# Patient Record
Sex: FEMALE | Race: WHITE | NOT HISPANIC OR LATINO | Employment: OTHER | URBAN - METROPOLITAN AREA
[De-identification: names, ages, dates, MRNs, and addresses within clinical notes are randomized per-mention and may not be internally consistent; named-entity substitution may affect disease eponyms.]

---

## 2009-03-18 LAB — HM COLONOSCOPY: NEGATIVE

## 2017-01-05 ENCOUNTER — PATIENT OUTREACH (OUTPATIENT)
Dept: ADMINISTRATIVE | Facility: HOSPITAL | Age: 72
End: 2017-01-05

## 2017-01-05 NOTE — PROGRESS NOTES
A letter was mailed to the patient on  today in regards to information below.    The patient is due for a urine microalbumin. The patient is due for immunizations(pneumonia), mammogram, and a diabetic eye exam.

## 2017-01-12 ENCOUNTER — TELEPHONE (OUTPATIENT)
Dept: FAMILY MEDICINE | Facility: CLINIC | Age: 72
End: 2017-01-12

## 2017-01-12 ENCOUNTER — PATIENT MESSAGE (OUTPATIENT)
Dept: FAMILY MEDICINE | Facility: CLINIC | Age: 72
End: 2017-01-12

## 2017-01-20 DIAGNOSIS — E11.9 TYPE 2 DIABETES MELLITUS WITHOUT COMPLICATION: ICD-10-CM

## 2017-01-20 DIAGNOSIS — Z12.31 OTHER SCREENING MAMMOGRAM: ICD-10-CM

## 2017-03-07 ENCOUNTER — LAB VISIT (OUTPATIENT)
Dept: LAB | Facility: HOSPITAL | Age: 72
End: 2017-03-07
Attending: INTERNAL MEDICINE
Payer: MEDICARE

## 2017-03-07 DIAGNOSIS — E78.5 HYPERLIPIDEMIA LDL GOAL <100: ICD-10-CM

## 2017-03-07 DIAGNOSIS — I10 BENIGN HYPERTENSION: ICD-10-CM

## 2017-03-07 LAB
ALBUMIN SERPL BCP-MCNC: 4.2 G/DL
ALP SERPL-CCNC: 74 U/L
ALT SERPL W/O P-5'-P-CCNC: 25 U/L
ANION GAP SERPL CALC-SCNC: 11 MMOL/L
AST SERPL-CCNC: 22 U/L
BASOPHILS # BLD AUTO: 0.07 K/UL
BASOPHILS NFR BLD: 0.8 %
BILIRUB SERPL-MCNC: 0.5 MG/DL
BUN SERPL-MCNC: 15 MG/DL
CALCIUM SERPL-MCNC: 10.1 MG/DL
CHLORIDE SERPL-SCNC: 103 MMOL/L
CHOLEST/HDLC SERPL: 3 {RATIO}
CO2 SERPL-SCNC: 26 MMOL/L
CREAT SERPL-MCNC: 0.8 MG/DL
DIFFERENTIAL METHOD: NORMAL
EOSINOPHIL # BLD AUTO: 0.4 K/UL
EOSINOPHIL NFR BLD: 4 %
ERYTHROCYTE [DISTWIDTH] IN BLOOD BY AUTOMATED COUNT: 13.6 %
EST. GFR  (AFRICAN AMERICAN): >60 ML/MIN/1.73 M^2
EST. GFR  (NON AFRICAN AMERICAN): >60 ML/MIN/1.73 M^2
GLUCOSE SERPL-MCNC: 195 MG/DL
HCT VFR BLD AUTO: 42.6 %
HDL/CHOLESTEROL RATIO: 33.1 %
HDLC SERPL-MCNC: 118 MG/DL
HDLC SERPL-MCNC: 39 MG/DL
HGB BLD-MCNC: 14.2 G/DL
LDLC SERPL CALC-MCNC: 59.4 MG/DL
LYMPHOCYTES # BLD AUTO: 3.2 K/UL
LYMPHOCYTES NFR BLD: 35 %
MCH RBC QN AUTO: 27.8 PG
MCHC RBC AUTO-ENTMCNC: 33.3 %
MCV RBC AUTO: 84 FL
MONOCYTES # BLD AUTO: 0.6 K/UL
MONOCYTES NFR BLD: 6.3 %
NEUTROPHILS # BLD AUTO: 4.9 K/UL
NEUTROPHILS NFR BLD: 53.9 %
NONHDLC SERPL-MCNC: 79 MG/DL
PLATELET # BLD AUTO: 342 K/UL
PMV BLD AUTO: 12.2 FL
POTASSIUM SERPL-SCNC: 5.1 MMOL/L
PROT SERPL-MCNC: 7.6 G/DL
RBC # BLD AUTO: 5.1 M/UL
SODIUM SERPL-SCNC: 140 MMOL/L
TRIGL SERPL-MCNC: 98 MG/DL
WBC # BLD AUTO: 9.25 K/UL

## 2017-03-07 PROCEDURE — 36415 COLL VENOUS BLD VENIPUNCTURE: CPT | Mod: PO

## 2017-03-07 PROCEDURE — 80061 LIPID PANEL: CPT

## 2017-03-07 PROCEDURE — 80053 COMPREHEN METABOLIC PANEL: CPT

## 2017-03-07 PROCEDURE — 83036 HEMOGLOBIN GLYCOSYLATED A1C: CPT

## 2017-03-07 PROCEDURE — 85025 COMPLETE CBC W/AUTO DIFF WBC: CPT

## 2017-03-08 LAB
ESTIMATED AVG GLUCOSE: 177 MG/DL
HBA1C MFR BLD HPLC: 7.8 %

## 2017-03-10 ENCOUNTER — PATIENT MESSAGE (OUTPATIENT)
Dept: FAMILY MEDICINE | Facility: CLINIC | Age: 72
End: 2017-03-10

## 2017-03-10 ENCOUNTER — OFFICE VISIT (OUTPATIENT)
Dept: FAMILY MEDICINE | Facility: CLINIC | Age: 72
End: 2017-03-10
Payer: MEDICARE

## 2017-03-10 VITALS
HEIGHT: 60 IN | DIASTOLIC BLOOD PRESSURE: 80 MMHG | OXYGEN SATURATION: 97 % | WEIGHT: 162.38 LBS | TEMPERATURE: 99 F | BODY MASS INDEX: 31.88 KG/M2 | SYSTOLIC BLOOD PRESSURE: 126 MMHG | HEART RATE: 110 BPM

## 2017-03-10 DIAGNOSIS — E55.9 MILD VITAMIN D DEFICIENCY: ICD-10-CM

## 2017-03-10 DIAGNOSIS — E78.5 HYPERLIPIDEMIA LDL GOAL <100: ICD-10-CM

## 2017-03-10 DIAGNOSIS — I10 BENIGN HYPERTENSION: ICD-10-CM

## 2017-03-10 DIAGNOSIS — Z12.31 ENCOUNTER FOR SCREENING MAMMOGRAM FOR BREAST CANCER: ICD-10-CM

## 2017-03-10 DIAGNOSIS — Z00.00 ROUTINE MEDICAL EXAM: Primary | ICD-10-CM

## 2017-03-10 DIAGNOSIS — G47.00 INSOMNIA, UNSPECIFIED TYPE: ICD-10-CM

## 2017-03-10 DIAGNOSIS — Z23 NEED FOR STREPTOCOCCUS PNEUMONIAE VACCINATION: ICD-10-CM

## 2017-03-10 PROCEDURE — 90732 PPSV23 VACC 2 YRS+ SUBQ/IM: CPT | Mod: S$GLB,,, | Performed by: INTERNAL MEDICINE

## 2017-03-10 PROCEDURE — 3079F DIAST BP 80-89 MM HG: CPT | Mod: S$GLB,,, | Performed by: INTERNAL MEDICINE

## 2017-03-10 PROCEDURE — 99499 UNLISTED E&M SERVICE: CPT | Mod: S$GLB,,, | Performed by: INTERNAL MEDICINE

## 2017-03-10 PROCEDURE — 99999 PR PBB SHADOW E&M-EST. PATIENT-LVL IV: CPT | Mod: PBBFAC,,, | Performed by: INTERNAL MEDICINE

## 2017-03-10 PROCEDURE — 3074F SYST BP LT 130 MM HG: CPT | Mod: S$GLB,,, | Performed by: INTERNAL MEDICINE

## 2017-03-10 PROCEDURE — 99397 PER PM REEVAL EST PAT 65+ YR: CPT | Mod: S$GLB,,, | Performed by: INTERNAL MEDICINE

## 2017-03-10 PROCEDURE — G0009 ADMIN PNEUMOCOCCAL VACCINE: HCPCS | Mod: S$GLB,,, | Performed by: INTERNAL MEDICINE

## 2017-03-10 RX ORDER — GLIMEPIRIDE 4 MG/1
TABLET ORAL
Qty: 180 TABLET | Refills: 1 | Status: SHIPPED | OUTPATIENT
Start: 2017-03-10 | End: 2017-10-04 | Stop reason: SDUPTHER

## 2017-03-10 RX ORDER — LOSARTAN POTASSIUM 100 MG/1
100 TABLET ORAL DAILY
Qty: 90 TABLET | Refills: 1 | Status: CANCELLED | OUTPATIENT
Start: 2017-03-10 | End: 2018-03-10

## 2017-03-10 RX ORDER — ROSUVASTATIN CALCIUM 5 MG/1
5 TABLET, COATED ORAL NIGHTLY
Qty: 90 TABLET | Refills: 1 | Status: SHIPPED | OUTPATIENT
Start: 2017-03-10 | End: 2017-03-10 | Stop reason: SDUPTHER

## 2017-03-10 RX ORDER — LOSARTAN POTASSIUM AND HYDROCHLOROTHIAZIDE 25; 100 MG/1; MG/1
1 TABLET ORAL DAILY
Qty: 90 TABLET | Refills: 1 | Status: SHIPPED | OUTPATIENT
Start: 2017-03-10 | End: 2017-10-04 | Stop reason: SDUPTHER

## 2017-03-10 RX ORDER — AMLODIPINE BESYLATE 5 MG/1
5 TABLET ORAL DAILY
Qty: 90 TABLET | Refills: 1 | Status: SHIPPED | OUTPATIENT
Start: 2017-03-10 | End: 2017-03-10 | Stop reason: SDUPTHER

## 2017-03-10 RX ORDER — ACETAMINOPHEN 500 MG
1 TABLET ORAL DAILY
Qty: 90 CAPSULE | Refills: 1 | Status: SHIPPED | OUTPATIENT
Start: 2017-03-10 | End: 2017-03-10 | Stop reason: SDUPTHER

## 2017-03-10 RX ORDER — ZOLPIDEM TARTRATE 5 MG/1
5 TABLET ORAL NIGHTLY PRN
Qty: 90 TABLET | Refills: 0 | Status: CANCELLED | OUTPATIENT
Start: 2017-03-10 | End: 2017-09-08

## 2017-03-10 RX ORDER — AMLODIPINE BESYLATE 5 MG/1
5 TABLET ORAL DAILY
Qty: 90 TABLET | Refills: 1 | Status: SHIPPED | OUTPATIENT
Start: 2017-03-10 | End: 2017-10-04

## 2017-03-10 RX ORDER — LANCETS 28 GAUGE
1 EACH MISCELLANEOUS DAILY
Qty: 100 EACH | Refills: 1 | Status: CANCELLED | OUTPATIENT
Start: 2017-03-10

## 2017-03-10 RX ORDER — HYDROCHLOROTHIAZIDE 25 MG/1
25 TABLET ORAL DAILY
Qty: 90 TABLET | Refills: 1 | Status: CANCELLED | OUTPATIENT
Start: 2017-03-10 | End: 2018-03-10

## 2017-03-10 RX ORDER — METFORMIN HYDROCHLORIDE 1000 MG/1
1000 TABLET ORAL 2 TIMES DAILY
Qty: 180 TABLET | Refills: 1 | Status: SHIPPED | OUTPATIENT
Start: 2017-03-10 | End: 2017-10-04 | Stop reason: SDUPTHER

## 2017-03-10 RX ORDER — LANCETS 28 GAUGE
1 EACH MISCELLANEOUS DAILY
Qty: 100 EACH | Refills: 1 | Status: SHIPPED | OUTPATIENT
Start: 2017-03-10 | End: 2018-10-17 | Stop reason: SDUPTHER

## 2017-03-10 RX ORDER — GLIMEPIRIDE 4 MG/1
TABLET ORAL
Qty: 180 TABLET | Refills: 1 | Status: SHIPPED | OUTPATIENT
Start: 2017-03-10 | End: 2017-03-10 | Stop reason: SDUPTHER

## 2017-03-10 RX ORDER — LANCETS 28 GAUGE
1 EACH MISCELLANEOUS DAILY
Qty: 100 EACH | Refills: 1 | Status: SHIPPED | OUTPATIENT
Start: 2017-03-10 | End: 2018-04-09 | Stop reason: SDUPTHER

## 2017-03-10 RX ORDER — ACETAMINOPHEN 500 MG
1 TABLET ORAL DAILY
Qty: 90 CAPSULE | Refills: 1 | Status: SHIPPED | OUTPATIENT
Start: 2017-03-10 | End: 2019-05-20

## 2017-03-10 RX ORDER — ROSUVASTATIN CALCIUM 5 MG/1
5 TABLET, COATED ORAL NIGHTLY
Qty: 90 TABLET | Refills: 1 | Status: SHIPPED | OUTPATIENT
Start: 2017-03-10 | End: 2017-10-04 | Stop reason: SDUPTHER

## 2017-03-10 RX ORDER — LOSARTAN POTASSIUM AND HYDROCHLOROTHIAZIDE 25; 100 MG/1; MG/1
1 TABLET ORAL DAILY
Qty: 90 TABLET | Refills: 1 | Status: SHIPPED | OUTPATIENT
Start: 2017-03-10 | End: 2017-03-10 | Stop reason: SDUPTHER

## 2017-03-10 NOTE — PROGRESS NOTES
HISTORY OF PRESENT ILLNESS:  Peace Farmer is a 72 y.o. female who presents to the clinic today for a routine medical physical exam. Her last physical exam was approximately 1 years(s) ago.    Contraception: n/a      PAST MEDICAL HISTORY:  Past Medical History:   Diagnosis Date    Abnormal mammogram 10/13    s/p biopsy    Diabetes mellitus type II     Fibromyalgia     Hyperlipidemia     Hypertension     Obesity (BMI 30-39.9)     Osteopenia     Psoriasis     Skin cancer of face 2011    forehead       PAST SURGICAL HISTORY:  Past Surgical History:   Procedure Laterality Date    PARTIAL HYSTERECTOMY      age 34    skin cancer face  2011       SOCIAL HISTORY:  Social History     Social History    Marital status:      Spouse name: N/A    Number of children: 3    Years of education: some colle     Occupational History    homemaker      Social History Main Topics    Smoking status: Never Smoker    Smokeless tobacco: Never Used    Alcohol use Yes      Comment: rare    Drug use: No    Sexual activity: Not Currently     Partners: Male     Birth control/ protection: Post-menopausal     Other Topics Concern    Not on file     Social History Narrative    Adult Screenings Reviewed and updated  5/20/14    Mammogram( for females) October 2013 abnromal left     Pap ( for females) partial hysterectomy     Colonoscopy  2009  Reported normal.    Flu shot 2013     Td updated today  As Tdap  5/20/14    Pneumovax  done once    Zostavax done    Eye exam 2013 due for  2014    Bone density 7/2012 due again in  2016       FAMILY HISTORY:  Family History   Problem Relation Age of Onset    Heart disease Father     Skin cancer Father     Hypertension Father     Lung cancer Father     Hypertension Mother     Skin cancer Mother     Stroke Maternal Grandmother     Diabetes Mellitus Neg Hx     Breast cancer Neg Hx     Colon cancer Neg Hx        ALLERGIES AND MEDICATIONS: updated and reviewed.  Review of  patient's allergies indicates:   Allergen Reactions    Pcn [penicillins] Hives and Shortness Of Breath     Medication List with Changes/Refills   New Medications    LINAGLIPTIN (TRADJENTA) 5 MG TAB TABLET    Take 1 tablet (5 mg total) by mouth once daily.    LOSARTAN-HYDROCHLOROTHIAZIDE 100-25 MG (HYZAAR) 100-25 MG PER TABLET    Take 1 tablet by mouth once daily.   Current Medications    BLOOD-GLUCOSE METER KIT    Use as instructed    CALCIUM-VITAMIN D 500-125 MG-UNIT TABLET    Take 1 tablet by mouth 2 (two) times daily.      LORATADINE (CLARITIN) 10 MG TABLET    Take 10 mg by mouth once daily.    ZOLPIDEM (AMBIEN) 5 MG TAB    Take 1 tablet (5 mg total) by mouth nightly as needed.   Changed and/or Refilled Medications    Modified Medication Previous Medication    AMLODIPINE (NORVASC) 5 MG TABLET amlodipine (NORVASC) 5 MG tablet       Take 1 tablet (5 mg total) by mouth once daily.    Take 1 tablet (5 mg total) by mouth once daily.    BLOOD SUGAR DIAGNOSTIC (FREESTYLE LITE STRIPS) STRP blood sugar diagnostic (FREESTYLE LITE STRIPS) Strp       Apply 1 strip topically once daily.    Apply 1 strip topically once daily.    CHOLECALCIFEROL, VITAMIN D3, 2,000 UNIT CAP cholecalciferol, vitamin D3, 2,000 unit Cap       Take 1 capsule (2,000 Units total) by mouth once daily.    Take 1 capsule (2,000 Units total) by mouth once daily.    GLIMEPIRIDE (AMARYL) 4 MG TABLET glimepiride (AMARYL) 4 MG tablet       TAKE 1 TABLET BY MOUTH TWO TIMES DAILY BEFORE MEALS....(HOLD FOR BLOOD SUGAR LESS THAN 100)    TAKE 1 TABLET BY MOUTH TWO TIMES DAILY BEFORE MEALS....(HOLD FOR BLOOD SUGAR LESS THAN 100)    LANCETS (FREESTYLE LANCETS) 28 GAUGE MISC lancets (FREESTYLE LANCETS) 28 gauge Misc       Apply 1 lancet topically once daily.    Apply 1 lancet topically once daily.    LANCETS (FREESTYLE LANCETS) 28 GAUGE MISC lancets (FREESTYLE LANCETS) 28 gauge Misc       Apply 1 lancet topically once daily.    Apply 1 lancet topically once daily.     METFORMIN (GLUCOPHAGE) 1000 MG TABLET metformin (GLUCOPHAGE) 1000 MG tablet       Take 1 tablet (1,000 mg total) by mouth 2 (two) times daily.    Take 1 tablet (1,000 mg total) by mouth 2 (two) times daily.    ROSUVASTATIN (CRESTOR) 5 MG TABLET rosuvastatin (CRESTOR) 5 MG tablet       Take 1 tablet (5 mg total) by mouth every evening.    Take 1 tablet (5 mg total) by mouth every evening.   Discontinued Medications    HYDROCHLOROTHIAZIDE (HYDRODIURIL) 25 MG TABLET    Take 1 tablet (25 mg total) by mouth once daily.    HYDROCHLOROTHIAZIDE (HYDRODIURIL) 25 MG TABLET    Take 1 tablet (25 mg total) by mouth once daily.    LOSARTAN (COZAAR) 100 MG TABLET    Take 1 tablet (100 mg total) by mouth once daily.    LOSARTAN (COZAAR) 100 MG TABLET    Take 1 tablet (100 mg total) by mouth once daily.    LOSARTAN (COZAAR) 100 MG TABLET    Take 1 tablet (100 mg total) by mouth once daily.             SCREENING HISTORY:  Health Maintenance       Date Due Completion Date    Mammogram 10/7/2016 10/7/2014    Pneumococcal (65+) (2 of 2 - PPSV23) 12/15/2016 12/15/2015    Urine Microalbumin 12/15/2016 12/15/2015    Eye Exam 12/29/2016 12/29/2015    Hemoglobin A1c 9/7/2017 3/7/2017    Foot Exam 9/14/2017 9/14/2016    Lipid Panel 3/7/2018 3/7/2017    DEXA SCAN 1/7/2019 1/7/2016    Colonoscopy 3/18/2019 3/18/2009    TETANUS VACCINE 5/20/2024 5/20/2014            REVIEW OF SYSTEMS:   The patient reports fair dietary habits.  The patient does not exercise regularly.  General ROS: negative for - chills, fever or malaise  Psychological ROS: negative for - anxiety, depression, sleep disturbances or suicidal ideation  Ophthalmic ROS: negative for - blurry vision or eye pain  ENT ROS: negative for - epistaxis, headaches, nasal congestion, oral lesions or sore throat  Allergy and Immunology ROS: negative for - hives  Hematological and Lymphatic ROS: negative for - swollen lymph nodes  Endocrine ROS: negative for - polydipsia/polyuria or  temperature intolerance  Respiratory ROS: no cough, shortness of breath, or wheezing  Cardiovascular ROS: no chest pain or dyspnea on exertion  Gastrointestinal ROS: no abdominal pain, change in bowel habits, or black or bloody stools  Genito-Urinary ROS: no dysuria, trouble voiding, or hematuria  Breast ROS: negative for breast lumps (she does report having dense fibrous breast tissue)  Musculoskeletal ROS: negative for - gait disturbance, joint swelling or muscle pain  Neurological ROS: no TIA or stroke symptoms  Dermatological ROS: negative for mole changes and rash    Physical Examination:   Vitals:    03/10/17 1423   BP: 126/80   Pulse: 110   Temp: 98.7 °F (37.1 °C)     Weight: 73.6 kg (162 lb 5.9 oz)   Height: 5' (152.4 cm)   Body mass index is 31.71 kg/(m^2).    General appearance - alert, well appearing, and in no distress and obese  Mental status - alert, oriented to person, place, and time, normal mood, behavior, speech, dress, motor activity, and thought processes  Eyes - pupils equal and reactive, extraocular eye movements intact, sclera anicteric  Mouth - mucous membranes moist, pharynx normal without lesions  Neck - supple, no significant adenopathy, carotids upstroke normal bilaterally, no bruits, thyroid exam: thyroid is normal in size without nodules or tenderness  Lymphatics - no palpable lymphadenopathy  Chest - clear to auscultation, no wheezes, rales or rhonchi, symmetric air entry  Heart - normal rate and regular rhythm, no gallops noted  Abdomen - soft, nontender, nondistended, no masses or organomegaly  Breasts - not examined  Back exam - full range of motion, no tenderness, palpable spasm or pain on motion  Neurological - alert, oriented, normal speech, no focal findings or movement disorder noted, cranial nerves II through XII intact  Musculoskeletal - no joint tenderness, deformity or swelling, no muscular tenderness noted  Extremities - peripheral pulses normal, no pedal edema, no  clubbing or cyanosis  Skin - normal coloration and turgor, no rashes, no suspicious skin lesions noted      Results for orders placed or performed in visit on 03/07/17   CBC auto differential   Result Value Ref Range    WBC 9.25 3.90 - 12.70 K/uL    RBC 5.10 4.00 - 5.40 M/uL    Hemoglobin 14.2 12.0 - 16.0 g/dL    Hematocrit 42.6 37.0 - 48.5 %    MCV 84 82 - 98 fL    MCH 27.8 27.0 - 31.0 pg    MCHC 33.3 32.0 - 36.0 %    RDW 13.6 11.5 - 14.5 %    Platelets 342 150 - 350 K/uL    MPV 12.2 9.2 - 12.9 fL    Gran # 4.9 1.8 - 7.7 K/uL    Lymph # 3.2 1.0 - 4.8 K/uL    Mono # 0.6 0.3 - 1.0 K/uL    Eos # 0.4 0.0 - 0.5 K/uL    Baso # 0.07 0.00 - 0.20 K/uL    Gran% 53.9 38.0 - 73.0 %    Lymph% 35.0 18.0 - 48.0 %    Mono% 6.3 4.0 - 15.0 %    Eosinophil% 4.0 0.0 - 8.0 %    Basophil% 0.8 0.0 - 1.9 %    Differential Method Automated    Comprehensive metabolic panel   Result Value Ref Range    Sodium 140 136 - 145 mmol/L    Potassium 5.1 3.5 - 5.1 mmol/L    Chloride 103 95 - 110 mmol/L    CO2 26 23 - 29 mmol/L    Glucose 195 (H) 70 - 110 mg/dL    BUN, Bld 15 8 - 23 mg/dL    Creatinine 0.8 0.5 - 1.4 mg/dL    Calcium 10.1 8.7 - 10.5 mg/dL    Total Protein 7.6 6.0 - 8.4 g/dL    Albumin 4.2 3.5 - 5.2 g/dL    Total Bilirubin 0.5 0.1 - 1.0 mg/dL    Alkaline Phosphatase 74 55 - 135 U/L    AST 22 10 - 40 U/L    ALT 25 10 - 44 U/L    Anion Gap 11 8 - 16 mmol/L    eGFR if African American >60.0 >60 mL/min/1.73 m^2    eGFR if non African American >60.0 >60 mL/min/1.73 m^2   Lipid panel   Result Value Ref Range    Cholesterol 118 (L) 120 - 199 mg/dL    Triglycerides 98 30 - 150 mg/dL    HDL 39 (L) 40 - 75 mg/dL    LDL Cholesterol 59.4 (L) 63.0 - 159.0 mg/dL    HDL/Chol Ratio 33.1 20.0 - 50.0 %    Total Cholesterol/HDL Ratio 3.0 2.0 - 5.0    Non-HDL Cholesterol 79 mg/dL   Hemoglobin A1c   Result Value Ref Range    Hemoglobin A1C 7.8 (H) 4.5 - 6.2 %    Estimated Avg Glucose 177 (H) 68 - 131 mg/dL           ASSESSMENT AND PLAN:  1. Routine medical  exam  Counseled on age appropriate medical preventative services including age appropriate cancer screenings, age appropriate eye and dental exams, over all nutritional health, need for a consistent exercise regimen, and an over all push towards maintaining a vigorous and active lifestyle.  Counseled on age appropriate vaccines and discussed upcoming health care needs based on age/gender. Discussed good sleep hygiene and stress management.     2. Type 2 diabetes, uncontrolled, with neuropathy  Diabetes control has worsened.  We discussed diabetic diet and regular exercise.  She is on maximum dose metformin and glimepiride.  I will add Tradjenta.  Recheck blood work in 3-6 months.  Neuropathy pain control.  - Microalbumin/creatinine urine ratio; Future  - metformin (GLUCOPHAGE) 1000 MG tablet; Take 1 tablet (1,000 mg total) by mouth 2 (two) times daily.  Dispense: 180 tablet; Refill: 1  - glimepiride (AMARYL) 4 MG tablet; TAKE 1 TABLET BY MOUTH TWO TIMES DAILY BEFORE MEALS....(HOLD FOR BLOOD SUGAR LESS THAN 100)  Dispense: 180 tablet; Refill: 1  - linagliptin (TRADJENTA) 5 mg Tab tablet; Take 1 tablet (5 mg total) by mouth once daily.  Dispense: 90 tablet; Refill: 1  - Ambulatory referral to Ophthalmology  - lancets (FREESTYLE LANCETS) 28 gauge Misc; Apply 1 lancet topically once daily.  Dispense: 100 each; Refill: 1  - blood sugar diagnostic (FREESTYLE LITE STRIPS) Strp; Apply 1 strip topically once daily.  Dispense: 100 strip; Refill: 1  - lancets (FREESTYLE LANCETS) 28 gauge Misc; Apply 1 lancet topically once daily.  Dispense: 100 each; Refill: 1    3. Benign hypertension  Discussed sodium restriction, maintaining ideal body weight and regular exercise program as physiologic means to achieve blood pressure control. The patient will strive towards this. The current medical regimen is effective;  continue present plan and medications. Recommended patient to check home readings to monitor and see me for followup as  scheduled or sooner as needed. Patient was educated that both decongestant and anti-inflammatory medication may raise blood pressure.   - amlodipine (NORVASC) 5 MG tablet; Take 1 tablet (5 mg total) by mouth once daily.  Dispense: 90 tablet; Refill: 1  - losartan-hydrochlorothiazide 100-25 mg (HYZAAR) 100-25 mg per tablet; Take 1 tablet by mouth once daily.  Dispense: 90 tablet; Refill: 1    4. Hyperlipidemia LDL goal <100  We discussed low fat diet and regular exercise.The current medical regimen is effective;  continue present plan and medications.    - rosuvastatin (CRESTOR) 5 MG tablet; Take 1 tablet (5 mg total) by mouth every evening.  Dispense: 90 tablet; Refill: 1    6. Insomnia, unspecified type  We discussed sleep hygiene including going to bed at the same time every night, having a bedtime routine, avoiding bright lights in the 2-3 hours before bedtime, making sure the bedroom is completely dark when going to bed and not watching TV or reading in bed (preferably no tv in the bedroom), taking melatonin up to 10 mg 30 minutes prior to going to sleep, and regular daily exercise. We discussed only sleeping for 7-8 hours every night and no daytime napping. Avoid caffeine after the noon hour.    Consider consultation with a sleep specialist if symptoms worsen or persist.     7. Mild vitamin D deficiency  The current medical regimen is effective;  continue present plan and medications.   - cholecalciferol, vitamin D3, 2,000 unit Cap; Take 1 capsule (2,000 Units total) by mouth once daily.  Dispense: 90 capsule; Refill: 1    8. Encounter for screening mammogram for breast cancer    - Mammo Digital Screening Bilat with CAD; Future  - Mammo Digital Screening Bilat with CAD    9. Need for Streptococcus pneumoniae vaccination    - Pneumococcal Polysaccharide Vaccine (23 Valent) (SQ/IM)          Return in about 6 months (around 9/10/2017), or if symptoms worsen or fail to improve, for follow up chronic medical  conditions.. or sooner as needed.

## 2017-03-15 ENCOUNTER — HOSPITAL ENCOUNTER (OUTPATIENT)
Dept: RADIOLOGY | Facility: HOSPITAL | Age: 72
Discharge: HOME | End: 2017-03-15
Attending: INTERNAL MEDICINE
Payer: MEDICARE

## 2017-03-15 DIAGNOSIS — Z12.31 ENCOUNTER FOR SCREENING MAMMOGRAM FOR BREAST CANCER: ICD-10-CM

## 2017-03-15 PROCEDURE — 77067 SCR MAMMO BI INCL CAD: CPT | Mod: 26,,, | Performed by: RADIOLOGY

## 2017-03-15 PROCEDURE — 77067 SCR MAMMO BI INCL CAD: CPT | Mod: TC

## 2017-03-15 PROCEDURE — 77063 BREAST TOMOSYNTHESIS BI: CPT | Mod: 26,,, | Performed by: RADIOLOGY

## 2017-03-20 ENCOUNTER — OFFICE VISIT (OUTPATIENT)
Dept: OPTOMETRY | Facility: CLINIC | Age: 72
End: 2017-03-20
Payer: MEDICARE

## 2017-03-20 DIAGNOSIS — H25.13 NUCLEAR SCLEROSIS, BILATERAL: ICD-10-CM

## 2017-03-20 DIAGNOSIS — H43.819 POSTERIOR VITREOUS DETACHMENT, UNSPECIFIED LATERALITY: ICD-10-CM

## 2017-03-20 DIAGNOSIS — H52.7 REFRACTIVE ERROR: ICD-10-CM

## 2017-03-20 DIAGNOSIS — H26.9 CORTICAL CATARACT OF BOTH EYES: ICD-10-CM

## 2017-03-20 DIAGNOSIS — E11.9 TYPE 2 DIABETES MELLITUS WITHOUT OPHTHALMIC MANIFESTATIONS: Primary | ICD-10-CM

## 2017-03-20 PROCEDURE — 99499 UNLISTED E&M SERVICE: CPT | Mod: S$GLB,,, | Performed by: OPTOMETRIST

## 2017-03-20 PROCEDURE — 92015 DETERMINE REFRACTIVE STATE: CPT | Mod: S$GLB,,, | Performed by: OPTOMETRIST

## 2017-03-20 PROCEDURE — 99999 PR PBB SHADOW E&M-EST. PATIENT-LVL I: CPT | Mod: PBBFAC,,, | Performed by: OPTOMETRIST

## 2017-03-20 PROCEDURE — 92004 COMPRE OPH EXAM NEW PT 1/>: CPT | Mod: S$GLB,,, | Performed by: OPTOMETRIST

## 2017-03-20 NOTE — LETTER
March 20, 2017      Rosa Garber MD  [address]  [place] [address]           [facility]  [address]  [place] [address]  Phone: [phone]  Fax: [phone]          Patient: Peace Farmer   MR Number: [id number]   YOB: 1945   Date of Visit: 3/20/2017       Dear Dr. Rosa Garber:    Thank you for referring Peace Farmer to me for evaluation. Attached you will find relevant portions of my assessment and plan of care.    If you have questions, please do not hesitate to call me. I look forward to following Peace Farmer along with you.    Sincerely,    Samira Freeman, OD    Enclosure  CC:  No Recipients    If you would like to receive this communication electronically, please contact [email or web address] or [phone] to request more information on [employer] access.    For providers and/or their staff who would like to refer a patient to [facility], please contact us through our one-stop-shop provider referral line, [facility], at [phone].    If you feel you have received this communication in error or would no longer like to receive these types of communications, please e-mail [email or web address]

## 2017-03-20 NOTE — PROGRESS NOTES
"HPI     Dls: 1 yr - Dr. Álvarez    Pt here for diabetic and hypertensive eye exam.   Pt states no changes in vision. Pt wears pal's. Pt c/o dry eyes ou itching   ou tearing ou no burning no pain no ha's floaters ou for yrs.     Eye meds:   None    Hemoglobin A1C       Date                     Value               Ref Range             Status                03/07/2017               7.8 (H)             4.5 - 6.2 %           Final                  09/09/2016               7.3 (H)             4.5 - 6.2 %           Final                  06/14/2016               6.9 (H)             4.5 - 6.2 %           Final            ----------    H/o carotid doppler x 5 years ago per request of previous OD - pt was not   sure why, do not have old records, reports results were normal. No history   of TIA or bleeds in the eye per patient        Last edited by Samira Freeman, OD on 3/20/2017  9:51 AM.     Assessment /Plan     For exam results, see Encounter Report.    Type 2 diabetes mellitus without ophthalmic manifestations  - No diabetic retinopathy. Educated patient on importance of good blood sugar control, compliance with meds, and follow up care with PCP. Return in 1 year for dilated eye exam, sooner PRN.    Cortical cataract of both eyes  Nuclear sclerosis, bilateral  - Educated patient on the presence of cataracts and effects on vision, including clouded, blurred or dim vision, increasing difficulty with vision at night, sensitivity to light and glare, need for brighter light for reading and other activities, and seeing "halos" around lights. No surgery at this time. Recheck in one year.    Posterior vitreous detachment, unspecified laterality  - Discussed causes of flashes and floaters with the patient and described the warnings of a possible retinal detachment. Advised patient to call if there is an increase in flashes or floaters    Refractive error  - Educated patient on refractive error and discussed lens options. Pt preferred " +3.00 add for peter work. Discussed holding reading objects closer due to shorter WD for peter. Gave pt Rx for specs. RTC in 1 year.    Eyeglass Final Rx     Eyeglass Final Rx      Sphere Cylinder Axis Add   Right -1.25 +0.75 035 +3.00   Left -1.25 +1.25 020 +3.00       Type:  PAL    Expiration Date:  3/21/2018                  RTC 1 year(s) or sooner PRN

## 2017-04-13 DIAGNOSIS — E11.9 DIABETES MELLITUS WITHOUT COMPLICATION: ICD-10-CM

## 2017-06-12 ENCOUNTER — OFFICE VISIT (OUTPATIENT)
Dept: FAMILY MEDICINE | Facility: CLINIC | Age: 72
End: 2017-06-12
Payer: MEDICARE

## 2017-06-12 VITALS
WEIGHT: 161.94 LBS | RESPIRATION RATE: 18 BRPM | TEMPERATURE: 99 F | DIASTOLIC BLOOD PRESSURE: 70 MMHG | HEIGHT: 60 IN | SYSTOLIC BLOOD PRESSURE: 121 MMHG | BODY MASS INDEX: 31.79 KG/M2 | HEART RATE: 101 BPM | OXYGEN SATURATION: 97 %

## 2017-06-12 DIAGNOSIS — J06.9 VIRAL URI WITH COUGH: ICD-10-CM

## 2017-06-12 DIAGNOSIS — I10 BENIGN HYPERTENSION: ICD-10-CM

## 2017-06-12 DIAGNOSIS — K12.1 MOUTH ULCER: Primary | ICD-10-CM

## 2017-06-12 PROCEDURE — 1159F MED LIST DOCD IN RCRD: CPT | Mod: S$GLB,,, | Performed by: NURSE PRACTITIONER

## 2017-06-12 PROCEDURE — 4010F ACE/ARB THERAPY RXD/TAKEN: CPT | Mod: S$GLB,,, | Performed by: NURSE PRACTITIONER

## 2017-06-12 PROCEDURE — 99499 UNLISTED E&M SERVICE: CPT | Mod: S$GLB,,, | Performed by: NURSE PRACTITIONER

## 2017-06-12 PROCEDURE — 99214 OFFICE O/P EST MOD 30 MIN: CPT | Mod: S$GLB,,, | Performed by: NURSE PRACTITIONER

## 2017-06-12 PROCEDURE — 99999 PR PBB SHADOW E&M-EST. PATIENT-LVL V: CPT | Mod: PBBFAC,,, | Performed by: NURSE PRACTITIONER

## 2017-06-12 PROCEDURE — 3045F PR MOST RECENT HEMOGLOBIN A1C LEVEL 7.0-9.0%: CPT | Mod: S$GLB,,, | Performed by: NURSE PRACTITIONER

## 2017-06-12 PROCEDURE — 1125F AMNT PAIN NOTED PAIN PRSNT: CPT | Mod: S$GLB,,, | Performed by: NURSE PRACTITIONER

## 2017-06-12 NOTE — PROGRESS NOTES
Subjective:       Patient ID: Peace Farmer is a 72 y.o. female.    Chief Complaint: Chest Congestion and Oral Pain (pain for 3 weeks )    72-year-old female presents to the clinic today with a sore area to her right upper gums where she had 2 previous teeth removed in the past.  She said the soreness has been going on for about 3 weeks.  She says the pain comes and goes.She takes Extra Strength ASA.  She also complains of a slight cough with some chest congestion.  She denies any fever, chills, sore throat, sinus congestion, ear pain, wheezing, shortness breath, abdominal pain, nausea, vomiting, or diarrhea.  She has a slight headache at times.  She denies any dizziness or blurred vision.  She denies any cardiac chest pain, heart palpitations, shortness breath, or swelling to lower extremities.  She's been applying one half water and one half hydrogen peroxide to right ear which is now much improved.  She does not check her blood sugars.  She has diabetes and hypertension.      Past Medical History:   Diagnosis Date    Abnormal mammogram 10/13    s/p biopsy    Diabetes mellitus type II     Fibromyalgia     Hyperlipidemia     Hypertension     Obesity (BMI 30-39.9)     Osteopenia     Psoriasis     Skin cancer of face 2011    forehead     Past Surgical History:   Procedure Laterality Date    PARTIAL HYSTERECTOMY      age 34    skin cancer face  2011      reports that she has never smoked. She has never used smokeless tobacco. She reports that she drinks alcohol. She reports that she does not use drugs.  Review of Systems   Constitutional: Negative for chills and fever.   HENT: Negative for congestion, ear discharge, mouth sores, postnasal drip, rhinorrhea, sinus pressure, sneezing and sore throat.         Oral pain    Eyes: Negative for pain, discharge and itching.   Respiratory: Positive for cough. Negative for shortness of breath and wheezing.    Cardiovascular: Negative for chest pain, palpitations  and leg swelling.   Gastrointestinal: Negative for abdominal pain, diarrhea, nausea and vomiting.   Musculoskeletal: Negative for gait problem.   Neurological: Negative for dizziness, light-headedness and headaches.       Objective:      Physical Exam   Constitutional: She is oriented to person, place, and time. She appears well-developed and well-nourished. No distress.   HENT:   Head: Normocephalic and atraumatic.   Right Ear: External ear normal.   Left Ear: External ear normal.   Nose: Nose normal.   Mouth/Throat: Oropharynx is clear and moist. No oropharyngeal exudate.   Small ulcer noted right upper gums previous site where 2 teeth were removed silver nitrate applied tolerated well   Eyes: Conjunctivae and EOM are normal. Pupils are equal, round, and reactive to light. Right eye exhibits no discharge. Left eye exhibits no discharge. No scleral icterus.   Neck: Normal range of motion. Neck supple. No JVD present.   Cardiovascular: Normal rate, regular rhythm and normal heart sounds.  Exam reveals no gallop and no friction rub.    No murmur heard.  Pulmonary/Chest: Effort normal and breath sounds normal. No respiratory distress. She has no wheezes. She has no rales.   Congested cough   Abdominal: Soft. Bowel sounds are normal. There is no tenderness.   Musculoskeletal: Normal range of motion. She exhibits no edema.   Lymphadenopathy:     She has no cervical adenopathy.   Neurological: She is alert and oriented to person, place, and time.   Skin: Skin is warm and dry. She is not diaphoretic.   Psychiatric: She has a normal mood and affect.       Assessment:       1. Mouth ulcer    2. Viral URI with cough    3. Benign hypertension    4. Type 2 diabetes, uncontrolled, with neuropathy        Plan:         Mouth ulcer  - sliver nitrate applied tolerated well    Viral URI with cough  - Mucinex DM as directed    Benign hypertension  - The current medical regimen is effective;  continue present plan and  medications.    Type 2 diabetes, uncontrolled, with neuropathy  - The current medical regimen is effective;  continue present plan and medications.

## 2017-09-29 ENCOUNTER — TELEPHONE (OUTPATIENT)
Dept: FAMILY MEDICINE | Facility: CLINIC | Age: 72
End: 2017-09-29

## 2017-09-29 DIAGNOSIS — E53.8 VITAMIN B12 DEFICIENCY: ICD-10-CM

## 2017-09-29 DIAGNOSIS — E55.9 MILD VITAMIN D DEFICIENCY: ICD-10-CM

## 2017-09-29 NOTE — TELEPHONE ENCOUNTER
----- Message from Jamaal Payton sent at 9/29/2017  4:21 PM CDT -----  Contact: Self/[phone]  Patient is requesting lab orders.  She would like to schedule for Monday, October 2nd. Thank you.

## 2017-10-02 ENCOUNTER — LAB VISIT (OUTPATIENT)
Dept: LAB | Facility: HOSPITAL | Age: 72
End: 2017-10-02
Attending: INTERNAL MEDICINE
Payer: MEDICARE

## 2017-10-02 DIAGNOSIS — E55.9 MILD VITAMIN D DEFICIENCY: ICD-10-CM

## 2017-10-02 DIAGNOSIS — E53.8 VITAMIN B12 DEFICIENCY: ICD-10-CM

## 2017-10-02 LAB
25(OH)D3+25(OH)D2 SERPL-MCNC: 43 NG/ML
ALBUMIN SERPL BCP-MCNC: 4.1 G/DL
ALP SERPL-CCNC: 76 U/L
ALT SERPL W/O P-5'-P-CCNC: 28 U/L
ANION GAP SERPL CALC-SCNC: 10 MMOL/L
AST SERPL-CCNC: 20 U/L
BILIRUB SERPL-MCNC: 0.4 MG/DL
BUN SERPL-MCNC: 16 MG/DL
CALCIUM SERPL-MCNC: 10.2 MG/DL
CHLORIDE SERPL-SCNC: 100 MMOL/L
CO2 SERPL-SCNC: 29 MMOL/L
CREAT SERPL-MCNC: 0.8 MG/DL
EST. GFR  (AFRICAN AMERICAN): >60 ML/MIN/1.73 M^2
EST. GFR  (NON AFRICAN AMERICAN): >60 ML/MIN/1.73 M^2
ESTIMATED AVG GLUCOSE: 177 MG/DL
GLUCOSE SERPL-MCNC: 203 MG/DL
HBA1C MFR BLD HPLC: 7.8 %
POTASSIUM SERPL-SCNC: 3.7 MMOL/L
PROT SERPL-MCNC: 7.7 G/DL
SODIUM SERPL-SCNC: 139 MMOL/L
VIT B12 SERPL-MCNC: 451 PG/ML

## 2017-10-02 PROCEDURE — 83036 HEMOGLOBIN GLYCOSYLATED A1C: CPT

## 2017-10-02 PROCEDURE — 82607 VITAMIN B-12: CPT

## 2017-10-02 PROCEDURE — 80053 COMPREHEN METABOLIC PANEL: CPT

## 2017-10-02 PROCEDURE — 82306 VITAMIN D 25 HYDROXY: CPT

## 2017-10-02 PROCEDURE — 36415 COLL VENOUS BLD VENIPUNCTURE: CPT | Mod: PO

## 2017-10-03 NOTE — PROGRESS NOTES
This note was created by combination of typed  and Dragon dictation.  Transcription errors may be present.  If there are any questions, please contact me.    Assessment & Plan  Type 2 diabetes, uncontrolled, with neuropathy-monofilament intact today.  A1c not to goal.  She thinks that 3 month visits will keep her more accountable.  Stay on metformin and stay on glimepiride.  Stop the Tradjenta and restart Januvia at 50 mg.  She would like to avoid Jardiance given how painful UTIs have been in the past and would like to avoid injections i.e. would like to avoid Trulicity.  -     SITagliptin (JANUVIA) 50 MG Tab; Take 2 tablets (100 mg total) by mouth once daily.  Dispense: 90 tablet; Refill: 1  -     metformin (GLUCOPHAGE) 1000 MG tablet; Take 1 tablet (1,000 mg total) by mouth 2 (two) times daily.  Dispense: 180 tablet; Refill: 1  -     glimepiride (AMARYL) 4 MG tablet; TAKE 1 TABLET BY MOUTH TWO TIMES DAILY BEFORE MEALS....(HOLD FOR BLOOD SUGAR LESS THAN 100)  Dispense: 180 tablet; Refill: 1    Hyperlipidemia LDL goal <100 - stable on crestor refilled.  -     rosuvastatin (CRESTOR) 5 MG tablet; Take 1 tablet (5 mg total) by mouth every evening.  Dispense: 90 tablet; Refill: 1    Benign hypertension-borderline control, hx of amlodipine with possible SE of edema, for now no change.  -     losartan-hydrochlorothiazide 100-25 mg (HYZAAR) 100-25 mg per tablet; Take 1 tablet by mouth once daily.  Dispense: 90 tablet; Refill: 1    Vitamin B12 deficiency - levels good not on B12 supplement, does not need injection/B12 vitamin.    Mild vitamin D deficiency  Osteopenia, unspecified location - stable on OTC vit D.    Insomnia, unspecified type - notes rare use, cautioned re: long term SE profile, refilled today #90 expect this should last her a year.  -     zolpidem (AMBIEN) 5 MG Tab; Take 1 tablet (5 mg total) by mouth nightly as needed.  Dispense: 90 tablet; Refill: 0        Medications Discontinued During This  Encounter   Medication Reason    linagliptin (TRADJENTA) 5 mg Tab tablet     amlodipine (NORVASC) 5 MG tablet     cyanocobalamin injection 1,000 mcg     metformin (GLUCOPHAGE) 1000 MG tablet Reorder    rosuvastatin (CRESTOR) 5 MG tablet Reorder    losartan-hydrochlorothiazide 100-25 mg (HYZAAR) 100-25 mg per tablet Reorder    zolpidem (AMBIEN) 5 MG Tab     glimepiride (AMARYL) 4 MG tablet Reorder    zolpidem (AMBIEN) 5 MG Tab Reorder       Follow-up: No Follow-up on file. OV 3 months with her PCP      =================================================================      Chief Complaint   Patient presents with    Medication Refill    Diabetes    Hyperlipidemia    Hypertension       KAYKAY Hand is a 72 y.o. female, last appointment with this clinic was 6/12/2017.    No LMP recorded. Patient has had a hysterectomy.    Pt of Dr. Garber.  DM2 complicated with neuropathy; metformin; tradjenta; glimepiride  HTN; amlodipine, losartan HCTZ  Hyperlipidemia, rosuvastatin.  Vit D deficient  B12 deficiency    Previsit labs B12 normal.  Vitamin D normal.  A1c remains high at 7.8.    Not taking any B12 supplement.   Diet - less restricted.  Finds that q3 month visits keep her more accountable.  Amlodipine - not taking b/c of possible SE of pedal edema. Confounder is that she was walking a lot. So she stopped it. Took it for months?  BP borderline control today.  Requesting RF on Ambien, takes sparingly, notes 90 tablets last her a year.  We talked about alternative medications such as Jardiance.  She is wary of possible side effect of UTI.  We also talked about possible alternatives such as Trulicity but she is wary of injections.  She has some leftover Januvia from previous, she thinks it gave her stomach but there was a confounding factor at that time and would like to rechallenge on that one.    Patient Care Team:  Rosa Garber MD as PCP - General (Internal Medicine)    Patient Active Problem List     Diagnosis Date Noted    Psoriasis 09/08/2014     - mild      Obesity (BMI 30-39.9)     Vitamin B12 deficiency 12/18/2013    Insomnia 12/18/2013    Mild vitamin D deficiency 12/18/2013    Osteopenia     History of skin cancer      - face      Type 2 diabetes, uncontrolled, with neuropathy     Benign hypertension     Hyperlipidemia LDL goal <100        PAST MEDICAL HISTORY:  Past Medical History:   Diagnosis Date    Abnormal mammogram 10/13    s/p biopsy    Diabetes mellitus type II     Fibromyalgia     Hyperlipidemia     Hypertension     Obesity (BMI 30-39.9)     Osteopenia     Psoriasis     Skin cancer of face 2011    forehead       PAST SURGICAL HISTORY:  Past Surgical History:   Procedure Laterality Date    PARTIAL HYSTERECTOMY      age 34    skin cancer face  2011       SOCIAL HISTORY:  Social History     Social History    Marital status:      Spouse name: N/A    Number of children: 3    Years of education: some colle     Occupational History    homemaker      Social History Main Topics    Smoking status: Never Smoker    Smokeless tobacco: Never Used    Alcohol use Yes      Comment: rare    Drug use: No    Sexual activity: Not Currently     Partners: Male     Birth control/ protection: Post-menopausal     Other Topics Concern    Not on file     Social History Narrative    Adult Screenings Reviewed and updated  5/20/14    Mammogram( for females) October 2013 abnromal left     Pap ( for females) partial hysterectomy     Colonoscopy  2009  Reported normal.    Flu shot 2013     Td updated today  As Tdap  5/20/14    Pneumovax  done once    Zostavax done    Eye exam 2013 due for  2014    Bone density 7/2012 due again in  2016       ALLERGIES AND MEDICATIONS: updated and reviewed.  Review of patient's allergies indicates:   Allergen Reactions    Pcn [penicillins] Hives and Shortness Of Breath     Current Outpatient Prescriptions   Medication Sig Dispense Refill    amlodipine  (NORVASC) 5 MG tablet Take 1 tablet (5 mg total) by mouth once daily. 90 tablet 1    blood sugar diagnostic (FREESTYLE LITE STRIPS) Strp Apply 1 strip topically once daily. 100 strip 1    blood-glucose meter kit Use as instructed 1 each 0    calcium-vitamin D 500-125 mg-unit tablet Take 1 tablet by mouth 2 (two) times daily.        cholecalciferol, vitamin D3, 2,000 unit Cap Take 1 capsule (2,000 Units total) by mouth once daily. 90 capsule 1    glimepiride (AMARYL) 4 MG tablet TAKE 1 TABLET BY MOUTH TWO TIMES DAILY BEFORE MEALS....(HOLD FOR BLOOD SUGAR LESS THAN 100) 180 tablet 1    lancets (FREESTYLE LANCETS) 28 gauge Misc Apply 1 lancet topically once daily. 100 each 1    lancets (FREESTYLE LANCETS) 28 gauge Misc Apply 1 lancet topically once daily. 100 each 1    linagliptin (TRADJENTA) 5 mg Tab tablet Take 1 tablet (5 mg total) by mouth once daily. 90 tablet 1    loratadine (CLARITIN) 10 mg tablet Take 10 mg by mouth once daily.      losartan-hydrochlorothiazide 100-25 mg (HYZAAR) 100-25 mg per tablet Take 1 tablet by mouth once daily. 90 tablet 1    metformin (GLUCOPHAGE) 1000 MG tablet Take 1 tablet (1,000 mg total) by mouth 2 (two) times daily. 180 tablet 1    rosuvastatin (CRESTOR) 5 MG tablet Take 1 tablet (5 mg total) by mouth every evening. 90 tablet 1     Current Facility-Administered Medications   Medication Dose Route Frequency Provider Last Rate Last Dose    cyanocobalamin injection 1,000 mcg  1,000 mcg Intramuscular Q30 Days Kaylene Landin MD   1,000 mcg at 12/18/13 1125       Review of Systems   Constitutional: Negative for chills and fever.   Respiratory: Negative for cough.    Cardiovascular: Negative for chest pain and palpitations.       Physical Exam   Vitals:    10/04/17 1340 10/04/17 1358   BP: (!) 140/84 138/82   BP Location:  Left arm   Patient Position:  Sitting   BP Method:  Large (Manual)   Pulse: 92    Temp: 98.3 °F (36.8 °C)    SpO2: 98%    Weight: 74.2 kg  (163 lb 11.1 oz)    Height: 5' (1.524 m)     Body mass index is 31.97 kg/m².  Weight: 74.2 kg (163 lb 11.1 oz)   Height: 5' (152.4 cm)     Physical Exam   Constitutional: She is oriented to person, place, and time. She appears well-developed and well-nourished. No distress.   Eyes: EOM are normal.   Cardiovascular: Normal rate, regular rhythm and normal heart sounds.    No murmur heard.  Pulses:       Dorsalis pedis pulses are 1+ on the right side, and 1+ on the left side.        Posterior tibial pulses are 0 on the right side, and 0 on the left side.   Pulmonary/Chest: Effort normal and breath sounds normal.   Musculoskeletal: Normal range of motion.        Right foot: There is no deformity.        Left foot: There is no deformity.   Feet:   Right Foot:   Protective Sensation: 5 sites tested. 5 sites sensed.   Skin Integrity: Negative for ulcer, blister, skin breakdown, erythema or warmth.   Left Foot:   Protective Sensation: 5 sites tested. 5 sites sensed.   Skin Integrity: Negative for ulcer, blister, skin breakdown, erythema or warmth.   Neurological: She is alert and oriented to person, place, and time. Coordination normal.   Skin: Skin is warm and dry.   Psychiatric: She has a normal mood and affect. Her behavior is normal. Thought content normal.        Component      Latest Ref Rng & Units 10/2/2017   Sodium      136 - 145 mmol/L 139   Potassium      3.5 - 5.1 mmol/L 3.7   Chloride      95 - 110 mmol/L 100   CO2      23 - 29 mmol/L 29   Glucose      70 - 110 mg/dL 203 (H)   BUN, Bld      8 - 23 mg/dL 16   Creatinine      0.5 - 1.4 mg/dL 0.8   Calcium      8.7 - 10.5 mg/dL 10.2   Total Protein      6.0 - 8.4 g/dL 7.7   Albumin      3.5 - 5.2 g/dL 4.1   Total Bilirubin      0.1 - 1.0 mg/dL 0.4   Alkaline Phosphatase      55 - 135 U/L 76   AST      10 - 40 U/L 20   ALT      10 - 44 U/L 28   Anion Gap      8 - 16 mmol/L 10   eGFR if African American      >60 mL/min/1.73 m:2 >60.0   eGFR if non African American       >60 mL/min/1.73 m:2 >60.0   Hemoglobin A1C      4.0 - 5.6 % 7.8 (H)   Estimated Avg Glucose      68 - 131 mg/dL 177 (H)   Vit D, 25-Hydroxy      30 - 96 ng/mL 43   Vitamin B-12      210 - 950 pg/mL 451

## 2017-10-04 ENCOUNTER — OFFICE VISIT (OUTPATIENT)
Dept: FAMILY MEDICINE | Facility: CLINIC | Age: 72
End: 2017-10-04
Payer: MEDICARE

## 2017-10-04 VITALS
OXYGEN SATURATION: 98 % | BODY MASS INDEX: 32.14 KG/M2 | DIASTOLIC BLOOD PRESSURE: 82 MMHG | HEART RATE: 92 BPM | TEMPERATURE: 98 F | HEIGHT: 60 IN | WEIGHT: 163.69 LBS | SYSTOLIC BLOOD PRESSURE: 138 MMHG

## 2017-10-04 DIAGNOSIS — E53.8 VITAMIN B12 DEFICIENCY: ICD-10-CM

## 2017-10-04 DIAGNOSIS — I10 BENIGN HYPERTENSION: ICD-10-CM

## 2017-10-04 DIAGNOSIS — G47.00 INSOMNIA, UNSPECIFIED TYPE: ICD-10-CM

## 2017-10-04 DIAGNOSIS — M85.80 OSTEOPENIA, UNSPECIFIED LOCATION: ICD-10-CM

## 2017-10-04 DIAGNOSIS — E55.9 MILD VITAMIN D DEFICIENCY: ICD-10-CM

## 2017-10-04 DIAGNOSIS — E78.5 HYPERLIPIDEMIA LDL GOAL <100: ICD-10-CM

## 2017-10-04 PROCEDURE — 99214 OFFICE O/P EST MOD 30 MIN: CPT | Mod: S$GLB,,, | Performed by: INTERNAL MEDICINE

## 2017-10-04 PROCEDURE — 99499 UNLISTED E&M SERVICE: CPT | Mod: S$GLB,,, | Performed by: INTERNAL MEDICINE

## 2017-10-04 PROCEDURE — 99999 PR PBB SHADOW E&M-EST. PATIENT-LVL III: CPT | Mod: PBBFAC,,, | Performed by: INTERNAL MEDICINE

## 2017-10-04 RX ORDER — ZOLPIDEM TARTRATE 5 MG/1
5 TABLET ORAL NIGHTLY PRN
Qty: 90 TABLET | Refills: 0 | Status: SHIPPED | OUTPATIENT
Start: 2017-10-04 | End: 2018-07-16 | Stop reason: ALTCHOICE

## 2017-10-04 RX ORDER — GLIMEPIRIDE 4 MG/1
TABLET ORAL
Qty: 180 TABLET | Refills: 1 | Status: SHIPPED | OUTPATIENT
Start: 2017-10-04 | End: 2018-01-10 | Stop reason: SDUPTHER

## 2017-10-04 RX ORDER — METFORMIN HYDROCHLORIDE 1000 MG/1
1000 TABLET ORAL 2 TIMES DAILY
Qty: 180 TABLET | Refills: 1 | Status: SHIPPED | OUTPATIENT
Start: 2017-10-04 | End: 2017-11-22 | Stop reason: SDUPTHER

## 2017-10-04 RX ORDER — LOSARTAN POTASSIUM AND HYDROCHLOROTHIAZIDE 25; 100 MG/1; MG/1
1 TABLET ORAL DAILY
Qty: 90 TABLET | Refills: 1 | Status: SHIPPED | OUTPATIENT
Start: 2017-10-04 | End: 2018-04-02 | Stop reason: SDUPTHER

## 2017-10-04 RX ORDER — ROSUVASTATIN CALCIUM 5 MG/1
5 TABLET, COATED ORAL NIGHTLY
Qty: 90 TABLET | Refills: 1 | Status: SHIPPED | OUTPATIENT
Start: 2017-10-04 | End: 2018-04-02 | Stop reason: SDUPTHER

## 2017-10-30 NOTE — TELEPHONE ENCOUNTER
----- Message from Maryellen Still sent at 1/12/2017 12:30 PM CST -----  Contact: self  Pt calling to state that her appt was scheduled on the wrong day and would like it to be changed. Please call [phone]  
OV and lab date rescheduled.  
- - -

## 2017-11-21 ENCOUNTER — PATIENT MESSAGE (OUTPATIENT)
Dept: FAMILY MEDICINE | Facility: CLINIC | Age: 72
End: 2017-11-21

## 2017-11-22 RX ORDER — METFORMIN HYDROCHLORIDE 1000 MG/1
1000 TABLET ORAL 2 TIMES DAILY
Qty: 14 TABLET | Refills: 0 | Status: SHIPPED | OUTPATIENT
Start: 2017-11-22 | End: 2017-12-12 | Stop reason: SDUPTHER

## 2017-11-22 RX ORDER — METFORMIN HYDROCHLORIDE 1000 MG/1
1000 TABLET ORAL 2 TIMES DAILY
Qty: 14 TABLET | Refills: 0 | Status: SHIPPED | OUTPATIENT
Start: 2017-11-22 | End: 2017-11-22 | Stop reason: SDUPTHER

## 2017-11-22 NOTE — TELEPHONE ENCOUNTER
----- Message from Deneen Lopes sent at 11/22/2017  8:53 AM CST -----  metformin (GLUCOPHAGE) 1000 MG tablet    Patient states that she is out of town and has forgotten her medication. Please call her when it is sent in at your [facility] convience at [phone]. Thank you!    [facility]  [address]  [place] [address]

## 2017-12-12 RX ORDER — METFORMIN HYDROCHLORIDE 1000 MG/1
1000 TABLET ORAL 2 TIMES DAILY
Qty: 180 TABLET | Refills: 0 | Status: SHIPPED | OUTPATIENT
Start: 2017-12-12 | End: 2018-04-02 | Stop reason: SDUPTHER

## 2018-01-04 ENCOUNTER — PATIENT MESSAGE (OUTPATIENT)
Dept: FAMILY MEDICINE | Facility: CLINIC | Age: 73
End: 2018-01-04

## 2018-01-04 DIAGNOSIS — I10 BENIGN HYPERTENSION: Primary | ICD-10-CM

## 2018-01-04 DIAGNOSIS — E78.5 HYPERLIPIDEMIA LDL GOAL <100: ICD-10-CM

## 2018-01-08 ENCOUNTER — LAB VISIT (OUTPATIENT)
Dept: LAB | Facility: HOSPITAL | Age: 73
End: 2018-01-08
Attending: INTERNAL MEDICINE
Payer: MEDICARE

## 2018-01-08 DIAGNOSIS — I10 BENIGN HYPERTENSION: ICD-10-CM

## 2018-01-08 DIAGNOSIS — E78.5 HYPERLIPIDEMIA LDL GOAL <100: ICD-10-CM

## 2018-01-08 LAB
ALBUMIN SERPL BCP-MCNC: 3.9 G/DL
ALP SERPL-CCNC: 68 U/L
ALT SERPL W/O P-5'-P-CCNC: 22 U/L
ANION GAP SERPL CALC-SCNC: 9 MMOL/L
AST SERPL-CCNC: 17 U/L
BASOPHILS # BLD AUTO: 0.07 K/UL
BASOPHILS NFR BLD: 0.9 %
BILIRUB SERPL-MCNC: 0.5 MG/DL
BUN SERPL-MCNC: 13 MG/DL
CALCIUM SERPL-MCNC: 9.7 MG/DL
CHLORIDE SERPL-SCNC: 103 MMOL/L
CHOLEST SERPL-MCNC: 127 MG/DL
CHOLEST/HDLC SERPL: 3.6 {RATIO}
CO2 SERPL-SCNC: 26 MMOL/L
CREAT SERPL-MCNC: 0.7 MG/DL
DIFFERENTIAL METHOD: ABNORMAL
EOSINOPHIL # BLD AUTO: 0.3 K/UL
EOSINOPHIL NFR BLD: 3.6 %
ERYTHROCYTE [DISTWIDTH] IN BLOOD BY AUTOMATED COUNT: 13.4 %
EST. GFR  (AFRICAN AMERICAN): >60 ML/MIN/1.73 M^2
EST. GFR  (NON AFRICAN AMERICAN): >60 ML/MIN/1.73 M^2
ESTIMATED AVG GLUCOSE: 160 MG/DL
GLUCOSE SERPL-MCNC: 266 MG/DL
HBA1C MFR BLD HPLC: 7.2 %
HCT VFR BLD AUTO: 41.1 %
HDLC SERPL-MCNC: 35 MG/DL
HDLC SERPL: 27.6 %
HGB BLD-MCNC: 13.4 G/DL
IMM GRANULOCYTES # BLD AUTO: 0.06 K/UL
IMM GRANULOCYTES NFR BLD AUTO: 0.8 %
LDLC SERPL CALC-MCNC: 67 MG/DL
LYMPHOCYTES # BLD AUTO: 2.6 K/UL
LYMPHOCYTES NFR BLD: 34.2 %
MCH RBC QN AUTO: 26.8 PG
MCHC RBC AUTO-ENTMCNC: 32.6 G/DL
MCV RBC AUTO: 82 FL
MONOCYTES # BLD AUTO: 0.5 K/UL
MONOCYTES NFR BLD: 6.3 %
NEUTROPHILS # BLD AUTO: 4 K/UL
NEUTROPHILS NFR BLD: 54.2 %
NONHDLC SERPL-MCNC: 92 MG/DL
NRBC BLD-RTO: 0 /100 WBC
PLATELET # BLD AUTO: 295 K/UL
PMV BLD AUTO: 12.1 FL
POTASSIUM SERPL-SCNC: 4.1 MMOL/L
PROT SERPL-MCNC: 7.2 G/DL
RBC # BLD AUTO: 5 M/UL
SODIUM SERPL-SCNC: 138 MMOL/L
TRIGL SERPL-MCNC: 125 MG/DL
WBC # BLD AUTO: 7.45 K/UL

## 2018-01-08 PROCEDURE — 80061 LIPID PANEL: CPT

## 2018-01-08 PROCEDURE — 80053 COMPREHEN METABOLIC PANEL: CPT

## 2018-01-08 PROCEDURE — 36415 COLL VENOUS BLD VENIPUNCTURE: CPT | Mod: PO

## 2018-01-08 PROCEDURE — 85025 COMPLETE CBC W/AUTO DIFF WBC: CPT

## 2018-01-08 PROCEDURE — 83036 HEMOGLOBIN GLYCOSYLATED A1C: CPT

## 2018-01-10 ENCOUNTER — OFFICE VISIT (OUTPATIENT)
Dept: FAMILY MEDICINE | Facility: CLINIC | Age: 73
End: 2018-01-10
Payer: MEDICARE

## 2018-01-10 VITALS
TEMPERATURE: 99 F | OXYGEN SATURATION: 96 % | BODY MASS INDEX: 32.7 KG/M2 | HEART RATE: 98 BPM | SYSTOLIC BLOOD PRESSURE: 130 MMHG | WEIGHT: 166.56 LBS | DIASTOLIC BLOOD PRESSURE: 85 MMHG | HEIGHT: 60 IN

## 2018-01-10 DIAGNOSIS — E78.5 HYPERLIPIDEMIA LDL GOAL <100: ICD-10-CM

## 2018-01-10 DIAGNOSIS — I10 BENIGN HYPERTENSION: ICD-10-CM

## 2018-01-10 DIAGNOSIS — E55.9 MILD VITAMIN D DEFICIENCY: ICD-10-CM

## 2018-01-10 DIAGNOSIS — B07.9 VIRAL WARTS, UNSPECIFIED TYPE: ICD-10-CM

## 2018-01-10 DIAGNOSIS — M89.9 DISORDER OF BONE AND CARTILAGE: ICD-10-CM

## 2018-01-10 DIAGNOSIS — M94.9 DISORDER OF BONE AND CARTILAGE: ICD-10-CM

## 2018-01-10 DIAGNOSIS — M85.80 OSTEOPENIA, UNSPECIFIED LOCATION: ICD-10-CM

## 2018-01-10 DIAGNOSIS — E66.9 OBESITY (BMI 30-39.9): ICD-10-CM

## 2018-01-10 PROCEDURE — 99214 OFFICE O/P EST MOD 30 MIN: CPT | Mod: S$GLB,,, | Performed by: INTERNAL MEDICINE

## 2018-01-10 PROCEDURE — 99999 PR PBB SHADOW E&M-EST. PATIENT-LVL IV: CPT | Mod: PBBFAC,,, | Performed by: INTERNAL MEDICINE

## 2018-01-10 PROCEDURE — 99499 UNLISTED E&M SERVICE: CPT | Mod: S$GLB,,, | Performed by: INTERNAL MEDICINE

## 2018-01-10 RX ORDER — AMLODIPINE BESYLATE 2.5 MG/1
2.5 TABLET ORAL DAILY
Qty: 90 TABLET | Refills: 1 | Status: SHIPPED | OUTPATIENT
Start: 2018-01-10 | End: 2018-04-02 | Stop reason: SDUPTHER

## 2018-01-10 RX ORDER — GLIMEPIRIDE 4 MG/1
TABLET ORAL
Qty: 180 TABLET | Refills: 1 | Status: SHIPPED | OUTPATIENT
Start: 2018-01-10 | End: 2018-09-13 | Stop reason: SDUPTHER

## 2018-01-10 NOTE — PROGRESS NOTES
HISTORY OF PRESENT ILLNESS:  Peace Farmer is a 73 y.o. female who presents to the clinic today for Diabetes (3 Month Follow Up); Hypertension; and Hyperlipidemia  .  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  She states blood pressures at home are generally better controlled than they are in the office.  We had stopped her amlodipine and her last office visit his blood pressures were actually running kind of low.  She has been doing better with diabetic diet.  She does not exercise regularly. The patient reports compliance with current medication- patient denies missing any  medication doses.  She recently had blood work done and is here today to discuss the results.  She did see a dermatologist for removal of a wart on her right foot.  She states she developed another one and was advised to see podiatry for further evaluation/treatment as she does have neuropathy.  She is requesting a referral.      PAST MEDICAL HISTORY:  Past Medical History:   Diagnosis Date    Abnormal mammogram 10/13    s/p biopsy    Diabetes mellitus type II     Fibromyalgia     Hyperlipidemia     Hypertension     Obesity (BMI 30-39.9)     Osteopenia     Psoriasis     Skin cancer of face 2011    forehead       PAST SURGICAL HISTORY:  Past Surgical History:   Procedure Laterality Date    PARTIAL HYSTERECTOMY      age 34    skin cancer face  2011       SOCIAL HISTORY:  Social History     Social History    Marital status:      Spouse name: N/A    Number of children: 3    Years of education: some colle     Occupational History    homemaker      Social History Main Topics    Smoking status: Never Smoker    Smokeless tobacco: Never Used    Alcohol use Yes      Comment: rare    Drug use: No    Sexual activity: Not Currently     Partners: Male     Birth control/ protection: Post-menopausal     Other Topics Concern     Not on file     Social History Narrative    Adult Screenings Reviewed and updated  5/20/14    Mammogram( for females) October 2013 abnromal left     Pap ( for females) partial hysterectomy     Colonoscopy  2009  Reported normal.    Flu shot 2013     Td updated today  As Tdap  5/20/14    Pneumovax  done once    Zostavax done    Eye exam 2013 due for  2014    Bone density 7/2012 due again in  2016       FAMILY HISTORY:  Family History   Problem Relation Age of Onset    Heart disease Father     Skin cancer Father     Hypertension Father     Lung cancer Father     Cataracts Father     Hypertension Mother     Skin cancer Mother     Cataracts Mother     Macular degeneration Mother     No Known Problems Brother     Stroke Maternal Grandmother     No Known Problems Maternal Grandfather     No Known Problems Paternal Grandmother     No Known Problems Paternal Grandfather     No Known Problems Maternal Aunt     No Known Problems Maternal Uncle     No Known Problems Paternal Aunt     No Known Problems Paternal Uncle     Diabetes Mellitus Neg Hx     Breast cancer Neg Hx     Colon cancer Neg Hx     Amblyopia Neg Hx     Blindness Neg Hx     Cancer Neg Hx     Diabetes Neg Hx     Glaucoma Neg Hx     Retinal detachment Neg Hx     Strabismus Neg Hx     Thyroid disease Neg Hx        ALLERGIES AND MEDICATIONS: updated and reviewed.  Review of patient's allergies indicates:   Allergen Reactions    Pcn [penicillins] Hives and Shortness Of Breath     Medication List with Changes/Refills   New Medications    AMLODIPINE (NORVASC) 2.5 MG TABLET    Take 1 tablet (2.5 mg total) by mouth once daily.   Current Medications    BLOOD SUGAR DIAGNOSTIC (FREESTYLE LITE STRIPS) STRP    Apply 1 strip topically once daily.    BLOOD-GLUCOSE METER KIT    Use as instructed    CALCIUM-VITAMIN D 500-125 MG-UNIT TABLET    Take 1 tablet by mouth 2 (two) times daily.      CHOLECALCIFEROL, VITAMIN D3, 2,000 UNIT CAP    Take 1  capsule (2,000 Units total) by mouth once daily.    LANCETS (FREESTYLE LANCETS) 28 GAUGE MISC    Apply 1 lancet topically once daily.    LANCETS (FREESTYLE LANCETS) 28 GAUGE MISC    Apply 1 lancet topically once daily.    LORATADINE (CLARITIN) 10 MG TABLET    Take 10 mg by mouth once daily.    LOSARTAN-HYDROCHLOROTHIAZIDE 100-25 MG (HYZAAR) 100-25 MG PER TABLET    Take 1 tablet by mouth once daily.    METFORMIN (GLUCOPHAGE) 1000 MG TABLET    Take 1 tablet (1,000 mg total) by mouth 2 (two) times daily.    ROSUVASTATIN (CRESTOR) 5 MG TABLET    Take 1 tablet (5 mg total) by mouth every evening.    ZOLPIDEM (AMBIEN) 5 MG TAB    Take 1 tablet (5 mg total) by mouth nightly as needed.   Changed and/or Refilled Medications    Modified Medication Previous Medication    GLIMEPIRIDE (AMARYL) 4 MG TABLET glimepiride (AMARYL) 4 MG tablet       TAKE 1 TABLET BY MOUTH TWO TIMES DAILY BEFORE MEALS....(HOLD FOR BLOOD SUGAR LESS THAN 100)    TAKE 1 TABLET BY MOUTH TWO TIMES DAILY BEFORE MEALS....(HOLD FOR BLOOD SUGAR LESS THAN 100)    SITAGLIPTIN (JANUVIA) 50 MG TAB SITagliptin (JANUVIA) 50 MG Tab       Take 2 tablets (100 mg total) by mouth once daily.    Take 2 tablets (100 mg total) by mouth once daily.   Discontinued Medications    FLUAD 7123-6305, 65 YR UP,,PF, 45 MCG (15 MCG X 3)/0.5 ML SYRG              CARE TEAM:  Patient Care Team:  Rosa Garber MD as PCP - General (Internal Medicine)         REVIEW OF SYSTEMS:  Review of Systems   Constitutional: Negative for chills, fever, malaise/fatigue and weight loss.   HENT: Negative for congestion, ear pain, nosebleeds and sore throat (or Oral Lesions).    Eyes: Negative for blurred vision, pain and discharge.   Respiratory: Negative for cough, shortness of breath and wheezing.    Cardiovascular: Negative for chest pain, palpitations and leg swelling.   Gastrointestinal: Negative for abdominal pain, blood in stool, constipation, diarrhea, heartburn, nausea and vomiting.    Genitourinary: Negative for dysuria, frequency, hematuria and urgency.   Musculoskeletal: Negative for falls (or Gait Disturbance), joint pain and myalgias.   Skin: Negative for itching (or Hives) and rash.        - see HPI   Neurological: Negative for dizziness, focal weakness, seizures, loss of consciousness and headaches.   Endo/Heme/Allergies: Negative for environmental allergies and polydipsia (or Temperature Intolerance). Does not bruise/bleed easily (or Lymphadenopathy).   Psychiatric/Behavioral: Negative for depression, memory loss and substance abuse. The patient is not nervous/anxious and does not have insomnia.          PHYSICAL EXAM:   Vitals:    01/10/18 1438   BP: 130/85   Pulse:    Temp:      Weight: 75.5 kg (166 lb 8.9 oz)   Height: 5' (152.4 cm)   Body mass index is 32.53 kg/m².     General appearance - alert, well appearing, and in no distress and obese  Mental status - alert, oriented to person, place, and time, normal mood, behavior, speech, dress, motor activity, and thought processes  Eyes - pupils equal and reactive, extraocular eye movements intact, sclera anicteric  Mouth - mucous membranes moist, pharynx normal without lesions  Neck - supple, no significant adenopathy, carotids upstroke normal bilaterally, no bruits, thyroid exam: thyroid is normal in size without nodules or tenderness  Lymphatics - no palpable lymphadenopathy  Chest - clear to auscultation, no wheezes, rales or rhonchi, symmetric air entry  Heart - normal rate and regular rhythm, no gallops noted  Neurological - alert, oriented, normal speech, no focal findings or movement disorder noted, cranial nerves II through XII intact  Musculoskeletal - no joint tenderness, deformity or swelling, no muscular tenderness noted  Extremities - peripheral pulses normal, no pedal edema, no clubbing or cyanosis  Skin - normal coloration and turgor, no rashes, no suspicious skin lesions noted (foot not examined)      Results for orders  placed or performed in visit on 01/08/18   CBC auto differential   Result Value Ref Range    WBC 7.45 3.90 - 12.70 K/uL    RBC 5.00 4.00 - 5.40 M/uL    Hemoglobin 13.4 12.0 - 16.0 g/dL    Hematocrit 41.1 37.0 - 48.5 %    MCV 82 82 - 98 fL    MCH 26.8 (L) 27.0 - 31.0 pg    MCHC 32.6 32.0 - 36.0 g/dL    RDW 13.4 11.5 - 14.5 %    Platelets 295 150 - 350 K/uL    MPV 12.1 9.2 - 12.9 fL    Immature Granulocytes 0.8 (H) 0.0 - 0.5 %    Gran # 4.0 1.8 - 7.7 K/uL    Immature Grans (Abs) 0.06 (H) 0.00 - 0.04 K/uL    Lymph # 2.6 1.0 - 4.8 K/uL    Mono # 0.5 0.3 - 1.0 K/uL    Eos # 0.3 0.0 - 0.5 K/uL    Baso # 0.07 0.00 - 0.20 K/uL    nRBC 0 0 /100 WBC    Gran% 54.2 38.0 - 73.0 %    Lymph% 34.2 18.0 - 48.0 %    Mono% 6.3 4.0 - 15.0 %    Eosinophil% 3.6 0.0 - 8.0 %    Basophil% 0.9 0.0 - 1.9 %    Differential Method Automated    Comprehensive metabolic panel   Result Value Ref Range    Sodium 138 136 - 145 mmol/L    Potassium 4.1 3.5 - 5.1 mmol/L    Chloride 103 95 - 110 mmol/L    CO2 26 23 - 29 mmol/L    Glucose 266 (H) 70 - 110 mg/dL    BUN, Bld 13 8 - 23 mg/dL    Creatinine 0.7 0.5 - 1.4 mg/dL    Calcium 9.7 8.7 - 10.5 mg/dL    Total Protein 7.2 6.0 - 8.4 g/dL    Albumin 3.9 3.5 - 5.2 g/dL    Total Bilirubin 0.5 0.1 - 1.0 mg/dL    Alkaline Phosphatase 68 55 - 135 U/L    AST 17 10 - 40 U/L    ALT 22 10 - 44 U/L    Anion Gap 9 8 - 16 mmol/L    eGFR if African American >60.0 >60 mL/min/1.73 m^2    eGFR if non African American >60.0 >60 mL/min/1.73 m^2   Lipid panel   Result Value Ref Range    Cholesterol 127 120 - 199 mg/dL    Triglycerides 125 30 - 150 mg/dL    HDL 35 (L) 40 - 75 mg/dL    LDL Cholesterol 67.0 63.0 - 159.0 mg/dL    HDL/Chol Ratio 27.6 20.0 - 50.0 %    Total Cholesterol/HDL Ratio 3.6 2.0 - 5.0    Non-HDL Cholesterol 92 mg/dL   Hemoglobin A1c   Result Value Ref Range    Hemoglobin A1C 7.2 (H) 4.0 - 5.6 %    Estimated Avg Glucose 160 (H) 68 - 131 mg/dL          ASSESSMENT AND PLAN:  1. Type 2 diabetes, uncontrolled,  with neuropathy  Diabetes currently is controlled. We discussed diabetic diet and regular exercise. We discussed home blood sugar monitoring, if appropriate. The current medical regimen is effective;  continue present plan and medications. Neuropathy pain controlled.   - glimepiride (AMARYL) 4 MG tablet; TAKE 1 TABLET BY MOUTH TWO TIMES DAILY BEFORE MEALS....(HOLD FOR BLOOD SUGAR LESS THAN 100)  Dispense: 180 tablet; Refill: 1  - SITagliptin (JANUVIA) 50 MG Tab; Take 2 tablets (100 mg total) by mouth once daily.  Dispense: 90 tablet; Refill: 1    2. Benign hypertension  I will start her back on amlodipine at a lower dose 2.5 mg. She will continue to monitor her BP at home and recheck in office in 3 months.  - amLODIPine (NORVASC) 2.5 MG tablet; Take 1 tablet (2.5 mg total) by mouth once daily.  Dispense: 90 tablet; Refill: 1    3. Hyperlipidemia LDL goal <100  We discussed low fat diet and regular exercise.The current medical regimen is effective;  continue present plan and medications.      4. Osteopenia, unspecified location/5. Mild vitamin D deficiency  We discussed adequate calcium and vitamin D supplementation. We discussed fall precautions. She is scheduled for her BMD. No need for Rx tx at this time.     6. Obesity (BMI 30-39.9)  The patient is asked to make an attempt to improve diet and exercise patterns to aid in medical management of this problem.     7. Disorder of bone and cartilage    - DXA Bone Density Spine And Hip; Future    8. Viral warts, unspecified type  Refer to podiatry per patient request.  - Ambulatory referral to Podiatry           Return in about 3 months (around 4/10/2018), or if symptoms worsen or fail to improve, for annual exam. or sooner as needed.

## 2018-01-15 ENCOUNTER — HOSPITAL ENCOUNTER (OUTPATIENT)
Dept: RADIOLOGY | Facility: CLINIC | Age: 73
Discharge: HOME | End: 2018-01-15
Attending: INTERNAL MEDICINE
Payer: MEDICARE

## 2018-01-15 DIAGNOSIS — M89.9 DISORDER OF BONE AND CARTILAGE: ICD-10-CM

## 2018-01-15 DIAGNOSIS — M94.9 DISORDER OF BONE AND CARTILAGE: ICD-10-CM

## 2018-01-15 PROCEDURE — 77080 DXA BONE DENSITY AXIAL: CPT | Mod: TC,PO

## 2018-01-15 PROCEDURE — 77080 DXA BONE DENSITY AXIAL: CPT | Mod: 26,,, | Performed by: INTERNAL MEDICINE

## 2018-01-17 NOTE — TELEPHONE ENCOUNTER
Received fax from [facility], requesting clarification.  States Rx was written different than what  prescribed. Spoke w/patient, states she takes Januvia 50mg once daily. States the MD she saw when  was out, mistakenly prescribed Januvia 50mg take 2 (100mg) once daily. Please advise.

## 2018-01-29 ENCOUNTER — OFFICE VISIT (OUTPATIENT)
Dept: PODIATRY | Facility: CLINIC | Age: 73
End: 2018-01-29
Payer: MEDICARE

## 2018-01-29 VITALS
SYSTOLIC BLOOD PRESSURE: 148 MMHG | HEIGHT: 60 IN | WEIGHT: 166.44 LBS | DIASTOLIC BLOOD PRESSURE: 78 MMHG | BODY MASS INDEX: 32.68 KG/M2

## 2018-01-29 DIAGNOSIS — R20.0 NUMBNESS OF FOOT: ICD-10-CM

## 2018-01-29 DIAGNOSIS — L84 CORN OR CALLUS: ICD-10-CM

## 2018-01-29 DIAGNOSIS — E11.49 TYPE II DIABETES MELLITUS WITH NEUROLOGICAL MANIFESTATIONS: Primary | ICD-10-CM

## 2018-01-29 DIAGNOSIS — M79.9 SOFT TISSUE LESION: ICD-10-CM

## 2018-01-29 DIAGNOSIS — M20.42 HAMMER TOES OF BOTH FEET: ICD-10-CM

## 2018-01-29 DIAGNOSIS — L84 HELOMA MOLLE: ICD-10-CM

## 2018-01-29 DIAGNOSIS — M20.41 HAMMER TOES OF BOTH FEET: ICD-10-CM

## 2018-01-29 PROCEDURE — 99203 OFFICE O/P NEW LOW 30 MIN: CPT | Mod: 25,S$GLB,, | Performed by: PODIATRIST

## 2018-01-29 PROCEDURE — 88305 TISSUE EXAM BY PATHOLOGIST: CPT | Performed by: PATHOLOGY

## 2018-01-29 PROCEDURE — 88305 TISSUE EXAM BY PATHOLOGIST: CPT | Mod: 26,,, | Performed by: PATHOLOGY

## 2018-01-29 PROCEDURE — 11100 PR BIOPSY OF SKIN LESION: CPT | Mod: S$GLB,,, | Performed by: PODIATRIST

## 2018-01-29 PROCEDURE — 99999 PR PBB SHADOW E&M-EST. PATIENT-LVL III: CPT | Mod: PBBFAC,,, | Performed by: PODIATRIST

## 2018-01-29 PROCEDURE — 99499 UNLISTED E&M SERVICE: CPT | Mod: S$GLB,,, | Performed by: PODIATRIST

## 2018-01-29 PROCEDURE — 88312 SPECIAL STAINS GROUP 1: CPT | Mod: 26,,, | Performed by: PATHOLOGY

## 2018-01-29 NOTE — LETTER
January 29, 2018      Rosa Garber MD  [address] [place] [address]           [facility]  [address] [facility]  [place] [address]  Phone: [phone]          Patient: Peace Farmer   MR Number: [id number]   YOB: 1945   Date of Visit: 1/29/2018       Dear Dr. Rosa Garber:    Thank you for referring Peace Farmer to me for evaluation. Attached you will find relevant portions of my assessment and plan of care.    If you have questions, please do not hesitate to call me. I look forward to following Peace Farmer along with you.    Sincerely,    Sveta Nicholson DPM    Enclosure  CC:  No Recipients    If you would like to receive this communication electronically, please contact [email or web address] or [phone] to request more information on [employer] access.    For providers and/or their staff who would like to refer a patient to [facility], please contact us through our one-stop-shop provider referral line, [facility], [place], at [phone].    If you feel you have received this communication in error or would no longer like to receive these types of communications, please e-mail [email or web address]

## 2018-01-29 NOTE — PROGRESS NOTES
Subjective:      Patient ID: Peace Farmer is a 73 y.o. female.    Chief Complaint: Plantar Warts (left foot between second and smallest toe Pcp Dr. Garber 01/10/2018); Diabetes Mellitus; Diabetic Foot Exam; and Nail Care    Peace is a 73 y.o. female who presents to the clinic for evaluation and treatment of high risk feet. Peace has a past medical history of Abnormal mammogram (10/13); Diabetes mellitus type II; Fibromyalgia; Hyperlipidemia; Hypertension; Obesity (BMI 30-39.9); Osteopenia; Psoriasis; and Skin cancer of face (2011). The patient's chief complaint is suspcicious soft tissue lesions between multiple toes of left foot. Has had them removed 2+ years ago as her dermatologist thought they were warts. They did not bother her again until now. She is also diabetic and was advised to have DM foot exam as well. Has some numbness from time to time on bottoms of both feet which comes and goes. Rubs moisturizer on bottom of feet from time to time which helps. This patient has documented high risk feet requiring routine maintenance secondary to diabetes mellitis and those secondary complications of diabetes, as mentioned..    PCP: Rosa Garber MD    Date Last Seen by PCP:   Chief Complaint   Patient presents with    Plantar Warts     left foot between second and smallest toe Pcp Dr. Garber 01/10/2018    Diabetes Mellitus    Diabetic Foot Exam    Nail Care       Current shoe gear: Casual shoes         Hemoglobin A1C   Date Value Ref Range Status   01/08/2018 7.2 (H) 4.0 - 5.6 % Final     Comment:     According to ADA guidelines, hemoglobin A1c <7.0% represents  optimal control in non-pregnant diabetic patients. Different  metrics may apply to specific patient populations.   Standards of Medical Care in Diabetes-2016.  For the purpose of screening for the presence of diabetes:  <5.7%     Consistent with the absence of diabetes  5.7-6.4%  Consistent with increasing risk for diabetes    (prediabetes)  >or=6.5%  Consistent with diabetes  Currently, no consensus exists for use of hemoglobin A1c  for diagnosis of diabetes for children.  This Hemoglobin A1c assay has significant interference with fetal   hemoglobin   (HbF). The results are invalid for patients with abnormal amounts of   HbF,   including those with known Hereditary Persistence   of Fetal Hemoglobin. Heterozygous hemoglobin variants (HbAS, HbAC,   HbAD, HbAE, HbA2) do not significantly interfere with this assay;   however, presence of multiple variants in a sample may impact the %   interference.     10/02/2017 7.8 (H) 4.0 - 5.6 % Final     Comment:     According to ADA guidelines, hemoglobin A1c <7.0% represents  optimal control in non-pregnant diabetic patients. Different  metrics may apply to specific patient populations.   Standards of Medical Care in Diabetes-2016.  For the purpose of screening for the presence of diabetes:  <5.7%     Consistent with the absence of diabetes  5.7-6.4%  Consistent with increasing risk for diabetes   (prediabetes)  >or=6.5%  Consistent with diabetes  Currently, no consensus exists for use of hemoglobin A1c  for diagnosis of diabetes for children.  This Hemoglobin A1c assay has significant interference with fetal   hemoglobin   (HbF). The results are invalid for patients with abnormal amounts of   HbF,   including those with known Hereditary Persistence   of Fetal Hemoglobin. Heterozygous hemoglobin variants (HbAS, HbAC,   HbAD, HbAE, HbA2) do not significantly interfere with this assay;   however, presence of multiple variants in a sample may impact the %   interference.     03/07/2017 7.8 (H) 4.5 - 6.2 % Final     Comment:     According to ADA guidelines, hemoglobin A1C <7.0% represents  optimal control in non-pregnant diabetic patients.  Different  metrics may apply to specific populations.   Standards of Medical Care in Diabetes - 2016.  For the purpose of screening for the presence of  diabetes:  <5.7%     Consistent with the absence of diabetes  5.7-6.4%  Consistent with increasing risk for diabetes   (prediabetes)  >or=6.5%  Consistent with diabetes  Currently no consensus exists for use of hemoglobin A1C  for diagnosis of diabetes for children.         Review of Systems   Constitution: Negative for chills and fever.   Cardiovascular: Negative for chest pain, claudication and leg swelling.   Respiratory: Negative for cough and shortness of breath.    Skin: Positive for dry skin and suspicious lesions.   Musculoskeletal: Negative.    Gastrointestinal: Negative for nausea and vomiting.   Neurological: Positive for numbness and sensory change. Negative for paresthesias.   Psychiatric/Behavioral: Negative for altered mental status.           Objective:      Physical Exam   Constitutional: She is oriented to person, place, and time. She appears well-developed and well-nourished.   HENT:   Head: Normocephalic.   Cardiovascular: Intact distal pulses.    Pulses:       Dorsalis pedis pulses are 2+ on the right side, and 2+ on the left side.        Posterior tibial pulses are 2+ on the right side, and 2+ on the left side.   CRT < 3 sec to tips of toes. No edema noted to b/l LE. No vericosities noted to b/l LEs.      Pulmonary/Chest: No respiratory distress.   Musculoskeletal:   Gastrocnemius equinus noted to b/l ankles with decreased DF noted on exam. MMT 5/5 in DF/PF/Inv/Ev resistance with no reproduction of pain in any direction. Passive range of motion of ankle and pedal joints is painless. Adequate pedal joint ROM.     Semi-reducible hammertoe contractures noted to toes 2-4 b/l-asymptomatic.    Neurological: She is alert and oriented to person, place, and time. She has normal strength. A sensory deficit is present.   Light touch, proprioception, and sharp/dull sensation are all intact bilaterally. Protective threshold with the Assaria-Wienstein monofilament is intact bilaterally. Vibratory sensation  diminished b/l distal foot. Subjective, intermittent numbness to plantar forefoot with no clearly identifiable source or trigger.      Skin: Skin is warm, dry and intact. Lesion noted. No bruising and no ecchymosis noted. No erythema.   No open lesions, lacerations or wounds noted. Nails are normotrophic and well trimmed to R 1-5 and L 1-5. Interdigital spaces clean, dry and intact b/l. No erythema noted to b/l foot. Skin texture normal. Pedal hair normal. Skin temperature normal b/l foot.     Hyperkeratotic lesion noted to left 4th interdigital space, medial L 3rd toe PIPJ, and plantar R 4th toe. Skin lines present to lesion/s. No signs of deep tissue injury.     Questionable appearance of spongy base to left 4th interdigital lesion. No pinpoint bleeding noted.      Psychiatric: She has a normal mood and affect. Her behavior is normal. Judgment and thought content normal.   Vitals reviewed.            Assessment:       Encounter Diagnoses   Name Primary?    Type II diabetes mellitus with neurological manifestations Yes    Numbness of foot     Heloma molle     Corn or callus     Soft tissue lesion     Hammer toes of both feet          Plan:       Peace was seen today for plantar warts, diabetes mellitus, diabetic foot exam and nail care.    Diagnoses and all orders for this visit:    Type II diabetes mellitus with neurological manifestations  -     DIABETIC SHOES FOR HOME USE    Numbness of foot  -     DIABETIC SHOES FOR HOME USE    Heloma molle  -     DIABETIC SHOES FOR HOME USE    Corn or callus  -     DIABETIC SHOES FOR HOME USE    Soft tissue lesion  -     Tissue Specimen To Pathology, Podiatry  -     DIABETIC SHOES FOR HOME USE    Hammer toes of both feet  -     DIABETIC SHOES FOR HOME USE      I counseled the patient on her conditions, their implications and medical management.        - Shoe inspection. Diabetic Foot Education. Patient reminded of the importance of good nutrition and blood sugar control  to help prevent podiatric complications of diabetes. Patient instructed on proper foot hygeine. We discussed wearing proper shoe gear, daily foot inspections, never walking without protective shoe gear, never putting sharp instruments to feet, routine podiatric nail visits every 2-3 months.      - The affected area was cleansed with an alcohol prep pad. Next, utilizing a No.15 scalpel, the hyperkeratotic tissues were trimmed from left 4th interdigital space and medial 3rd toe PIPJ and plantar R 4th toe, down to appropriate level of skin. This was done as a courtesy today. Care was taken to remove any nucleated core from the center of the lesion. No pinpoint bleeding was encountered. The patient tolerated relief following this procedure.    - Discussed regular and routine moisturizer to skin of both feet to help improve dry skin. Advised to apply twice daily until resolution of symptoms. Avoid between toes.     - Shoe inspection. Diabetic Foot Education. Patient reminded of the importance of good nutrition and blood sugar control to help prevent podiatric complications of diabetes. Patient instructed on proper foot hygeine. We discussed wearing proper shoe gear, daily foot inspections, never walking without protective shoe gear, caution putting sharp instruments to feet     - Discussed DM foot care:  Wear comfortable, proper fitting shoes. Wash feet daily. Dry well. After drying, apply moisturizer to feet (no lotion to webspaces). Inspect feet daily for skin breaks, blisters, swelling, or redness. Wear cotton socks (preferably white)  Change socks every day. Do NOT walk barefoot. Do NOT use heating pads or warm/hot water soaks     L 4th interdigital lesion shave biopsy taken and placed in specimen cup for pathology testing to confirm or rule out wart. Will await results.     RTC 4 months for DM foot exam and callus trimming if desired otherwise ok to return in 1 year for DM exam.

## 2018-03-08 ENCOUNTER — PES CALL (OUTPATIENT)
Dept: ADMINISTRATIVE | Facility: CLINIC | Age: 73
End: 2018-03-08

## 2018-04-02 ENCOUNTER — PATIENT MESSAGE (OUTPATIENT)
Dept: FAMILY MEDICINE | Facility: CLINIC | Age: 73
End: 2018-04-02

## 2018-04-02 DIAGNOSIS — I10 BENIGN HYPERTENSION: ICD-10-CM

## 2018-04-02 DIAGNOSIS — G47.00 INSOMNIA, UNSPECIFIED TYPE: ICD-10-CM

## 2018-04-02 DIAGNOSIS — E78.5 HYPERLIPIDEMIA LDL GOAL <100: ICD-10-CM

## 2018-04-02 RX ORDER — AMLODIPINE BESYLATE 2.5 MG/1
2.5 TABLET ORAL DAILY
Qty: 90 TABLET | Refills: 0 | Status: SHIPPED | OUTPATIENT
Start: 2018-04-02 | End: 2018-07-16 | Stop reason: SDUPTHER

## 2018-04-02 RX ORDER — ZOLPIDEM TARTRATE 5 MG/1
5 TABLET ORAL NIGHTLY PRN
Qty: 90 TABLET | Refills: 0 | Status: CANCELLED | OUTPATIENT
Start: 2018-04-02 | End: 2018-10-01

## 2018-04-02 RX ORDER — METFORMIN HYDROCHLORIDE 1000 MG/1
1000 TABLET ORAL 2 TIMES DAILY
Qty: 180 TABLET | Refills: 0 | Status: SHIPPED | OUTPATIENT
Start: 2018-04-02 | End: 2018-10-11 | Stop reason: SDUPTHER

## 2018-04-02 RX ORDER — LOSARTAN POTASSIUM AND HYDROCHLOROTHIAZIDE 25; 100 MG/1; MG/1
TABLET ORAL
Qty: 90 TABLET | Refills: 1 | Status: CANCELLED | OUTPATIENT
Start: 2018-04-02

## 2018-04-02 RX ORDER — ROSUVASTATIN CALCIUM 5 MG/1
TABLET, COATED ORAL
Qty: 90 TABLET | Refills: 1 | Status: CANCELLED | OUTPATIENT
Start: 2018-04-02

## 2018-04-02 RX ORDER — ROSUVASTATIN CALCIUM 5 MG/1
5 TABLET, COATED ORAL NIGHTLY
Qty: 90 TABLET | Refills: 0 | Status: SHIPPED | OUTPATIENT
Start: 2018-04-02 | End: 2018-06-06 | Stop reason: SDUPTHER

## 2018-04-02 RX ORDER — LOSARTAN POTASSIUM AND HYDROCHLOROTHIAZIDE 25; 100 MG/1; MG/1
1 TABLET ORAL DAILY
Qty: 90 TABLET | Refills: 0 | Status: SHIPPED | OUTPATIENT
Start: 2018-04-02 | End: 2018-04-09 | Stop reason: SDUPTHER

## 2018-04-09 ENCOUNTER — PATIENT MESSAGE (OUTPATIENT)
Dept: FAMILY MEDICINE | Facility: CLINIC | Age: 73
End: 2018-04-09

## 2018-04-09 ENCOUNTER — OFFICE VISIT (OUTPATIENT)
Dept: FAMILY MEDICINE | Facility: CLINIC | Age: 73
End: 2018-04-09
Payer: MEDICARE

## 2018-04-09 ENCOUNTER — LAB VISIT (OUTPATIENT)
Dept: LAB | Facility: HOSPITAL | Age: 73
End: 2018-04-09
Attending: INTERNAL MEDICINE
Payer: MEDICARE

## 2018-04-09 VITALS
TEMPERATURE: 98 F | DIASTOLIC BLOOD PRESSURE: 76 MMHG | HEART RATE: 100 BPM | WEIGHT: 164.56 LBS | SYSTOLIC BLOOD PRESSURE: 152 MMHG | OXYGEN SATURATION: 95 % | HEIGHT: 60 IN | BODY MASS INDEX: 32.31 KG/M2

## 2018-04-09 DIAGNOSIS — Z71.89 COUNSELING REGARDING END OF LIFE DECISION MAKING: ICD-10-CM

## 2018-04-09 DIAGNOSIS — H00.012 HORDEOLUM EXTERNUM OF RIGHT LOWER EYELID: Primary | ICD-10-CM

## 2018-04-09 DIAGNOSIS — Z00.00 ROUTINE MEDICAL EXAM: Primary | ICD-10-CM

## 2018-04-09 DIAGNOSIS — M81.0 OSTEOPOROSIS, UNSPECIFIED OSTEOPOROSIS TYPE, UNSPECIFIED PATHOLOGICAL FRACTURE PRESENCE: ICD-10-CM

## 2018-04-09 DIAGNOSIS — E78.5 HYPERLIPIDEMIA LDL GOAL <100: ICD-10-CM

## 2018-04-09 DIAGNOSIS — E55.9 MILD VITAMIN D DEFICIENCY: ICD-10-CM

## 2018-04-09 DIAGNOSIS — I10 BENIGN HYPERTENSION: ICD-10-CM

## 2018-04-09 DIAGNOSIS — H00.012 HORDEOLUM EXTERNUM OF RIGHT LOWER EYELID: ICD-10-CM

## 2018-04-09 DIAGNOSIS — E66.9 OBESITY (BMI 30-39.9): ICD-10-CM

## 2018-04-09 LAB
ESTIMATED AVG GLUCOSE: 157 MG/DL
HBA1C MFR BLD HPLC: 7.1 %

## 2018-04-09 PROCEDURE — 36415 COLL VENOUS BLD VENIPUNCTURE: CPT | Mod: PO

## 2018-04-09 PROCEDURE — 99497 ADVNCD CARE PLAN 30 MIN: CPT | Mod: S$GLB,,, | Performed by: INTERNAL MEDICINE

## 2018-04-09 PROCEDURE — 99499 UNLISTED E&M SERVICE: CPT | Mod: S$PBB,,, | Performed by: INTERNAL MEDICINE

## 2018-04-09 PROCEDURE — 99999 PR PBB SHADOW E&M-EST. PATIENT-LVL III: CPT | Mod: PBBFAC,,, | Performed by: INTERNAL MEDICINE

## 2018-04-09 PROCEDURE — 83036 HEMOGLOBIN GLYCOSYLATED A1C: CPT

## 2018-04-09 PROCEDURE — 99397 PER PM REEVAL EST PAT 65+ YR: CPT | Mod: 25,S$GLB,, | Performed by: INTERNAL MEDICINE

## 2018-04-09 RX ORDER — BACITRACIN ZINC AND POLYMYXIN B SULFATE 500; 10000 [USP'U]/G; [USP'U]/G
OINTMENT OPHTHALMIC 3 TIMES DAILY
Qty: 3.5 G | Refills: 0 | Status: SHIPPED | OUTPATIENT
Start: 2018-04-09 | End: 2018-04-16

## 2018-04-09 RX ORDER — GENTAMICIN SULFATE 0.3 %
0.5 OINTMENT (GRAM) OPHTHALMIC (EYE) 3 TIMES DAILY
Qty: 3.5 G | Refills: 0 | Status: SHIPPED | OUTPATIENT
Start: 2018-04-09 | End: 2018-04-09 | Stop reason: RX

## 2018-04-09 RX ORDER — ERYTHROMYCIN 5 MG/G
OINTMENT OPHTHALMIC 3 TIMES DAILY
Qty: 3.5 G | Refills: 0 | OUTPATIENT
Start: 2018-04-09

## 2018-04-09 RX ORDER — ALENDRONATE SODIUM 70 MG/1
70 TABLET ORAL
Qty: 12 TABLET | Refills: 3 | Status: SHIPPED | OUTPATIENT
Start: 2018-04-09 | End: 2018-07-16

## 2018-04-09 RX ORDER — LOSARTAN POTASSIUM AND HYDROCHLOROTHIAZIDE 25; 100 MG/1; MG/1
1 TABLET ORAL DAILY
Qty: 90 TABLET | Refills: 0 | Status: SHIPPED | OUTPATIENT
Start: 2018-04-09 | End: 2018-06-12 | Stop reason: SDUPTHER

## 2018-04-09 NOTE — PROGRESS NOTES
HISTORY OF PRESENT ILLNESS:  Peace Farmer is a 73 y.o. female who presents to the clinic today for a routine medical physical exam. Her last physical exam was approximately 1 years(s) ago.        PAST MEDICAL HISTORY:  Past Medical History:   Diagnosis Date    Abnormal mammogram 10/13    s/p biopsy    Diabetes mellitus type II     Fibromyalgia     Hyperlipidemia     Hypertension     Obesity (BMI 30-39.9)     Osteopenia     Psoriasis     Skin cancer of face 2011    forehead       PAST SURGICAL HISTORY:  Past Surgical History:   Procedure Laterality Date    PARTIAL HYSTERECTOMY      age 34    skin cancer face  2011       SOCIAL HISTORY:  Social History     Social History    Marital status:      Spouse name: N/A    Number of children: 3    Years of education: some colle     Occupational History    homemaker      Social History Main Topics    Smoking status: Never Smoker    Smokeless tobacco: Never Used    Alcohol use Yes      Comment: rare    Drug use: No    Sexual activity: Not Currently     Partners: Male     Birth control/ protection: Post-menopausal     Other Topics Concern    Not on file     Social History Narrative    Adult Screenings Reviewed and updated  5/20/14    Mammogram( for females) October 2013 abnromal left     Pap ( for females) partial hysterectomy     Colonoscopy  2009  Reported normal.    Flu shot 2013     Td updated today  As Tdap  5/20/14    Pneumovax  done once    Zostavax done    Eye exam 2013 due for  2014    Bone density 7/2012 due again in  2016       FAMILY HISTORY:  Family History   Problem Relation Age of Onset    Heart disease Father     Skin cancer Father     Hypertension Father     Lung cancer Father     Cataracts Father     Hypertension Mother     Skin cancer Mother     Cataracts Mother     Macular degeneration Mother     No Known Problems Brother     Stroke Maternal Grandmother     No Known Problems Maternal Grandfather     No Known  Problems Paternal Grandmother     No Known Problems Paternal Grandfather     No Known Problems Maternal Aunt     No Known Problems Maternal Uncle     No Known Problems Paternal Aunt     No Known Problems Paternal Uncle     Diabetes Mellitus Neg Hx     Breast cancer Neg Hx     Colon cancer Neg Hx     Amblyopia Neg Hx     Blindness Neg Hx     Cancer Neg Hx     Diabetes Neg Hx     Glaucoma Neg Hx     Retinal detachment Neg Hx     Strabismus Neg Hx     Thyroid disease Neg Hx        ALLERGIES AND MEDICATIONS: updated and reviewed.  Review of patient's allergies indicates:   Allergen Reactions    Pcn [penicillins] Hives and Shortness Of Breath     Medication List with Changes/Refills   New Medications    ALENDRONATE (FOSAMAX) 70 MG TABLET    Take 1 tablet (70 mg total) by mouth every 7 days.    GENTAMICIN (GARAMYCIN) 0.3 % (3 MG/GRAM) OPHTHALMIC OINTMENT    Place 0.5 inches into both eyes 3 (three) times daily.   Current Medications    AMLODIPINE (NORVASC) 2.5 MG TABLET    Take 1 tablet (2.5 mg total) by mouth once daily.    BLOOD SUGAR DIAGNOSTIC (FREESTYLE LITE STRIPS) STRP    Apply 1 strip topically once daily.    BLOOD-GLUCOSE METER KIT    Use as instructed    CALCIUM-VITAMIN D 500-125 MG-UNIT TABLET    Take 1 tablet by mouth 2 (two) times daily.      CHOLECALCIFEROL, VITAMIN D3, 2,000 UNIT CAP    Take 1 capsule (2,000 Units total) by mouth once daily.    GLIMEPIRIDE (AMARYL) 4 MG TABLET    TAKE 1 TABLET BY MOUTH TWO TIMES DAILY BEFORE MEALS....(HOLD FOR BLOOD SUGAR LESS THAN 100)    LANCETS (FREESTYLE LANCETS) 28 GAUGE MISC    Apply 1 lancet topically once daily.    LORATADINE (CLARITIN) 10 MG TABLET    Take 10 mg by mouth once daily.    METFORMIN (GLUCOPHAGE) 1000 MG TABLET    Take 1 tablet (1,000 mg total) by mouth 2 (two) times daily.    ROSUVASTATIN (CRESTOR) 5 MG TABLET    Take 1 tablet (5 mg total) by mouth every evening.    SITAGLIPTIN (JANUVIA) 50 MG TAB    Take 1 tablet (50 mg total) by  mouth once daily.    ZOLPIDEM (AMBIEN) 5 MG TAB    Take 1 tablet (5 mg total) by mouth nightly as needed.   Changed and/or Refilled Medications    Modified Medication Previous Medication    LOSARTAN-HYDROCHLOROTHIAZIDE 100-25 MG (HYZAAR) 100-25 MG PER TABLET losartan-hydrochlorothiazide 100-25 mg (HYZAAR) 100-25 mg per tablet       Take 1 tablet by mouth once daily.    Take 1 tablet by mouth once daily.   Discontinued Medications    LANCETS (FREESTYLE LANCETS) 28 GAUGE MISC    Apply 1 lancet topically once daily.         CARE TEAM:  Patient Care Team:  Rosa Garber MD as PCP - General (Internal Medicine)           SCREENING HISTORY:  Health Maintenance       Date Due Completion Date    Urine Microalbumin 03/15/2018 3/15/2017    Eye Exam 06/12/2018 6/12/2017    Override on 3/20/2017: Done    Hemoglobin A1c 07/08/2018 1/8/2018    Foot Exam 10/04/2018 10/4/2017    Lipid Panel 01/08/2019 1/8/2018    Mammogram 03/15/2019 3/15/2017    Colonoscopy 03/18/2019 3/18/2009    Low Dose Statin 04/09/2019 4/9/2018    DEXA SCAN 01/15/2020 1/15/2018    TETANUS VACCINE 05/20/2024 5/20/2014            REVIEW OF SYSTEMS:   The patient reports fair dietary habits.  The patient does not exercise regularly.  Review of Systems   Constitutional: Positive for fatigue. Negative for chills, fever and unexpected weight change.   HENT: Negative for congestion, ear discharge, ear pain and postnasal drip.    Eyes: Negative for photophobia, pain and visual disturbance.        - She noticed a small bump on her right lower eyelid a few days ago   Respiratory: Negative for cough, shortness of breath and wheezing.    Cardiovascular: Negative for chest pain, palpitations and leg swelling.   Gastrointestinal: Negative for abdominal pain, constipation, diarrhea, nausea and vomiting.   Endocrine: Positive for polydipsia (- all urinary symptoms are improving; had negative culture last month), polyphagia and polyuria.   Genitourinary: Negative for  dysuria, frequency, urgency and vaginal discharge.   Musculoskeletal: Positive for back pain (- gets rare left sided low back pain; relieved with muscle relaxer overnight; doing well now). Negative for joint swelling and neck stiffness.   Skin: Negative for pallor and rash.   Neurological: Positive for weakness (- mild/generalized; actually doing better now). Negative for dizziness, tremors, seizures, speech difficulty and headaches.   Psychiatric/Behavioral: Positive for confusion (- chronic/stable). Negative for dysphoric mood and sleep disturbance. The patient is nervous/anxious.      Breast ROS: negative for breast lumps             Physical Examination:   Vitals:    04/09/18 1014   BP: (!) 152/76   Pulse: 100   Temp: 98.3 °F (36.8 °C)     Weight: 74.6 kg (164 lb 9.2 oz)   Height: 5' (152.4 cm)   Body mass index is 32.14 kg/m².    General appearance - alert, well appearing, and in no distress and obese  Mental status - alert, oriented to person, place, and time, normal mood, behavior, speech, dress, motor activity, and thought processes  Eyes - pupils equal and reactive, extraocular eye movements intact, sclera anicteric; small external hordeolum noted over medial aspect of right lower eyelid  Mouth - mucous membranes moist, pharynx normal without lesions  Neck - supple, no significant adenopathy, carotids upstroke normal bilaterally, no bruits, thyroid exam: thyroid is normal in size without nodules or tenderness  Lymphatics - no palpable lymphadenopathy  Chest - clear to auscultation, no wheezes, rales or rhonchi, symmetric air entry  Heart - normal rate and regular rhythm, no gallops noted  Abdomen - soft, nontender, nondistended, no masses or organomegaly  Breasts - not examined  Back exam - mild limited range of motion; no pain on exam today  Neurological - alert, oriented, normal speech, no focal findings or movement disorder noted, cranial nerves II through XII intact  Musculoskeletal - no muscular  tenderness noted, Mild-Moderate osteoarthritic changes noted to both knee joints. No joint effusions noted.   Extremities - no pedal edema noted  Skin - normal coloration and turgor, no rashes, no suspicious skin lesions noted      ASSESSMENT AND PLAN:  1. Routine medical exam  Counseled on age appropriate medical preventative services including age appropriate cancer screenings, age appropriate eye and dental exams, over all nutritional health, need for a consistent exercise regimen, and an over all push towards maintaining a vigorous and active lifestyle.  Counseled on age appropriate vaccines and discussed upcoming health care needs based on age/gender. Discussed good sleep hygiene and stress management.     2. Type 2 diabetes, uncontrolled, with neuropathy  Diabetes currently is controlled for age and comorbid conditions. We discussed diabetic diet and regular exercise. We discussed home blood sugar monitoring, if appropriate. The current medical regimen is effective;  continue present plan and medications. Neuropathy pain controlled.   - Microalbumin/creatinine urine ratio; Future   - Hemoglobin A1c; Future    3. Benign hypertension  Blood pressures elevated today.  We discussed the need to avoid anti-inflammatory and decongestive medication.  Continue current regimen for now.  She thinks it just high because she is at the doctor's office.  She will check her blood pressure at home tomorrow and call me with results.  - losartan-hydrochlorothiazide 100-25 mg (HYZAAR) 100-25 mg per tablet; Take 1 tablet by mouth once daily.  Dispense: 90 tablet; Refill: 0    4. Hyperlipidemia LDL goal <100  We discussed low fat diet and regular exercise.The current medical regimen is effective;  continue present plan and medications.      5. Osteoporosis, unspecified osteoporosis type, unspecified pathological fracture presence/6. Mild vitamin D deficiency  We discussed adequate calcium and vitamin D supplementation. We discussed  fall precautions. She is up to date on her BMD.  Bone density test in January of this year recommend that we start prescription medication.  She has taken Fosamax in the remote past.  We will resume that medication now.   - alendronate (FOSAMAX) 70 MG tablet; Take 1 tablet (70 mg total) by mouth every 7 days.  Dispense: 12 tablet; Refill: 3    7. Obesity (BMI 30-39.9)  The patient is asked to make an attempt to improve diet and exercise patterns to aid in medical management of this problem.     8. Hordeolum externum of right lower eyelid  She will use this with warm compresses.  She will need to see her eye doctor if symptoms worsen or persist.  - gentamicin (GARAMYCIN) 0.3 % (3 mg/gram) ophthalmic ointment; Place 0.5 inches into both eyes 3 (three) times daily.  Dispense: 3.5 g; Refill: 0    9. Counseling regarding end of life decision making  I had a discussion today with the patient, family, and/or surrogate regarding advanced directives. Paperwork was given to complete living will and medical POA. [place] was not discussed. Approximately 5 minute(s) spent on counseling/discussion regarding end of life decision making.             Follow-up in about 3 months (around 7/9/2018), or if symptoms worsen or fail to improve, for follow up chronic medical conditions.. or sooner as needed.    Answers for HPI/ROS submitted by the patient on 4/8/2018   Diabetes problem  Diabetes type: type 2  MedicAlert ID: No  Disease duration: 7 years  blurred vision: No  foot paresthesias: No  foot ulcerations: No  visual change: No  weight loss: No  Symptom course: stable  hunger: Yes  mood changes: Yes  sleepiness: Yes  sweats: Yes  blackouts: No  hospitalization: No  nocturnal hypoglycemia: No  required assistance: No  required glucagon: No  CVA: No  heart disease: No  nephropathy: No  peripheral neuropathy: Yes  retinopathy: No  autonomic neuropathy: No  CAD risks: hypertension, obesity, sedentary lifestyle, stress, diabetes  mellitus  Current treatments: diet, oral agent (triple therapy)  Treatment compliance: all of the time  Monitoring compliance: no compliance  Weight trend: stable  Current diet: diabetic, generally healthy  Meal planning: avoidance of concentrated sweets, carbohydrate counting  Exercise: intermittently  Dietitian visit: No  Eye exam current: Yes  Sees podiatrist: Yes

## 2018-04-09 NOTE — TELEPHONE ENCOUNTER
Medication  gentamicin (GARAMYCIN) 0.3 % (3 mg/gram) ophthalmic ointment is on back order.Requesting erythromycin optical ointment. Please advise.

## 2018-04-09 NOTE — TELEPHONE ENCOUNTER
----- Message from Jamaal Payton sent at 4/9/2018 12:58 PM CDT -----  Contact: Heidi/[facility]/[phone]  Heidi called stating that the patient's prescription:  gentamicin (GARAMYCIN) 0.3 % (3 mg/gram) ophthalmic ointment is on back order. She's requesting erythromycin optical ointment . Thank you.

## 2018-04-13 DIAGNOSIS — E11.9 DIABETES MELLITUS WITHOUT COMPLICATION: ICD-10-CM

## 2018-05-28 ENCOUNTER — OFFICE VISIT (OUTPATIENT)
Dept: PODIATRY | Facility: CLINIC | Age: 73
End: 2018-05-28
Payer: MEDICARE

## 2018-05-28 VITALS
SYSTOLIC BLOOD PRESSURE: 158 MMHG | HEIGHT: 60 IN | DIASTOLIC BLOOD PRESSURE: 82 MMHG | BODY MASS INDEX: 32.2 KG/M2 | WEIGHT: 164 LBS

## 2018-05-28 DIAGNOSIS — L84 CORN OR CALLUS: ICD-10-CM

## 2018-05-28 DIAGNOSIS — M20.42 HAMMER TOES OF BOTH FEET: ICD-10-CM

## 2018-05-28 DIAGNOSIS — E11.49 TYPE II DIABETES MELLITUS WITH NEUROLOGICAL MANIFESTATIONS: Primary | ICD-10-CM

## 2018-05-28 DIAGNOSIS — R20.0 NUMBNESS OF TOES: ICD-10-CM

## 2018-05-28 DIAGNOSIS — L84 HELOMA MOLLE: ICD-10-CM

## 2018-05-28 DIAGNOSIS — M20.41 HAMMER TOES OF BOTH FEET: ICD-10-CM

## 2018-05-28 PROCEDURE — 3045F PR MOST RECENT HEMOGLOBIN A1C LEVEL 7.0-9.0%: CPT | Mod: CPTII,S$GLB,, | Performed by: PODIATRIST

## 2018-05-28 PROCEDURE — 99499 UNLISTED E&M SERVICE: CPT | Mod: S$PBB,,, | Performed by: PODIATRIST

## 2018-05-28 PROCEDURE — 3077F SYST BP >= 140 MM HG: CPT | Mod: CPTII,S$GLB,, | Performed by: PODIATRIST

## 2018-05-28 PROCEDURE — 99999 PR PBB SHADOW E&M-EST. PATIENT-LVL III: CPT | Mod: PBBFAC,,, | Performed by: PODIATRIST

## 2018-05-28 PROCEDURE — 3079F DIAST BP 80-89 MM HG: CPT | Mod: CPTII,S$GLB,, | Performed by: PODIATRIST

## 2018-05-28 PROCEDURE — 99213 OFFICE O/P EST LOW 20 MIN: CPT | Mod: S$GLB,,, | Performed by: PODIATRIST

## 2018-05-28 NOTE — PROGRESS NOTES
Subjective:      Patient ID: Peace Farmer is a 73 y.o. female.    Chief Complaint: Diabetes Mellitus (left smallest toe corn  Pcp Dr. Garber 3/19/2018); Diabetic Foot Exam; and Nail Care    Peace is a 73 y.o. female who presents to the clinic for evaluation and treatment of high risk feet. Peace has a past medical history of Abnormal mammogram (10/13); Diabetes mellitus type II; Fibromyalgia; Hyperlipidemia; Hypertension; Obesity (BMI 30-39.9); Osteopenia; Psoriasis; and Skin cancer of face (2011). The patient's chief complaint is follow up for DM foot exam and painful callus between toes of left foot. States previous trimming of callus made pain worse so she would like to avoid trimming today. She is inquiring about other ways to help improve pain and pressure between toes. Also having some pain and friction problems in the balls of her feet. States she moisturizes her feet and even between all toes 3 times a day.  This patient has documented high risk feet requiring routine maintenance secondary to diabetes mellitis and those secondary complications of diabetes, as mentioned. States she never got her DM shoes because she called 20 stores and they were all asking for too much information or they did not accept her insurance despite being given list of local retailers that accept [employer] last visit. Requesting new Rx and list today.     PCP: Rosa Garber MD    Date Last Seen by PCP:   Chief Complaint   Patient presents with    Diabetes Mellitus     left smallest toe corn  Pcp Dr. Garber 3/19/2018    Diabetic Foot Exam    Nail Care       Current shoe gear: Slip-on shoes         Hemoglobin A1C   Date Value Ref Range Status   04/09/2018 7.1 (H) 4.0 - 5.6 % Final     Comment:     According to ADA guidelines, hemoglobin A1c <7.0% represents  optimal control in non-pregnant diabetic patients. Different  metrics may apply to specific patient populations.   Standards of Medical Care in Diabetes-2016.  For the  purpose of screening for the presence of diabetes:  <5.7%     Consistent with the absence of diabetes  5.7-6.4%  Consistent with increasing risk for diabetes   (prediabetes)  >or=6.5%  Consistent with diabetes  Currently, no consensus exists for use of hemoglobin A1c  for diagnosis of diabetes for children.  This Hemoglobin A1c assay has significant interference with fetal   hemoglobin   (HbF). The results are invalid for patients with abnormal amounts of   HbF,   including those with known Hereditary Persistence   of Fetal Hemoglobin. Heterozygous hemoglobin variants (HbAS, HbAC,   HbAD, HbAE, HbA2) do not significantly interfere with this assay;   however, presence of multiple variants in a sample may impact the %   interference.     01/08/2018 7.2 (H) 4.0 - 5.6 % Final     Comment:     According to ADA guidelines, hemoglobin A1c <7.0% represents  optimal control in non-pregnant diabetic patients. Different  metrics may apply to specific patient populations.   Standards of Medical Care in Diabetes-2016.  For the purpose of screening for the presence of diabetes:  <5.7%     Consistent with the absence of diabetes  5.7-6.4%  Consistent with increasing risk for diabetes   (prediabetes)  >or=6.5%  Consistent with diabetes  Currently, no consensus exists for use of hemoglobin A1c  for diagnosis of diabetes for children.  This Hemoglobin A1c assay has significant interference with fetal   hemoglobin   (HbF). The results are invalid for patients with abnormal amounts of   HbF,   including those with known Hereditary Persistence   of Fetal Hemoglobin. Heterozygous hemoglobin variants (HbAS, HbAC,   HbAD, HbAE, HbA2) do not significantly interfere with this assay;   however, presence of multiple variants in a sample may impact the %   interference.     10/02/2017 7.8 (H) 4.0 - 5.6 % Final     Comment:     According to ADA guidelines, hemoglobin A1c <7.0% represents  optimal control in non-pregnant diabetic patients.  Different  metrics may apply to specific patient populations.   Standards of Medical Care in Diabetes-2016.  For the purpose of screening for the presence of diabetes:  <5.7%     Consistent with the absence of diabetes  5.7-6.4%  Consistent with increasing risk for diabetes   (prediabetes)  >or=6.5%  Consistent with diabetes  Currently, no consensus exists for use of hemoglobin A1c  for diagnosis of diabetes for children.  This Hemoglobin A1c assay has significant interference with fetal   hemoglobin   (HbF). The results are invalid for patients with abnormal amounts of   HbF,   including those with known Hereditary Persistence   of Fetal Hemoglobin. Heterozygous hemoglobin variants (HbAS, HbAC,   HbAD, HbAE, HbA2) do not significantly interfere with this assay;   however, presence of multiple variants in a sample may impact the %   interference.         Review of Systems   Constitution: Negative for chills and fever.   Cardiovascular: Negative for chest pain, claudication and leg swelling.   Respiratory: Negative for cough and shortness of breath.    Skin: Positive for dry skin and suspicious lesions.   Musculoskeletal: Negative.    Gastrointestinal: Negative for nausea and vomiting.   Neurological: Positive for numbness and sensory change. Negative for paresthesias.   Psychiatric/Behavioral: Negative for altered mental status.           Objective:      Physical Exam   Constitutional: She is oriented to person, place, and time. She appears well-developed and well-nourished.   HENT:   Head: Normocephalic.   Cardiovascular: Intact distal pulses.    Pulses:       Dorsalis pedis pulses are 2+ on the right side, and 2+ on the left side.        Posterior tibial pulses are 2+ on the right side, and 2+ on the left side.   CRT < 3 sec to tips of toes. No edema noted to b/l LE. No vericosities noted to b/l LEs.      Pulmonary/Chest: No respiratory distress.   Musculoskeletal:   Gastrocnemius equinus noted to b/l ankles with  decreased DF noted on exam. MMT 5/5 in DF/PF/Inv/Ev resistance with no reproduction of pain in any direction. Passive range of motion of ankle and pedal joints is painless. Adequate pedal joint ROM.     Semi-reducible hammertoe contractures noted to toes 2-4 b/l-asymptomatic.    Neurological: She is alert and oriented to person, place, and time. She has normal strength. A sensory deficit is present.   Light touch, proprioception, and sharp/dull sensation are all intact bilaterally. Protective threshold with the [place]-Wienstein monofilament is intact bilaterally. Vibratory sensation diminished b/l distal foot. Subjective, intermittent numbness to plantar forefoot with no clearly identifiable source or trigger.      Skin: Skin is warm, dry and intact. Lesion noted. No bruising and no ecchymosis noted. No erythema.   No open lesions, lacerations or wounds noted. Nails are normotrophic and well trimmed to R 1-5 and L 1-5. Interdigital spaces clean, dry and intact b/l. No erythema noted to b/l foot. Skin texture normal. Pedal hair normal. Skin temperature normal b/l foot.     Hyperkeratotic lesion noted to left 4th interdigital space, medial L 3rd toe PIPJ, and plantar R 4th toe. Skin lines present to lesion/s. No signs of deep tissue injury.     Otherwise skin is normotrophic.    Psychiatric: She has a normal mood and affect. Her behavior is normal. Judgment and thought content normal.   Vitals reviewed.            Assessment:       Encounter Diagnoses   Name Primary?    Type II diabetes mellitus with neurological manifestations Yes    Numbness of toes     Corn or callus     Heloma molle - Left Foot     Hammer toes of both feet          Plan:       Peace was seen today for diabetes mellitus, diabetic foot exam and nail care.    Diagnoses and all orders for this visit:    Type II diabetes mellitus with neurological manifestations    Numbness of toes    Corn or callus    Heloma molle - Left Foot    Hammer toes of  both feet    Other orders  -     DIABETIC SHOES FOR HOME USE      I counseled the patient on her conditions, their implications and medical management.        - Shoe inspection. Diabetic Foot Education. Patient reminded of the importance of good nutrition and blood sugar control to help prevent podiatric complications of diabetes. Patient instructed on proper foot hygeine. We discussed wearing proper shoe gear, daily foot inspections, never walking without protective shoe gear, never putting sharp instruments to feet, routine podiatric nail visits every 2-3 months.      - patient requested no trimming of lesions today.     - Discussed regular and routine moisturizer to skin of both feet to help improve dry skin. Advised to apply twice daily until resolution of symptoms. Advised to decrease to once a day to feet and avoid between toes. Ok to use between left foot toes 4 and 5 due to callus but only once a day. Do not use with toe spacers or sleeves.     - Shoe inspection. Diabetic Foot Education. Patient reminded of the importance of good nutrition and blood sugar control to help prevent podiatric complications of diabetes. Patient instructed on proper foot hygeine. We discussed wearing proper shoe gear, daily foot inspections, never walking without protective shoe gear, caution putting sharp instruments to feet     - Discussed DM foot care:  Wear comfortable, proper fitting shoes. Wash feet daily. Dry well. After drying, apply moisturizer to feet (no lotion to webspaces). Inspect feet daily for skin breaks, blisters, swelling, or redness. Wear cotton socks (preferably white)  Change socks every day. Do NOT walk barefoot. Do NOT use heating pads or warm/hot water soaks     Rx diabetic shoes with custom molded inserts to be worn at all times while ambulating. Prescription provided with list of local retailers.     Previous shave biopsy of left toe lesion showed hyperkeratotic tissue only. No sign of wart.     Silicone  toe sleeves and spacers provided for left foot and educated on their use.     Metatarsal gel cushion pad dispensed and applied to R foot for additional cushioning of met heads.    RTC 6 months for DM foot exam and callus trimming if desired otherwise ok to return in 1 year for DM exam.

## 2018-05-28 NOTE — PATIENT INSTRUCTIONS
Recommend lotions: eucerin, eucerin for diabetics, aquaphor, A&D ointment, gold bond for diabetics, sween, [place]'s Bees all purpose baby ointment,  urea 40 with aloe (found on amazon.com)    Shoe recommendations: (try 6pm.com, zappos.com , nordstromrack.[employer], or shoes.[employer] for discounted prices) you can visit DSW shoes in [place]  or [employer] [facility] [place] in the [facility] (there are also several shoe brand outlets in the [facility])    Asics (GT 2000 or gel foundations), new balance stability type shoes, saucony (stabil c3),  Anne (GTS or Beast or transcend), vionic, propet (tennis shoe)    sofft brand, clarks, crocs, aerosoles, naturalizers, SAS, ecco, born, rupa boone, rockports (dress shoes)    Vionic, burkenstocks, fitflops, propet (sandals)  Nike comfort thong sandals, crocs, propet (house shoes)    Nail Home remedy:  Vicks Vapor rub to nails for easier managability    Occasional soaks for 15-20 mins in luke warm water with 1 cup of listerine and 1 cup of apple cider vinegar are ok You may add several drops of oil of oregano or tea tree oil as well        Diabetes: Inspecting Your Feet  Diabetes increases your chances of developing foot problems. So inspect your feet every day. This helps you find small skin irritations before they become serious infections. If you have trouble seeing the bottoms of your feet, use a mirror or ask a family member or friend to help.     Pressure spots on the bottom of the foot are common areas where problems develop.   How to check your feet  Below are tips to help you look for foot problems. Try to check your feet at the same time each day, such as when you get out of bed in the morning:  · Check the top of each foot. The tops of toes, back of the heel, and outer edge of the foot can get a lot of rubbing from poor-fitting shoes.  · Check the bottom of each foot. Daily wear and tear often leads to problems at pressure spots.  · Check the toes and nails. Fungal infections often occur  between toes. Toenail problems can also be a sign of fungal infections or lead to breaks in the skin.  · Check your shoes, too. Loose objects inside a shoe can injure the foot. Use your hand to feel inside your shoes for things like joselyn, loose stitching, or rough areas that could irritate your skin.  Warning signs  Look for any color changes in the foot. Redness with streaks can signal a severe infection, which needs immediate medical attention. Tell your doctor right away if you have any of these problems:  · Swelling, sometimes with color changes, may be a sign of poor blood flow or infection. Symptoms include tenderness and an increase in the size of your foot.  · Warm or hot areas on your feet may be signs of infection. A foot that is cold may not be getting enough blood.  · Sensations such as burning, tingling, or pins and needles can be signs of a problem. Also check for areas that may be numb.  · Hot spots are caused by friction or pressure. Look for hot spots in areas that get a lot of rubbing. Hot spots can turn into blisters, calluses, or sores.  · Cracks and sores are caused by dry or irritated skin. They are a sign that the skin is breaking down, which can lead to infection.  · Toenail problems to watch for include nails growing into the skin (ingrown toenail) and causing redness or pain. Thick, yellow, or discolored nails can signal a fungal infection.  · Drainage and odor can develop from untreated sores and ulcers. Call your doctor right away if you notice white or yellow drainage, bleeding, or unpleasant odor.   © 7102-7156 [employer]. [address] [place] [id number]. All rights reserved. This information is not intended as a substitute for professional medical care. Always follow your healthcare professional's instructions.        Step-by-Step:  Inspecting Your Feet (Diabetes)    Date Last Reviewed: 10/1/2016  © 6258-2141 [employer]. [address] [place] [address]  Road, [place] [address]. All rights reserved. This information is not intended as a substitute for professional medical care. Always follow your healthcare professional's instructions.

## 2018-06-06 DIAGNOSIS — E78.5 HYPERLIPIDEMIA LDL GOAL <100: ICD-10-CM

## 2018-06-06 RX ORDER — ROSUVASTATIN CALCIUM 5 MG/1
TABLET, COATED ORAL
Qty: 90 TABLET | Refills: 1 | Status: SHIPPED | OUTPATIENT
Start: 2018-06-06 | End: 2018-10-17 | Stop reason: SDUPTHER

## 2018-06-12 DIAGNOSIS — I10 BENIGN HYPERTENSION: ICD-10-CM

## 2018-06-12 RX ORDER — LOSARTAN POTASSIUM AND HYDROCHLOROTHIAZIDE 25; 100 MG/1; MG/1
TABLET ORAL
Qty: 90 TABLET | Refills: 0 | Status: SHIPPED | OUTPATIENT
Start: 2018-06-12 | End: 2018-10-17 | Stop reason: SDUPTHER

## 2018-06-12 RX ORDER — SITAGLIPTIN 50 MG/1
TABLET, FILM COATED ORAL
Qty: 90 TABLET | Refills: 0 | Status: SHIPPED | OUTPATIENT
Start: 2018-06-12 | End: 2018-07-16 | Stop reason: SDUPTHER

## 2018-06-22 DIAGNOSIS — E11.9 TYPE 2 DIABETES MELLITUS WITHOUT COMPLICATION, UNSPECIFIED WHETHER LONG TERM INSULIN USE: ICD-10-CM

## 2018-07-02 ENCOUNTER — TELEPHONE (OUTPATIENT)
Dept: FAMILY MEDICINE | Facility: CLINIC | Age: 73
End: 2018-07-02

## 2018-07-02 NOTE — TELEPHONE ENCOUNTER
Spoke w/ patient, overdue Health Maintenance was discussed. The patient has not had a diabetic eye exam. The patient would like to schedule an eye appointment. The patient requesting a call back later today.

## 2018-07-03 NOTE — TELEPHONE ENCOUNTER
Spoke w/ patient, a urine appointment was already scheduled for 07/09/18. A referral was placed to for Optometry for a diabetic eye exam.

## 2018-07-05 ENCOUNTER — TELEPHONE (OUTPATIENT)
Dept: OPTOMETRY | Facility: CLINIC | Age: 73
End: 2018-07-05

## 2018-07-09 ENCOUNTER — TELEPHONE (OUTPATIENT)
Dept: FAMILY MEDICINE | Facility: CLINIC | Age: 73
End: 2018-07-09

## 2018-07-09 ENCOUNTER — LAB VISIT (OUTPATIENT)
Dept: LAB | Facility: HOSPITAL | Age: 73
End: 2018-07-09
Attending: INTERNAL MEDICINE
Payer: MEDICARE

## 2018-07-09 PROCEDURE — 82043 UR ALBUMIN QUANTITATIVE: CPT

## 2018-07-10 LAB
CREAT UR-MCNC: 65 MG/DL
MICROALBUMIN UR DL<=1MG/L-MCNC: 5 UG/ML
MICROALBUMIN/CREATININE RATIO: 7.7 UG/MG

## 2018-07-16 ENCOUNTER — OFFICE VISIT (OUTPATIENT)
Dept: FAMILY MEDICINE | Facility: CLINIC | Age: 73
End: 2018-07-16
Payer: MEDICARE

## 2018-07-16 VITALS
DIASTOLIC BLOOD PRESSURE: 88 MMHG | SYSTOLIC BLOOD PRESSURE: 148 MMHG | TEMPERATURE: 99 F | HEART RATE: 92 BPM | WEIGHT: 162.69 LBS | HEIGHT: 60 IN | BODY MASS INDEX: 31.94 KG/M2 | OXYGEN SATURATION: 97 %

## 2018-07-16 DIAGNOSIS — E78.5 HYPERLIPIDEMIA LDL GOAL <100: ICD-10-CM

## 2018-07-16 DIAGNOSIS — I10 BENIGN HYPERTENSION: ICD-10-CM

## 2018-07-16 DIAGNOSIS — E66.9 OBESITY (BMI 30-39.9): ICD-10-CM

## 2018-07-16 DIAGNOSIS — M81.8 OTHER OSTEOPOROSIS WITHOUT CURRENT PATHOLOGICAL FRACTURE: ICD-10-CM

## 2018-07-16 DIAGNOSIS — Z71.89 ADVANCED DIRECTIVES, COUNSELING/DISCUSSION: ICD-10-CM

## 2018-07-16 DIAGNOSIS — G47.00 INSOMNIA, UNSPECIFIED TYPE: ICD-10-CM

## 2018-07-16 PROCEDURE — 3045F PR MOST RECENT HEMOGLOBIN A1C LEVEL 7.0-9.0%: CPT | Mod: CPTII,S$GLB,, | Performed by: INTERNAL MEDICINE

## 2018-07-16 PROCEDURE — 3077F SYST BP >= 140 MM HG: CPT | Mod: CPTII,S$GLB,, | Performed by: INTERNAL MEDICINE

## 2018-07-16 PROCEDURE — 99999 PR PBB SHADOW E&M-EST. PATIENT-LVL IV: CPT | Mod: PBBFAC,,, | Performed by: INTERNAL MEDICINE

## 2018-07-16 PROCEDURE — 99215 OFFICE O/P EST HI 40 MIN: CPT | Mod: S$GLB,,, | Performed by: INTERNAL MEDICINE

## 2018-07-16 PROCEDURE — 3079F DIAST BP 80-89 MM HG: CPT | Mod: CPTII,S$GLB,, | Performed by: INTERNAL MEDICINE

## 2018-07-16 RX ORDER — ASPIRIN 325 MG
325 TABLET ORAL DAILY
COMMUNITY
Start: 2018-07-07 | End: 2022-05-06

## 2018-07-16 RX ORDER — AMLODIPINE BESYLATE 5 MG/1
5 TABLET ORAL DAILY
Qty: 90 TABLET | Refills: 1 | Status: SHIPPED | OUTPATIENT
Start: 2018-07-16 | End: 2019-02-06 | Stop reason: SDUPTHER

## 2018-07-16 RX ORDER — ZOLPIDEM TARTRATE 5 MG/1
5 TABLET ORAL NIGHTLY PRN
Qty: 90 TABLET | Refills: 0 | Status: CANCELLED | OUTPATIENT
Start: 2018-07-16 | End: 2019-01-14

## 2018-07-16 NOTE — PROGRESS NOTES
HISTORY OF PRESENT ILLNESS:  Peace Farmer is a 73 y.o. female who presents to the clinic today for Results (A1c); medication review; and Hypertension  .  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  The patient reports fair dietary habits.  She stays active, but does not exercise regularly.  She states that she takes Ambien as needed to help her sleep sometimes.  She states sometimes before she travels she gets nervous and so she can sleep the night before and then she feels bad for several days.  She states sometimes she just gets out of rhythm and needs Ambien to help get back into asleep rhythm. She has very poor sleep hygiene.  She states only rarely does she have a good night's sleep where she feels well rested.  She has recently noted an elevation in her blood pressure.  She denies any significant salt intake.  No cardiac chest pain or shortness of breath.  She denies abdominal pain, temperature intolerance, or unexplained changes in her weight.      PAST MEDICAL HISTORY:  Past Medical History:   Diagnosis Date    Abnormal mammogram 10/13    s/p biopsy    Diabetes mellitus type II     Fibromyalgia     Hyperlipidemia     Hypertension     Obesity (BMI 30-39.9)     Osteopenia     Psoriasis     Skin cancer of face 2011    forehead       PAST SURGICAL HISTORY:  Past Surgical History:   Procedure Laterality Date    PARTIAL HYSTERECTOMY      age 34    skin cancer face  2011       SOCIAL HISTORY:  Social History     Social History    Marital status:      Spouse name: N/A    Number of children: 3    Years of education: some colle     Occupational History    homemaker      Social History Main Topics    Smoking status: Never Smoker    Smokeless tobacco: Never Used    Alcohol use Yes      Comment: rare    Drug use: No    Sexual activity: Not Currently     Partners: Male     Birth control/  protection: Post-menopausal     Other Topics Concern    Not on file     Social History Narrative    Adult Screenings Reviewed and updated  5/20/14    Mammogram( for females) October 2013 abnromal left     Pap ( for females) partial hysterectomy     Colonoscopy  2009  Reported normal.    Flu shot 2013     Td updated today  As Tdap  5/20/14    Pneumovax  done once    Zostavax done    Eye exam 2013 due for  2014    Bone density 7/2012 due again in  2016       FAMILY HISTORY:  Family History   Problem Relation Age of Onset    Heart disease Father     Skin cancer Father     Hypertension Father     Lung cancer Father     Cataracts Father     Hypertension Mother     Skin cancer Mother     Cataracts Mother     Macular degeneration Mother     No Known Problems Brother     Stroke Maternal Grandmother     No Known Problems Maternal Grandfather     No Known Problems Paternal Grandmother     No Known Problems Paternal Grandfather     No Known Problems Maternal Aunt     No Known Problems Maternal Uncle     No Known Problems Paternal Aunt     No Known Problems Paternal Uncle     Diabetes Mellitus Neg Hx     Breast cancer Neg Hx     Colon cancer Neg Hx     Amblyopia Neg Hx     Blindness Neg Hx     Cancer Neg Hx     Diabetes Neg Hx     Glaucoma Neg Hx     Retinal detachment Neg Hx     Strabismus Neg Hx     Thyroid disease Neg Hx        ALLERGIES AND MEDICATIONS: updated and reviewed.  Review of patient's allergies indicates:   Allergen Reactions    Pcn [penicillins] Hives and Shortness Of Breath     Medication List with Changes/Refills   Current Medications    ASPIRIN 325 MG TABLET        BLOOD SUGAR DIAGNOSTIC (FREESTYLE LITE STRIPS) STRP    Apply 1 strip topically once daily.    BLOOD-GLUCOSE METER KIT    Use as instructed    CALCIUM-VITAMIN D 500-125 MG-UNIT TABLET    Take 1 tablet by mouth 2 (two) times daily.      CETIRIZINE 10 MG CAP        CHOLECALCIFEROL, VITAMIN D3, 2,000 UNIT CAP    Take 1  capsule (2,000 Units total) by mouth once daily.    GLIMEPIRIDE (AMARYL) 4 MG TABLET    TAKE 1 TABLET BY MOUTH TWO TIMES DAILY BEFORE MEALS....(HOLD FOR BLOOD SUGAR LESS THAN 100)    LANCETS (FREESTYLE LANCETS) 28 GAUGE MISC    Apply 1 lancet topically once daily.    LOSARTAN-HYDROCHLOROTHIAZIDE 100-25 MG (HYZAAR) 100-25 MG PER TABLET    TAKE 1 TABLET EVERY DAY    METFORMIN (GLUCOPHAGE) 1000 MG TABLET    Take 1 tablet (1,000 mg total) by mouth 2 (two) times daily.    ROSUVASTATIN (CRESTOR) 5 MG TABLET    TAKE 1 TABLET EVERY EVENING   Changed and/or Refilled Medications    Modified Medication Previous Medication    AMLODIPINE (NORVASC) 5 MG TABLET amLODIPine (NORVASC) 2.5 MG tablet       Take 1 tablet (5 mg total) by mouth once daily.    Take 1 tablet (2.5 mg total) by mouth once daily.    SITAGLIPTIN (JANUVIA) 100 MG TAB JANUVIA 50 mg Tab       Take 1 tablet (100 mg total) by mouth once daily.    TAKE 1 TABLET EVERY DAY   Discontinued Medications    ALENDRONATE (FOSAMAX) 70 MG TABLET    Take 1 tablet (70 mg total) by mouth every 7 days.    LORATADINE (CLARITIN) 10 MG TABLET    Take 10 mg by mouth once daily.    ZOLPIDEM (AMBIEN) 5 MG TAB    Take 1 tablet (5 mg total) by mouth nightly as needed.          CARE TEAM:  Patient Care Team:  Rosa Garber MD as PCP - General (Internal Medicine)         REVIEW OF SYSTEMS:  Review of Systems   Constitutional: Positive for fatigue. Negative for chills, fever and unexpected weight change.   HENT: Negative for congestion, ear discharge, ear pain and postnasal drip.    Eyes: Negative for photophobia, pain and visual disturbance.   Respiratory: Negative for cough, shortness of breath and wheezing.    Cardiovascular: Negative for chest pain, palpitations and leg swelling.   Gastrointestinal: Negative for abdominal pain, constipation, diarrhea, nausea and vomiting.   Genitourinary: Negative for dysuria, frequency, urgency and vaginal discharge.   Musculoskeletal: Negative for  back pain, joint swelling and neck stiffness.   Skin: Negative for rash.   Neurological: Negative for weakness and headaches.   Psychiatric/Behavioral: Positive for sleep disturbance. Negative for dysphoric mood. The patient is nervous/anxious.          PHYSICAL EXAM:   Vitals:    07/16/18 1010   BP: (!) 148/88   Pulse: 92   Temp: 98.6 °F (37 °C)     Weight: 73.8 kg (162 lb 11.2 oz)   Height: 5' (152.4 cm)   Body mass index is 31.78 kg/m².     General appearance - alert, well appearing, and in no distress and obese  Mental status - alert, oriented to person, place, and time, normal mood, behavior, speech, dress, motor activity, and thought processes  Eyes - pupils equal and reactive, extraocular eye movements intact, sclera anicteric  Mouth - mucous membranes moist, pharynx normal without lesions  Neck - supple, no significant adenopathy, carotids upstroke normal bilaterally, no bruits  Lymphatics - no palpable lymphadenopathy  Chest - clear to auscultation, no wheezes, rales or rhonchi, symmetric air entry  Heart - normal rate and regular rhythm, no gallops noted  Neurological - alert, oriented, normal speech, no focal findings or movement disorder noted, cranial nerves II through XII intact  Musculoskeletal - no muscular tenderness noted, Mild-Moderate osteoarthritic changes noted to both knee joints. No joint effusions noted.   Extremities - no pedal edema noted  Skin - normal coloration and turgor, no rashes, no suspicious skin lesions noted      Hemoglobin A1C   Date Value Ref Range Status   07/09/2018 8.0 (H) 4.0 - 5.6 % Final     Comment:     ADA Screening Guidelines:  5.7-6.4%  Consistent with prediabetes  >or=6.5%  Consistent with diabetes  High levels of fetal hemoglobin interfere with the HbA1C  assay. Heterozygous hemoglobin variants (HbS, HgC, etc)do  not significantly interfere with this assay.   However, presence of multiple variants may affect accuracy.     04/09/2018 7.1 (H) 4.0 - 5.6 % Final      Comment:     According to ADA guidelines, hemoglobin A1c <7.0% represents  optimal control in non-pregnant diabetic patients. Different  metrics may apply to specific patient populations.   Standards of Medical Care in Diabetes-2016.  For the purpose of screening for the presence of diabetes:  <5.7%     Consistent with the absence of diabetes  5.7-6.4%  Consistent with increasing risk for diabetes   (prediabetes)  >or=6.5%  Consistent with diabetes  Currently, no consensus exists for use of hemoglobin A1c  for diagnosis of diabetes for children.  This Hemoglobin A1c assay has significant interference with fetal   hemoglobin   (HbF). The results are invalid for patients with abnormal amounts of   HbF,   including those with known Hereditary Persistence   of Fetal Hemoglobin. Heterozygous hemoglobin variants (HbAS, HbAC,   HbAD, HbAE, HbA2) do not significantly interfere with this assay;   however, presence of multiple variants in a sample may impact the %   interference.     01/08/2018 7.2 (H) 4.0 - 5.6 % Final     Comment:     According to ADA guidelines, hemoglobin A1c <7.0% represents  optimal control in non-pregnant diabetic patients. Different  metrics may apply to specific patient populations.   Standards of Medical Care in Diabetes-2016.  For the purpose of screening for the presence of diabetes:  <5.7%     Consistent with the absence of diabetes  5.7-6.4%  Consistent with increasing risk for diabetes   (prediabetes)  >or=6.5%  Consistent with diabetes  Currently, no consensus exists for use of hemoglobin A1c  for diagnosis of diabetes for children.  This Hemoglobin A1c assay has significant interference with fetal   hemoglobin   (HbF). The results are invalid for patients with abnormal amounts of   HbF,   including those with known Hereditary Persistence   of Fetal Hemoglobin. Heterozygous hemoglobin variants (HbAS, HbAC,   HbAD, HbAE, HbA2) do not significantly interfere with this assay;   however, presence  of multiple variants in a sample may impact the %   interference.             ASSESSMENT AND PLAN:  1. Type 2 diabetes, uncontrolled, with neuropathy  Diabetes currently is not controlled for age and comorbid conditions. We discussed diabetic diet and regular exercise. We discussed home blood sugar monitoring, if appropriate. I will increase her januvia to 100mg daily. Recheck in 3 months.   - SITagliptin (JANUVIA) 100 MG Tab; Take 1 tablet (100 mg total) by mouth once daily.  Dispense: 90 tablet; Refill: 1  - Hemoglobin A1c; Future    2. Benign hypertension  Blood pressures are currently not well controlled.  She states she is actually taking 5 mg of amlodipine a day at this time.  Part of this may be related to her recent poor sleep.  We discussed low-salt diet and regular exercise.  She will come back in a few weeks for recheck of blood pressure with nursing staff.  We will make medication adjustments, if needed.  - amLODIPine (NORVASC) 5 MG tablet; Take 1 tablet (5 mg total) by mouth once daily.  Dispense: 90 tablet; Refill: 1    3. Hyperlipidemia LDL goal <100  We discussed low fat diet and regular exercise.The current medical regimen is effective;  continue present plan and medications.      4. Other osteoporosis without current pathological fracture  We discussed adequate calcium and vitamin-D supplementation.  Last bone density test recommended prescription treatment.  I prescribed her Fosamax at her last office visit, but she is concerned about all the side effects that she read about.  I will refer her to Endocrinology to discuss other treatment options.  - Ambulatory consult to Endocrinology    5. Insomnia, unspecified type  We discussed sleep hygiene.  Patient does not seem very motivated.  I am also not sure if the current sleep that she is getting is restorative sleep.  I told the patient that Ambien at her age is not a safe medication.  I will refer to Sleep Medicine for further evaluation.  -  Ambulatory consult to Sleep Disorders    6. Obesity (BMI 30-39.9)  The patient is asked to make an attempt to improve diet and exercise patterns to aid in medical management of this problem.     7. Advanced directives, counseling/discussion  I had a discussion today with the patient regarding advanced directives.  Advanced directives were discussed due to patient's age and/or chronic medical conditions. Prognosis based on current condition: good. Paperwork was given to complete living will and medical POA. [place] was not discussed. Approximately 8 minute(s) spent on counseling/discussion regarding end of life decision making.            Follow-up in about 3 months (around 10/16/2018), or if symptoms worsen or fail to improve, for follow up chronic medical conditions.. or sooner as needed.

## 2018-07-18 ENCOUNTER — TELEPHONE (OUTPATIENT)
Dept: FAMILY MEDICINE | Facility: CLINIC | Age: 73
End: 2018-07-18

## 2018-07-18 NOTE — TELEPHONE ENCOUNTER
Pt states her son saw a sleep doctor and she will deal with the insomnia on her own. I also left the phone number to Dr. James office on her voicemail because she was busy and could not take the number down. Thanks.

## 2018-08-07 ENCOUNTER — TELEPHONE (OUTPATIENT)
Dept: FAMILY MEDICINE | Facility: CLINIC | Age: 73
End: 2018-08-07

## 2018-08-07 NOTE — TELEPHONE ENCOUNTER
----- Message from Sheron Nielsen sent at 8/7/2018  1:51 PM CDT -----  Contact: self   Pt would like to speak with the office about her experience at her referred Endocrinologist. She is very upset and would like to speak to the office as soon as possible. Please call at [phone].

## 2018-08-08 ENCOUNTER — TELEPHONE (OUTPATIENT)
Dept: FAMILY MEDICINE | Facility: CLINIC | Age: 73
End: 2018-08-08

## 2018-08-08 DIAGNOSIS — M81.8 OTHER OSTEOPOROSIS WITHOUT CURRENT PATHOLOGICAL FRACTURE: Primary | ICD-10-CM

## 2018-08-08 NOTE — TELEPHONE ENCOUNTER
----- Message from Ayesha Michele sent at 8/7/2018  4:38 PM CDT -----  Contact: Self  Pt is returning call. Please call pt at [phone].

## 2018-08-09 ENCOUNTER — TELEPHONE (OUTPATIENT)
Dept: ENDOCRINOLOGY | Facility: CLINIC | Age: 73
End: 2018-08-09

## 2018-08-09 NOTE — TELEPHONE ENCOUNTER
Talk to pt she is new to endocrinology department I have scheduled her to see dr ken on Sept 10 at 10am. She ageed will send her a reminder.             ----- Message from Daisha Song sent at 8/8/2018  4:02 PM CDT -----  Dr. Garber placed a referral for the patient. I was unable to pull up any available appointments. Please call to schedule. Thanks.

## 2018-09-04 ENCOUNTER — TELEPHONE (OUTPATIENT)
Dept: ENDOCRINOLOGY | Facility: CLINIC | Age: 73
End: 2018-09-04

## 2018-09-04 NOTE — TELEPHONE ENCOUNTER
----- Message from Smita Lopez sent at 9/4/2018  2:15 PM CDT -----  Contact: self  Pt called in about wanting to rs appt. Pt would like the nurse to give her a call back      Pt can be reached at [phone]      TY

## 2018-09-13 ENCOUNTER — PATIENT MESSAGE (OUTPATIENT)
Dept: FAMILY MEDICINE | Facility: CLINIC | Age: 73
End: 2018-09-13

## 2018-09-13 RX ORDER — GLIMEPIRIDE 4 MG/1
TABLET ORAL
Qty: 180 TABLET | Refills: 0 | Status: SHIPPED | OUTPATIENT
Start: 2018-09-13 | End: 2018-10-11 | Stop reason: SDUPTHER

## 2018-10-11 RX ORDER — GLIMEPIRIDE 4 MG/1
TABLET ORAL
Qty: 180 TABLET | Refills: 0 | Status: SHIPPED | OUTPATIENT
Start: 2018-10-11 | End: 2019-02-06 | Stop reason: SDUPTHER

## 2018-10-11 RX ORDER — METFORMIN HYDROCHLORIDE 1000 MG/1
1000 TABLET ORAL 2 TIMES DAILY
Qty: 180 TABLET | Refills: 0 | Status: SHIPPED | OUTPATIENT
Start: 2018-10-11 | End: 2018-10-17 | Stop reason: SDUPTHER

## 2018-10-12 ENCOUNTER — LAB VISIT (OUTPATIENT)
Dept: LAB | Facility: HOSPITAL | Age: 73
End: 2018-10-12
Attending: INTERNAL MEDICINE
Payer: MEDICARE

## 2018-10-12 LAB
ESTIMATED AVG GLUCOSE: 154 MG/DL
HBA1C MFR BLD HPLC: 7 %

## 2018-10-12 PROCEDURE — 36415 COLL VENOUS BLD VENIPUNCTURE: CPT | Mod: PO

## 2018-10-12 PROCEDURE — 83036 HEMOGLOBIN GLYCOSYLATED A1C: CPT

## 2018-10-17 ENCOUNTER — OFFICE VISIT (OUTPATIENT)
Dept: FAMILY MEDICINE | Facility: CLINIC | Age: 73
End: 2018-10-17
Payer: MEDICARE

## 2018-10-17 VITALS
HEIGHT: 60 IN | DIASTOLIC BLOOD PRESSURE: 82 MMHG | WEIGHT: 162.5 LBS | TEMPERATURE: 98 F | SYSTOLIC BLOOD PRESSURE: 138 MMHG | HEART RATE: 94 BPM | OXYGEN SATURATION: 97 % | BODY MASS INDEX: 31.9 KG/M2

## 2018-10-17 DIAGNOSIS — I10 BENIGN HYPERTENSION: ICD-10-CM

## 2018-10-17 DIAGNOSIS — Z23 NEED FOR PROPHYLACTIC VACCINATION AND INOCULATION AGAINST INFLUENZA: ICD-10-CM

## 2018-10-17 DIAGNOSIS — E78.5 HYPERLIPIDEMIA LDL GOAL <100: ICD-10-CM

## 2018-10-17 DIAGNOSIS — E66.9 OBESITY (BMI 30-39.9): ICD-10-CM

## 2018-10-17 PROCEDURE — 1101F PT FALLS ASSESS-DOCD LE1/YR: CPT | Mod: CPTII,S$PBB,, | Performed by: INTERNAL MEDICINE

## 2018-10-17 PROCEDURE — 90662 IIV NO PRSV INCREASED AG IM: CPT | Mod: PBBFAC,PO

## 2018-10-17 PROCEDURE — 3079F DIAST BP 80-89 MM HG: CPT | Mod: CPTII,S$PBB,, | Performed by: INTERNAL MEDICINE

## 2018-10-17 PROCEDURE — 3075F SYST BP GE 130 - 139MM HG: CPT | Mod: CPTII,S$PBB,, | Performed by: INTERNAL MEDICINE

## 2018-10-17 PROCEDURE — 99214 OFFICE O/P EST MOD 30 MIN: CPT | Mod: PBBFAC,PO | Performed by: INTERNAL MEDICINE

## 2018-10-17 PROCEDURE — 3045F PR MOST RECENT HEMOGLOBIN A1C LEVEL 7.0-9.0%: CPT | Mod: CPTII,S$PBB,, | Performed by: INTERNAL MEDICINE

## 2018-10-17 PROCEDURE — 99999 PR PBB SHADOW E&M-EST. PATIENT-LVL IV: CPT | Mod: PBBFAC,,, | Performed by: INTERNAL MEDICINE

## 2018-10-17 PROCEDURE — 99214 OFFICE O/P EST MOD 30 MIN: CPT | Mod: S$PBB,,, | Performed by: INTERNAL MEDICINE

## 2018-10-17 RX ORDER — LOSARTAN POTASSIUM AND HYDROCHLOROTHIAZIDE 25; 100 MG/1; MG/1
1 TABLET ORAL DAILY
Qty: 90 TABLET | Refills: 1 | Status: SHIPPED | OUTPATIENT
Start: 2018-10-17 | End: 2019-02-06 | Stop reason: SDUPTHER

## 2018-10-17 RX ORDER — LANCETS 28 GAUGE
1 EACH MISCELLANEOUS DAILY
Qty: 100 EACH | Refills: 1 | Status: SHIPPED | OUTPATIENT
Start: 2018-10-17 | End: 2019-09-09 | Stop reason: SDUPTHER

## 2018-10-17 RX ORDER — ROSUVASTATIN CALCIUM 5 MG/1
5 TABLET, COATED ORAL NIGHTLY
Qty: 90 TABLET | Refills: 1 | Status: SHIPPED | OUTPATIENT
Start: 2018-10-17 | End: 2019-02-06 | Stop reason: SDUPTHER

## 2018-10-17 RX ORDER — METFORMIN HYDROCHLORIDE 1000 MG/1
1000 TABLET ORAL 2 TIMES DAILY
Qty: 180 TABLET | Refills: 1 | Status: SHIPPED | OUTPATIENT
Start: 2018-10-17 | End: 2019-02-06 | Stop reason: SDUPTHER

## 2018-10-17 NOTE — PROGRESS NOTES
HISTORY OF PRESENT ILLNESS:  Peace Farmer is a 73 y.o. female who presents to the clinic today for Diabetes and Follow-up  .   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  She saw Podiatry in May for corn.  She has been wearing different shoes and using creams and a corn is much improved.  She has made some improvement in dietary habits since her last office visit with me.  She recently did blood work and is here today to discuss the results.  Diabetes: The patient reports that they  check blood sugars only occasionally. Blood sugar results are generally in an acceptable range (> 70 and <150). The patient  denies any problems with low blood sugars. The patient  reports that they have been compliant with current medication plan but have had difficulty with following diet and/or exercise plan on a regular basis.  Hypertension: The patient reports that they check their blood pressures regularly and blood pressure is generally well controlled (<= 139/89).  The patient  is not enrolled in the digital hypertension program. The patient denies  cardiac chest pain, shortness of breath, lower extremity edema, headaches and side effects of medication. The patient reports problems with  none. The patient  has been compliant with the current medication regimen.  The patient : tries to follow a low salt diet.  Hyperlipidemia: Patient reports that they have been compliant with low fat diet and regular exercise. Patient reports that they have been compliant with their medication regimen and deny any problems/side effects from the medication.        PAST MEDICAL HISTORY:  Past Medical History:   Diagnosis Date    Abnormal mammogram 10/13    s/p biopsy    Diabetes mellitus type II     Fibromyalgia     Hyperlipidemia     Hypertension     Obesity (BMI 30-39.9)     Osteopenia     Psoriasis     Skin cancer of face 2011     forehead       PAST SURGICAL HISTORY:  Past Surgical History:   Procedure Laterality Date    PARTIAL HYSTERECTOMY      age 34    skin cancer face  2011       SOCIAL HISTORY:  Social History     Socioeconomic History    Marital status:      Spouse name: Not on file    Number of children: 3    Years of education: some colle    Highest education level: Not on file   Social Needs    Financial resource strain: Not on file    Food insecurity - worry: Not on file    Food insecurity - inability: Not on file    Transportation needs - medical: Not on file    Transportation needs - non-medical: Not on file   Occupational History    Occupation: homemaker   Tobacco Use    Smoking status: Never Smoker    Smokeless tobacco: Never Used   Substance and Sexual Activity    Alcohol use: Yes     Comment: rare    Drug use: No    Sexual activity: Not Currently     Partners: Male     Birth control/protection: Post-menopausal   Other Topics Concern    Are you pregnant or think you may be? Not Asked    Breast-feeding Not Asked   Social History Narrative    Adult Screenings Reviewed and updated  5/20/14    Mammogram( for females) October 2013 abnromal left     Pap ( for females) partial hysterectomy     Colonoscopy  2009  Reported normal.    Flu shot 2013     Td updated today  As Tdap  5/20/14    Pneumovax  done once    Zostavax done    Eye exam 2013 due for  2014    Bone density 7/2012 due again in  2016       FAMILY HISTORY:  Family History   Problem Relation Age of Onset    Heart disease Father     Skin cancer Father     Hypertension Father     Lung cancer Father     Cataracts Father     Hypertension Mother     Skin cancer Mother     Cataracts Mother     Macular degeneration Mother     No Known Problems Brother     Stroke Maternal Grandmother     No Known Problems Maternal Grandfather     No Known Problems Paternal Grandmother     No Known Problems Paternal Grandfather     No Known Problems  Maternal Aunt     No Known Problems Maternal Uncle     No Known Problems Paternal Aunt     No Known Problems Paternal Uncle     Diabetes Mellitus Neg Hx     Breast cancer Neg Hx     Colon cancer Neg Hx     Amblyopia Neg Hx     Blindness Neg Hx     Cancer Neg Hx     Diabetes Neg Hx     Glaucoma Neg Hx     Retinal detachment Neg Hx     Strabismus Neg Hx     Thyroid disease Neg Hx        ALLERGIES AND MEDICATIONS: updated and reviewed.  Review of patient's allergies indicates:   Allergen Reactions    Pcn [penicillins] Hives and Shortness Of Breath        Medication List           Accurate as of 10/17/18  2:20 PM. If you have any questions, ask your nurse or doctor.               CHANGE how you take these medications    losartan-hydrochlorothiazide 100-25 mg 100-25 mg per tablet  Commonly known as:  HYZAAR  Take 1 tablet by mouth once daily.  What changed:  See the new instructions.     rosuvastatin 5 MG tablet  Commonly known as:  CRESTOR  Take 1 tablet (5 mg total) by mouth every evening.  What changed:  See the new instructions.        CONTINUE taking these medications    amLODIPine 5 MG tablet  Commonly known as:  NORVASC  Take 1 tablet (5 mg total) by mouth once daily.     aspirin 325 MG tablet     blood sugar diagnostic Strp  Commonly known as:  FREESTYLE LITE STRIPS  Apply 1 strip topically once daily.     blood-glucose meter kit  Use as instructed     calcium-vitamin D 500-125 mg-unit tablet     cetirizine 10 mg Cap     cholecalciferol (vitamin D3) 2,000 unit Cap  Commonly known as:  VITAMIN D3  Take 1 capsule (2,000 Units total) by mouth once daily.     glimepiride 4 MG tablet  Commonly known as:  AMARYL  TAKE 1 TABLET BY MOUTH TWO TIMES DAILY BEFORE MEALS....(HOLD FOR BLOOD SUGAR LESS THAN 100)     lancets 28 gauge Misc  Commonly known as:  FREESTYLE LANCETS  Apply 1 lancet topically once daily.     metFORMIN 1000 MG tablet  Commonly known as:  GLUCOPHAGE  Take 1 tablet (1,000 mg total) by mouth  2 (two) times daily.     SITagliptin 100 MG Tab  Commonly known as:  JANUVIA  Take 1 tablet (100 mg total) by mouth once daily.           Where to Get Your Medications      These medications were sent to [facility] - [place], [place] - [address]   [address], [facility] [address]    Phone:  [phone]   · losartan-hydrochlorothiazide 100-25 mg 100-25 mg per tablet  · metFORMIN 1000 MG tablet  · rosuvastatin 5 MG tablet  · SITagliptin 100 MG Tab     You can get these medications from any pharmacy    Bring a paper prescription for each of these medications  · blood sugar diagnostic Strp  · lancets 28 gauge [facility]            CARE TEAM:  Patient Care Team:  Rosa Garber MD as PCP - General (Internal Medicine)  Carloz Heredia MD as PCP - [facility]         REVIEW OF SYSTEMS:  Review of Systems   Constitutional: Negative for chills, fatigue, fever and unexpected weight change.   HENT: Negative for congestion and postnasal drip.    Eyes: Negative for pain and visual disturbance.   Respiratory: Negative for cough, shortness of breath and wheezing.    Cardiovascular: Negative for chest pain, palpitations and leg swelling.   Gastrointestinal: Negative for abdominal pain, constipation, diarrhea, nausea and vomiting.   Genitourinary: Negative for dysuria.   Musculoskeletal: Negative for arthralgias and back pain.   Skin: Negative for rash.   Neurological: Negative for weakness and headaches.   Psychiatric/Behavioral: Negative for dysphoric mood and sleep disturbance. The patient is not nervous/anxious.          PHYSICAL EXAM:   Vitals:    10/17/18 1313   BP: 138/82   Pulse: 94   Temp: 98.4 °F (36.9 °C)     Weight: 73.7 kg (162 lb 7.7 oz)   Height: 5' (152.4 cm)   Body mass index is 31.73 kg/m².     General appearance - alert, well appearing, and in no distress and obese  Mental status - alert, oriented to person, place, and time, normal mood, behavior, speech, dress, motor  activity, and thought processes  Eyes - pupils equal and reactive, extraocular eye movements intact, sclera anicteric  Mouth - mucous membranes moist, pharynx normal without lesions  Chest - clear to auscultation, no wheezes, rales or rhonchi, symmetric air entry  Heart - normal rate, regular rhythm, normal S1, S2, no murmurs, rubs, clicks or gallops      Lab Results   Component Value Date    HGBA1C 7.0 (H) 10/12/2018    HGBA1C 8.0 (H) 07/09/2018    HGBA1C 7.1 (H) 04/09/2018      Lab Results   Component Value Date    CHOL 127 01/08/2018    CHOL 118 (L) 03/07/2017    CHOL 122 06/14/2016     Lab Results   Component Value Date    LDLCALC 67.0 01/08/2018    LDLCALC 59.4 (L) 03/07/2017    LDLCALC 58.0 (L) 06/14/2016          ASSESSMENT AND PLAN:  1. Type 2 diabetes, uncontrolled, with neuropathy  Diabetes currently is controlled for age and comorbid conditions - control has much improved since last office visit. We discussed diabetic diet and regular exercise. We discussed home blood sugar monitoring, if appropriate. Continue current medication regimen.  Diabetic complications addressed: Neuropathy pain controlled.  Patient was counseled on the need for yearly diabetic retinopathy exam and yearly diabetic foot exam.   - metFORMIN (GLUCOPHAGE) 1000 MG tablet; Take 1 tablet (1,000 mg total) by mouth 2 (two) times daily.  Dispense: 180 tablet; Refill: 1  - SITagliptin (JANUVIA) 100 MG Tab; Take 1 tablet (100 mg total) by mouth once daily.  Dispense: 90 tablet; Refill: 1  - Ambulatory consult to Optometry  - Hemoglobin A1c; Future  - lancets (FREESTYLE LANCETS) 28 gauge Misc; Apply 1 lancet topically once daily.  Dispense: 100 each; Refill: 1  - blood sugar diagnostic (FREESTYLE LITE STRIPS) Strp; Apply 1 strip topically once daily.  Dispense: 100 strip; Refill: 1    2. Benign hypertension  Discussed sodium restriction, maintaining ideal body weight and regular exercise program as physiologic means to achieve blood pressure  control. The patient will strive towards this. The current medical regimen is effective;  continue present plan and medications. Recommended patient to check home readings to monitor and see me for followup as scheduled or sooner as needed. Patient was educated that both decongestant and anti-inflammatory medication may raise blood pressure.   - losartan-hydrochlorothiazide 100-25 mg (HYZAAR) 100-25 mg per tablet; Take 1 tablet by mouth once daily.  Dispense: 90 tablet; Refill: 1  - CBC auto differential; Future    3. Hyperlipidemia LDL goal <100  We discussed low fat diet and regular exercise.The current medical regimen is effective;  continue present plan and medications.    - rosuvastatin (CRESTOR) 5 MG tablet; Take 1 tablet (5 mg total) by mouth every evening.  Dispense: 90 tablet; Refill: 1  - Comprehensive metabolic panel; Future  - Lipid panel; Future    4. Obesity (BMI 30-39.9)  The patient is asked to make an attempt to improve diet and exercise patterns to aid in medical management of this problem.     5. Need for prophylactic vaccination and inoculation against influenza    - Flu Vaccine - High Dose (PF) (65+)           Follow-up in about 4 months (around 2/17/2019), or if symptoms worsen or fail to improve, for follow up chronic medical conditions.. or sooner as needed.

## 2018-11-16 ENCOUNTER — OFFICE VISIT (OUTPATIENT)
Dept: OPTOMETRY | Facility: CLINIC | Age: 73
End: 2018-11-16
Payer: MEDICARE

## 2018-11-16 DIAGNOSIS — H25.13 NUCLEAR SCLEROSIS, BILATERAL: ICD-10-CM

## 2018-11-16 DIAGNOSIS — E11.9 DIABETIC EYE EXAM: Primary | ICD-10-CM

## 2018-11-16 DIAGNOSIS — Z01.00 DIABETIC EYE EXAM: Primary | ICD-10-CM

## 2018-11-16 DIAGNOSIS — H26.9 CORTICAL CATARACT OF BOTH EYES: ICD-10-CM

## 2018-11-16 DIAGNOSIS — H52.7 REFRACTIVE ERROR: ICD-10-CM

## 2018-11-16 LAB
LEFT EYE DM RETINOPATHY: NEGATIVE
RIGHT EYE DM RETINOPATHY: NEGATIVE

## 2018-11-16 PROCEDURE — 92014 COMPRE OPH EXAM EST PT 1/>: CPT | Mod: S$GLB,,, | Performed by: OPTOMETRIST

## 2018-11-16 PROCEDURE — 92015 DETERMINE REFRACTIVE STATE: CPT | Mod: S$GLB,,, | Performed by: OPTOMETRIST

## 2018-11-16 PROCEDURE — 99999 PR PBB SHADOW E&M-EST. PATIENT-LVL II: CPT | Mod: PBBFAC,,, | Performed by: OPTOMETRIST

## 2018-11-16 NOTE — LETTER
November 16, 2018      Rosa Garber MD  [address]  [place] [address]           [facility]  [address]  [place] [address]  Phone: [phone]  Fax: [phone]          Patient: Peace Farmer   MR Number: [id number]   YOB: 1945   Date of Visit: 11/16/2018       Dear Dr. Rosa Garber:    Thank you for referring Peace Farmer to me for evaluation. Attached you will find relevant portions of my assessment and plan of care.    If you have questions, please do not hesitate to call me. I look forward to following Peace Farmer along with you.    Sincerely,    Samira Freeman, OD    Enclosure  CC:  No Recipients    If you would like to receive this communication electronically, please contact [email or web address] or [phone] to request more information on [employer] Link access.    For providers and/or their staff who would like to refer a patient to [facility], please contact us through our one-stop-shop provider referral line, [facility], at [phone].    If you feel you have received this communication in error or would no longer like to receive these types of communications, please e-mail [email or web address]

## 2018-11-16 NOTE — PATIENT INSTRUCTIONS
Cataract Surgery FAQs  Frequently Asked Questions    My doctor told me I have a cataract     What do I do next?   Relax. Cataracts are a normal part of aging, and cataract surgery is among the safest and most common procedures performed today.  Most patients who have had cataract surgery report significant improvement in their quality of life because of their improved vision, with a speedy recovery and minimal discomfort or inconvenience.    Your optometrist will recommend a cataract surgeon who will examine your eyes, evaluate the need for surgery, and discuss the details with you and your family.     What exactly is a cataract?   A cataract is simply a darkening or clouding of the eyes internal lens, which blurs your vision.  It is not a film which grows over the eye, but rather a degenerative process which causes the eyes normally clear lens to become cloudy or hazy.     Because this degenerative process occurs slowly over many years, we generally wait until the cataract begins to significantly impact your vision, before recommend surgery.                     How is cataract surgery performed?  Cataract surgery involves removal of the dark or cloudy lens from the eye, and implantation of a new, clear lens implant or IOL.  Cataract surgery is a lens replacement surgery.          Modern cataract surgery is performed as a same-day surgery and typically takes about 15 minutes to complete.  It is performed while you are awake, but you will be medicated so that you are relaxed and comfortable. Of course, the eye is anesthetized so that you dont feel any discomfort, and many people only vaguely remember the actual procedure, recalling only lights and the sensation of moisture on the eye.     Although is takes about a week to fully heal, most people are able to see better and resume their normal activities the very next day!  You will need to use eye drops for about one month after your surgery.     Will I  need glasses after cataract surgery?   The purpose of cataract surgery is to improve the overall quality of your vision, but it does not necessarily eliminate the need for glasses. Although some people dont need to wear glasses after standard cataract surgery, most people will still need to wear glasses for certain tasks.  If you are interested in minimizing or even eliminating the need for glasses, you should ask your surgeon about Refractive Cataract Surgery.    What is Refractive Cataract Surgery?   Refractive Cataract Surgery refers to the use of additional techniques performed either at the time of your cataract surgery or soon afterwards, designed to minimize and often eliminate your need for glasses.  Technologies such as LASIK, Astigmatism Correcting Incisions, Multifocal, Accommodating, or Toric lens implants can all be used, depending on the particular needs of each patient. Only your surgeon can determine if you are a candidate and which techniques are appropriate for your goals and your eye.                         LASIK                Multifocal IOL         Will my insurance cover these additional procedures?   Although your insurance will fully cover your cataract surgery, any other additional procedures -designed solely to eliminate the need for glasses- are considered elective (not medically necessary), and therefore not covered by your insurance.  Rest assured that your vision will be better after cataract surgery, in either case.  You simply need to decide whether minimizing your dependence on glasses is worth the additional investment in your eyes.  (Costs typically range between $1,000 to $2,000 per eye, depending on your needs).    View a 1hr video discussing cataract surgery with live patient questions and answers on the [employer] Web Site (Keywords: [facility] Cataract):    [email or web address]

## 2018-11-28 ENCOUNTER — OFFICE VISIT (OUTPATIENT)
Dept: ENDOCRINOLOGY | Facility: CLINIC | Age: 73
End: 2018-11-28
Payer: MEDICARE

## 2018-11-28 ENCOUNTER — LAB VISIT (OUTPATIENT)
Dept: LAB | Facility: HOSPITAL | Age: 73
End: 2018-11-28
Attending: HOSPITALIST
Payer: MEDICARE

## 2018-11-28 VITALS
SYSTOLIC BLOOD PRESSURE: 158 MMHG | DIASTOLIC BLOOD PRESSURE: 60 MMHG | HEIGHT: 60 IN | HEART RATE: 72 BPM | WEIGHT: 161.19 LBS | BODY MASS INDEX: 31.64 KG/M2

## 2018-11-28 DIAGNOSIS — M81.8 OTHER OSTEOPOROSIS WITHOUT CURRENT PATHOLOGICAL FRACTURE: ICD-10-CM

## 2018-11-28 DIAGNOSIS — M81.8 OTHER OSTEOPOROSIS WITHOUT CURRENT PATHOLOGICAL FRACTURE: Primary | ICD-10-CM

## 2018-11-28 DIAGNOSIS — E11.69 CONTROLLED TYPE 2 DIABETES MELLITUS WITH OTHER SPECIFIED COMPLICATION, WITHOUT LONG-TERM CURRENT USE OF INSULIN: ICD-10-CM

## 2018-11-28 DIAGNOSIS — E66.9 OBESITY (BMI 30-39.9): ICD-10-CM

## 2018-11-28 PROBLEM — M81.0 OSTEOPOROSIS WITHOUT CURRENT PATHOLOGICAL FRACTURE: Status: ACTIVE | Noted: 2018-11-28

## 2018-11-28 LAB
25(OH)D3+25(OH)D2 SERPL-MCNC: 29 NG/ML
ALBUMIN SERPL BCP-MCNC: 3.7 G/DL
ALP SERPL-CCNC: 98 U/L
ALT SERPL W/O P-5'-P-CCNC: 17 U/L
ANION GAP SERPL CALC-SCNC: 11 MMOL/L
AST SERPL-CCNC: 12 U/L
BILIRUB SERPL-MCNC: 0.5 MG/DL
BUN SERPL-MCNC: 16 MG/DL
CALCIUM SERPL-MCNC: 10.7 MG/DL
CHLORIDE SERPL-SCNC: 99 MMOL/L
CO2 SERPL-SCNC: 26 MMOL/L
CREAT SERPL-MCNC: 0.8 MG/DL
EST. GFR  (AFRICAN AMERICAN): >60 ML/MIN/1.73 M^2
EST. GFR  (NON AFRICAN AMERICAN): >60 ML/MIN/1.73 M^2
GLUCOSE SERPL-MCNC: 391 MG/DL
POTASSIUM SERPL-SCNC: 5 MMOL/L
PROT SERPL-MCNC: 7.4 G/DL
SODIUM SERPL-SCNC: 136 MMOL/L
T4 FREE SERPL-MCNC: 1.91 NG/DL
TSH SERPL DL<=0.005 MIU/L-ACNC: 0.03 UIU/ML

## 2018-11-28 PROCEDURE — 1101F PT FALLS ASSESS-DOCD LE1/YR: CPT | Mod: CPTII,S$GLB,, | Performed by: INTERNAL MEDICINE

## 2018-11-28 PROCEDURE — 3078F DIAST BP <80 MM HG: CPT | Mod: CPTII,S$GLB,, | Performed by: INTERNAL MEDICINE

## 2018-11-28 PROCEDURE — 80053 COMPREHEN METABOLIC PANEL: CPT

## 2018-11-28 PROCEDURE — 84443 ASSAY THYROID STIM HORMONE: CPT

## 2018-11-28 PROCEDURE — 99999 PR PBB SHADOW E&M-EST. PATIENT-LVL III: CPT | Mod: PBBFAC,,, | Performed by: INTERNAL MEDICINE

## 2018-11-28 PROCEDURE — 99204 OFFICE O/P NEW MOD 45 MIN: CPT | Mod: S$GLB,,, | Performed by: INTERNAL MEDICINE

## 2018-11-28 PROCEDURE — 36415 COLL VENOUS BLD VENIPUNCTURE: CPT

## 2018-11-28 PROCEDURE — 82306 VITAMIN D 25 HYDROXY: CPT

## 2018-11-28 PROCEDURE — 3077F SYST BP >= 140 MM HG: CPT | Mod: CPTII,S$GLB,, | Performed by: INTERNAL MEDICINE

## 2018-11-28 PROCEDURE — 3045F PR MOST RECENT HEMOGLOBIN A1C LEVEL 7.0-9.0%: CPT | Mod: CPTII,S$GLB,, | Performed by: INTERNAL MEDICINE

## 2018-11-28 PROCEDURE — 84439 ASSAY OF FREE THYROXINE: CPT

## 2018-11-28 NOTE — LETTER
November 28, 2018      Rosa Garber MD  [address]  [place] [address]           [facility]  [address]  [place] [address]  Phone: [phone]          Patient: Peace Farmer   MR Number: [id number]   YOB: 1945   Date of Visit: 11/28/2018       Dear Dr. Rosa Garber:    Thank you for referring Peace Farmer to me for evaluation. Attached you will find relevant portions of my assessment and plan of care.    If you have questions, please do not hesitate to call me. I look forward to following Peace Farmer along with you.    Sincerely,    Katerina Rashid MD    Enclosure  CC:  No Recipients    If you would like to receive this communication electronically, please contact [email or web address] or [phone] to request more information on [employer] Link access.    For providers and/or their staff who would like to refer a patient to [facility], please contact us through our one-stop-shop provider referral line, [facility], at [phone].    If you feel you have received this communication in error or would no longer like to receive these types of communications, please e-mail [email or web address]

## 2018-11-28 NOTE — PROGRESS NOTES
Subjective:      Patient ID: Peace Farmer is a 73 y.o. female.    Chief Complaint:  Osteoporosis      History of Present Illness  This is a 73 year old female with DM2, HLD,  HTN, obesity presented to endocrinology for evaluation and treatment of osteoporosis     Osteoporosis  Patient complains of osteoporosis reportedly diagnosed in 2008s. She was diagnosed with osteoporosis by bone density scan in 2018 but had osteopenia in 2016 bone density scan. Patient denies history of fractures or fall.  She is currently being treated with calcium and vitamin D supplementation OTC.  She is not currently being treated with bisphosphonates due to side effect in the past severe GERD and jaw pain when she took the medications. Postmenopausal since her 50s, hysterectomy at age 33 with ovaries intact. Denies smoking. Denies recent systemic steroid use. Previously exercised with dancing (Protalex) with  but stop 8 years ago after her  cancer diagnosis. No hx of renal stones, no calcium disorder. No changes in height. No reported thyroid issue.    DEXA: 1/10/18: Lumbar T-1.6, Hip T-1.4, Femoral neck T-2.5  Vit D 43     Family hx of mom with osteoporosis and first cousin (mom side) with osteoporosis      Osteoporosis Risk Factors   Nonmodifiable  Personal Hx of fracture as an adult: no  Hx of fracture in first-degree relative: no   race: yes  Advanced age: yes  Female sex: yes  Dementia: no  Poor health/frailty: no     Potentially modifiable:  Tobacco use: no  Low body weight (<127 lbs): no  Estrogen deficiency     early menopause (age <45) or bilateral ovariectomy: no     prolonged premenopausal amenorrhea (>1 yr): no  Low calcium intake (lifelong): no  Alcoholism: no  Recurrent falls: no  Inadequate physical activity: yes    Current calcium and Vit D intake:  Dietary sources: Vegetables  Supplements: Calcium+VitD3 two tab daily    Diabetes  Current symptoms/problems include paresthesia of the feet and have  been stable. Managed by PCP. Follow up with eye and podiatry in clinic  Current diabetic medications include: oral agents (triple therapy): Metformin 1000mg BID, Januvia 100mg QD, Glimepiride 4mg QD      Lab Results   Component Value Date    HGBA1C 7.0 (H) 10/12/2018    HGBA1C 8.0 (H) 07/09/2018    HGBA1C 7.1 (H) 04/09/2018         Review of Systems   Constitutional: Negative for activity change and unexpected weight change.   HENT: Negative for sore throat and voice change.    Eyes: Negative for visual disturbance.   Respiratory: Negative for chest tightness and shortness of breath.    Cardiovascular: Negative for chest pain and leg swelling.   Gastrointestinal: Negative for abdominal pain.   Endocrine: Negative for cold intolerance and polyphagia.   Genitourinary: Negative for urgency.   Musculoskeletal: Negative for arthralgias.   Skin: Negative for wound.   Neurological: Negative for weakness and headaches.   Psychiatric/Behavioral: Negative for confusion and sleep disturbance.       Objective:   Physical Exam   Constitutional: She is oriented to person, place, and time. She appears well-developed and well-nourished. No distress.   Eyes: Conjunctivae are normal. No scleral icterus.   Neck: Normal range of motion. No tracheal deviation present. No thyromegaly present.   Cardiovascular: Normal rate and normal heart sounds.   Pulmonary/Chest: Effort normal and breath sounds normal.   Abdominal: Soft. There is no tenderness. No hernia.   Musculoskeletal: Normal range of motion. She exhibits no edema or tenderness.   Neurological: She is alert and oriented to person, place, and time.   Skin: Skin is warm. No erythema.   Psychiatric: She has a normal mood and affect. Her behavior is normal.   Nursing note and vitals reviewed.      Lab Review:   Lab Visit on 10/12/2018   Component Date Value    Hemoglobin A1C 10/12/2018 7.0*    Estimated Avg Glucose 10/12/2018 154*   Lab Visit on 07/09/2018   Component Date Value     Hemoglobin A1C 07/09/2018 8.0*    Estimated Avg Glucose 07/09/2018 183*   Lab Visit on 07/09/2018   Component Date Value    Microalbum.,U,Random 07/09/2018 5.0     Creatinine, Random Ur 07/09/2018 65.0     Microalb Creat Ratio 07/09/2018 7.7        Assessment:     1. Other osteoporosis without current pathological fracture  TSH    Comprehensive metabolic panel    VITAMIN D    Calcium, Timed Urine [facility]; 24 Hours    Creatinine, urine, timed 24 Hours   2. Obesity (BMI 30-39.9)     3. Controlled type 2 diabetes mellitus with other specified complication, without long-term current use of insulin         Plan:     Osteoporosis  1. DEXA scan reviewed, will need medical therapy, candidate for IV form of Bisphosphonate therapy given her previous side effect with PO Bisphosphonate therapy.   - Planning Reclast after discussion  - need repeat lab work, TSH, CMP, Urine 24 hr: Cr and Ca  - risks and side effect reviewed with patient in person, she is in agreement to start, Reclast drug handout given to patient to review while pending lab work  - pt is to schedule a follow up with her dentist soon and will let us know about result of that visit    2. Calcium and vitamin D supplementation.  - Continue her OTC supplementation at this time    3. The risks and benefits of my recommendations, as well as other treatment options were discussed with the patient today. Questions were answered.    Diabetes type 2, controlled  - manage per PCP    Obesity  - encourage her to start exercise regiment  - pt expressed interest in starting a senior Rell class     Will contact her with result of lab work. Pending Dentist visit. Will contact her with follow up     Earnest Zavaleta MD  Endocrine Fellow  11/28/2018

## 2018-11-29 ENCOUNTER — TELEPHONE (OUTPATIENT)
Dept: ENDOCRINOLOGY | Facility: CLINIC | Age: 73
End: 2018-11-29

## 2018-11-29 DIAGNOSIS — E05.90 HYPERTHYROIDISM: Primary | ICD-10-CM

## 2018-11-29 RX ORDER — ATENOLOL 25 MG/1
25 TABLET ORAL DAILY
Qty: 30 TABLET | Refills: 1 | Status: SHIPPED | OUTPATIENT
Start: 2018-11-29 | End: 2018-12-23 | Stop reason: SDUPTHER

## 2018-11-29 NOTE — PROGRESS NOTES
Ms. Farmer is a 73 year old woman who is here in consultation for osteoporosis with recent bone loss at the hip who reports GERD symptoms while on oral bisphonates many years ago.     Check for other causes of bone loss including hypercalciuria, celiac disease, vitamin D deficiency and hyperthyroidism.     Reviewed side effects including ONJ, AFF, hypocalcemia, arthralgias of both reclast and prolia, in detail. Discussed dental care. To have visit soon.     I, Katerina Rashid, have personally taken the history and physical exam and I agree with Dr. Zavaleta's assessment and plan

## 2018-11-29 NOTE — TELEPHONE ENCOUNTER
Discuss with staff, given lab, suppressed TSH and elevated FT4 concern for hyperthyroidism. No symptoms of hyperthyroidism from discussion yesterday    Plan to repeat TSH, FT4 in 2 weeks (order placed, Dec 13th) . Start low dose BB (atenolol) for now.     Ok to start urine studies.     I called and updated patient. She is ok with that plan. I recommend that she sees a dentist for her jaw pain.    Will contact her with results.     Earnest Zavaleta MD

## 2018-12-10 ENCOUNTER — LAB VISIT (OUTPATIENT)
Dept: LAB | Facility: HOSPITAL | Age: 73
End: 2018-12-10
Attending: HOSPITALIST
Payer: MEDICARE

## 2018-12-10 DIAGNOSIS — M81.8 OTHER OSTEOPOROSIS WITHOUT CURRENT PATHOLOGICAL FRACTURE: ICD-10-CM

## 2018-12-10 LAB
CALCIUM 24H UR-MRATE: 7 MG/HR
CALCIUM UR-MCNC: 10.4 MG/DL
CALCIUM URINE (MG/SPEC): 159 MG/SPEC
CREAT 24H UR-MRATE: 30.5 MG/HR
CREAT UR-MCNC: 48 MG/DL
CREATININE, URINE (MG/SPEC): 732 MG/SPEC
URINE COLLECTION DURATION: 24 HR
URINE COLLECTION DURATION: 24 HR
URINE VOLUME: 1525 ML
URINE VOLUME: 1525 ML

## 2018-12-10 PROCEDURE — 82570 ASSAY OF URINE CREATININE: CPT

## 2018-12-10 PROCEDURE — 82340 ASSAY OF CALCIUM IN URINE: CPT

## 2018-12-13 ENCOUNTER — TELEPHONE (OUTPATIENT)
Dept: ENDOCRINOLOGY | Facility: CLINIC | Age: 73
End: 2018-12-13

## 2018-12-13 ENCOUNTER — TELEPHONE (OUTPATIENT)
Dept: INTERVENTIONAL RADIOLOGY/VASCULAR | Facility: HOSPITAL | Age: 73
End: 2018-12-13

## 2018-12-13 NOTE — TELEPHONE ENCOUNTER
Referral to IR for fna, no recent imaging in Epic. Tried calling pt to see if she had imaging done at an outside facility, no answer, left message

## 2018-12-13 NOTE — TELEPHONE ENCOUNTER
Talk to pt she was asking what kind of  Xray she needed for her jaw. I told her in dr katz note that he just recommend xray not what kind. She stated that her dentist need to know she will have appt on Monday I told her both Meghann and bubba is not in the office. She gave me the dentist number so dr katz can call.

## 2018-12-13 NOTE — TELEPHONE ENCOUNTER
----- Message from Natalee Valencia sent at 12/13/2018  3:31 PM CST -----  Contact: Self [phone]  PT spoke with her dentist about jar pain. The dentist wants to know what type of xray is needed. She can be reached at [phone].

## 2018-12-17 ENCOUNTER — TELEPHONE (OUTPATIENT)
Dept: ENDOCRINOLOGY | Facility: CLINIC | Age: 73
End: 2018-12-17

## 2018-12-17 NOTE — TELEPHONE ENCOUNTER
Talk to pt I let her that dr katz just wants the dentist to rule  Out if she have any infection in her jaw. The type of Xray will be up to the dentist. She understand will either call or faxed results to the clinic

## 2018-12-20 ENCOUNTER — LAB VISIT (OUTPATIENT)
Dept: LAB | Facility: HOSPITAL | Age: 73
End: 2018-12-20
Attending: HOSPITALIST
Payer: MEDICARE

## 2018-12-20 DIAGNOSIS — E05.90 HYPERTHYROIDISM: ICD-10-CM

## 2018-12-20 LAB
T4 FREE SERPL-MCNC: 1.14 NG/DL
TSH SERPL DL<=0.005 MIU/L-ACNC: 0.01 UIU/ML

## 2018-12-20 PROCEDURE — 84439 ASSAY OF FREE THYROXINE: CPT

## 2018-12-20 PROCEDURE — 84443 ASSAY THYROID STIM HORMONE: CPT

## 2018-12-20 PROCEDURE — 36415 COLL VENOUS BLD VENIPUNCTURE: CPT | Mod: PO

## 2018-12-23 DIAGNOSIS — E05.90 HYPERTHYROIDISM: ICD-10-CM

## 2018-12-26 RX ORDER — ATENOLOL 25 MG/1
TABLET ORAL
Qty: 90 TABLET | Refills: 1 | Status: SHIPPED | OUTPATIENT
Start: 2018-12-26 | End: 2019-02-06

## 2019-01-22 DIAGNOSIS — E05.90 HYPERTHYROIDISM: ICD-10-CM

## 2019-01-22 RX ORDER — ATENOLOL 25 MG/1
TABLET ORAL
Qty: 30 TABLET | Refills: 0 | Status: SHIPPED | OUTPATIENT
Start: 2019-01-22 | End: 2019-02-06 | Stop reason: SDUPTHER

## 2019-02-04 ENCOUNTER — LAB VISIT (OUTPATIENT)
Dept: LAB | Facility: HOSPITAL | Age: 74
End: 2019-02-04
Attending: INTERNAL MEDICINE
Payer: MEDICARE

## 2019-02-04 DIAGNOSIS — E78.5 HYPERLIPIDEMIA LDL GOAL <100: ICD-10-CM

## 2019-02-04 DIAGNOSIS — I10 BENIGN HYPERTENSION: ICD-10-CM

## 2019-02-04 LAB
ALBUMIN SERPL BCP-MCNC: 4.3 G/DL
ALP SERPL-CCNC: 75 U/L
ALT SERPL W/O P-5'-P-CCNC: 18 U/L
ANION GAP SERPL CALC-SCNC: 9 MMOL/L
AST SERPL-CCNC: 15 U/L
BASOPHILS # BLD AUTO: 0.1 K/UL
BASOPHILS NFR BLD: 1 %
BILIRUB SERPL-MCNC: 0.4 MG/DL
BUN SERPL-MCNC: 17 MG/DL
CALCIUM SERPL-MCNC: 10.7 MG/DL
CHLORIDE SERPL-SCNC: 101 MMOL/L
CHOLEST SERPL-MCNC: 124 MG/DL
CHOLEST/HDLC SERPL: 2.6 {RATIO}
CO2 SERPL-SCNC: 27 MMOL/L
CREAT SERPL-MCNC: 0.8 MG/DL
DIFFERENTIAL METHOD: ABNORMAL
EOSINOPHIL # BLD AUTO: 0.3 K/UL
EOSINOPHIL NFR BLD: 3 %
ERYTHROCYTE [DISTWIDTH] IN BLOOD BY AUTOMATED COUNT: 14.6 %
EST. GFR  (AFRICAN AMERICAN): >60 ML/MIN/1.73 M^2
EST. GFR  (NON AFRICAN AMERICAN): >60 ML/MIN/1.73 M^2
ESTIMATED AVG GLUCOSE: 180 MG/DL
GLUCOSE SERPL-MCNC: 234 MG/DL
HBA1C MFR BLD HPLC: 7.9 %
HCT VFR BLD AUTO: 44.5 %
HDLC SERPL-MCNC: 47 MG/DL
HDLC SERPL: 37.9 %
HGB BLD-MCNC: 14.3 G/DL
IMM GRANULOCYTES # BLD AUTO: 0.11 K/UL
IMM GRANULOCYTES NFR BLD AUTO: 1.1 %
LDLC SERPL CALC-MCNC: 53.8 MG/DL
LYMPHOCYTES # BLD AUTO: 3.8 K/UL
LYMPHOCYTES NFR BLD: 36.4 %
MCH RBC QN AUTO: 27 PG
MCHC RBC AUTO-ENTMCNC: 32.1 G/DL
MCV RBC AUTO: 84 FL
MONOCYTES # BLD AUTO: 0.6 K/UL
MONOCYTES NFR BLD: 5.7 %
NEUTROPHILS # BLD AUTO: 5.5 K/UL
NEUTROPHILS NFR BLD: 52.8 %
NONHDLC SERPL-MCNC: 77 MG/DL
NRBC BLD-RTO: 0 /100 WBC
PLATELET # BLD AUTO: 336 K/UL
PMV BLD AUTO: 12.8 FL
POTASSIUM SERPL-SCNC: 5.4 MMOL/L
PROT SERPL-MCNC: 7.9 G/DL
RBC # BLD AUTO: 5.3 M/UL
SODIUM SERPL-SCNC: 137 MMOL/L
TRIGL SERPL-MCNC: 116 MG/DL
WBC # BLD AUTO: 10.32 K/UL

## 2019-02-04 PROCEDURE — 85025 COMPLETE CBC W/AUTO DIFF WBC: CPT

## 2019-02-04 PROCEDURE — 80053 COMPREHEN METABOLIC PANEL: CPT

## 2019-02-04 PROCEDURE — 83036 HEMOGLOBIN GLYCOSYLATED A1C: CPT

## 2019-02-04 PROCEDURE — 80061 LIPID PANEL: CPT

## 2019-02-04 PROCEDURE — 36415 COLL VENOUS BLD VENIPUNCTURE: CPT | Mod: PO

## 2019-02-06 ENCOUNTER — OFFICE VISIT (OUTPATIENT)
Dept: FAMILY MEDICINE | Facility: CLINIC | Age: 74
End: 2019-02-06
Payer: MEDICARE

## 2019-02-06 VITALS
SYSTOLIC BLOOD PRESSURE: 140 MMHG | OXYGEN SATURATION: 97 % | HEIGHT: 60 IN | BODY MASS INDEX: 31.83 KG/M2 | DIASTOLIC BLOOD PRESSURE: 78 MMHG | WEIGHT: 162.13 LBS | HEART RATE: 75 BPM | TEMPERATURE: 98 F

## 2019-02-06 DIAGNOSIS — E66.9 OBESITY (BMI 30-39.9): ICD-10-CM

## 2019-02-06 DIAGNOSIS — Z12.31 ENCOUNTER FOR SCREENING MAMMOGRAM FOR BREAST CANCER: ICD-10-CM

## 2019-02-06 DIAGNOSIS — I10 BENIGN HYPERTENSION: ICD-10-CM

## 2019-02-06 DIAGNOSIS — E05.90 HYPERTHYROIDISM: ICD-10-CM

## 2019-02-06 DIAGNOSIS — E78.5 HYPERLIPIDEMIA LDL GOAL <100: ICD-10-CM

## 2019-02-06 PROBLEM — M81.0 OSTEOPOROSIS WITHOUT CURRENT PATHOLOGICAL FRACTURE: Status: RESOLVED | Noted: 2018-11-28 | Resolved: 2019-02-06

## 2019-02-06 PROCEDURE — 99214 PR OFFICE/OUTPT VISIT, EST, LEVL IV, 30-39 MIN: ICD-10-PCS | Mod: S$GLB,,, | Performed by: INTERNAL MEDICINE

## 2019-02-06 PROCEDURE — 1101F PT FALLS ASSESS-DOCD LE1/YR: CPT | Mod: CPTII,S$GLB,, | Performed by: INTERNAL MEDICINE

## 2019-02-06 PROCEDURE — 3045F PR MOST RECENT HEMOGLOBIN A1C LEVEL 7.0-9.0%: ICD-10-PCS | Mod: CPTII,S$GLB,, | Performed by: INTERNAL MEDICINE

## 2019-02-06 PROCEDURE — 3045F PR MOST RECENT HEMOGLOBIN A1C LEVEL 7.0-9.0%: CPT | Mod: CPTII,S$GLB,, | Performed by: INTERNAL MEDICINE

## 2019-02-06 PROCEDURE — 99214 OFFICE O/P EST MOD 30 MIN: CPT | Mod: S$GLB,,, | Performed by: INTERNAL MEDICINE

## 2019-02-06 PROCEDURE — 99999 PR PBB SHADOW E&M-EST. PATIENT-LVL IV: ICD-10-PCS | Mod: PBBFAC,,, | Performed by: INTERNAL MEDICINE

## 2019-02-06 PROCEDURE — 99999 PR PBB SHADOW E&M-EST. PATIENT-LVL IV: CPT | Mod: PBBFAC,,, | Performed by: INTERNAL MEDICINE

## 2019-02-06 PROCEDURE — 3077F SYST BP >= 140 MM HG: CPT | Mod: CPTII,S$GLB,, | Performed by: INTERNAL MEDICINE

## 2019-02-06 PROCEDURE — 3078F DIAST BP <80 MM HG: CPT | Mod: CPTII,S$GLB,, | Performed by: INTERNAL MEDICINE

## 2019-02-06 PROCEDURE — 3078F PR MOST RECENT DIASTOLIC BLOOD PRESSURE < 80 MM HG: ICD-10-PCS | Mod: CPTII,S$GLB,, | Performed by: INTERNAL MEDICINE

## 2019-02-06 PROCEDURE — 1101F PR PT FALLS ASSESS DOC 0-1 FALLS W/OUT INJ PAST YR: ICD-10-PCS | Mod: CPTII,S$GLB,, | Performed by: INTERNAL MEDICINE

## 2019-02-06 PROCEDURE — 3077F PR MOST RECENT SYSTOLIC BLOOD PRESSURE >= 140 MM HG: ICD-10-PCS | Mod: CPTII,S$GLB,, | Performed by: INTERNAL MEDICINE

## 2019-02-06 RX ORDER — AMLODIPINE BESYLATE 5 MG/1
5 TABLET ORAL DAILY
Qty: 90 TABLET | Refills: 1 | Status: SHIPPED | OUTPATIENT
Start: 2019-02-06 | End: 2019-08-23 | Stop reason: SDUPTHER

## 2019-02-06 RX ORDER — ATENOLOL 25 MG/1
TABLET ORAL
Qty: 90 TABLET | Refills: 1 | Status: SHIPPED | OUTPATIENT
Start: 2019-02-06 | End: 2019-08-23 | Stop reason: SDUPTHER

## 2019-02-06 RX ORDER — GLIMEPIRIDE 4 MG/1
TABLET ORAL
Qty: 180 TABLET | Refills: 1 | Status: SHIPPED | OUTPATIENT
Start: 2019-02-06 | End: 2019-08-23 | Stop reason: SDUPTHER

## 2019-02-06 RX ORDER — LANCETS 28 GAUGE
1 EACH MISCELLANEOUS DAILY
Qty: 100 EACH | Refills: 1 | Status: CANCELLED | OUTPATIENT
Start: 2019-02-06

## 2019-02-06 RX ORDER — METFORMIN HYDROCHLORIDE 1000 MG/1
1000 TABLET ORAL 2 TIMES DAILY
Qty: 180 TABLET | Refills: 1 | Status: SHIPPED | OUTPATIENT
Start: 2019-02-06 | End: 2019-08-23 | Stop reason: SDUPTHER

## 2019-02-06 RX ORDER — LOSARTAN POTASSIUM AND HYDROCHLOROTHIAZIDE 25; 100 MG/1; MG/1
1 TABLET ORAL DAILY
Qty: 90 TABLET | Refills: 1 | Status: SHIPPED | OUTPATIENT
Start: 2019-02-06 | End: 2019-08-23 | Stop reason: SDUPTHER

## 2019-02-06 RX ORDER — ROSUVASTATIN CALCIUM 5 MG/1
5 TABLET, COATED ORAL NIGHTLY
Qty: 90 TABLET | Refills: 1 | Status: SHIPPED | OUTPATIENT
Start: 2019-02-06 | End: 2019-08-23 | Stop reason: SDUPTHER

## 2019-02-06 NOTE — PROGRESS NOTES
HISTORY OF PRESENT ILLNESS:  Peace Farmer is a 74 y.o. female who presents to the clinic today for Annual Exam  .   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  Diabetes: The patient reports that they  check blood sugars only occasionally. Blood sugar results are generally in an acceptable range (> 70 and <150). The patient  denies any problems with low blood sugars. The patient  reports that they have been complaint with current treatment plan (diet, exercise, medication).  Hypertension: The patient reports that they check their blood pressures regularly and blood pressure is generally well controlled (<= 139/89).  The patient  plans to become enrolled in the digital hypertension program, The patient denies  cardiac chest pain, shortness of breath, lower extremity edema, headaches and side effects of medication. The patient reports problems with  none. The patient  has been compliant with the current medication regimen.  The patient : tries to follow a low salt diet.  She admits to eating a lot of foods high in potassium.  She reports that she saw the endocrinologist.  She was advised that they could not do treatment for her bones because her heart rate was too high.  She was started on atenolol.  She states she was ill for about 2 weeks after starting the medication, and then went to [place] on vacation.  She did a lot of walking there.  She states since then she has been back and is sleeping better and feels less fatigued.  She is wondering if this is due to the atenolol.      PAST MEDICAL HISTORY:  Past Medical History:   Diagnosis Date    Abnormal mammogram 10/13    s/p biopsy    Cortical cataract of both eyes 11/16/2018    Diabetes mellitus type II     Fibromyalgia     Hyperlipidemia     Hypertension     Obesity (BMI 30-39.9)     Osteopenia     Psoriasis     Skin cancer of face 2011     forehead       PAST SURGICAL HISTORY:  Past Surgical History:   Procedure Laterality Date    PARTIAL HYSTERECTOMY      age 34    skin cancer face  2011       SOCIAL HISTORY:  Social History     Socioeconomic History    Marital status:      Spouse name: Not on file    Number of children: 3    Years of education: some colle    Highest education level: Not on file   Social Needs    Financial resource strain: Not on file    Food insecurity - worry: Not on file    Food insecurity - inability: Not on file    Transportation needs - medical: Not on file    Transportation needs - non-medical: Not on file   Occupational History    Occupation: homemaker   Tobacco Use    Smoking status: Never Smoker    Smokeless tobacco: Never Used   Substance and Sexual Activity    Alcohol use: Yes     Comment: rare    Drug use: No    Sexual activity: Not Currently     Partners: Male     Birth control/protection: Post-menopausal   Other Topics Concern    Are you pregnant or think you may be? Not Asked    Breast-feeding Not Asked   Social History Narrative    Adult Screenings Reviewed and updated  5/20/14    Mammogram( for females) October 2013 abnromal left     Pap ( for females) partial hysterectomy     Colonoscopy  2009  Reported normal.    Flu shot 2013     Td updated today  As Tdap  5/20/14    Pneumovax  done once    Zostavax done    Eye exam 2013 due for  2014    Bone density 7/2012 due again in  2016       FAMILY HISTORY:  Family History   Problem Relation Age of Onset    Heart disease Father     Skin cancer Father     Hypertension Father     Lung cancer Father     Cataracts Father     Hypertension Mother     Skin cancer Mother     Cataracts Mother     Macular degeneration Mother     No Known Problems Brother     Stroke Maternal Grandmother     No Known Problems Maternal Grandfather     No Known Problems Paternal Grandmother     No Known Problems Paternal Grandfather     No Known Problems  Maternal Aunt     No Known Problems Maternal Uncle     No Known Problems Paternal Aunt     No Known Problems Paternal Uncle     Diabetes Mellitus Neg Hx     Breast cancer Neg Hx     Colon cancer Neg Hx     Amblyopia Neg Hx     Blindness Neg Hx     Cancer Neg Hx     Diabetes Neg Hx     Glaucoma Neg Hx     Retinal detachment Neg Hx     Strabismus Neg Hx     Thyroid disease Neg Hx        ALLERGIES AND MEDICATIONS: updated and reviewed.  Review of patient's allergies indicates:   Allergen Reactions    Pcn [penicillins] Hives and Shortness Of Breath     Medication List with Changes/Refills   Current Medications    ASPIRIN 325 MG TABLET        BLOOD SUGAR DIAGNOSTIC (FREESTYLE LITE STRIPS) STRP    Apply 1 strip topically once daily.    BLOOD-GLUCOSE METER KIT    Use as instructed    CALCIUM-VITAMIN D 500-125 MG-UNIT TABLET    Take 1 tablet by mouth 2 (two) times daily.      CETIRIZINE 10 MG CAP        CHOLECALCIFEROL, VITAMIN D3, 2,000 UNIT CAP    Take 1 capsule (2,000 Units total) by mouth once daily.    LANCETS (FREESTYLE LANCETS) 28 GAUGE MISC    Apply 1 lancet topically once daily.   Changed and/or Refilled Medications    Modified Medication Previous Medication    AMLODIPINE (NORVASC) 5 MG TABLET amLODIPine (NORVASC) 5 MG tablet       Take 1 tablet (5 mg total) by mouth once daily.    Take 1 tablet (5 mg total) by mouth once daily.    ATENOLOL (TENORMIN) 25 MG TABLET atenolol (TENORMIN) 25 MG tablet       TAKE 1 TABLET(25 MG) BY MOUTH EVERY DAY    TAKE 1 TABLET(25 MG) BY MOUTH EVERY DAY    GLIMEPIRIDE (AMARYL) 4 MG TABLET glimepiride (AMARYL) 4 MG tablet       TAKE 1 TABLET BY MOUTH TWO TIMES DAILY BEFORE MEALS....(HOLD FOR BLOOD SUGAR LESS THAN 100)    TAKE 1 TABLET BY MOUTH TWO TIMES DAILY BEFORE MEALS....(HOLD FOR BLOOD SUGAR LESS THAN 100)    LOSARTAN-HYDROCHLOROTHIAZIDE 100-25 MG (HYZAAR) 100-25 MG PER TABLET losartan-hydrochlorothiazide 100-25 mg (HYZAAR) 100-25 mg per tablet       Take 1 tablet  by mouth once daily.    Take 1 tablet by mouth once daily.    METFORMIN (GLUCOPHAGE) 1000 MG TABLET metFORMIN (GLUCOPHAGE) 1000 MG tablet       Take 1 tablet (1,000 mg total) by mouth 2 (two) times daily.    Take 1 tablet (1,000 mg total) by mouth 2 (two) times daily.    ROSUVASTATIN (CRESTOR) 5 MG TABLET rosuvastatin (CRESTOR) 5 MG tablet       Take 1 tablet (5 mg total) by mouth every evening.    Take 1 tablet (5 mg total) by mouth every evening.    SITAGLIPTIN (JANUVIA) 100 MG TAB SITagliptin (JANUVIA) 100 MG Tab       Take 1 tablet (100 mg total) by mouth once daily.    Take 1 tablet (100 mg total) by mouth once daily.   Discontinued Medications    ATENOLOL (TENORMIN) 25 MG TABLET    TAKE 1 TABLET(25 MG) BY MOUTH EVERY DAY          CARE TEAM:  Patient Care Team:  Rosa Garber MD as PCP - General (Internal Medicine)         REVIEW OF SYSTEMS:  Review of Systems   Constitutional: Negative for chills, fatigue, fever and unexpected weight change.   HENT: Negative for congestion, ear discharge, ear pain and postnasal drip.    Eyes: Negative for photophobia, pain and visual disturbance.   Respiratory: Negative for cough, shortness of breath and wheezing.    Cardiovascular: Negative for chest pain, palpitations and leg swelling.   Gastrointestinal: Negative for abdominal pain, constipation, diarrhea, nausea and vomiting.   Genitourinary: Negative for dysuria, frequency, urgency and vaginal discharge.   Musculoskeletal: Negative for back pain, joint swelling and neck stiffness.   Skin: Negative for rash.   Neurological: Negative for weakness and headaches.   Psychiatric/Behavioral: Negative for dysphoric mood and sleep disturbance. The patient is not nervous/anxious.          PHYSICAL EXAM:   Vitals:    02/06/19 1335   BP: (!) 140/78   Pulse: 75   Temp: 98.4 °F (36.9 °C)     Weight: 73.5 kg (162 lb 2.4 oz)   Height: 5' (152.4 cm)   Body mass index is 31.67 kg/m².     General appearance - alert, well appearing, and  in no distress and obese  Mental status - alert, oriented to person, place, and time, normal mood, behavior, speech, dress, motor activity, and thought processes  Eyes - pupils equal and reactive, extraocular eye movements intact, sclera anicteric  Mouth - mucous membranes moist, pharynx normal without lesions  Neck - supple, no significant adenopathy, carotids upstroke normal bilaterally, no bruits  Lymphatics - no palpable lymphadenopathy  Chest - clear to auscultation, no wheezes, rales or rhonchi, symmetric air entry  Heart - normal rate and regular rhythm  Neurological - alert, oriented, normal speech, no focal findings or movement disorder noted, cranial nerves II through XII intact  Musculoskeletal - no joint tenderness, deformity or swelling  Extremities - no pedal edema noted  Skin - normal coloration and turgor, no rashes, no suspicious skin lesions noted      Lab Results   Component Value Date    HGBA1C 7.9 (H) 02/04/2019    HGBA1C 7.0 (H) 10/12/2018    HGBA1C 8.0 (H) 07/09/2018      Lab Results   Component Value Date    CHOL 124 02/04/2019    CHOL 127 01/08/2018    CHOL 118 (L) 03/07/2017     Lab Results   Component Value Date    LDLCALC 53.8 (L) 02/04/2019    LDLCALC 67.0 01/08/2018    LDLCALC 59.4 (L) 03/07/2017          ASSESSMENT AND PLAN:  1. Type 2 diabetes, uncontrolled, with neuropathy  Diabetes currently is not controlled for age and comorbid conditions. We discussed diabetic diet and regular exercise. We discussed home blood sugar monitoring, if appropriate. Continue current medication regimen. and she will enroll in the digital diabetes program.  Diabetic complications addressed: Neuropathy pain controlled.  Patient was counseled on the need for yearly diabetic retinopathy exam and yearly diabetic foot exam.   - SITagliptin (JANUVIA) 100 MG Tab; Take 1 tablet (100 mg total) by mouth once daily.  Dispense: 90 tablet; Refill: 1  - metFORMIN (GLUCOPHAGE) 1000 MG tablet; Take 1 tablet (1,000 mg  total) by mouth 2 (two) times daily.  Dispense: 180 tablet; Refill: 1  - glimepiride (AMARYL) 4 MG tablet; TAKE 1 TABLET BY MOUTH TWO TIMES DAILY BEFORE MEALS....(HOLD FOR BLOOD SUGAR LESS THAN 100)  Dispense: 180 tablet; Refill: 1  - Diabetes Digital Medicine (DDMP) Enrollment Order  - Diabetes Digital Medicine (DD): Assign Onboarding Questionnaires    2. Benign hypertension  Blood pressure is not controlled today. We discussed low salt diet and regular exercise. Patient reports that they have not taken any decongestant or anti-inflammatory medication recently (patient educated that these medications can increase blood pressure).  Medication changes made today: None. Patient will come back in 2-4 weeks for recheck of blood pressure by nursing staff. Patient also asked to check blood pressure at home and bring recordings to next office visit. Patient did want to enroll in the digital hypertension program at this time.    - losartan-hydrochlorothiazide 100-25 mg (HYZAAR) 100-25 mg per tablet; Take 1 tablet by mouth once daily.  Dispense: 90 tablet; Refill: 1  - amLODIPine (NORVASC) 5 MG tablet; Take 1 tablet (5 mg total) by mouth once daily.  Dispense: 90 tablet; Refill: 1  - Hypertension Digital Medicine ([facility]) Enrollment Order  - Hypertension Digital Medicine ([facility]): Assign Onboarding Questionnaires    3. Hyperlipidemia LDL goal <100  We discussed low fat diet and regular exercise.The current medical regimen is effective;  continue present plan and medications.    - rosuvastatin (CRESTOR) 5 MG tablet; Take 1 tablet (5 mg total) by mouth every evening.  Dispense: 90 tablet; Refill: 1    4. Hyperthyroidism  Patient is clinically euthyroid. Continue current regimen. Seeing endocrinology.  - atenolol (TENORMIN) 25 MG tablet; TAKE 1 TABLET(25 MG) BY MOUTH EVERY DAY  Dispense: 90 tablet; Refill: 1    5. Obesity (BMI 30-39.9)  The patient is asked to make an attempt to improve diet and exercise patterns to aid in  medical management of this problem.     6. Encounter for screening mammogram for breast cancer    - Mammo Digital Screening Bilat; Future           Follow-up in about 3 months (around 5/6/2019), or if symptoms worsen or fail to improve, for annual exam. or sooner as needed.

## 2019-02-06 NOTE — PATIENT INSTRUCTIONS
Your potassium was a little elevated. Please be sure to minimize the intake of foods high in potassium such as bananas, oranges/orange juice, tomatoes, avocado, yoghurt, sweet potatoes and white potatoes, tomato sauce, watermelon, spinach, beets, black beans, white beans, canned salmon, butternut squash.

## 2019-02-08 ENCOUNTER — PATIENT MESSAGE (OUTPATIENT)
Dept: ADMINISTRATIVE | Facility: OTHER | Age: 74
End: 2019-02-08

## 2019-02-08 ENCOUNTER — HOSPITAL ENCOUNTER (OUTPATIENT)
Dept: RADIOLOGY | Facility: HOSPITAL | Age: 74
Discharge: HOME | End: 2019-02-08
Attending: INTERNAL MEDICINE
Payer: MEDICARE

## 2019-02-08 VITALS — BODY MASS INDEX: 31.8 KG/M2 | HEIGHT: 60 IN | WEIGHT: 162 LBS

## 2019-02-08 DIAGNOSIS — Z12.31 ENCOUNTER FOR SCREENING MAMMOGRAM FOR BREAST CANCER: ICD-10-CM

## 2019-02-08 PROCEDURE — 77063 MAMMO DIGITAL SCREENING BILAT WITH TOMOSYNTHESIS_CAD: ICD-10-PCS | Mod: 26,,, | Performed by: RADIOLOGY

## 2019-02-08 PROCEDURE — 77063 BREAST TOMOSYNTHESIS BI: CPT | Mod: 26,,, | Performed by: RADIOLOGY

## 2019-02-08 PROCEDURE — 77067 SCR MAMMO BI INCL CAD: CPT | Mod: 26,,, | Performed by: RADIOLOGY

## 2019-02-08 PROCEDURE — 77067 MAMMO DIGITAL SCREENING BILAT WITH TOMOSYNTHESIS_CAD: ICD-10-PCS | Mod: 26,,, | Performed by: RADIOLOGY

## 2019-02-08 PROCEDURE — 77067 SCR MAMMO BI INCL CAD: CPT | Mod: TC,PO

## 2019-02-13 ENCOUNTER — PATIENT OUTREACH (OUTPATIENT)
Dept: OTHER | Facility: OTHER | Age: 74
End: 2019-02-13

## 2019-02-13 NOTE — LETTER
April 16, 2019     Peace Farmer  [address] [place] [address]       Dear Peace,    Thank you for enrolling in [facility]s Digital Medicine Program. To participate, we ask that you submit information at least once weekly through your [facility] account and maintain regular contact with your Care Team. We have not received any data or heard from you in some time.     The Digital Medicine Care Team has attempted to reach you on multiple occasions to determine if you would like to continue participating in the program. While we encourage you to continue participating fully, we understand that circumstances may change.     To continue participating in the program, please contact me at [phone]. If we do not hear back, you will be un-enrolled, and your physician will be notified of your decision.    If you have submitted data and believe you are receiving this letter in error, please call the Digital Medicine Patient Support Line at [phone] for troubleshooting.      We look forward to hearing from you soon.    Sincerely,     Krystian Frye  [facility]   [phone]

## 2019-02-13 NOTE — PROGRESS NOTES
Pt request to complete enrollment call any day next week, anytime after 11am          Last 5 Patient Entered Readings                                      Current 30 Day Average: 151/80     Recent Readings 2/11/2019 2/10/2019 2/9/2019 2/8/2019 2/8/2019    SBP (mmHg) 150 151 148 154 148    DBP (mmHg) 76 84 78 80 82    Pulse 63 67 61 74 66

## 2019-02-19 NOTE — PROGRESS NOTES
1st attempt for enrollment call. Left voicemail.           Last 5 Patient Entered Readings                                      Current 30 Day Average: 151/80     Recent Readings 2/11/2019 2/10/2019 2/9/2019 2/8/2019 2/8/2019    SBP (mmHg) 150 151 148 154 148    DBP (mmHg) 76 84 78 80 82    Pulse 63 67 61 74 66

## 2019-03-25 NOTE — PROGRESS NOTES
Last 5 Patient Entered Readings                                      Current 30 Day Average:      Recent Readings 2/11/2019 2/10/2019 2/9/2019 2/8/2019 2/8/2019    SBP (mmHg) 150 151 148 154 148    DBP (mmHg) 76 84 78 80 82    Pulse 63 67 61 74 66        3/25: RCB tomorrow to complete health  introduction.

## 2019-03-26 NOTE — PROGRESS NOTES
Last 5 Patient Entered Readings                                      Current 30 Day Average:      Recent Readings 2/11/2019 2/10/2019 2/9/2019 2/8/2019 2/8/2019    SBP (mmHg) 150 151 148 154 148    DBP (mmHg) 76 84 78 80 82    Pulse 63 67 61 74 66        3/26: LVM.  Will call in 1 week to complete health  introduction.

## 2019-04-02 NOTE — PROGRESS NOTES
"Last 5 Patient Entered Readings                                      Current 30 Day Average:      Recent Readings 2/11/2019 2/10/2019 2/9/2019 2/8/2019 2/8/2019    SBP (mmHg) 150 151 148 154 148    DBP (mmHg) 76 84 78 80 82    Pulse 63 67 61 74 66          4/2: Patient RCB in 2 weeks.  States her "bluetooth is dying" and cannot hook up to her phone.  States she will go to the [facility] next week to set it up to her tablet.  "

## 2019-04-16 NOTE — PROGRESS NOTES
"Last 5 Patient Entered Readings                                      Current 30 Day Average:      Recent Readings 2/11/2019 2/10/2019 2/9/2019 2/8/2019 2/8/2019    SBP (mmHg) 150 151 148 154 148    DBP (mmHg) 76 84 78 80 82    Pulse 63 67 61 74 66        4/16: Patient states she has a "terrible cold."  States "I will get to that as soon as I can, but I am not well right now,"  Then proceeds to hang up.  Sending non compliance.  No BP readings since 2/11/2019.  Will call in 2 weeks.  "

## 2019-04-30 NOTE — PROGRESS NOTES
"Last 5 Patient Entered Readings                                      Current 30 Day Average:      Recent Readings 2/11/2019 2/10/2019 2/9/2019 2/8/2019 2/8/2019    SBP (mmHg) 150 151 148 154 148    DBP (mmHg) 76 84 78 80 82    Pulse 63 67 61 74 66        4/30: Patient states she has a terrible cold still.  States she has not been able to bring her blood pressure cuff in to get it hooked up with her tablet.  States it is not connecting with her phone.  Patient states "when I feel better and when I feel like it, I will go bring it in."  Informed patient we are unable to monitor if we do not receive readings.  Patient states "I know, I know.  Please please let me go."  Then patient proceeds to hang up.  Sent [employer] message explaining the purpose of the program and can re enroll when she feels she is ready.  "

## 2019-05-06 ENCOUNTER — PATIENT OUTREACH (OUTPATIENT)
Dept: ADMINISTRATIVE | Facility: HOSPITAL | Age: 74
End: 2019-05-06

## 2019-05-06 DIAGNOSIS — Z12.11 SPECIAL SCREENING FOR MALIGNANT NEOPLASM OF COLON: Primary | ICD-10-CM

## 2019-05-07 NOTE — PROGRESS NOTES
"Last 5 Patient Entered Readings                                      Current 30 Day Average:      Recent Readings 2/11/2019 2/10/2019 2/9/2019 2/8/2019 2/8/2019    SBP (mmHg) 150 151 148 154 148    DBP (mmHg) 76 84 78 80 82    Pulse 63 67 61 74 66        5/7: Patient states she is still fighting a cold.  Reports she does not feel well to get her BP cuff checked.  Informed patient that she can be removed from the program and join back when she feels better and has time to get BP cuff checked.  States "my doctor wants to get my blood pressure checked, so I have to go in for that."  States she plans to go next week.  Informed patient if she does not have her BP cuff checked by next week, she will be removed from the program.  Patient states "that's fine."    "

## 2019-05-08 ENCOUNTER — TELEPHONE (OUTPATIENT)
Dept: FAMILY MEDICINE | Facility: CLINIC | Age: 74
End: 2019-05-08

## 2019-05-08 DIAGNOSIS — I10 BENIGN HYPERTENSION: ICD-10-CM

## 2019-05-08 DIAGNOSIS — E78.5 HYPERLIPIDEMIA LDL GOAL <100: ICD-10-CM

## 2019-05-08 NOTE — TELEPHONE ENCOUNTER
"----- Message from Aurea Bradford LPN sent at 5/8/2019 12:26 PM CDT -----  ?        05/08/2019  Medical Record # [id number]     Dear Rosa Garber MD,    An order for the following procedure,  colonoscopy  ,was placed for Peace Farmer.  An attempts on (  5/8/19        ), to schedule your patient at [phone] (home)  Your patient stated "please don't contact me again". Please follow up with your client regarding scheduling their procedure.        Sincerely,  Aurea Bradford LPN [phone]  "

## 2019-05-08 NOTE — TELEPHONE ENCOUNTER
Patient stated she would like to speak to Dr. Garber before scheduling the colonoscopy. Patient also would like to schedule labs before doctor appointment.

## 2019-05-14 NOTE — PROGRESS NOTES
Last 5 Patient Entered Readings                                      Current 30 Day Average:      Recent Readings 2/11/2019 2/10/2019 2/9/2019 2/8/2019 2/8/2019    SBP (mmHg) 150 151 148 154 148    DBP (mmHg) 76 84 78 80 82    Pulse 63 67 61 74 66        5/14: Patient has labs tomorrow and appt next week.  Will wait 2 weeks.  If no readings, will send non compliance.

## 2019-05-15 ENCOUNTER — LAB VISIT (OUTPATIENT)
Dept: LAB | Facility: HOSPITAL | Age: 74
End: 2019-05-15
Attending: INTERNAL MEDICINE
Payer: MEDICARE

## 2019-05-15 DIAGNOSIS — I10 BENIGN HYPERTENSION: ICD-10-CM

## 2019-05-15 DIAGNOSIS — E78.5 HYPERLIPIDEMIA LDL GOAL <100: ICD-10-CM

## 2019-05-15 LAB
ALBUMIN SERPL BCP-MCNC: 4.1 G/DL (ref 3.5–5.2)
ALP SERPL-CCNC: 65 U/L (ref 55–135)
ALT SERPL W/O P-5'-P-CCNC: 19 U/L (ref 10–44)
ANION GAP SERPL CALC-SCNC: 10 MMOL/L (ref 8–16)
AST SERPL-CCNC: 16 U/L (ref 10–40)
BASOPHILS # BLD AUTO: 0.11 K/UL (ref 0–0.2)
BASOPHILS NFR BLD: 1.3 % (ref 0–1.9)
BILIRUB SERPL-MCNC: 0.2 MG/DL (ref 0.1–1)
BUN SERPL-MCNC: 15 MG/DL (ref 8–23)
CALCIUM SERPL-MCNC: 10.1 MG/DL (ref 8.7–10.5)
CHLORIDE SERPL-SCNC: 102 MMOL/L (ref 95–110)
CHOLEST SERPL-MCNC: 107 MG/DL (ref 120–199)
CHOLEST/HDLC SERPL: 2.5 {RATIO} (ref 2–5)
CO2 SERPL-SCNC: 25 MMOL/L (ref 23–29)
CREAT SERPL-MCNC: 0.7 MG/DL (ref 0.5–1.4)
DIFFERENTIAL METHOD: ABNORMAL
EOSINOPHIL # BLD AUTO: 0.5 K/UL (ref 0–0.5)
EOSINOPHIL NFR BLD: 5.7 % (ref 0–8)
ERYTHROCYTE [DISTWIDTH] IN BLOOD BY AUTOMATED COUNT: 13.1 % (ref 11.5–14.5)
EST. GFR  (AFRICAN AMERICAN): >60 ML/MIN/1.73 M^2
EST. GFR  (NON AFRICAN AMERICAN): >60 ML/MIN/1.73 M^2
ESTIMATED AVG GLUCOSE: 192 MG/DL (ref 68–131)
GLUCOSE SERPL-MCNC: 233 MG/DL (ref 70–110)
HBA1C MFR BLD HPLC: 8.3 % (ref 4–5.6)
HCT VFR BLD AUTO: 43.1 % (ref 37–48.5)
HDLC SERPL-MCNC: 42 MG/DL (ref 40–75)
HDLC SERPL: 39.3 % (ref 20–50)
HGB BLD-MCNC: 14.1 G/DL (ref 12–16)
IMM GRANULOCYTES # BLD AUTO: 0.1 K/UL (ref 0–0.04)
IMM GRANULOCYTES NFR BLD AUTO: 1.2 % (ref 0–0.5)
LDLC SERPL CALC-MCNC: 50 MG/DL (ref 63–159)
LYMPHOCYTES # BLD AUTO: 3.4 K/UL (ref 1–4.8)
LYMPHOCYTES NFR BLD: 40 % (ref 18–48)
MCH RBC QN AUTO: 27.5 PG (ref 27–31)
MCHC RBC AUTO-ENTMCNC: 32.7 G/DL (ref 32–36)
MCV RBC AUTO: 84 FL (ref 82–98)
MONOCYTES # BLD AUTO: 0.6 K/UL (ref 0.3–1)
MONOCYTES NFR BLD: 6.8 % (ref 4–15)
NEUTROPHILS # BLD AUTO: 3.9 K/UL (ref 1.8–7.7)
NEUTROPHILS NFR BLD: 45 % (ref 38–73)
NONHDLC SERPL-MCNC: 65 MG/DL
NRBC BLD-RTO: 0 /100 WBC
PLATELET # BLD AUTO: 279 K/UL (ref 150–350)
PMV BLD AUTO: 12.9 FL (ref 9.2–12.9)
POTASSIUM SERPL-SCNC: 4.6 MMOL/L (ref 3.5–5.1)
PROT SERPL-MCNC: 7.3 G/DL (ref 6–8.4)
RBC # BLD AUTO: 5.13 M/UL (ref 4–5.4)
SODIUM SERPL-SCNC: 137 MMOL/L (ref 136–145)
TRIGL SERPL-MCNC: 75 MG/DL (ref 30–150)
WBC # BLD AUTO: 8.56 K/UL (ref 3.9–12.7)

## 2019-05-15 PROCEDURE — 83036 HEMOGLOBIN GLYCOSYLATED A1C: CPT | Mod: HCNC

## 2019-05-15 PROCEDURE — 85025 COMPLETE CBC W/AUTO DIFF WBC: CPT | Mod: HCNC

## 2019-05-15 PROCEDURE — 80053 COMPREHEN METABOLIC PANEL: CPT | Mod: HCNC

## 2019-05-15 PROCEDURE — 36415 COLL VENOUS BLD VENIPUNCTURE: CPT | Mod: HCNC,PO

## 2019-05-15 PROCEDURE — 80061 LIPID PANEL: CPT | Mod: HCNC

## 2019-05-20 ENCOUNTER — OFFICE VISIT (OUTPATIENT)
Dept: FAMILY MEDICINE | Facility: CLINIC | Age: 74
End: 2019-05-20
Payer: MEDICARE

## 2019-05-20 VITALS
SYSTOLIC BLOOD PRESSURE: 142 MMHG | TEMPERATURE: 98 F | WEIGHT: 168.56 LBS | HEIGHT: 60 IN | HEART RATE: 76 BPM | DIASTOLIC BLOOD PRESSURE: 70 MMHG | BODY MASS INDEX: 33.09 KG/M2 | OXYGEN SATURATION: 96 %

## 2019-05-20 DIAGNOSIS — E55.9 MILD VITAMIN D DEFICIENCY: ICD-10-CM

## 2019-05-20 DIAGNOSIS — Z23 NEED FOR SHINGLES VACCINE: ICD-10-CM

## 2019-05-20 DIAGNOSIS — E78.5 HYPERLIPIDEMIA LDL GOAL <100: ICD-10-CM

## 2019-05-20 DIAGNOSIS — M81.8 OTHER OSTEOPOROSIS WITHOUT CURRENT PATHOLOGICAL FRACTURE: ICD-10-CM

## 2019-05-20 DIAGNOSIS — Z12.11 ENCOUNTER FOR FIT (FECAL IMMUNOCHEMICAL TEST) SCREENING: ICD-10-CM

## 2019-05-20 DIAGNOSIS — I10 BENIGN HYPERTENSION: ICD-10-CM

## 2019-05-20 DIAGNOSIS — E66.9 OBESITY (BMI 30-39.9): ICD-10-CM

## 2019-05-20 DIAGNOSIS — Z00.00 ROUTINE MEDICAL EXAM: Primary | ICD-10-CM

## 2019-05-20 PROCEDURE — 99397 PER PM REEVAL EST PAT 65+ YR: CPT | Mod: HCNC,S$GLB,, | Performed by: INTERNAL MEDICINE

## 2019-05-20 PROCEDURE — 3077F PR MOST RECENT SYSTOLIC BLOOD PRESSURE >= 140 MM HG: ICD-10-PCS | Mod: HCNC,CPTII,S$GLB, | Performed by: INTERNAL MEDICINE

## 2019-05-20 PROCEDURE — 3045F PR MOST RECENT HEMOGLOBIN A1C LEVEL 7.0-9.0%: CPT | Mod: HCNC,CPTII,S$GLB, | Performed by: INTERNAL MEDICINE

## 2019-05-20 PROCEDURE — 3077F SYST BP >= 140 MM HG: CPT | Mod: HCNC,CPTII,S$GLB, | Performed by: INTERNAL MEDICINE

## 2019-05-20 PROCEDURE — 82043 UR ALBUMIN QUANTITATIVE: CPT | Mod: HCNC

## 2019-05-20 PROCEDURE — 3045F PR MOST RECENT HEMOGLOBIN A1C LEVEL 7.0-9.0%: ICD-10-PCS | Mod: HCNC,CPTII,S$GLB, | Performed by: INTERNAL MEDICINE

## 2019-05-20 PROCEDURE — 99999 PR PBB SHADOW E&M-EST. PATIENT-LVL III: ICD-10-PCS | Mod: PBBFAC,HCNC,, | Performed by: INTERNAL MEDICINE

## 2019-05-20 PROCEDURE — 99999 PR PBB SHADOW E&M-EST. PATIENT-LVL III: CPT | Mod: PBBFAC,HCNC,, | Performed by: INTERNAL MEDICINE

## 2019-05-20 PROCEDURE — 3078F DIAST BP <80 MM HG: CPT | Mod: HCNC,CPTII,S$GLB, | Performed by: INTERNAL MEDICINE

## 2019-05-20 PROCEDURE — 3078F PR MOST RECENT DIASTOLIC BLOOD PRESSURE < 80 MM HG: ICD-10-PCS | Mod: HCNC,CPTII,S$GLB, | Performed by: INTERNAL MEDICINE

## 2019-05-20 PROCEDURE — 99397 PR PREVENTIVE VISIT,EST,65 & OVER: ICD-10-PCS | Mod: HCNC,S$GLB,, | Performed by: INTERNAL MEDICINE

## 2019-05-20 NOTE — PROGRESS NOTES
HISTORY OF PRESENT ILLNESS:  Peace Farmer is a 74 y.o. female who presents to the clinic today for a routine medical physical exam. Her last physical exam was approximately 1 years(s) ago.        PAST MEDICAL HISTORY:  Past Medical History:   Diagnosis Date    Abnormal mammogram 10/13    s/p biopsy    Cortical cataract of both eyes 11/16/2018    Diabetes mellitus type II     Fibromyalgia     Hyperlipidemia     Hypertension     Obesity (BMI 30-39.9)     Osteopenia     Psoriasis     Skin cancer of face 2011    forehead       PAST SURGICAL HISTORY:  Past Surgical History:   Procedure Laterality Date    PARTIAL HYSTERECTOMY      age 34    skin cancer face  2011       SOCIAL HISTORY:  Social History     Socioeconomic History    Marital status:      Spouse name: Not on file    Number of children: 3    Years of education: some colle    Highest education level: Not on file   Occupational History    Occupation: homemaker   Social Needs    Financial resource strain: Not on file    Food insecurity:     Worry: Not on file     Inability: Not on file    Transportation needs:     Medical: Not on file     Non-medical: Not on file   Tobacco Use    Smoking status: Never Smoker    Smokeless tobacco: Never Used   Substance and Sexual Activity    Alcohol use: Yes     Comment: rare    Drug use: No    Sexual activity: Not Currently     Partners: Male     Birth control/protection: Post-menopausal   Lifestyle    Physical activity:     Days per week: Not on file     Minutes per session: Not on file    Stress: Not on file   Relationships    Social connections:     Talks on phone: Not on file     Gets together: Not on file     Attends [employer] service: Not on file     Active member of club or organization: Not on file     Attends meetings of clubs or organizations: Not on file     Relationship status: Not on file   Other Topics Concern    Are you pregnant or think you may be? Not Asked     Breast-feeding Not Asked   Social History Narrative    Adult Screenings Reviewed and updated  5/20/14    Mammogram( for females) October 2013 abnromal left     Pap ( for females) partial hysterectomy     Colonoscopy  2009  Reported normal.    Flu shot 2013     Td updated today  As Tdap  5/20/14    Pneumovax  done once    Zostavax done    Eye exam 2013 due for  2014    Bone density 7/2012 due again in  2016       FAMILY HISTORY:  Family History   Problem Relation Age of Onset    Heart disease Father     Skin cancer Father     Hypertension Father     Lung cancer Father     Cataracts Father     Hypertension Mother     Skin cancer Mother     Cataracts Mother     Macular degeneration Mother     Cancer Mother         skin     No Known Problems Brother     Stroke Maternal Grandmother     No Known Problems Maternal Grandfather     No Known Problems Paternal Grandmother     No Known Problems Paternal Grandfather     No Known Problems Maternal Aunt     No Known Problems Maternal Uncle     No Known Problems Paternal Aunt     No Known Problems Paternal Uncle     Diabetes Mellitus Neg Hx     Breast cancer Neg Hx     Colon cancer Neg Hx     Amblyopia Neg Hx     Blindness Neg Hx     Diabetes Neg Hx     Glaucoma Neg Hx     Retinal detachment Neg Hx     Strabismus Neg Hx     Thyroid disease Neg Hx        ALLERGIES AND MEDICATIONS: updated and reviewed.  Review of patient's allergies indicates:   Allergen Reactions    Pcn [penicillins] Hives and Shortness Of Breath     Medication List with Changes/Refills   Current Medications    AMLODIPINE (NORVASC) 5 MG TABLET    Take 1 tablet (5 mg total) by mouth once daily.    ASPIRIN 325 MG TABLET    Take 325 mg by mouth once daily.     ATENOLOL (TENORMIN) 25 MG TABLET    TAKE 1 TABLET(25 MG) BY MOUTH EVERY DAY    BLOOD SUGAR DIAGNOSTIC (FREESTYLE LITE STRIPS) STRP    Apply 1 strip topically once daily.    BLOOD-GLUCOSE METER KIT    Use as instructed    CALCIUM  CARB/VIT D3/MINERALS (CALCIUM-VITAMIN D ORAL)    Take by mouth.    CALCIUM-VITAMIN D 500-125 MG-UNIT TABLET    Take 1 tablet by mouth 2 (two) times daily.      CETIRIZINE 10 MG CAP    Take 1 capsule by mouth as needed.     GLIMEPIRIDE (AMARYL) 4 MG TABLET    TAKE 1 TABLET BY MOUTH TWO TIMES DAILY BEFORE MEALS....(HOLD FOR BLOOD SUGAR LESS THAN 100)    LANCETS (FREESTYLE LANCETS) 28 GAUGE MISC    Apply 1 lancet topically once daily.    LOSARTAN-HYDROCHLOROTHIAZIDE 100-25 MG (HYZAAR) 100-25 MG PER TABLET    Take 1 tablet by mouth once daily.    METFORMIN (GLUCOPHAGE) 1000 MG TABLET    Take 1 tablet (1,000 mg total) by mouth 2 (two) times daily.    ROSUVASTATIN (CRESTOR) 5 MG TABLET    Take 1 tablet (5 mg total) by mouth every evening.    SITAGLIPTIN (JANUVIA) 100 MG TAB    Take 1 tablet (100 mg total) by mouth once daily.   Discontinued Medications    CHOLECALCIFEROL, VITAMIN D3, 2,000 UNIT CAP    Take 1 capsule (2,000 Units total) by mouth once daily.         CARE TEAM:  Patient Care Team:  Rosa Garber MD as PCP - General (Internal Medicine)  Krystian Frye as Digital Medicine Health   Rosa Garber MD as Hypertension Digital Medicine Responsible Provider (Internal Medicine)  Ana Warren LPN as Licensed Practical Nurse           SCREENING HISTORY:  Health Maintenance       Date Due Completion Date    Fecal Occult Blood Test (FOBT)/[employer] 1945 ---    Shingles Vaccine (2 of 3) 10/20/2009 8/25/2009    Foot Exam 05/28/2019 5/28/2018 (Done)    Override on 5/28/2018: Done    Urine Microalbumin 07/09/2019 7/9/2018    Influenza Vaccine 08/01/2019 10/17/2018    Hemoglobin A1c 08/15/2019 5/15/2019    Eye Exam 11/16/2019 11/16/2018    DEXA SCAN 01/15/2020 1/15/2018    Lipid Panel 05/15/2020 5/15/2019    Low Dose Statin 05/20/2020 5/20/2019    Mammogram 02/08/2021 2/8/2019    TETANUS VACCINE 05/20/2024 5/20/2014            REVIEW OF SYSTEMS:   The patient reports: fair dietary habits. She admits to  not eating as well the last few months due to being sick.  The patient reports : that they do not exercise.  Review of Systems   Constitutional: Negative for chills, fatigue (- she feels her fatigue is better since somebody started her on atenolol to control her elevated heart rate), fever and unexpected weight change.        The patient reports that she has been sick since the last time she saw me.  She had a cold, which was followed by a tooth infection.  She then fell and shook up her whole body which made her hurt all over.  She then had another cold.  She is just getting over it.   HENT: Negative for congestion and postnasal drip.    Eyes: Negative for pain and visual disturbance.   Respiratory: Negative for cough, shortness of breath and wheezing.    Cardiovascular: Negative for chest pain, palpitations and leg swelling.   Gastrointestinal: Negative for abdominal pain, constipation, diarrhea, nausea and vomiting.   Endocrine:        She has not yet started Reclast infusion because she was having problems with a tooth infection.   Genitourinary: Negative for dysuria.   Musculoskeletal: Negative for arthralgias and back pain.   Skin: Negative for rash.   Neurological: Negative for weakness and headaches.   Psychiatric/Behavioral: Negative for dysphoric mood and sleep disturbance. The patient is not nervous/anxious.      Breast ROS: negative for breast lumps             Physical Examination:   Vitals:    05/20/19 1009   BP: (!) 142/70   Pulse: 76   Temp: 98.3 °F (36.8 °C)     Weight: 76.5 kg (168 lb 8.7 oz)   Height: 5' (152.4 cm)   Body mass index is 32.92 kg/m².      Patient did not require to have a chaperone present during the exam today.  General appearance - alert, well appearing, and in no distress and obese  Mental status - alert, oriented to person, place, and time, normal mood, behavior, speech, dress, motor activity, and thought processes  Eyes - pupils equal and reactive, extraocular eye movements  intact, sclera anicteric  Mouth - mucous membranes moist, pharynx normal without lesions  Neck - supple, no significant adenopathy, carotids upstroke normal bilaterally, no bruits  Lymphatics - no palpable lymphadenopathy  Chest - clear to auscultation, no wheezes, rales or rhonchi, symmetric air entry  Heart - normal rate and regular rhythm, no gallops noted  Abdomen - not examined  Back exam - not examined  Neurological - alert, oriented, normal speech, no focal findings or movement disorder noted, cranial nerves II through XII intact  Musculoskeletal - no muscular tenderness noted, Mild-Moderate osteoarthritic changes noted to both knee joints. No joint effusions noted.   Extremities - no pedal edema noted  Skin - normal coloration and turgor, no rashes, no suspicious skin lesions noted      Lab Results   Component Value Date    HGBA1C 8.3 (H) 05/15/2019    HGBA1C 7.9 (H) 02/04/2019    HGBA1C 7.0 (H) 10/12/2018      Lab Results   Component Value Date    CHOL 107 (L) 05/15/2019    CHOL 124 02/04/2019    CHOL 127 01/08/2018     Lab Results   Component Value Date    LDLCALC 50.0 (L) 05/15/2019    LDLCALC 53.8 (L) 02/04/2019    LDLCALC 67.0 01/08/2018          ASSESSMENT AND PLAN:  1. Routine medical exam  Counseled on age appropriate medical preventative services including age appropriate cancer screenings, age appropriate eye and dental exams, over all nutritional health, need for a consistent exercise regimen, and an over all push towards maintaining a vigorous and active lifestyle.  Counseled on age appropriate vaccines and discussed upcoming health care needs based on age/gender. Discussed good sleep hygiene and stress management.     2. Type 2 diabetes, uncontrolled, with neuropathy  Diabetes currently is not controlled for age and comorbid conditions. We discussed diabetic diet and regular exercise. We discussed home blood sugar monitoring, if appropriate. Continue current medication regimen., Recheck A1c in 3  months. and She will do better with diet and exercise until her next office visit with  Diabetic complications addressed: Neuropathy pain controlled.  Patient was counseled on the need for yearly diabetic retinopathy exam and yearly diabetic foot exam.   - Microalbumin/creatinine urine ratio  - Hemoglobin A1c; Future  - Basic metabolic panel; Future    3. Benign hypertension  Blood pressure not completely at goal today.  She states similar results at home, but her cuff is currently not working.  She is on the digital hypertension program.  She will get her cuff replaced at her earliest convenience.    4. Hyperlipidemia LDL goal <100  We discussed low fat diet and regular exercise.The current medical regimen is effective;  continue present plan and medications.      5. Other osteoporosis without current pathological fracture/6. Mild vitamin D deficiency  We discussed adequate calcium and vitamin D supplementation. We discussed fall precautions. She is up to date on her BMD. She will follow up with endocrinology to discuss Reclast infusion at her earliest convenience.     7. Obesity (BMI 30-39.9)  The patient is asked to make an attempt to improve diet and exercise patterns to aid in medical management of this problem.     8. Need for shingles vaccine  Patient was advised to get immunization at the pharmacy.     9. Encounter for FIT (fecal immunochemical test) screening    - Fecal Immunochemical Test (iFOBT); Future          Follow up in about 3 months (around 8/20/2019). or sooner as needed.

## 2019-05-28 NOTE — PROGRESS NOTES
Last 5 Patient Entered Readings                                      Current 30 Day Average:      Recent Readings 2/11/2019 2/10/2019 2/9/2019 2/8/2019 2/8/2019    SBP (mmHg) 150 151 148 154 148    DBP (mmHg) 76 84 78 80 82    Pulse 63 67 61 74 66          5/28: defer non compliance due to protocol.

## 2019-05-29 LAB
ALBUMIN/CREAT UR: 9.3 UG/MG (ref 0–30)
CREAT UR-MCNC: 43 MG/DL (ref 15–325)
MICROALBUMIN UR DL<=1MG/L-MCNC: 4 UG/ML

## 2019-06-13 ENCOUNTER — PES CALL (OUTPATIENT)
Dept: ADMINISTRATIVE | Facility: CLINIC | Age: 74
End: 2019-06-13

## 2019-08-23 DIAGNOSIS — E05.90 HYPERTHYROIDISM: ICD-10-CM

## 2019-08-23 DIAGNOSIS — E78.5 HYPERLIPIDEMIA LDL GOAL <100: ICD-10-CM

## 2019-08-23 DIAGNOSIS — I10 BENIGN HYPERTENSION: ICD-10-CM

## 2019-08-23 RX ORDER — METFORMIN HYDROCHLORIDE 1000 MG/1
1000 TABLET ORAL 2 TIMES DAILY
Qty: 180 TABLET | Refills: 1 | Status: SHIPPED | OUTPATIENT
Start: 2019-08-23 | End: 2020-03-19

## 2019-08-23 RX ORDER — LOSARTAN POTASSIUM AND HYDROCHLOROTHIAZIDE 25; 100 MG/1; MG/1
TABLET ORAL
Qty: 90 TABLET | Refills: 1 | Status: SHIPPED | OUTPATIENT
Start: 2019-08-23 | End: 2020-01-16

## 2019-08-23 RX ORDER — AMLODIPINE BESYLATE 5 MG/1
TABLET ORAL
Qty: 90 TABLET | Refills: 1 | Status: SHIPPED | OUTPATIENT
Start: 2019-08-23 | End: 2020-01-16

## 2019-08-23 RX ORDER — ROSUVASTATIN CALCIUM 5 MG/1
5 TABLET, COATED ORAL NIGHTLY
Qty: 90 TABLET | Refills: 1 | Status: SHIPPED | OUTPATIENT
Start: 2019-08-23 | End: 2020-03-19

## 2019-08-23 RX ORDER — ATENOLOL 25 MG/1
TABLET ORAL
Qty: 90 TABLET | Refills: 1 | Status: SHIPPED | OUTPATIENT
Start: 2019-08-23 | End: 2020-01-16

## 2019-08-23 RX ORDER — GLIMEPIRIDE 4 MG/1
TABLET ORAL
Qty: 180 TABLET | Refills: 1 | Status: SHIPPED | OUTPATIENT
Start: 2019-08-23 | End: 2020-01-16

## 2019-09-03 ENCOUNTER — LAB VISIT (OUTPATIENT)
Dept: LAB | Facility: HOSPITAL | Age: 74
End: 2019-09-03
Attending: INTERNAL MEDICINE
Payer: MEDICARE

## 2019-09-03 LAB
ANION GAP SERPL CALC-SCNC: 11 MMOL/L (ref 8–16)
BUN SERPL-MCNC: 13 MG/DL (ref 8–23)
CALCIUM SERPL-MCNC: 9.8 MG/DL (ref 8.7–10.5)
CHLORIDE SERPL-SCNC: 101 MMOL/L (ref 95–110)
CO2 SERPL-SCNC: 25 MMOL/L (ref 23–29)
CREAT SERPL-MCNC: 0.8 MG/DL (ref 0.5–1.4)
EST. GFR  (AFRICAN AMERICAN): >60 ML/MIN/1.73 M^2
EST. GFR  (NON AFRICAN AMERICAN): >60 ML/MIN/1.73 M^2
ESTIMATED AVG GLUCOSE: 243 MG/DL (ref 68–131)
GLUCOSE SERPL-MCNC: 260 MG/DL (ref 70–110)
HBA1C MFR BLD HPLC: 10.1 % (ref 4–5.6)
POTASSIUM SERPL-SCNC: 4.5 MMOL/L (ref 3.5–5.1)
SODIUM SERPL-SCNC: 137 MMOL/L (ref 136–145)

## 2019-09-03 PROCEDURE — 80048 BASIC METABOLIC PNL TOTAL CA: CPT | Mod: HCNC

## 2019-09-03 PROCEDURE — 83036 HEMOGLOBIN GLYCOSYLATED A1C: CPT | Mod: HCNC

## 2019-09-03 PROCEDURE — 36415 COLL VENOUS BLD VENIPUNCTURE: CPT | Mod: HCNC,PO

## 2019-09-06 NOTE — PROGRESS NOTES
Last 5 Patient Entered Readings                                      Current 30 Day Average:      Recent Readings 2/11/2019 2/10/2019 2/9/2019 2/8/2019 2/8/2019    SBP (mmHg) 150 151 148 154 148    DBP (mmHg) 76 84 78 80 82    Pulse 63 67 61 74 66          9/6: Patient reports she has been dealing with a lot of stress due to  going through radiation.  Reports she is not ready to start the HDMP at this time.  Informed patient she will be removed from the program, but informed her to give me a call or inform her PCP if she would like to rejoin at a better time.  Patient confirmed understanding.  Will send message to enrolling provider and remove patient from the HDMP.

## 2019-09-09 ENCOUNTER — OFFICE VISIT (OUTPATIENT)
Dept: FAMILY MEDICINE | Facility: CLINIC | Age: 74
End: 2019-09-09
Payer: MEDICARE

## 2019-09-09 VITALS
TEMPERATURE: 98 F | HEIGHT: 60 IN | OXYGEN SATURATION: 97 % | SYSTOLIC BLOOD PRESSURE: 140 MMHG | WEIGHT: 167.44 LBS | HEART RATE: 74 BPM | DIASTOLIC BLOOD PRESSURE: 64 MMHG | BODY MASS INDEX: 32.87 KG/M2

## 2019-09-09 DIAGNOSIS — I10 BENIGN HYPERTENSION: ICD-10-CM

## 2019-09-09 DIAGNOSIS — E78.5 HYPERLIPIDEMIA LDL GOAL <100: ICD-10-CM

## 2019-09-09 DIAGNOSIS — E66.9 OBESITY (BMI 30-39.9): ICD-10-CM

## 2019-09-09 DIAGNOSIS — E05.90 SUBCLINICAL HYPERTHYROIDISM: ICD-10-CM

## 2019-09-09 DIAGNOSIS — Z71.89 ADVANCED DIRECTIVES, COUNSELING/DISCUSSION: ICD-10-CM

## 2019-09-09 PROCEDURE — 99999 PR PBB SHADOW E&M-EST. PATIENT-LVL III: ICD-10-PCS | Mod: PBBFAC,HCNC,, | Performed by: INTERNAL MEDICINE

## 2019-09-09 PROCEDURE — 99999 PR PBB SHADOW E&M-EST. PATIENT-LVL III: CPT | Mod: PBBFAC,HCNC,, | Performed by: INTERNAL MEDICINE

## 2019-09-09 PROCEDURE — 1101F PT FALLS ASSESS-DOCD LE1/YR: CPT | Mod: HCNC,CPTII,S$GLB, | Performed by: INTERNAL MEDICINE

## 2019-09-09 PROCEDURE — 3077F SYST BP >= 140 MM HG: CPT | Mod: HCNC,CPTII,S$GLB, | Performed by: INTERNAL MEDICINE

## 2019-09-09 PROCEDURE — 3078F DIAST BP <80 MM HG: CPT | Mod: HCNC,CPTII,S$GLB, | Performed by: INTERNAL MEDICINE

## 2019-09-09 PROCEDURE — 3046F PR MOST RECENT HEMOGLOBIN A1C LEVEL > 9.0%: ICD-10-PCS | Mod: HCNC,CPTII,S$GLB, | Performed by: INTERNAL MEDICINE

## 2019-09-09 PROCEDURE — 3046F HEMOGLOBIN A1C LEVEL >9.0%: CPT | Mod: HCNC,CPTII,S$GLB, | Performed by: INTERNAL MEDICINE

## 2019-09-09 PROCEDURE — 99214 PR OFFICE/OUTPT VISIT, EST, LEVL IV, 30-39 MIN: ICD-10-PCS | Mod: HCNC,S$GLB,, | Performed by: INTERNAL MEDICINE

## 2019-09-09 PROCEDURE — 99214 OFFICE O/P EST MOD 30 MIN: CPT | Mod: HCNC,S$GLB,, | Performed by: INTERNAL MEDICINE

## 2019-09-09 PROCEDURE — 1101F PR PT FALLS ASSESS DOC 0-1 FALLS W/OUT INJ PAST YR: ICD-10-PCS | Mod: HCNC,CPTII,S$GLB, | Performed by: INTERNAL MEDICINE

## 2019-09-09 PROCEDURE — 3077F PR MOST RECENT SYSTOLIC BLOOD PRESSURE >= 140 MM HG: ICD-10-PCS | Mod: HCNC,CPTII,S$GLB, | Performed by: INTERNAL MEDICINE

## 2019-09-09 PROCEDURE — 3078F PR MOST RECENT DIASTOLIC BLOOD PRESSURE < 80 MM HG: ICD-10-PCS | Mod: HCNC,CPTII,S$GLB, | Performed by: INTERNAL MEDICINE

## 2019-09-09 RX ORDER — ATENOLOL 25 MG/1
TABLET ORAL
Qty: 90 TABLET | Refills: 1 | Status: CANCELLED | OUTPATIENT
Start: 2019-09-09

## 2019-09-09 RX ORDER — GLUCOSAM/CHON-MSM1/C/MANG/BOSW 500-416.6
TABLET ORAL
Qty: 300 EACH | Refills: 1 | Status: SHIPPED | OUTPATIENT
Start: 2019-09-09 | End: 2020-01-13 | Stop reason: SDUPTHER

## 2019-09-09 RX ORDER — LANCETS 28 GAUGE
1 EACH MISCELLANEOUS DAILY
Qty: 100 EACH | Refills: 1 | Status: SHIPPED | OUTPATIENT
Start: 2019-09-09 | End: 2019-09-09 | Stop reason: SDUPTHER

## 2019-09-09 NOTE — PROGRESS NOTES
HISTORY OF PRESENT ILLNESS:  Peace Farmer is a 74 y.o. female who presents to the clinic today for Diabetes (f/u); Medication Refill; and Results  .   The patient presents to clinic today for follow-up of her type 2 diabetes mellitus, hypertension, and hyperlipidemia.  She recently had blood work done and is here today to discuss the results.  She is under a lot of stress at the moment.  Her  has recurrent anal cancer.  They are considering extensive surgery and she is very overwhelmed.  She admits to eating out a lot and not been quite as active.  She has been eating more carbs.  She also has been having a lot of thirst at night.  She denies cardiac chest pain or shortness of breath.  No abdominal pain. She does report compliance with her medication.      PAST MEDICAL HISTORY:  Past Medical History:   Diagnosis Date    Abnormal mammogram 10/13    s/p biopsy    Cortical cataract of both eyes 11/16/2018    Diabetes mellitus type II     Fibromyalgia     Hyperlipidemia     Hypertension     Obesity (BMI 30-39.9)     Osteopenia     Psoriasis     Skin cancer of face 2011    forehead       PAST SURGICAL HISTORY:  Past Surgical History:   Procedure Laterality Date    PARTIAL HYSTERECTOMY      age 34    skin cancer face  2011       SOCIAL HISTORY:  Social History     Socioeconomic History    Marital status:      Spouse name: Not on file    Number of children: 3    Years of education: some colle    Highest education level: Not on file   Occupational History    Occupation: homemaker   Social Needs    Financial resource strain: Not on file    Food insecurity:     Worry: Not on file     Inability: Not on file    Transportation needs:     Medical: Not on file     Non-medical: Not on file   Tobacco Use    Smoking status: Never Smoker    Smokeless tobacco: Never Used   Substance and Sexual Activity    Alcohol use: Yes     Comment: rare    Drug use: No    Sexual activity: Not Currently      Partners: Male     Birth control/protection: Post-menopausal   Lifestyle    Physical activity:     Days per week: Not on file     Minutes per session: Not on file    Stress: Very much   Relationships    Social connections:     Talks on phone: Not on file     Gets together: Not on file     Attends Gnosticist service: Not on file     Active member of club or organization: Not on file     Attends meetings of clubs or organizations: Not on file     Relationship status: Not on file   Other Topics Concern    Are you pregnant or think you may be? Not Asked    Breast-feeding Not Asked   Social History Narrative    Adult Screenings Reviewed and updated  5/20/14    Mammogram( for females) October 2013 abnromal left     Pap ( for females) partial hysterectomy     Colonoscopy  2009  Reported normal.    Flu shot 2013     Td updated today  As Tdap  5/20/14    Pneumovax  done once    Zostavax done    Eye exam 2013 due for  2014    Bone density 7/2012 due again in  2016       FAMILY HISTORY:  Family History   Problem Relation Age of Onset    Heart disease Father     Skin cancer Father     Hypertension Father     Lung cancer Father     Cataracts Father     Hypertension Mother     Skin cancer Mother     Cataracts Mother     Macular degeneration Mother     Cancer Mother         skin     No Known Problems Brother     Stroke Maternal Grandmother     No Known Problems Maternal Grandfather     No Known Problems Paternal Grandmother     No Known Problems Paternal Grandfather     No Known Problems Maternal Aunt     No Known Problems Maternal Uncle     No Known Problems Paternal Aunt     No Known Problems Paternal Uncle     Diabetes Mellitus Neg Hx     Breast cancer Neg Hx     Colon cancer Neg Hx     Amblyopia Neg Hx     Blindness Neg Hx     Diabetes Neg Hx     Glaucoma Neg Hx     Retinal detachment Neg Hx     Strabismus Neg Hx     Thyroid disease Neg Hx        ALLERGIES AND MEDICATIONS: updated and  reviewed.  Review of patient's allergies indicates:   Allergen Reactions    Pcn [penicillins] Hives and Shortness Of Breath     Medication List with Changes/Refills   Current Medications    AMLODIPINE (NORVASC) 5 MG TABLET    TAKE 1 TABLET EVERY DAY    ASPIRIN 325 MG TABLET    Take 325 mg by mouth once daily.     ATENOLOL (TENORMIN) 25 MG TABLET    TAKE 1 TABLET EVERY DAY    BLOOD-GLUCOSE METER KIT    Use as instructed    CALCIUM CARB/VIT D3/MINERALS (CALCIUM-VITAMIN D ORAL)    Take by mouth.    CALCIUM-VITAMIN D 500-125 MG-UNIT TABLET    Take 1 tablet by mouth 2 (two) times daily.      CETIRIZINE 10 MG CAP    Take 1 capsule by mouth as needed.     GLIMEPIRIDE (AMARYL) 4 MG TABLET    TAKE 1 TABLET TWO TIMES DAILY BEFORE MEALS. (HOLD FOR BLOOD SUGAR LESS THAN 100)    LOSARTAN-HYDROCHLOROTHIAZIDE 100-25 MG (HYZAAR) 100-25 MG PER TABLET    TAKE 1 TABLET EVERY DAY    METFORMIN (GLUCOPHAGE) 1000 MG TABLET    Take 1 tablet (1,000 mg total) by mouth 2 (two) times daily.    ROSUVASTATIN (CRESTOR) 5 MG TABLET    Take 1 tablet (5 mg total) by mouth every evening.   Changed and/or Refilled Medications    Modified Medication Previous Medication    BLOOD SUGAR DIAGNOSTIC (FREESTYLE LITE STRIPS) STRP blood sugar diagnostic (FREESTYLE LITE STRIPS) Strp       Apply 1 strip topically once daily.    Apply 1 strip topically once daily.    LANCETS (FREESTYLE LANCETS) 28 GAUGE MISC lancets (FREESTYLE LANCETS) 28 gauge Misc       Apply 1 lancet topically once daily.    Apply 1 lancet topically once daily.    SITAGLIPTIN (JANUVIA) 100 MG TAB SITagliptin (JANUVIA) 100 MG Tab       Take 1 tablet (100 mg total) by mouth once daily.    Take 1 tablet (100 mg total) by mouth once daily.          CARE TEAM:  Patient Care Team:  Rosa Garber MD as PCP - General (Internal Medicine)  Ana Warren LPN as Licensed Practical Nurse         REVIEW OF SYSTEMS:  Review of Systems   Constitutional: Negative for chills, fatigue, fever and  unexpected weight change.   HENT: Negative for congestion and postnasal drip.    Eyes: Negative for pain and visual disturbance.   Respiratory: Negative for cough, shortness of breath and wheezing.    Cardiovascular: Negative for chest pain, palpitations and leg swelling.   Gastrointestinal: Positive for diarrhea. Negative for abdominal pain, constipation, nausea and vomiting.   Endocrine: Positive for polyphagia (- mostly at night).   Genitourinary: Negative for dysuria.   Musculoskeletal: Negative for arthralgias and back pain.   Skin: Negative for rash.   Neurological: Negative for weakness and headaches.   Psychiatric/Behavioral: Positive for sleep disturbance. Negative for dysphoric mood. The patient is nervous/anxious.          PHYSICAL EXAM:   Vitals:    09/09/19 0944   BP: (!) 140/64   Pulse: 74   Temp: 97.6 °F (36.4 °C)     Weight: 76 kg (167 lb 7 oz)   Height: 5' (152.4 cm)   Body mass index is 32.7 kg/m².      General appearance - alert, well appearing, and in no distress, obese  Mental status - alert, oriented to person, place, and time, normal behavior, speech, dress, motor activity, mildly anxious  Eyes - pupils equal and reactive, extraocular eye movements intact, sclera anicteric  Mouth - mucous membranes moist, pharynx normal without lesions  Neck - supple, no significant adenopathy, carotids upstroke normal bilaterally, no bruits  Lymphatics - no palpable cervical lymphadenopathy  Chest - clear to auscultation, no wheezes, rales or rhonchi, symmetric air entry  Heart - normal rate and regular rhythm, no gallops noted  Neurological - alert, normal speech, no focal findings or movement disorder noted, cranial nerves II through XII intact  Musculoskeletal - no muscular tenderness noted, patient noted to have Mild-Moderate osteoarthritic changes to both knee joints. No joint effusions noted.  Extremities - peripheral pulses normal, no pedal edema, no clubbing or cyanosis  Skin - normal coloration and  turgor, no rashes, no suspicious skin lesions noted    Protective Sensation (w/ 10 gram monofilament):  Right: Decreased  Left: Decreased    Visual Inspection:  Normal -  Bilateral    Pedal Pulses:   Right: Present  Left: Present    Posterior tibialis:   Right:Present  Left: Present         Labs:  Lab Results   Component Value Date    HGBA1C 10.1 (H) 09/03/2019    HGBA1C 8.3 (H) 05/15/2019    HGBA1C 7.9 (H) 02/04/2019      Lab Results   Component Value Date    CHOL 107 (L) 05/15/2019    CHOL 124 02/04/2019    CHOL 127 01/08/2018     Lab Results   Component Value Date    LDLCALC 50.0 (L) 05/15/2019    LDLCALC 53.8 (L) 02/04/2019    LDLCALC 67.0 01/08/2018          ASSESSMENT AND PLAN:  1. Type 2 diabetes, uncontrolled, with neuropathy  Diabetes is not controlled at this time for age and comorbid conditions. We discussed diabetic diet and regular exercise. We discussed home blood sugar monitoring, if appropriate - the patient should test once daily and as needed. Continue current medication regimen., Recheck A1c in 3 months. and She will do much better with low carb diet. she declines medication changes at this time.  Diabetic complications addressed: Neuropathy pain controlled.  Patient was counseled on the need for yearly eye exam to screen for/monitor diabetic retinopathy and yearly diabetic foot exam.   - SITagliptin (JANUVIA) 100 MG Tab; Take 1 tablet (100 mg total) by mouth once daily.  Dispense: 90 tablet; Refill: 1  - Hemoglobin A1c; Future  - blood sugar diagnostic (FREESTYLE LITE STRIPS) Strp; Apply 1 strip topically once daily.  Dispense: 100 strip; Refill: 1  - lancets (FREESTYLE LANCETS) 28 gauge Misc; Apply 1 lancet topically once daily.  Dispense: 100 each; Refill: 1    2. Benign hypertension  Blood pressure is not controlled today. We discussed low salt diet and regular exercise. Patient reports that they have not taken any decongestant or anti-inflammatory medication recently (patient educated that  these medications can increase blood pressure).  Medication changes made today: None. Patient states she is unable to come back in 2-4 weeks for recheck of blood pressure by nursing staff due to the fact that her  will be having major surgery in the near future. Patient asked to check blood pressure at home and bring recordings to next office visit. Patient did not want to enroll in the digital hypertension program at this time.      3. Subclinical hyperthyroidism  Patient is clinically euthyroid.  She is not on any medication.  Recheck blood work in 6 months.    4. Hyperlipidemia LDL goal <100  We discussed low fat diet and regular exercise.The current medical regimen is effective;  continue present plan and medications.      5. Obesity (BMI 30-39.9)  The patient is asked to make an attempt to improve diet and exercise patterns to aid in medical management of this problem.     6. Advanced directives, counseling/discussion  Advance Care Planning   I initiated the process and explained the importance of advance care planning today with the patient.  Advanced directives were discussed due to patient's age and/or chronic medical conditions. Prognosis based on current condition: good.   Paperwork was given to complete living will (at this point in time, the patient does have full decision-making capacity.  We discussed different extreme health states that she could experience, and reviewed what kind of medical care she would want in those situations) and medical POA (The patient received detailed information about the importance of designating a Health Care Power of . She was also instructed to communicate with this person about their wishes for future healthcare, should she become sick and lose decision-making capacity).   [place] was not discussed.   Approximately 3 minute(s) were spent on counseling/discussion regarding end of life decision making.                   Follow up in about 4 months (around  1/9/2020), or if symptoms worsen or fail to improve, for follow up chronic medical conditions.. or sooner as needed.

## 2019-12-30 ENCOUNTER — PATIENT OUTREACH (OUTPATIENT)
Dept: ADMINISTRATIVE | Facility: HOSPITAL | Age: 74
End: 2019-12-30

## 2020-01-09 ENCOUNTER — LAB VISIT (OUTPATIENT)
Dept: LAB | Facility: HOSPITAL | Age: 75
End: 2020-01-09
Attending: INTERNAL MEDICINE
Payer: MEDICARE

## 2020-01-09 LAB
ESTIMATED AVG GLUCOSE: 103 MG/DL (ref 68–131)
HBA1C MFR BLD HPLC: 5.2 % (ref 4–5.6)

## 2020-01-09 PROCEDURE — 36415 COLL VENOUS BLD VENIPUNCTURE: CPT | Mod: HCNC,PO

## 2020-01-09 PROCEDURE — 83036 HEMOGLOBIN GLYCOSYLATED A1C: CPT | Mod: HCNC

## 2020-01-13 ENCOUNTER — OFFICE VISIT (OUTPATIENT)
Dept: FAMILY MEDICINE | Facility: CLINIC | Age: 75
End: 2020-01-13
Payer: MEDICARE

## 2020-01-13 VITALS
OXYGEN SATURATION: 96 % | TEMPERATURE: 98 F | HEIGHT: 60 IN | DIASTOLIC BLOOD PRESSURE: 66 MMHG | SYSTOLIC BLOOD PRESSURE: 140 MMHG | BODY MASS INDEX: 29.44 KG/M2 | WEIGHT: 149.94 LBS | HEART RATE: 71 BPM

## 2020-01-13 DIAGNOSIS — M94.9 DISORDER OF BONE AND CARTILAGE: ICD-10-CM

## 2020-01-13 DIAGNOSIS — E55.9 MILD VITAMIN D DEFICIENCY: ICD-10-CM

## 2020-01-13 DIAGNOSIS — M89.9 DISORDER OF BONE AND CARTILAGE: ICD-10-CM

## 2020-01-13 DIAGNOSIS — Z12.11 ENCOUNTER FOR FIT (FECAL IMMUNOCHEMICAL TEST) SCREENING: ICD-10-CM

## 2020-01-13 DIAGNOSIS — E78.5 HYPERLIPIDEMIA LDL GOAL <100: ICD-10-CM

## 2020-01-13 DIAGNOSIS — E53.8 VITAMIN B12 DEFICIENCY: ICD-10-CM

## 2020-01-13 DIAGNOSIS — E66.9 OBESITY (BMI 30-39.9): ICD-10-CM

## 2020-01-13 DIAGNOSIS — I10 BENIGN HYPERTENSION: ICD-10-CM

## 2020-01-13 DIAGNOSIS — Z23 NEED FOR SHINGLES VACCINE: ICD-10-CM

## 2020-01-13 DIAGNOSIS — M81.8 OTHER OSTEOPOROSIS WITHOUT CURRENT PATHOLOGICAL FRACTURE: ICD-10-CM

## 2020-01-13 DIAGNOSIS — E11.40 CONTROLLED TYPE 2 DIABETES WITH NEUROPATHY: Primary | ICD-10-CM

## 2020-01-13 DIAGNOSIS — E05.90 SUBCLINICAL HYPERTHYROIDISM: ICD-10-CM

## 2020-01-13 DIAGNOSIS — Z71.89 ADVANCED DIRECTIVES, COUNSELING/DISCUSSION: ICD-10-CM

## 2020-01-13 PROCEDURE — 3077F SYST BP >= 140 MM HG: CPT | Mod: HCNC,CPTII,S$GLB, | Performed by: INTERNAL MEDICINE

## 2020-01-13 PROCEDURE — 1159F MED LIST DOCD IN RCRD: CPT | Mod: HCNC,S$GLB,, | Performed by: INTERNAL MEDICINE

## 2020-01-13 PROCEDURE — 1101F PR PT FALLS ASSESS DOC 0-1 FALLS W/OUT INJ PAST YR: ICD-10-PCS | Mod: HCNC,CPTII,S$GLB, | Performed by: INTERNAL MEDICINE

## 2020-01-13 PROCEDURE — 99214 PR OFFICE/OUTPT VISIT, EST, LEVL IV, 30-39 MIN: ICD-10-PCS | Mod: HCNC,S$GLB,, | Performed by: INTERNAL MEDICINE

## 2020-01-13 PROCEDURE — 1159F PR MEDICATION LIST DOCUMENTED IN MEDICAL RECORD: ICD-10-PCS | Mod: HCNC,S$GLB,, | Performed by: INTERNAL MEDICINE

## 2020-01-13 PROCEDURE — 1126F AMNT PAIN NOTED NONE PRSNT: CPT | Mod: HCNC,S$GLB,, | Performed by: INTERNAL MEDICINE

## 2020-01-13 PROCEDURE — 3078F PR MOST RECENT DIASTOLIC BLOOD PRESSURE < 80 MM HG: ICD-10-PCS | Mod: HCNC,CPTII,S$GLB, | Performed by: INTERNAL MEDICINE

## 2020-01-13 PROCEDURE — 99999 PR PBB SHADOW E&M-EST. PATIENT-LVL IV: CPT | Mod: PBBFAC,HCNC,, | Performed by: INTERNAL MEDICINE

## 2020-01-13 PROCEDURE — 1126F PR PAIN SEVERITY QUANTIFIED, NO PAIN PRESENT: ICD-10-PCS | Mod: HCNC,S$GLB,, | Performed by: INTERNAL MEDICINE

## 2020-01-13 PROCEDURE — 3077F PR MOST RECENT SYSTOLIC BLOOD PRESSURE >= 140 MM HG: ICD-10-PCS | Mod: HCNC,CPTII,S$GLB, | Performed by: INTERNAL MEDICINE

## 2020-01-13 PROCEDURE — 99214 OFFICE O/P EST MOD 30 MIN: CPT | Mod: HCNC,S$GLB,, | Performed by: INTERNAL MEDICINE

## 2020-01-13 PROCEDURE — 99499 RISK ADDL DX/OHS AUDIT: ICD-10-PCS | Mod: HCNC,S$GLB,, | Performed by: INTERNAL MEDICINE

## 2020-01-13 PROCEDURE — 99499 UNLISTED E&M SERVICE: CPT | Mod: HCNC,S$GLB,, | Performed by: INTERNAL MEDICINE

## 2020-01-13 PROCEDURE — 3044F PR MOST RECENT HEMOGLOBIN A1C LEVEL <7.0%: ICD-10-PCS | Mod: HCNC,CPTII,S$GLB, | Performed by: INTERNAL MEDICINE

## 2020-01-13 PROCEDURE — 99999 PR PBB SHADOW E&M-EST. PATIENT-LVL IV: ICD-10-PCS | Mod: PBBFAC,HCNC,, | Performed by: INTERNAL MEDICINE

## 2020-01-13 PROCEDURE — 1101F PT FALLS ASSESS-DOCD LE1/YR: CPT | Mod: HCNC,CPTII,S$GLB, | Performed by: INTERNAL MEDICINE

## 2020-01-13 PROCEDURE — 3078F DIAST BP <80 MM HG: CPT | Mod: HCNC,CPTII,S$GLB, | Performed by: INTERNAL MEDICINE

## 2020-01-13 PROCEDURE — 3044F HG A1C LEVEL LT 7.0%: CPT | Mod: HCNC,CPTII,S$GLB, | Performed by: INTERNAL MEDICINE

## 2020-01-13 RX ORDER — LANCETS 28 GAUGE
EACH MISCELLANEOUS
Qty: 300 EACH | Refills: 1 | Status: SHIPPED | OUTPATIENT
Start: 2020-01-13 | End: 2020-04-15 | Stop reason: SDUPTHER

## 2020-01-13 NOTE — PROGRESS NOTES
HISTORY OF PRESENT ILLNESS:  Peace Farmer is a 75 y.o. female who presents to the clinic today for Diabetes and Medication Refill  .   The patient presents to clinic today to follow-up on her type 2 diabetes mellitus complicated by neuropathy, hypertension, and hyperlipidemia.  The patient has made significant dietary changes since her last visit with me.  She has cut out a lot of her starches.  She recently did an A1c and is here today to discuss the results.  She states she has lost about 20 lb.  She is eating more meat and vegetables.  She has not found much improvement in her blood pressure, however.  She states she is overall in a better place as her 's cancer has been treated.  He has had 3 bouts of his cancer in the last 6 years, however, so she is still cautious.  She denies temperature intolerance.  She has seen endocrinology regarding her osteoporosis.  She has just about completed her dental work.  She plans to follow up with them this year to discuss Reclast infusion.      PAST MEDICAL HISTORY:  Past Medical History:   Diagnosis Date    Abnormal mammogram 10/13    s/p biopsy    Cortical cataract of both eyes 11/16/2018    Diabetes mellitus type II     Fibromyalgia     Hyperlipidemia     Hypertension     Obesity (BMI 30-39.9)     Osteopenia     Psoriasis     Skin cancer of face 2011    forehead       PAST SURGICAL HISTORY:  Past Surgical History:   Procedure Laterality Date    PARTIAL HYSTERECTOMY      age 34    skin cancer face  2011       SOCIAL HISTORY:  Social History     Socioeconomic History    Marital status:      Spouse name: Not on file    Number of children: 3    Years of education: some colle    Highest education level: Not on file   Occupational History    Occupation: homemaker   Social Needs    Financial resource strain: Not on file    Food insecurity:     Worry: Not on file     Inability: Not on file    Transportation needs:     Medical: Not on file      Non-medical: Not on file   Tobacco Use    Smoking status: Never Smoker    Smokeless tobacco: Never Used   Substance and Sexual Activity    Alcohol use: Yes     Comment: rare    Drug use: No    Sexual activity: Not Currently     Partners: Male     Birth control/protection: Post-menopausal   Lifestyle    Physical activity:     Days per week: Not on file     Minutes per session: Not on file    Stress: Very much   Relationships    Social connections:     Talks on phone: Not on file     Gets together: Not on file     Attends Worship service: Not on file     Active member of club or organization: Not on file     Attends meetings of clubs or organizations: Not on file     Relationship status: Not on file   Other Topics Concern    Are you pregnant or think you may be? Not Asked    Breast-feeding Not Asked   Social History Narrative    Adult Screenings Reviewed and updated  5/20/14    Mammogram( for females) October 2013 abnromal left     Pap ( for females) partial hysterectomy     Colonoscopy  2009  Reported normal.    Flu shot 2013     Td updated today  As Tdap  5/20/14    Pneumovax  done once    Zostavax done    Eye exam 2013 due for  2014    Bone density 7/2012 due again in  2016       FAMILY HISTORY:  Family History   Problem Relation Age of Onset    Heart disease Father     Skin cancer Father     Hypertension Father     Lung cancer Father     Cataracts Father     Hypertension Mother     Skin cancer Mother     Cataracts Mother     Macular degeneration Mother     Cancer Mother         skin     No Known Problems Brother     Stroke Maternal Grandmother     No Known Problems Maternal Grandfather     No Known Problems Paternal Grandmother     No Known Problems Paternal Grandfather     No Known Problems Maternal Aunt     No Known Problems Maternal Uncle     No Known Problems Paternal Aunt     No Known Problems Paternal Uncle     Diabetes Mellitus Neg Hx     Breast cancer Neg Hx     Colon  cancer Neg Hx     Amblyopia Neg Hx     Blindness Neg Hx     Diabetes Neg Hx     Glaucoma Neg Hx     Retinal detachment Neg Hx     Strabismus Neg Hx     Thyroid disease Neg Hx        ALLERGIES AND MEDICATIONS: updated and reviewed.  Review of patient's allergies indicates:   Allergen Reactions    Pcn [penicillins] Hives and Shortness Of Breath     Medication List with Changes/Refills   Current Medications    AMLODIPINE (NORVASC) 5 MG TABLET    TAKE 1 TABLET EVERY DAY    ASPIRIN 325 MG TABLET    Take 325 mg by mouth once daily.     ATENOLOL (TENORMIN) 25 MG TABLET    TAKE 1 TABLET EVERY DAY    BLOOD-GLUCOSE METER KIT    Use as instructed    CALCIUM CARB/VIT D3/MINERALS (CALCIUM-VITAMIN D ORAL)    Take by mouth.    CALCIUM-VITAMIN D 500-125 MG-UNIT TABLET    Take 1 tablet by mouth 2 (two) times daily.      CETIRIZINE 10 MG CAP    Take 1 capsule by mouth as needed.     GLIMEPIRIDE (AMARYL) 4 MG TABLET    TAKE 1 TABLET TWO TIMES DAILY BEFORE MEALS. (HOLD FOR BLOOD SUGAR LESS THAN 100)    LOSARTAN-HYDROCHLOROTHIAZIDE 100-25 MG (HYZAAR) 100-25 MG PER TABLET    TAKE 1 TABLET EVERY DAY    METFORMIN (GLUCOPHAGE) 1000 MG TABLET    Take 1 tablet (1,000 mg total) by mouth 2 (two) times daily.    ROSUVASTATIN (CRESTOR) 5 MG TABLET    Take 1 tablet (5 mg total) by mouth every evening.   Changed and/or Refilled Medications    Modified Medication Previous Medication    BLOOD SUGAR DIAGNOSTIC (FREESTYLE LITE STRIPS) STRP blood sugar diagnostic (FREESTYLE LITE STRIPS) Strp       Apply 1 strip topically once daily.    Apply 1 strip topically once daily.    LANCETS (TRUEPLUS LANCETS) 28 GAUGE MISC TRUEPLUS LANCETS 28 gauge Misc       USE ONCE DAILY AS DIRECTED    USE ONCE DAILY AS DIRECTED   Discontinued Medications    FLUZONE HIGH-DOSE 2019-20, PF, 180 MCG/0.5 ML SYRG    ADMINISTER 0.5ML IN THE MUSCLE AS DIRECTED    SITAGLIPTIN (JANUVIA) 100 MG TAB    Take 1 tablet (100 mg total) by mouth once daily.          CARE  TEAM:  Patient Care Team:  Rosa Garber MD as PCP - General (Internal Medicine)  Ana Warren LPN as Licensed Practical Nurse         REVIEW OF SYSTEMS:  Review of Systems   Constitutional: Negative for chills, fatigue, fever and unexpected weight change.   HENT: Negative for congestion and postnasal drip.    Eyes: Negative for pain and visual disturbance.   Respiratory: Negative for cough, shortness of breath and wheezing.    Cardiovascular: Negative for chest pain, palpitations and leg swelling.   Gastrointestinal: Negative for abdominal pain, constipation, diarrhea, nausea and vomiting.   Genitourinary: Negative for dysuria.   Musculoskeletal: Positive for arthralgias.   Skin: Negative for rash.   Neurological: Negative for weakness and headaches.   Psychiatric/Behavioral: Positive for dysphoric mood (- mild; stable). Negative for sleep disturbance. The patient is not nervous/anxious (- mild; stable).          PHYSICAL EXAM:   Vitals:    01/13/20 1027   BP: (!) 140/66   Pulse: 71   Temp: 97.7 °F (36.5 °C)     Weight: 68 kg (149 lb 14.6 oz)   Height: 5' (152.4 cm)   Body mass index is 29.28 kg/m².      General appearance - alert, well appearing, and in no distress, overweight  Psychiatric - alert, oriented to person, place, and time, normal behavior, speech, dress, motor activity, mildly anxious (baseline)  Eyes - pupils equal and reactive, extraocular eye movements intact, sclera anicteric  Mouth - mucous membranes moist, pharynx normal without lesions  Neck - supple, no significant adenopathy, carotids upstroke normal bilaterally, no bruits  Lymphatics - no palpable cervical lymphadenopathy  Chest - clear to auscultation, no wheezes, rales or rhonchi, symmetric air entry  Heart - normal rate and regular rhythm, no gallops noted  Extremities - peripheral pulses normal, no pedal edema, no clubbing or cyanosis  Skin - normal coloration and turgor, no rashes, no suspicious skin lesions  noted      Labs:  Lab Results   Component Value Date    HGBA1C 5.2 01/09/2020    HGBA1C 10.1 (H) 09/03/2019    HGBA1C 8.3 (H) 05/15/2019      Lab Results   Component Value Date    CHOL 107 (L) 05/15/2019    CHOL 124 02/04/2019    CHOL 127 01/08/2018     Lab Results   Component Value Date    LDLCALC 50.0 (L) 05/15/2019    LDLCALC 53.8 (L) 02/04/2019    LDLCALC 67.0 01/08/2018         ASSESSMENT AND PLAN:  1. Controlled type 2 diabetes with neuropathy  Diabetes is under good control control at this time for age and comorbid conditions. We discussed diabetic diet and regular exercise. We discussed home blood sugar monitoring, if appropriate - the patient should test once daily and as needed. Medication changes were made today: She will stop her Januvia-I am concerned about her having low blood sugars. Recheck A1c in 3 months.  Diabetic complications addressed: Neuropathy pain controlled.  Patient was counseled on the need for yearly eye exam to screen for/monitor diabetic retinopathy and yearly diabetic foot exam.  - lancets (TRUEPLUS LANCETS) 28 gauge Misc; USE ONCE DAILY AS DIRECTED  Dispense: 300 each; Refill: 1  - blood sugar diagnostic (FREESTYLE LITE STRIPS) Strp; Apply 1 strip topically once daily.  Dispense: 300 strip; Refill: 1  - Hemoglobin A1c; Future  - Ambulatory consult to Optometry    2. Benign hypertension  Blood pressure is not controlled today. We discussed low salt diet and regular exercise. Patient reports that they have not taken any decongestant or anti-inflammatory medication recently (patient educated that these medications can increase blood pressure).  Medication changes made today: None. Patient will come back in 2-4 weeks for recheck of blood pressure by nursing staff. Patient also asked to check blood pressure at home and bring recordings to next office visit. Patient did not want to enroll in the digital hypertension program at this time.   - CBC auto differential; Future    3.  Hyperlipidemia LDL goal <100  We discussed low fat diet and regular exercise.The current medical regimen is effective;  continue present plan and medications.   - Comprehensive metabolic panel; Future  - Lipid panel; Future    4. Mild vitamin D deficiency  Asymptomatic.  We discussed adequate supplementation.  - Vitamin D; Future    5. Vitamin B12 deficiency  Asymptomatic.  We discussed adequate supplementation.  - Vitamin B12; Future    6. Subclinical hyperthyroidism  Asymptomatic.  I will check blood work and address results accordingly.  - TSH; Future    7. Other osteoporosis without current pathological fracture  We discussed adequate calcium and vitamin D supplementation. We discussed fall precautions. She is scheduled for her BMD. Will likely need to start Reclast infusion - she will follow up with endocrinology.  - DXA Bone Density Spine And Hip; Future    8. Obesity (BMI 30-39.9)  The patient is asked to continue to make an attempt to improve diet and exercise patterns to aid in medical management of this problem.    9. Advanced directives, counseling/discussion  Advance Care Planning   I initiated the process and explained the importance of advance care planning today with the patient.  Advanced directives were discussed due to patient's age and/or chronic medical conditions. Prognosis based on current condition: good.   Paperwork was given to complete living will (at this point in time, the patient does have full decision-making capacity.  We discussed different extreme health states that she could experience, and reviewed what kind of medical care she would want in those situations) and medical POA (The patient received detailed information about the importance of designating a Health Care Power of . She was also instructed to communicate with this person about their wishes for future healthcare, should she become sick and lose decision-making capacity).   [place] was not discussed.   Approximately  3 minute(s) were spent on counseling/discussion regarding end of life decision making.           10. Disorder of bone and cartilage      11. Encounter for FIT (fecal immunochemical test) screening  FitKit was given to patient on 1/13/2020 12:01 PM          12. Need for shingles vaccine  Patient was advised to get immunization at the pharmacy.           Follow up in about 3 months (around 4/13/2020), or if symptoms worsen or fail to improve. or sooner as needed.

## 2020-01-16 ENCOUNTER — HOSPITAL ENCOUNTER (OUTPATIENT)
Dept: RADIOLOGY | Facility: HOSPITAL | Age: 75
Discharge: HOME | End: 2020-01-16
Attending: INTERNAL MEDICINE
Payer: MEDICARE

## 2020-01-16 DIAGNOSIS — I10 BENIGN HYPERTENSION: ICD-10-CM

## 2020-01-16 DIAGNOSIS — M81.8 OTHER OSTEOPOROSIS WITHOUT CURRENT PATHOLOGICAL FRACTURE: ICD-10-CM

## 2020-01-16 DIAGNOSIS — E05.90 HYPERTHYROIDISM: ICD-10-CM

## 2020-01-16 PROCEDURE — 77080 DXA BONE DENSITY AXIAL: CPT | Mod: TC,HCNC

## 2020-01-16 PROCEDURE — 77080 DXA BONE DENSITY AXIAL: CPT | Mod: 26,HCNC,, | Performed by: RADIOLOGY

## 2020-01-16 PROCEDURE — 77080 DEXA BONE DENSITY SPINE HIP: ICD-10-PCS | Mod: 26,HCNC,, | Performed by: RADIOLOGY

## 2020-01-16 RX ORDER — GLIMEPIRIDE 4 MG/1
TABLET ORAL
Qty: 180 TABLET | Refills: 1 | Status: SHIPPED | OUTPATIENT
Start: 2020-01-16 | End: 2020-02-18 | Stop reason: SDUPTHER

## 2020-01-16 RX ORDER — AMLODIPINE BESYLATE 5 MG/1
TABLET ORAL
Qty: 90 TABLET | Refills: 1 | Status: SHIPPED | OUTPATIENT
Start: 2020-01-16 | End: 2020-04-15 | Stop reason: SDUPTHER

## 2020-01-16 RX ORDER — ATENOLOL 25 MG/1
TABLET ORAL
Qty: 90 TABLET | Refills: 1 | Status: SHIPPED | OUTPATIENT
Start: 2020-01-16 | End: 2020-04-15 | Stop reason: SDUPTHER

## 2020-01-16 RX ORDER — LOSARTAN POTASSIUM AND HYDROCHLOROTHIAZIDE 25; 100 MG/1; MG/1
TABLET ORAL
Qty: 90 TABLET | Refills: 1 | Status: SHIPPED | OUTPATIENT
Start: 2020-01-16 | End: 2020-04-15 | Stop reason: SDUPTHER

## 2020-02-13 ENCOUNTER — PATIENT OUTREACH (OUTPATIENT)
Dept: ADMINISTRATIVE | Facility: OTHER | Age: 75
End: 2020-02-13

## 2020-02-13 NOTE — PROGRESS NOTES
Chart reviewed.   Immunizations reconciled.   HM updated.    Eye appt 02/14/202    fitkit orders already placed.

## 2020-02-14 ENCOUNTER — OFFICE VISIT (OUTPATIENT)
Dept: OPTOMETRY | Facility: CLINIC | Age: 75
End: 2020-02-14
Payer: MEDICARE

## 2020-02-14 DIAGNOSIS — H25.13 NUCLEAR SCLEROSIS, BILATERAL: ICD-10-CM

## 2020-02-14 DIAGNOSIS — E11.9 TYPE 2 DIABETES MELLITUS WITHOUT RETINOPATHY: Primary | ICD-10-CM

## 2020-02-14 DIAGNOSIS — H26.9 CORTICAL CATARACT OF BOTH EYES: ICD-10-CM

## 2020-02-14 LAB
LEFT EYE DM RETINOPATHY: NEGATIVE
RIGHT EYE DM RETINOPATHY: NEGATIVE

## 2020-02-14 PROCEDURE — 92014 COMPRE OPH EXAM EST PT 1/>: CPT | Mod: HCNC,S$GLB,, | Performed by: OPTOMETRIST

## 2020-02-14 PROCEDURE — 99499 UNLISTED E&M SERVICE: CPT | Mod: HCNC,S$GLB,, | Performed by: OPTOMETRIST

## 2020-02-14 PROCEDURE — 92014 PR EYE EXAM, EST PATIENT,COMPREHESV: ICD-10-PCS | Mod: HCNC,S$GLB,, | Performed by: OPTOMETRIST

## 2020-02-14 PROCEDURE — 99999 PR PBB SHADOW E&M-EST. PATIENT-LVL II: CPT | Mod: PBBFAC,HCNC,, | Performed by: OPTOMETRIST

## 2020-02-14 PROCEDURE — 99999 PR PBB SHADOW E&M-EST. PATIENT-LVL II: ICD-10-PCS | Mod: PBBFAC,HCNC,, | Performed by: OPTOMETRIST

## 2020-02-14 PROCEDURE — 99499 RISK ADDL DX/OHS AUDIT: ICD-10-PCS | Mod: HCNC,S$GLB,, | Performed by: OPTOMETRIST

## 2020-02-14 NOTE — PROGRESS NOTES
Subjective:       Patient ID: Peace Farmer is a 75 y.o. female      Chief Complaint   Patient presents with    Cataract    Concerns About Ocular Health    Diabetic Eye Exam     History of Present Illness  Dls: 11/16/18 Dr. Freeman     74 y/o female presents today for diabetic eye exam.   Pt states vision is getting worse. Wants referral for cataract surgery. Pt wears pal's.     LBS ?     + tearing  + itching   + burning  No pain  No ha's  + floaters  No flashes    Eye meds  None    Hemoglobin A1C       Date                     Value               Ref Range           Status             01/09/2020               5.2                 4.0 - 5.6 %         Final           09/03/2019               10.1 (H)            4.0 - 5.6 %         Final            05/15/2019               8.3 (H)             4.0 - 5.6 %         Final       Assessment/Plan:     1. Type 2 diabetes mellitus without retinopathy  No diabetic retinopathy. Discussed with pt the effects of diabetes on vision, importance of good blood sugar control, compliance with meds, and follow up care with PCP. Return in 1 year for dilated eye exam, sooner PRN.    2. Cortical cataract of both eyes  3. Nuclear sclerosis, bilateral  VS OS > OD. Discussed diagnosis with patient, different lens options, and surgical process. Referral to Dr. Robin for cataract evaluation.     Pt inquired about MF vs monovision vs DVO. Pt states had multifocal CL in the past and did not adjust well. Pt has worn monovision CL with good success. Pt does a lot of [place] [facility]   - Ambulatory referral/consult to Ophthalmology    Follow up for Cataract consult with Dr. Robin.

## 2020-02-14 NOTE — LETTER
February 14, 2020      Rosa Garber MD  [address]  [place] [address]           [facility]  [address]  [place] [address]  Phone: [phone]  Fax: [phone]          Patient: Peace Farmer   MR Number: [id number]   YOB: 1945   Date of Visit: 2/14/2020       Dear Dr. Rosa Garber:    Thank you for referring Peace Farmer to me for evaluation. Attached you will find relevant portions of my assessment and plan of care.    If you have questions, please do not hesitate to call me. I look forward to following Peace Farmer along with you.    Sincerely,    Samira Freeman, OD    Enclosure  CC:  No Recipients    If you would like to receive this communication electronically, please contact [email or web address] or [phone] to request more information on [employer] access.    For providers and/or their staff who would like to refer a patient to [facility], please contact us through our one-stop-shop provider referral line, [facility], at [phone].    If you feel you have received this communication in error or would no longer like to receive these types of communications, please e-mail [email or web address]

## 2020-02-18 ENCOUNTER — LAB VISIT (OUTPATIENT)
Dept: LAB | Facility: HOSPITAL | Age: 75
End: 2020-02-18
Attending: INTERNAL MEDICINE
Payer: MEDICARE

## 2020-02-18 DIAGNOSIS — R30.0 DYSURIA: ICD-10-CM

## 2020-02-18 DIAGNOSIS — R30.0 DYSURIA: Primary | ICD-10-CM

## 2020-02-18 PROCEDURE — 81001 URINALYSIS AUTO W/SCOPE: CPT | Mod: HCNC

## 2020-02-18 RX ORDER — GLIMEPIRIDE 4 MG/1
TABLET ORAL
Qty: 180 TABLET | Refills: 1 | Status: SHIPPED | OUTPATIENT
Start: 2020-02-18 | End: 2020-04-15 | Stop reason: SDUPTHER

## 2020-02-18 NOTE — TELEPHONE ENCOUNTER
I sent the prescription(s) to the pharmacy on file.    I have also placed an order for a urine test to see if she has an infection. Results will take 48-72 hours. If she feels she needs treatment sooner I would recommend an office visit. In the meantime, she can take some cranberry pills and azo for her symptoms.

## 2020-02-18 NOTE — TELEPHONE ENCOUNTER
----- Message from Rama Main sent at 2/18/2020 11:42 AM CST -----  Contact: PATIENT [phone]  Type: Patient Call Back    Who called: Patient    What is the request in detail: Patient states that she's been fighting a bladder infection for a week and she's not sure how long she should go like this. Need to know if she should come in. Also, she's lost one of her medications and would like to know if she can get a Rx sent to [facility] until she can get some from [facility]. The medication is glimepiride (AMARYL) 4 MG tablet. States it was a 3 months supply. Can't find the medication.. Please call.  .  [id number] - [place] - [address] AT [address]  [place] [address]  Phone: [phone] Fax: [phone]    Would the patient rather a call back or a response via [facility]? Call back    Best call back number: [phone]

## 2020-02-18 NOTE — TELEPHONE ENCOUNTER
I sent the prescription(s) to the pharmacy on file.     I have also placed an order for a urine test to see if she has an infection. Results will take 48-72 hours. If she feels she needs treatment sooner I would recommend an office visit. In the meantime, she can take some cranberry pills and azo for her symptoms.    Scheduled her urine study.  Spoke with patient and informed of recommendations and patient verbalized understandings.

## 2020-02-19 LAB
BILIRUB UR QL STRIP: NEGATIVE
CLARITY UR REFRACT.AUTO: CLEAR
COLOR UR AUTO: ABNORMAL
GLUCOSE UR QL STRIP: NEGATIVE
HGB UR QL STRIP: NEGATIVE
KETONES UR QL STRIP: NEGATIVE
LEUKOCYTE ESTERASE UR QL STRIP: ABNORMAL
MICROSCOPIC COMMENT: ABNORMAL
NITRITE UR QL STRIP: NEGATIVE
PH UR STRIP: 7 [PH] (ref 5–8)
PROT UR QL STRIP: NEGATIVE
RBC #/AREA URNS AUTO: 3 /HPF (ref 0–4)
SP GR UR STRIP: 1.01 (ref 1–1.03)
URN SPEC COLLECT METH UR: ABNORMAL
WBC #/AREA URNS AUTO: 8 /HPF (ref 0–5)

## 2020-03-18 DIAGNOSIS — E78.5 HYPERLIPIDEMIA LDL GOAL <100: ICD-10-CM

## 2020-03-19 RX ORDER — METFORMIN HYDROCHLORIDE 1000 MG/1
TABLET ORAL
Qty: 180 TABLET | Refills: 0 | Status: SHIPPED | OUTPATIENT
Start: 2020-03-19 | End: 2020-04-15 | Stop reason: SDUPTHER

## 2020-03-19 RX ORDER — ROSUVASTATIN CALCIUM 5 MG/1
TABLET, COATED ORAL
Qty: 90 TABLET | Refills: 0 | Status: SHIPPED | OUTPATIENT
Start: 2020-03-19 | End: 2020-04-15 | Stop reason: SDUPTHER

## 2020-04-03 ENCOUNTER — TELEPHONE (OUTPATIENT)
Dept: FAMILY MEDICINE | Facility: CLINIC | Age: 75
End: 2020-04-03

## 2020-04-03 NOTE — TELEPHONE ENCOUNTER
Spoke with patient she is adamant she will be happy to talk to Dr. Garber over the phone but she does not want to download jennifer unto her phone. Patient also rescheduled labs as well into next month.

## 2020-04-15 ENCOUNTER — OFFICE VISIT (OUTPATIENT)
Dept: FAMILY MEDICINE | Facility: CLINIC | Age: 75
End: 2020-04-15
Payer: MEDICARE

## 2020-04-15 DIAGNOSIS — E78.5 HYPERLIPIDEMIA LDL GOAL <100: ICD-10-CM

## 2020-04-15 DIAGNOSIS — Z12.11 COLON CANCER SCREENING: ICD-10-CM

## 2020-04-15 DIAGNOSIS — R30.0 DYSURIA: ICD-10-CM

## 2020-04-15 DIAGNOSIS — Z23 NEED FOR SHINGLES VACCINE: ICD-10-CM

## 2020-04-15 DIAGNOSIS — E11.40 CONTROLLED TYPE 2 DIABETES WITH NEUROPATHY: Primary | ICD-10-CM

## 2020-04-15 DIAGNOSIS — E11.40 CONTROLLED TYPE 2 DIABETES WITH NEUROPATHY: ICD-10-CM

## 2020-04-15 DIAGNOSIS — E05.90 SUBCLINICAL HYPERTHYROIDISM: ICD-10-CM

## 2020-04-15 DIAGNOSIS — I10 BENIGN HYPERTENSION: ICD-10-CM

## 2020-04-15 PROCEDURE — 99442 PR PHYSICIAN TELEPHONE EVALUATION 11-20 MIN: ICD-10-PCS | Mod: HCNC,95,, | Performed by: INTERNAL MEDICINE

## 2020-04-15 PROCEDURE — 99442 PR PHYSICIAN TELEPHONE EVALUATION 11-20 MIN: CPT | Mod: HCNC,95,, | Performed by: INTERNAL MEDICINE

## 2020-04-15 RX ORDER — ROSUVASTATIN CALCIUM 5 MG/1
5 TABLET, COATED ORAL NIGHTLY
Qty: 90 TABLET | Refills: 0 | Status: SHIPPED | OUTPATIENT
Start: 2020-04-15 | End: 2020-09-30

## 2020-04-15 RX ORDER — LANCETS 28 GAUGE
EACH MISCELLANEOUS
Qty: 300 EACH | Refills: 1 | Status: SHIPPED | OUTPATIENT
Start: 2020-04-15 | End: 2020-04-15

## 2020-04-15 RX ORDER — LOSARTAN POTASSIUM AND HYDROCHLOROTHIAZIDE 25; 100 MG/1; MG/1
1 TABLET ORAL DAILY
Qty: 90 TABLET | Refills: 1 | Status: SHIPPED | OUTPATIENT
Start: 2020-04-15 | End: 2020-10-16

## 2020-04-15 RX ORDER — AMLODIPINE BESYLATE 5 MG/1
5 TABLET ORAL DAILY
Qty: 90 TABLET | Refills: 1 | Status: SHIPPED | OUTPATIENT
Start: 2020-04-15 | End: 2020-10-16

## 2020-04-15 RX ORDER — GLUCOSAM/CHON-MSM1/C/MANG/BOSW 500-416.6
TABLET ORAL
Qty: 300 EACH | Refills: 3 | Status: SHIPPED | OUTPATIENT
Start: 2020-04-15

## 2020-04-15 RX ORDER — GLIMEPIRIDE 4 MG/1
TABLET ORAL
Qty: 180 TABLET | Refills: 1 | Status: SHIPPED | OUTPATIENT
Start: 2020-04-15 | End: 2020-11-04 | Stop reason: SDUPTHER

## 2020-04-15 RX ORDER — INSULIN PUMP SYRINGE, 3 ML
EACH MISCELLANEOUS
Qty: 1 EACH | Refills: 0 | Status: SHIPPED | OUTPATIENT
Start: 2020-04-15 | End: 2022-05-06

## 2020-04-15 RX ORDER — METFORMIN HYDROCHLORIDE 1000 MG/1
1000 TABLET ORAL 2 TIMES DAILY
Qty: 180 TABLET | Refills: 0 | Status: SHIPPED | OUTPATIENT
Start: 2020-04-15 | End: 2020-09-30

## 2020-04-15 RX ORDER — ATENOLOL 25 MG/1
25 TABLET ORAL DAILY
Qty: 90 TABLET | Refills: 1 | Status: SHIPPED | OUTPATIENT
Start: 2020-04-15 | End: 2020-10-16

## 2020-04-15 NOTE — PROGRESS NOTES
The patient location is: Home.  The chief complaint leading to consultation is: Follow-up (diabetes, HTN, HPL)     Visit type: Virtual audio visit only; patient consented to visit via telephone  Total time spent with patient: 16 minutes.  Each patient to whom he or she provides medical services by telemedicine is:  (1) informed of the relationship between the physician and patient and the respective role of any other health care provider with respect to management of the patient; and (2) notified that he or she may decline to receive medical services by telemedicine and may withdraw from such care at any time.      HISTORY OF PRESENT ILLNESS:  Peace Farmer is a 75 y.o. female who presents to the clinic today for Follow-up (diabetes, HTN, HPL)  .   The patient had an audio only visit today for follow-up of her type 2 diabetes mellitus complicated by neuropathy, hypertension, and hyperlipidemia.  She states she has been unable to check her blood sugars recently as her machine is broken.  She needs a new one.  She also has not been checking her blood pressure at home as she needs a blood pressure cuff.  She denies cardiac chest pain or shortness of breath.  She reports that she has had intermittent feelings of dysuria for several months.  We did recently check a urinalysis which was normal.  No culture done.  She has intermittently taken some over-the-counter azo.  She states her mother has had similar problems as she got older.  She states her symptoms are not as severe as when she used to have urinary tract infections when she was younger.  She denies any associated fever or nausea.  The patient also reports that she recently had an upper respiratory infection.  She usually gets 1 every year.  She states the symptoms lasted about 3 weeks.  She took some over-the-counter medication and rested and eventually got better.  She states she did take her daughters Sudafed a few times which she found very  helpful.      PAST MEDICAL HISTORY:  Past Medical History:   Diagnosis Date    Abnormal mammogram 10/13    s/p biopsy    Cortical cataract of both eyes 11/16/2018    Diabetes mellitus type II     Fibromyalgia     Hyperlipidemia     Hypertension     Obesity (BMI 30-39.9)     Osteopenia     Psoriasis     Skin cancer of face 2011    forehead       PAST SURGICAL HISTORY:  Past Surgical History:   Procedure Laterality Date    PARTIAL HYSTERECTOMY      age 34    skin cancer face  2011       SOCIAL HISTORY:  Social History     Socioeconomic History    Marital status:      Spouse name: Not on file    Number of children: 3    Years of education: some colle    Highest education level: Not on file   Occupational History    Occupation: homemaker   Social Needs    Financial resource strain: Not on file    Food insecurity:     Worry: Not on file     Inability: Not on file    Transportation needs:     Medical: Not on file     Non-medical: Not on file   Tobacco Use    Smoking status: Never Smoker    Smokeless tobacco: Never Used   Substance and Sexual Activity    Alcohol use: Yes     Comment: rare    Drug use: No    Sexual activity: Not Currently     Partners: Male     Birth control/protection: Post-menopausal   Lifestyle    Physical activity:     Days per week: Not on file     Minutes per session: Not on file    Stress: Very much   Relationships    Social connections:     Talks on phone: Not on file     Gets together: Not on file     Attends Spiritism service: Not on file     Active member of club or organization: Not on file     Attends meetings of clubs or organizations: Not on file     Relationship status: Not on file   Other Topics Concern    Are you pregnant or think you may be? Not Asked    Breast-feeding Not Asked   Social History Narrative    Adult Screenings Reviewed and updated  5/20/14    Mammogram( for females) October 2013 abnromal left     Pap ( for females) partial hysterectomy      Colonoscopy  2009  Reported normal.    Flu shot 2013     Td updated today  As Tdap  5/20/14    Pneumovax  done once    Zostavax done    Eye exam 2013 due for  2014    Bone density 7/2012 due again in  2016       FAMILY HISTORY:  Family History   Problem Relation Age of Onset    Heart disease Father     Skin cancer Father     Hypertension Father     Lung cancer Father     Cataracts Father     Hypertension Mother     Skin cancer Mother     Cataracts Mother     Macular degeneration Mother     Cancer Mother         skin     No Known Problems Brother     Stroke Maternal Grandmother     No Known Problems Maternal Grandfather     No Known Problems Paternal Grandmother     No Known Problems Paternal Grandfather     No Known Problems Maternal Aunt     No Known Problems Maternal Uncle     No Known Problems Paternal Aunt     No Known Problems Paternal Uncle     Diabetes Mellitus Neg Hx     Breast cancer Neg Hx     Colon cancer Neg Hx     Amblyopia Neg Hx     Blindness Neg Hx     Diabetes Neg Hx     Glaucoma Neg Hx     Retinal detachment Neg Hx     Strabismus Neg Hx     Thyroid disease Neg Hx        ALLERGIES AND MEDICATIONS: updated and reviewed.  Review of patient's allergies indicates:   Allergen Reactions    Pcn [penicillins] Hives and Shortness Of Breath     Medication List with Changes/Refills   Current Medications    ASPIRIN 325 MG TABLET    Take 325 mg by mouth once daily.     CALCIUM CARB/VIT D3/MINERALS (CALCIUM-VITAMIN D ORAL)    Take by mouth.    CALCIUM-VITAMIN D 500-125 MG-UNIT TABLET    Take 1 tablet by mouth 2 (two) times daily.      CETIRIZINE 10 MG CAP    Take 1 capsule by mouth as needed.    Changed and/or Refilled Medications    Modified Medication Previous Medication    AMLODIPINE (NORVASC) 5 MG TABLET amLODIPine (NORVASC) 5 MG tablet       Take 1 tablet (5 mg total) by mouth once daily.    TAKE 1 TABLET EVERY DAY    ATENOLOL (TENORMIN) 25 MG TABLET atenolol (TENORMIN) 25 MG  tablet       Take 1 tablet (25 mg total) by mouth once daily.    TAKE 1 TABLET EVERY DAY    BLOOD SUGAR DIAGNOSTIC (FREESTYLE LITE STRIPS) STRP blood sugar diagnostic (FREESTYLE LITE STRIPS) Strp       Apply 1 strip topically once daily.    Apply 1 strip topically once daily.    BLOOD-GLUCOSE METER KIT blood-glucose meter kit       Use as instructed    Use as instructed    GLIMEPIRIDE (AMARYL) 4 MG TABLET glimepiride (AMARYL) 4 MG tablet       TAKE 1 TABLET TWO TIMES DAILY BEFORE MEALS (HOLD FOR BLOOD SUGAR LESS THAN 100    TAKE 1 TABLET TWO TIMES DAILY BEFORE MEALS (HOLD FOR BLOOD SUGAR LESS THAN 100    LANCETS (TRUEPLUS LANCETS) 28 GAUGE MISC lancets (TRUEPLUS LANCETS) 28 gauge Misc       USE ONCE DAILY AS DIRECTED    USE ONCE DAILY AS DIRECTED    LOSARTAN-HYDROCHLOROTHIAZIDE 100-25 MG (HYZAAR) 100-25 MG PER TABLET losartan-hydrochlorothiazide 100-25 mg (HYZAAR) 100-25 mg per tablet       Take 1 tablet by mouth once daily.    TAKE 1 TABLET EVERY DAY    METFORMIN (GLUCOPHAGE) 1000 MG TABLET metFORMIN (GLUCOPHAGE) 1000 MG tablet       Take 1 tablet (1,000 mg total) by mouth 2 (two) times daily.    TAKE 1 TABLET TWICE DAILY    ROSUVASTATIN (CRESTOR) 5 MG TABLET rosuvastatin (CRESTOR) 5 MG tablet       Take 1 tablet (5 mg total) by mouth every evening.    TAKE 1 TABLET EVERY EVENING.         CARE TEAM:  Patient Care Team:  Rosa [place] MD as PCP - General (Internal Medicine)  Ana Warren LPN as Licensed Practical Nurse         REVIEW OF SYSTEMS:  Review of Systems   Constitutional: Negative for chills, fatigue, fever and unexpected weight change.   HENT: Negative for congestion and postnasal drip.    Eyes: Negative for pain and visual disturbance.   Respiratory: Negative for cough, shortness of breath and wheezing.    Cardiovascular: Negative for chest pain, palpitations and leg swelling.   Gastrointestinal: Negative for abdominal pain, constipation, diarrhea, nausea and vomiting.   Genitourinary:  Positive for dysuria.   Musculoskeletal: Negative for arthralgias and back pain.   Skin: Negative for rash.   Neurological: Negative for weakness and headaches.   Psychiatric/Behavioral: Negative for dysphoric mood and sleep disturbance. The patient is nervous/anxious (- baseline).          PHYSICAL EXAM:   There were no vitals filed for this visit.          There is no height or weight on file to calculate BMI.      Psychiatric - alert, oriented to person, place and time, normal speech  Unable to perform remainder of PE as this was an audio only visit      Labs:  Lab Results   Component Value Date    HGBA1C 5.2 01/09/2020    HGBA1C 10.1 (H) 09/03/2019    HGBA1C 8.3 (H) 05/15/2019      Lab Results   Component Value Date    CHOL 107 (L) 05/15/2019    CHOL 124 02/04/2019    CHOL 127 01/08/2018     Lab Results   Component Value Date    LDLCALC 50.0 (L) 05/15/2019    LDLCALC 53.8 (L) 02/04/2019    LDLCALC 67.0 01/08/2018         ASSESSMENT AND PLAN:  1. Type 2 diabetes, controlled, with neuropathy  Diabetes is under good control control at this time for age and comorbid conditions. We discussed diabetic diet and regular exercise. We discussed home blood sugar monitoring, if appropriate - the patient should test once daily and as needed. Continue current medication regimen. and She is scheduled for her A1c checked next month.  Diabetic complications addressed: Neuropathy pain controlled.  Patient was counseled on the need for yearly eye exam to screen for/monitor diabetic retinopathy and yearly diabetic foot exam.  - Microalbumin/creatinine urine ratio; Future  - blood sugar diagnostic (FREESTYLE LITE STRIPS) Strp; Apply 1 strip topically once daily.  Dispense: 300 strip; Refill: 1  - metFORMIN (GLUCOPHAGE) 1000 MG tablet; Take 1 tablet (1,000 mg total) by mouth 2 (two) times daily.  Dispense: 180 tablet; Refill: 0  - lancets (TRUEPLUS LANCETS) 28 gauge Misc; USE ONCE DAILY AS DIRECTED  Dispense: 300 each; Refill: 1  -  glimepiride (AMARYL) 4 MG tablet; TAKE 1 TABLET TWO TIMES DAILY BEFORE MEALS (HOLD FOR BLOOD SUGAR LESS THAN 100  Dispense: 180 tablet; Refill: 1    2. Benign hypertension  Discussed sodium restriction, maintaining ideal body weight and regular exercise program as physiologic means to achieve blood pressure control. The patient will strive towards this.   The current medical regimen is effective;  continue present plan and medications. Recommended patient to check home readings to monitor and see me for followup as scheduled or sooner as needed.   Patient was educated that both decongestant and anti-inflammatory medication may raise blood pressure.  The patient is not interested in the digital hypertension program.  - losartan-hydrochlorothiazide 100-25 mg (HYZAAR) 100-25 mg per tablet; Take 1 tablet by mouth once daily.  Dispense: 90 tablet; Refill: 1  - atenoloL (TENORMIN) 25 MG tablet; Take 1 tablet (25 mg total) by mouth once daily.  Dispense: 90 tablet; Refill: 1  - amLODIPine (NORVASC) 5 MG tablet; Take 1 tablet (5 mg total) by mouth once daily.  Dispense: 90 tablet; Refill: 1    3. Hyperlipidemia LDL goal <100  We discussed low fat diet and regular exercise.The current medical regimen is effective;  continue present plan and medications.   - rosuvastatin (CRESTOR) 5 MG tablet; Take 1 tablet (5 mg total) by mouth every evening.  Dispense: 90 tablet; Refill: 0    4. Subclinical hyperthyroidism  Stable. Asymptomatic. Observe.    5. Dysuria  I advised the patient that there other conditions that can cause symptoms that mimic a urinary tract infection.  Her culture was negative.  I recommend that she have an appointment with urology if symptoms worsen or persist.    6. Colon cancer screening  Overdue. Patient would like to put off for now.    7. Need for shingles vaccine  Patient was advised to get immunization at the pharmacy.         Orders Placed This Encounter   Procedures    Microalbumin/creatinine urine  ratio      Follow up in about 3 months (around 7/15/2020), or if symptoms worsen or fail to improve, for follow up chronic medical conditions.. or sooner as needed.

## 2020-05-13 ENCOUNTER — LAB VISIT (OUTPATIENT)
Dept: LAB | Facility: HOSPITAL | Age: 75
End: 2020-05-13
Attending: INTERNAL MEDICINE
Payer: MEDICARE

## 2020-05-13 ENCOUNTER — TELEPHONE (OUTPATIENT)
Dept: FAMILY MEDICINE | Facility: CLINIC | Age: 75
End: 2020-05-13

## 2020-05-13 DIAGNOSIS — E05.90 SUBCLINICAL HYPERTHYROIDISM: ICD-10-CM

## 2020-05-13 DIAGNOSIS — E55.9 MILD VITAMIN D DEFICIENCY: ICD-10-CM

## 2020-05-13 DIAGNOSIS — E78.5 HYPERLIPIDEMIA LDL GOAL <100: ICD-10-CM

## 2020-05-13 DIAGNOSIS — E11.40 CONTROLLED TYPE 2 DIABETES WITH NEUROPATHY: ICD-10-CM

## 2020-05-13 DIAGNOSIS — I10 BENIGN HYPERTENSION: ICD-10-CM

## 2020-05-13 DIAGNOSIS — E53.8 VITAMIN B12 DEFICIENCY: ICD-10-CM

## 2020-05-13 LAB
25(OH)D3+25(OH)D2 SERPL-MCNC: 37 NG/ML (ref 30–96)
ALBUMIN SERPL BCP-MCNC: 4.7 G/DL (ref 3.5–5.2)
ALP SERPL-CCNC: 57 U/L (ref 55–135)
ALT SERPL W/O P-5'-P-CCNC: 20 U/L (ref 10–44)
ANION GAP SERPL CALC-SCNC: 13 MMOL/L (ref 8–16)
AST SERPL-CCNC: 17 U/L (ref 10–40)
BASOPHILS # BLD AUTO: 0.07 K/UL (ref 0–0.2)
BASOPHILS NFR BLD: 0.9 % (ref 0–1.9)
BILIRUB SERPL-MCNC: 0.3 MG/DL (ref 0.1–1)
BUN SERPL-MCNC: 23 MG/DL (ref 8–23)
CALCIUM SERPL-MCNC: 10.6 MG/DL (ref 8.7–10.5)
CHLORIDE SERPL-SCNC: 99 MMOL/L (ref 95–110)
CHOLEST SERPL-MCNC: 116 MG/DL (ref 120–199)
CHOLEST/HDLC SERPL: 2.6 {RATIO} (ref 2–5)
CO2 SERPL-SCNC: 26 MMOL/L (ref 23–29)
CREAT SERPL-MCNC: 0.8 MG/DL (ref 0.5–1.4)
DIFFERENTIAL METHOD: ABNORMAL
EOSINOPHIL # BLD AUTO: 0.3 K/UL (ref 0–0.5)
EOSINOPHIL NFR BLD: 4 % (ref 0–8)
ERYTHROCYTE [DISTWIDTH] IN BLOOD BY AUTOMATED COUNT: 13.2 % (ref 11.5–14.5)
EST. GFR  (AFRICAN AMERICAN): >60 ML/MIN/1.73 M^2
EST. GFR  (NON AFRICAN AMERICAN): >60 ML/MIN/1.73 M^2
ESTIMATED AVG GLUCOSE: 108 MG/DL (ref 68–131)
GLUCOSE SERPL-MCNC: 158 MG/DL (ref 70–110)
HBA1C MFR BLD HPLC: 5.4 % (ref 4–5.6)
HCT VFR BLD AUTO: 47.9 % (ref 37–48.5)
HDLC SERPL-MCNC: 44 MG/DL (ref 40–75)
HDLC SERPL: 37.9 % (ref 20–50)
HGB BLD-MCNC: 14.9 G/DL (ref 12–16)
IMM GRANULOCYTES # BLD AUTO: 0.04 K/UL (ref 0–0.04)
IMM GRANULOCYTES NFR BLD AUTO: 0.5 % (ref 0–0.5)
LDLC SERPL CALC-MCNC: 55.8 MG/DL (ref 63–159)
LYMPHOCYTES # BLD AUTO: 3.3 K/UL (ref 1–4.8)
LYMPHOCYTES NFR BLD: 41.2 % (ref 18–48)
MCH RBC QN AUTO: 27.7 PG (ref 27–31)
MCHC RBC AUTO-ENTMCNC: 31.1 G/DL (ref 32–36)
MCV RBC AUTO: 89 FL (ref 82–98)
MONOCYTES # BLD AUTO: 0.6 K/UL (ref 0.3–1)
MONOCYTES NFR BLD: 7.2 % (ref 4–15)
NEUTROPHILS # BLD AUTO: 3.7 K/UL (ref 1.8–7.7)
NEUTROPHILS NFR BLD: 46.2 % (ref 38–73)
NONHDLC SERPL-MCNC: 72 MG/DL
NRBC BLD-RTO: 0 /100 WBC
PLATELET # BLD AUTO: 333 K/UL (ref 150–350)
PMV BLD AUTO: 11.8 FL (ref 9.2–12.9)
POTASSIUM SERPL-SCNC: 5.3 MMOL/L (ref 3.5–5.1)
PROT SERPL-MCNC: 7.7 G/DL (ref 6–8.4)
RBC # BLD AUTO: 5.38 M/UL (ref 4–5.4)
SODIUM SERPL-SCNC: 138 MMOL/L (ref 136–145)
TRIGL SERPL-MCNC: 81 MG/DL (ref 30–150)
TSH SERPL DL<=0.005 MIU/L-ACNC: 3.33 UIU/ML (ref 0.4–4)
VIT B12 SERPL-MCNC: 204 PG/ML (ref 210–950)
WBC # BLD AUTO: 7.92 K/UL (ref 3.9–12.7)

## 2020-05-13 PROCEDURE — 85025 COMPLETE CBC W/AUTO DIFF WBC: CPT | Mod: HCNC

## 2020-05-13 PROCEDURE — 84443 ASSAY THYROID STIM HORMONE: CPT | Mod: HCNC

## 2020-05-13 PROCEDURE — 82607 VITAMIN B-12: CPT | Mod: HCNC

## 2020-05-13 PROCEDURE — 83970 ASSAY OF PARATHORMONE: CPT | Mod: HCNC

## 2020-05-13 PROCEDURE — 82306 VITAMIN D 25 HYDROXY: CPT | Mod: HCNC

## 2020-05-13 PROCEDURE — 83036 HEMOGLOBIN GLYCOSYLATED A1C: CPT | Mod: HCNC

## 2020-05-13 PROCEDURE — 80061 LIPID PANEL: CPT | Mod: HCNC

## 2020-05-13 PROCEDURE — 80053 COMPREHEN METABOLIC PANEL: CPT | Mod: HCNC

## 2020-05-13 PROCEDURE — 36415 COLL VENOUS BLD VENIPUNCTURE: CPT | Mod: HCNC,PO

## 2020-05-13 NOTE — TELEPHONE ENCOUNTER
Patient return call and was informed. She said that she was told not to eat are drink anything after midnight for her lab. Since she couldn't drink anything; she just couldn't go. Patient want to know if she can get an order to bring in urine specimen tomorrow.

## 2020-05-13 NOTE — TELEPHONE ENCOUNTER
Left message on answering machine to return call to clinic. Due to her not submitting urine that was schedule for today; order for urine test is cancelled.

## 2020-05-14 ENCOUNTER — LAB VISIT (OUTPATIENT)
Dept: LAB | Facility: HOSPITAL | Age: 75
End: 2020-05-14
Attending: INTERNAL MEDICINE
Payer: MEDICARE

## 2020-05-14 LAB
ALBUMIN/CREAT UR: 7.7 UG/MG (ref 0–30)
CREAT UR-MCNC: 52 MG/DL (ref 15–325)
MICROALBUMIN UR DL<=1MG/L-MCNC: 4 UG/ML
PTH-INTACT SERPL-MCNC: 64 PG/ML (ref 9–77)

## 2020-05-14 PROCEDURE — 82043 UR ALBUMIN QUANTITATIVE: CPT | Mod: HCNC

## 2020-06-10 ENCOUNTER — PATIENT OUTREACH (OUTPATIENT)
Dept: ADMINISTRATIVE | Facility: OTHER | Age: 75
End: 2020-06-10

## 2020-06-11 ENCOUNTER — OFFICE VISIT (OUTPATIENT)
Dept: OPHTHALMOLOGY | Facility: CLINIC | Age: 75
End: 2020-06-11
Payer: MEDICARE

## 2020-06-11 DIAGNOSIS — H26.9 CORTICAL CATARACT OF BOTH EYES: ICD-10-CM

## 2020-06-11 DIAGNOSIS — E11.9 DM TYPE 2 WITHOUT RETINOPATHY: ICD-10-CM

## 2020-06-11 DIAGNOSIS — H25.13 NUCLEAR SCLEROSIS, BILATERAL: Primary | ICD-10-CM

## 2020-06-11 DIAGNOSIS — H52.7 REFRACTIVE ERROR: ICD-10-CM

## 2020-06-11 LAB
LEFT EYE DM RETINOPATHY: NEGATIVE
RIGHT EYE DM RETINOPATHY: NEGATIVE

## 2020-06-11 PROCEDURE — 99999 PR PBB SHADOW E&M-EST. PATIENT-LVL I: ICD-10-PCS | Mod: PBBFAC,HCNC,, | Performed by: OPHTHALMOLOGY

## 2020-06-11 PROCEDURE — 99499 UNLISTED E&M SERVICE: CPT | Mod: HCNC,S$GLB,, | Performed by: OPHTHALMOLOGY

## 2020-06-11 PROCEDURE — 99999 PR PBB SHADOW E&M-EST. PATIENT-LVL I: CPT | Mod: PBBFAC,HCNC,, | Performed by: OPHTHALMOLOGY

## 2020-06-11 PROCEDURE — 99499 RISK ADDL DX/OHS AUDIT: ICD-10-PCS | Mod: HCNC,S$GLB,, | Performed by: OPHTHALMOLOGY

## 2020-06-11 PROCEDURE — 92136 OPHTHALMIC BIOMETRY: CPT | Mod: HCNC,RT,S$GLB, | Performed by: OPHTHALMOLOGY

## 2020-06-11 PROCEDURE — 92136 BIOMETRY: ICD-10-PCS | Mod: HCNC,RT,S$GLB, | Performed by: OPHTHALMOLOGY

## 2020-06-11 PROCEDURE — 92014 COMPRE OPH EXAM EST PT 1/>: CPT | Mod: HCNC,S$GLB,, | Performed by: OPHTHALMOLOGY

## 2020-06-11 PROCEDURE — 92014 PR EYE EXAM, EST PATIENT,COMPREHESV: ICD-10-PCS | Mod: HCNC,S$GLB,, | Performed by: OPHTHALMOLOGY

## 2020-06-11 RX ORDER — OFLOXACIN 3 MG/ML
1 SOLUTION/ DROPS OPHTHALMIC 4 TIMES DAILY
Qty: 5 ML | Refills: 1 | Status: SHIPPED | OUTPATIENT
Start: 2020-06-27 | End: 2020-07-07

## 2020-06-11 RX ORDER — NEPAFENAC 3 MG/ML
1 SUSPENSION/ DROPS OPHTHALMIC DAILY
Qty: 3 ML | Refills: 1 | Status: SHIPPED | OUTPATIENT
Start: 2020-06-27 | End: 2020-07-27

## 2020-06-11 RX ORDER — DIFLUPREDNATE OPHTHALMIC 0.5 MG/ML
1 EMULSION OPHTHALMIC 4 TIMES DAILY
Qty: 5 ML | Refills: 1 | Status: SHIPPED | OUTPATIENT
Start: 2020-06-30 | End: 2020-07-30

## 2020-06-11 NOTE — PROGRESS NOTES
Subjective:       Patient ID: Peace Farmer is a 75 y.o. female.    Chief Complaint: Cataract    HPI     DLS: 2/14/2020 Dr. Freeman    75 y.o. Female is here for Cataracat Eval per Dr. Freeman. Denies eye pain and   flashes. H/o floaters, bilateral. Itching, bilateral. No burning or   tearing. Blurred vision up close..Do have glare trouble glare.     Eye Med's:     Last edited by ZAYRA Moreno on 6/11/2020  1:47 PM. (History)             Assessment:       1. Nuclear sclerosis, bilateral    2. Cortical cataract of both eyes    3. DM type 2 without retinopathy    4. Refractive error        Plan:       Visually significant cataract OU -Pt. Wants Sx.     DM-No NPDR OS.  RE-Pt wants CE OS for reading vision.      Cataract Surgery Consent: Patient with a visually significant cataract with difficulties of ADLs, reading, driving, night vision, glare (any and all).  Discussed with Patient/Family/Caregiver: options, risks and benefits, expectations of cataract surgery, utilized an eye model with questions and answers to facilitate discussion.  Discussed lens options and patient understands that glasses may be required for optimal vision for distance and/or near vision after cataract surgery.  The Patient/Family/Caregiver  voice good understanding and patient wishes to proceed with surgery.  The patient will likely benefit from surgery and patient signed consent for Right Eye.  CE OD 6/30/2020 SN60WF 18.5 for distance,        OS 7/14/2020 SN60WF 21.5 for reading.  Control DM.

## 2020-06-16 ENCOUNTER — TELEPHONE (OUTPATIENT)
Dept: OPHTHALMOLOGY | Facility: CLINIC | Age: 75
End: 2020-06-16

## 2020-06-16 DIAGNOSIS — H25.11 NS (NUCLEAR SCLEROSIS), RIGHT: Primary | ICD-10-CM

## 2020-06-16 DIAGNOSIS — H25.13 NUCLEAR SCLEROTIC CATARACT OF BOTH EYES: ICD-10-CM

## 2020-06-16 DIAGNOSIS — Z13.9 SCREENING PROCEDURE: ICD-10-CM

## 2020-06-17 ENCOUNTER — TELEPHONE (OUTPATIENT)
Dept: OPHTHALMOLOGY | Facility: CLINIC | Age: 75
End: 2020-06-17

## 2020-06-17 DIAGNOSIS — H25.13 NUCLEAR SCLEROTIC CATARACT OF BOTH EYES: ICD-10-CM

## 2020-06-17 DIAGNOSIS — H25.12 NS (NUCLEAR SCLEROSIS), LEFT: Primary | ICD-10-CM

## 2020-06-17 DIAGNOSIS — Z13.9 SCREENING PROCEDURE: ICD-10-CM

## 2020-06-26 ENCOUNTER — TELEPHONE (OUTPATIENT)
Dept: OPHTHALMOLOGY | Facility: CLINIC | Age: 75
End: 2020-06-26

## 2020-06-27 ENCOUNTER — CLINICAL SUPPORT (OUTPATIENT)
Dept: URGENT CARE | Facility: CLINIC | Age: 75
End: 2020-06-27
Payer: MEDICARE

## 2020-06-27 VITALS — TEMPERATURE: 96 F

## 2020-06-27 DIAGNOSIS — H25.13 NUCLEAR SCLEROTIC CATARACT OF BOTH EYES: ICD-10-CM

## 2020-06-27 DIAGNOSIS — Z13.9 SCREENING PROCEDURE: ICD-10-CM

## 2020-06-27 PROCEDURE — U0003 INFECTIOUS AGENT DETECTION BY NUCLEIC ACID (DNA OR RNA); SEVERE ACUTE RESPIRATORY SYNDROME CORONAVIRUS 2 (SARS-COV-2) (CORONAVIRUS DISEASE [COVID-19]), AMPLIFIED PROBE TECHNIQUE, MAKING USE OF HIGH THROUGHPUT TECHNOLOGIES AS DESCRIBED BY CMS-2020-01-R: HCPCS | Mod: HCNC

## 2020-06-27 NOTE — H&P
[facility]  History & Physical    Subjective:      Chief Complaint/Reason for Admission:     Peace Farmer is a 75 y.o. female.    Past Medical History:   Diagnosis Date    Abnormal mammogram 10/13    s/p biopsy    Cortical cataract of both eyes 11/16/2018    Diabetes mellitus type II     Fibromyalgia     Hyperlipidemia     Hypertension     Obesity (BMI 30-39.9)     Osteopenia     Psoriasis     Skin cancer of face 2011    forehead     Past Surgical History:   Procedure Laterality Date    PARTIAL HYSTERECTOMY      age 34    skin cancer face  2011     Family History   Problem Relation Age of Onset    Heart disease Father     Skin cancer Father     Hypertension Father     Lung cancer Father     Cataracts Father     Hypertension Mother     Skin cancer Mother     Cataracts Mother     Macular degeneration Mother     Cancer Mother         skin     No Known Problems Brother     Stroke Maternal Grandmother     No Known Problems Maternal Grandfather     No Known Problems Paternal Grandmother     No Known Problems Paternal Grandfather     No Known Problems Maternal Aunt     No Known Problems Maternal Uncle     No Known Problems Paternal Aunt     No Known Problems Paternal Uncle     Diabetes Mellitus Neg Hx     Breast cancer Neg Hx     Colon cancer Neg Hx     Amblyopia Neg Hx     Blindness Neg Hx     Diabetes Neg Hx     Glaucoma Neg Hx     Retinal detachment Neg Hx     Strabismus Neg Hx     Thyroid disease Neg Hx      Social History     Tobacco Use    Smoking status: Never Smoker    Smokeless tobacco: Never Used   Substance Use Topics    Alcohol use: Yes     Comment: rare    Drug use: No       No medications prior to admission.     Review of patient's allergies indicates:   Allergen Reactions    Pcn [penicillins] Hives and Shortness Of Breath        Review of Systems   Eyes: Positive for blurred vision.   All other systems reviewed and are  negative.      Objective:      Vital Signs (Most Recent)       Vital Signs Range (Last 24H):       Physical Exam  Constitutional:       Appearance: She is well-developed.   HENT:      Head: Normocephalic.   Eyes:      Conjunctiva/sclera: Conjunctivae normal.      Pupils: Pupils are equal, round, and reactive to light.   Neck:      Musculoskeletal: Normal range of motion and neck supple.   Cardiovascular:      Rate and Rhythm: Normal rate and regular rhythm.      Heart sounds: Normal heart sounds.   Pulmonary:      Effort: Pulmonary effort is normal.      Breath sounds: Normal breath sounds.   Abdominal:      General: Bowel sounds are normal.      Palpations: Abdomen is soft.   Musculoskeletal: Normal range of motion.   Skin:     General: Skin is warm.   Neurological:      Mental Status: She is alert and oriented to person, place, and time.         Data Review:    ECG:     Assessment:      Cataract OD.    Plan:    CE OD.

## 2020-06-28 LAB — SARS-COV-2 RNA RESP QL NAA+PROBE: NOT DETECTED

## 2020-06-30 ENCOUNTER — ANESTHESIA EVENT (OUTPATIENT)
Dept: SURGERY | Facility: OTHER | Age: 75
End: 2020-06-30
Payer: MEDICARE

## 2020-06-30 ENCOUNTER — ANESTHESIA (OUTPATIENT)
Dept: SURGERY | Facility: OTHER | Age: 75
End: 2020-06-30
Payer: MEDICARE

## 2020-06-30 ENCOUNTER — HOSPITAL ENCOUNTER (OUTPATIENT)
Facility: OTHER | Age: 75
Discharge: HOME | End: 2020-06-30
Attending: OPHTHALMOLOGY | Admitting: OPHTHALMOLOGY
Payer: MEDICARE

## 2020-06-30 VITALS
DIASTOLIC BLOOD PRESSURE: 70 MMHG | TEMPERATURE: 98 F | SYSTOLIC BLOOD PRESSURE: 140 MMHG | HEIGHT: 60 IN | WEIGHT: 152 LBS | RESPIRATION RATE: 16 BRPM | BODY MASS INDEX: 29.84 KG/M2 | OXYGEN SATURATION: 98 % | HEART RATE: 85 BPM

## 2020-06-30 DIAGNOSIS — H25.11 NUCLEAR SCLEROTIC CATARACT OF RIGHT EYE: Primary | ICD-10-CM

## 2020-06-30 LAB — POCT GLUCOSE: 174 MG/DL (ref 70–110)

## 2020-06-30 PROCEDURE — V2632 POST CHMBR INTRAOCULAR LENS: HCPCS | Mod: HCNC | Performed by: OPHTHALMOLOGY

## 2020-06-30 PROCEDURE — 71000015 HC POSTOP RECOV 1ST HR: Mod: HCNC | Performed by: OPHTHALMOLOGY

## 2020-06-30 PROCEDURE — 36000707: Mod: HCNC | Performed by: OPHTHALMOLOGY

## 2020-06-30 PROCEDURE — 25000003 PHARM REV CODE 250: Mod: HCNC | Performed by: OPHTHALMOLOGY

## 2020-06-30 PROCEDURE — 63600175 PHARM REV CODE 636 W HCPCS: Mod: HCNC | Performed by: OPHTHALMOLOGY

## 2020-06-30 PROCEDURE — 66984 XCAPSL CTRC RMVL W/O ECP: CPT | Mod: HCNC,RT,, | Performed by: OPHTHALMOLOGY

## 2020-06-30 PROCEDURE — 37000008 HC ANESTHESIA 1ST 15 MINUTES: Mod: HCNC | Performed by: OPHTHALMOLOGY

## 2020-06-30 PROCEDURE — 36000706: Mod: HCNC | Performed by: OPHTHALMOLOGY

## 2020-06-30 PROCEDURE — 37000009 HC ANESTHESIA EA ADD 15 MINS: Mod: HCNC | Performed by: OPHTHALMOLOGY

## 2020-06-30 PROCEDURE — 63600175 PHARM REV CODE 636 W HCPCS: Mod: HCNC | Performed by: NURSE ANESTHETIST, CERTIFIED REGISTERED

## 2020-06-30 PROCEDURE — 66984 PR REMOVAL, CATARACT, W/INSRT INTRAOC LENS, W/O ENDO CYCLO: ICD-10-PCS | Mod: HCNC,RT,, | Performed by: OPHTHALMOLOGY

## 2020-06-30 DEVICE — LENS 18.5: Type: IMPLANTABLE DEVICE | Site: EYE | Status: FUNCTIONAL

## 2020-06-30 RX ORDER — MIDAZOLAM HYDROCHLORIDE 1 MG/ML
INJECTION INTRAMUSCULAR; INTRAVENOUS
Status: DISCONTINUED | OUTPATIENT
Start: 2020-06-30 | End: 2020-06-30

## 2020-06-30 RX ORDER — TETRACAINE HYDROCHLORIDE 5 MG/ML
SOLUTION OPHTHALMIC
Status: DISCONTINUED | OUTPATIENT
Start: 2020-06-30 | End: 2020-06-30 | Stop reason: HOSPADM

## 2020-06-30 RX ORDER — ACETAMINOPHEN 325 MG/1
650 TABLET ORAL EVERY 4 HOURS PRN
Status: DISCONTINUED | OUTPATIENT
Start: 2020-06-30 | End: 2020-06-30 | Stop reason: HOSPADM

## 2020-06-30 RX ORDER — OFLOXACIN 3 MG/ML
1 SOLUTION/ DROPS OPHTHALMIC
Status: DISCONTINUED | OUTPATIENT
Start: 2020-06-30 | End: 2022-05-02

## 2020-06-30 RX ORDER — CYCLOPENTOLATE HYDROCHLORIDE 10 MG/ML
1 SOLUTION/ DROPS OPHTHALMIC
Status: DISCONTINUED | OUTPATIENT
Start: 2020-06-30 | End: 2022-05-02

## 2020-06-30 RX ORDER — EPINEPHRINE 1 MG/ML
INJECTION, SOLUTION INTRACARDIAC; INTRAMUSCULAR; INTRAVENOUS; SUBCUTANEOUS
Status: DISCONTINUED | OUTPATIENT
Start: 2020-06-30 | End: 2020-06-30 | Stop reason: HOSPADM

## 2020-06-30 RX ORDER — FENTANYL CITRATE 50 UG/ML
INJECTION, SOLUTION INTRAMUSCULAR; INTRAVENOUS
Status: DISCONTINUED | OUTPATIENT
Start: 2020-06-30 | End: 2020-06-30

## 2020-06-30 RX ORDER — HYDROCODONE BITARTRATE AND ACETAMINOPHEN 5; 325 MG/1; MG/1
1 TABLET ORAL EVERY 4 HOURS PRN
Status: DISCONTINUED | OUTPATIENT
Start: 2020-06-30 | End: 2020-06-30 | Stop reason: HOSPADM

## 2020-06-30 RX ORDER — TETRACAINE HYDROCHLORIDE 5 MG/ML
1 SOLUTION OPHTHALMIC
Status: COMPLETED | OUTPATIENT
Start: 2020-06-30 | End: 2020-06-30

## 2020-06-30 RX ORDER — TROPICAMIDE 10 MG/ML
1 SOLUTION/ DROPS OPHTHALMIC
Status: COMPLETED | OUTPATIENT
Start: 2020-06-30 | End: 2020-06-30

## 2020-06-30 RX ORDER — PREDNISOLONE ACETATE 10 MG/ML
SUSPENSION/ DROPS OPHTHALMIC
Status: DISCONTINUED | OUTPATIENT
Start: 2020-06-30 | End: 2020-06-30 | Stop reason: HOSPADM

## 2020-06-30 RX ORDER — SODIUM CHLORIDE 0.9 % (FLUSH) 0.9 %
10 SYRINGE (ML) INJECTION
Status: DISCONTINUED | OUTPATIENT
Start: 2020-06-30 | End: 2022-05-02

## 2020-06-30 RX ORDER — PHENYLEPHRINE HYDROCHLORIDE 25 MG/ML
1 SOLUTION/ DROPS OPHTHALMIC
Status: COMPLETED | OUTPATIENT
Start: 2020-06-30 | End: 2020-06-30

## 2020-06-30 RX ORDER — LIDOCAINE HYDROCHLORIDE 40 MG/ML
INJECTION, SOLUTION RETROBULBAR
Status: DISCONTINUED | OUTPATIENT
Start: 2020-06-30 | End: 2020-06-30 | Stop reason: HOSPADM

## 2020-06-30 RX ADMIN — CYCLOPENTOLATE HYDROCHLORIDE 1 DROP: 10 SOLUTION/ DROPS OPHTHALMIC at 09:06

## 2020-06-30 RX ADMIN — OFLOXACIN 1 DROP: 3 SOLUTION OPHTHALMIC at 09:06

## 2020-06-30 RX ADMIN — PHENYLEPHRINE HYDROCHLORIDE 1 DROP: 25 SOLUTION/ DROPS OPHTHALMIC at 09:06

## 2020-06-30 RX ADMIN — FENTANYL CITRATE 50 MCG: 50 INJECTION, SOLUTION INTRAMUSCULAR; INTRAVENOUS at 11:06

## 2020-06-30 RX ADMIN — TETRACAINE HYDROCHLORIDE 1 DROP: 5 SOLUTION OPHTHALMIC at 09:06

## 2020-06-30 RX ADMIN — MIDAZOLAM HYDROCHLORIDE 2 MG: 1 INJECTION, SOLUTION INTRAMUSCULAR; INTRAVENOUS at 11:06

## 2020-06-30 RX ADMIN — TROPICAMIDE 1 DROP: 10 SOLUTION/ DROPS OPHTHALMIC at 09:06

## 2020-06-30 NOTE — ANESTHESIA POSTPROCEDURE EVALUATION
Anesthesia Post Evaluation    Patient: Peace Farmer    Procedure(s) Performed: Procedure(s) (LRB):  EXTRACTION, CATARACT, WITH IOL INSERTION (Right)    Final Anesthesia Type: MAC    Patient location during evaluation: [facility]  Patient participation: Yes- Able to Participate  Level of consciousness: awake and alert  Post-procedure vital signs: reviewed and stable  Airway patency: patent    PONV status at discharge: No PONV  Anesthetic complications: no      Cardiovascular status: blood pressure returned to baseline and hemodynamically stable  Respiratory status: unassisted and spontaneous ventilation  Hydration status: euvolemic  Follow-up not needed.          Vitals Value Taken Time   /60 06/30/20 1149   Temp  06/30/20 1149   Pulse 71 06/30/20 1149   Resp 18 06/30/20 1149   SpO2 100 06/30/20 1149         No case tracking events are documented in the log.      Pain/Pravin Score: No data recorded

## 2020-06-30 NOTE — PLAN OF CARE
Peacenadege Farmer has met all discharge criteria from Phase II. Vital Signs are stable, ambulating  without difficulty. Discharge instructions given, patient verbalized understanding. Discharged from facility via wheelchair in stable condition.

## 2020-06-30 NOTE — INTERVAL H&P NOTE
The patient has been examined and the H&P has been reviewed:        Anesthesia/Surgery risks, benefits and alternative options discussed and understood by patient/family.          Active Hospital Problems    Diagnosis  POA    Nuclear sclerotic cataract of right eye [H25.11]  Yes      Resolved Hospital Problems   No resolved problems to display.

## 2020-06-30 NOTE — DISCHARGE INSTRUCTIONS
Anesthesia: Monitored Anesthesia Care (MAC)  Anesthesia safety  Tips for anesthesia safety include the following:   · Follow all instructions you are given for how long not to eat or drink before your procedure.  · Be sure your healthcare provider knows what medicines you take, especially any anti-inflammatory medicine or blood thinners. This includes aspirin and any other over-the-counter medicines, herbs, and supplements.  · Have an adult family member or friend drive you home after the procedure.  · For the first 24 hours after your surgery:  ¨ Do not drive or use heavy equipment.  ¨ Do not make important decisions or sign documents.  ¨ Avoid alcohol.  ¨ Have someone stay with you, if possible. They can watch for problems and help keep you safe.  Date Last Reviewed: 12/1/2016 © 2000-2017 [employer], [employer]. [address] [place] [address], [place] [address]. All rights reserved. This information is not intended as a substitute for professional medical care. Always follow your healthcare professional's instructions.

## 2020-06-30 NOTE — ANESTHESIA PREPROCEDURE EVALUATION
06/30/2020  Peace Farmer is a 75 y.o., female.    Anesthesia Evaluation    I have reviewed the Patient Summary Reports.    I have reviewed the Nursing Notes. I have reviewed the NPO Status.   I have reviewed the Medications.     Review of Systems  Anesthesia Hx:  No problems with previous Anesthesia  Denies Family Hx of Anesthesia complications.   Denies Personal Hx of Anesthesia complications.   Social:  Non-Smoker    Hematology/Oncology:  Hematology Normal   Oncology Normal     EENT/Dental:EENT/Dental Normal   Cardiovascular:   Exercise tolerance: good Hypertension    Pulmonary:  Pulmonary Normal    Renal/:  Renal/ Normal     Musculoskeletal:  Musculoskeletal Normal    Neurological:  Neurology Normal fibromyslgia   Endocrine:   Diabetes Hyperthyroidism    Dermatological:  Skin Normal    Psych:  Psychiatric Normal           Physical Exam  General:  Well nourished    Airway/Jaw/Neck:  Airway Findings: Mouth Opening: Normal Tongue: Normal  General Airway Assessment: Adult  Mallampati: I  TM Distance: Normal, at least 6 cm  Jaw/Neck Findings:  Neck ROM: Normal ROM      Dental:  Dental Findings: In tact             Anesthesia Plan  Type of Anesthesia, risks & benefits discussed:  Anesthesia Type:  MAC  Patient's Preference:   Intra-op Monitoring Plan:   Intra-op Monitoring Plan Comments:   Post Op Pain Control Plan:   Post Op Pain Control Plan Comments:   Induction:    Beta Blocker:         Informed Consent: Patient understands risks and agrees with Anesthesia plan.  Questions answered. Anesthesia consent signed with patient.  ASA Score: 3     Day of Surgery Review of History & Physical:    H&P update referred to the surgeon.         Ready For Surgery From Anesthesia Perspective.

## 2020-06-30 NOTE — ANESTHESIA POSTPROCEDURE EVALUATION
Anesthesia Post Evaluation    Patient: Peace Farmer    Procedure(s) Performed: Procedure(s) (LRB):  EXTRACTION, CATARACT, WITH IOL INSERTION (Right)    Final Anesthesia Type: MAC    Patient location during evaluation: [facility]  Patient participation: Yes- Able to Participate  Level of consciousness: awake and alert  Post-procedure vital signs: reviewed and stable  Pain management: adequate  Airway patency: patent    PONV status at discharge: No PONV      Cardiovascular status: blood pressure returned to baseline and hemodynamically stable  Respiratory status: unassisted and spontaneous ventilation  Hydration status: euvolemic  Follow-up not needed.          Vitals Value Taken Time   /72 06/30/20 0926   Temp 36.6 °C (97.9 °F) 06/30/20 0926   Pulse 71 06/30/20 0926   Resp 16 06/30/20 0926   SpO2 98 % 06/30/20 0926         No case tracking events are documented in the log.      Pain/Pravin Score: No data recorded

## 2020-06-30 NOTE — OP NOTE
Operative Date:  06/30/2020    Discharge Date:  06/30/2020    Discharge Patient Home    Report Title: Operative Note      SURGEON: Joshua Robin MD     ASSISTANT:     PREOPERATIVE DIAGNOSIS: Visually significant NSC cataract,  Right Eye.    POSTOPERATIVE DIAGNOSIS: Visually-significant NSC cataract,  Right Eye.    PROCEDURE PERFORMED: Phacoemulsification of the cataract with posterior chamber intraocular lens Right Eye.    ANESTHESIA: Topical with MAC     COMPLICATIONS: None    ESTIMATED BLOOD LOSS: Minimal    INDICATIONS FOR PROCEDURE:   The patient is a pleasant 75 year old woman with increasing difficulties with activities of daily living secondary to a dense visually significant cataract in the Right Eye.  Discussions have been carried out with this patient concerning the options to surgery, risks, benefits and expectations.  The patient voiced good understanding and wished to proceed with the above procedure.    PROCEDURE IN DETAIL: The patient was brought to the operating room and received topical anesthetic to the eye and then was prepped and draped in the usual sterile fashion.  Using the Bravo ring and the guarded julita blade set at 0.37 mm, a partial thickness clear cornea incision was made temporally.  The paracentesis site was made at the twelve o'clock position.  Omidria was injected into the anterior chamber through the paracentesis.  Viscoat was then injected into the anterior chamber.  The eye was then reentered at the primary surgical site with a 2.4 mm keratome followed by continuous capsulotomy, hydrodissection, hydrodelineation and phacoemulsification of the cataract.  Residual cortical material was removed using automated irrigation-aspiration technique.  Healon was injected into the posterior chamber and an SN60WF 18.5 diopter lens was placed in the bag without difficulty. Residual viscoelastic was removed using automated irrigation-aspiration technique. The eye was  re-pressurized using BSS solution and both the paracentesis site and the primary surgical site were demonstrated to be watertight at the end of the case with Weck--Marlen manipulation.  One drop of Ofloxacin and one drop of Pred acetate 1% was applied to the Right Eye .The eye was closed, patched and a Martinez shield placed.  The patient was taken to the recovery room in good and stable condition.  The patient tolerated the procedure well.  The patient was instructed to refrain from any heavy lifting, bending, stooping or straining activities, discharged home  and to follow-up in the morning for routine postoperative care with Joshua Robin MD.

## 2020-06-30 NOTE — BRIEF OP NOTE
[facility]  Brief Operative Note    Surgery Date: 6/30/2020     Surgeon(s) and Role:     * Joshua Robin MD - Primary    Assisting Surgeon: None    Pre-op Diagnosis:  NS (nuclear sclerosis), right [H25.11]    Post-op Diagnosis:  Post-Op Diagnosis Codes:     * NS (nuclear sclerosis), right [H25.11]    Procedure(s) (LRB):  EXTRACTION, CATARACT, WITH IOL INSERTION (Right)    Anesthesia: Local MAC    Description of the findings of the procedure(s):     Estimated Blood Loss: * No values recorded between 6/30/2020 11:18 AM and 6/30/2020 11:43 AM *         Specimens:   Specimen (12h ago, onward)    None            Discharge Note    OUTCOME: Patient tolerated treatment/procedure well without complication and is now ready for discharge.    DISPOSITION: Home or Self Care    FINAL DIAGNOSIS:  Nuclear sclerotic cataract of right eye    FOLLOWUP: In clinic    DISCHARGE INSTRUCTIONS:    Discharge Procedure Orders   Other restrictions (specify):   Order Comments: No heavy lifting or bending for 1 week.

## 2020-07-01 ENCOUNTER — OFFICE VISIT (OUTPATIENT)
Dept: OPHTHALMOLOGY | Facility: CLINIC | Age: 75
End: 2020-07-01
Payer: MEDICARE

## 2020-07-01 VITALS — SYSTOLIC BLOOD PRESSURE: 128 MMHG | HEART RATE: 67 BPM | DIASTOLIC BLOOD PRESSURE: 66 MMHG

## 2020-07-01 DIAGNOSIS — Z98.890 POST-OPERATIVE STATE: Primary | ICD-10-CM

## 2020-07-01 PROCEDURE — 99999 PR PBB SHADOW E&M-EST. PATIENT-LVL III: CPT | Mod: PBBFAC,HCNC,, | Performed by: OPHTHALMOLOGY

## 2020-07-01 PROCEDURE — 99024 POSTOP FOLLOW-UP VISIT: CPT | Mod: HCNC,S$GLB,, | Performed by: OPHTHALMOLOGY

## 2020-07-01 PROCEDURE — 99024 PR POST-OP FOLLOW-UP VISIT: ICD-10-PCS | Mod: HCNC,S$GLB,, | Performed by: OPHTHALMOLOGY

## 2020-07-01 PROCEDURE — 99999 PR PBB SHADOW E&M-EST. PATIENT-LVL III: ICD-10-PCS | Mod: PBBFAC,HCNC,, | Performed by: OPHTHALMOLOGY

## 2020-07-01 NOTE — PROGRESS NOTES
Subjective:       Patient ID: Peace Farmer is a 75 y.o. female.    Chief Complaint: Post-op Evaluation    HPI     DLS: 6/11/2020 with Dr. Robin     75 y.o. Female is here for 1 Day PO OD. Pt states surgery went well.   Denies pain and headaches.   Pt states eyes are puffy.     Eye Med's:   Ofloxacin OD QID   Durezol OD QID   Ilevro OD QHS    Last edited by Sarah Schneider on 7/1/2020  1:14 PM. (History)             Assessment:       1. Post-operative state        Plan:       S/p CE OD- Doing well.         CPM OD.  RTC 1 wk.

## 2020-07-10 ENCOUNTER — CLINICAL SUPPORT (OUTPATIENT)
Dept: URGENT CARE | Facility: CLINIC | Age: 75
End: 2020-07-10
Payer: MEDICARE

## 2020-07-10 ENCOUNTER — OFFICE VISIT (OUTPATIENT)
Dept: OPHTHALMOLOGY | Facility: CLINIC | Age: 75
End: 2020-07-10
Payer: MEDICARE

## 2020-07-10 ENCOUNTER — TELEPHONE (OUTPATIENT)
Dept: OPHTHALMOLOGY | Facility: CLINIC | Age: 75
End: 2020-07-10

## 2020-07-10 DIAGNOSIS — Z13.9 SCREENING PROCEDURE: ICD-10-CM

## 2020-07-10 DIAGNOSIS — H25.13 NUCLEAR SCLEROTIC CATARACT OF BOTH EYES: ICD-10-CM

## 2020-07-10 DIAGNOSIS — Z98.890 POST-OPERATIVE STATE: Primary | ICD-10-CM

## 2020-07-10 DIAGNOSIS — H25.12 NS (NUCLEAR SCLEROSIS), LEFT: ICD-10-CM

## 2020-07-10 PROCEDURE — 99024 POSTOP FOLLOW-UP VISIT: CPT | Mod: HCNC,S$GLB,, | Performed by: OPHTHALMOLOGY

## 2020-07-10 PROCEDURE — 92136 PR OPHTHAL BIOMETRY,INTRAOC LENS POW CALC: ICD-10-PCS | Mod: 26,HCNC,LT,S$GLB | Performed by: OPHTHALMOLOGY

## 2020-07-10 PROCEDURE — 99999 PR PBB SHADOW E&M-EST. PATIENT-LVL III: CPT | Mod: PBBFAC,HCNC,, | Performed by: OPHTHALMOLOGY

## 2020-07-10 PROCEDURE — 99024 PR POST-OP FOLLOW-UP VISIT: ICD-10-PCS | Mod: HCNC,S$GLB,, | Performed by: OPHTHALMOLOGY

## 2020-07-10 PROCEDURE — U0003 INFECTIOUS AGENT DETECTION BY NUCLEIC ACID (DNA OR RNA); SEVERE ACUTE RESPIRATORY SYNDROME CORONAVIRUS 2 (SARS-COV-2) (CORONAVIRUS DISEASE [COVID-19]), AMPLIFIED PROBE TECHNIQUE, MAKING USE OF HIGH THROUGHPUT TECHNOLOGIES AS DESCRIBED BY CMS-2020-01-R: HCPCS | Mod: HCNC

## 2020-07-10 PROCEDURE — 99999 PR PBB SHADOW E&M-EST. PATIENT-LVL III: ICD-10-PCS | Mod: PBBFAC,HCNC,, | Performed by: OPHTHALMOLOGY

## 2020-07-10 PROCEDURE — 92136 OPHTHALMIC BIOMETRY: CPT | Mod: 26,HCNC,LT,S$GLB | Performed by: OPHTHALMOLOGY

## 2020-07-10 NOTE — PROGRESS NOTES
Subjective:       Patient ID: Peace Farmer is a 75 y.o. female.    Chief Complaint: Post-op Evaluation    HPI     DLS: 7/1/2020 with Dr. Robin    75 y.o. Female is here for 1 Week PO OD. Pt states surgery went well. Pt   states va is great OD. Pt states seeing a few floaters and black lines are   still occurring which is not a new problem. Denies pain and headaches.        Eye Med's:   Durezol OD BID  Ilevro OD QHS    Last edited by Sarah Schneider on 7/10/2020  8:25 AM. (History)             Assessment:       1. Post-operative state    2. NS (nuclear sclerosis), left        Plan:       S/p CE OD- Doing well.     Visually significant cataract OS -Pt. Wants Sx.      Taper gtts OD.  Cataract Surgery Consent: Patient with a visually significant cataract with difficulties of ADLs, reading, driving, night vision, glare (any and all).  Discussed with Patient/Family/Caregiver: options, risks and benefits, expectations of cataract surgery, utilized an eye model with questions and answers to facilitate discussion.  Discussed lens options and patient understands that glasses may be required for optimal vision for distance and/or near vision after cataract surgery.  The Patient/Family/Caregiver  voice good understanding and patient wishes to proceed with surgery.  The patient will likely benefit from surgery and patient signed consent for Left Eye.  CE OS 7/14/2020.

## 2020-07-11 LAB — SARS-COV-2 RNA RESP QL NAA+PROBE: NOT DETECTED

## 2020-07-12 NOTE — H&P
[facility]  History & Physical    Subjective:      Chief Complaint/Reason for Admission:     Peace Framer is a 75 y.o. female.    Past Medical History:   Diagnosis Date    Abnormal mammogram 10/13    s/p biopsy    Cortical cataract of both eyes 11/16/2018    Diabetes mellitus type II     Fibromyalgia     Hyperlipidemia     Hypertension     Obesity (BMI 30-39.9)     Osteopenia     Psoriasis     Skin cancer of face 2011    forehead     Past Surgical History:   Procedure Laterality Date    CATARACT EXTRACTION W/  INTRAOCULAR LENS IMPLANT Right 6/30/2020    Procedure: EXTRACTION, CATARACT, WITH IOL INSERTION;  Surgeon: Joshua Robin MD;  Location: [facility];  Service: Ophthalmology;  Laterality: Right;    PARTIAL HYSTERECTOMY      age 34    skin cancer face  2011     Family History   Problem Relation Age of Onset    Heart disease Father     Skin cancer Father     Hypertension Father     Lung cancer Father     Cataracts Father     Hypertension Mother     Skin cancer Mother     Cataracts Mother     Macular degeneration Mother     Cancer Mother         skin     No Known Problems Brother     Stroke Maternal Grandmother     No Known Problems Maternal Grandfather     No Known Problems Paternal Grandmother     No Known Problems Paternal Grandfather     No Known Problems Maternal Aunt     No Known Problems Maternal Uncle     No Known Problems Paternal Aunt     No Known Problems Paternal Uncle     Diabetes Mellitus Neg Hx     Breast cancer Neg Hx     Colon cancer Neg Hx     Amblyopia Neg Hx     Blindness Neg Hx     Diabetes Neg Hx     Glaucoma Neg Hx     Retinal detachment Neg Hx     Strabismus Neg Hx     Thyroid disease Neg Hx      Social History     Tobacco Use    Smoking status: Never Smoker    Smokeless tobacco: Never Used   Substance Use Topics    Alcohol use: Yes     Comment: rare    Drug use: No       No medications prior to admission.     Review  of patient's allergies indicates:   Allergen Reactions    Pcn [penicillins] Hives and Shortness Of Breath        Review of Systems   Eyes: Positive for blurred vision.   All other systems reviewed and are negative.      Objective:      Vital Signs (Most Recent)       Vital Signs Range (Last 24H):       Physical Exam  Constitutional:       Appearance: She is well-developed.   HENT:      Head: Normocephalic.   Eyes:      Conjunctiva/sclera: Conjunctivae normal.      Pupils: Pupils are equal, round, and reactive to light.   Neck:      Musculoskeletal: Normal range of motion and neck supple.   Cardiovascular:      Rate and Rhythm: Normal rate and regular rhythm.      Heart sounds: Normal heart sounds.   Pulmonary:      Effort: Pulmonary effort is normal.      Breath sounds: Normal breath sounds.   Abdominal:      General: Bowel sounds are normal.      Palpations: Abdomen is soft.   Musculoskeletal: Normal range of motion.   Skin:     General: Skin is warm.   Neurological:      Mental Status: She is alert and oriented to person, place, and time.         Data Review:    ECG:     Assessment:      Cataract OS.    Plan:    CE OS.

## 2020-07-13 ENCOUNTER — TELEPHONE (OUTPATIENT)
Dept: OPHTHALMOLOGY | Facility: CLINIC | Age: 75
End: 2020-07-13

## 2020-07-14 ENCOUNTER — ANESTHESIA EVENT (OUTPATIENT)
Dept: SURGERY | Facility: OTHER | Age: 75
End: 2020-07-14
Payer: MEDICARE

## 2020-07-14 ENCOUNTER — HOSPITAL ENCOUNTER (OUTPATIENT)
Facility: OTHER | Age: 75
Discharge: HOME | End: 2020-07-14
Attending: OPHTHALMOLOGY | Admitting: OPHTHALMOLOGY
Payer: MEDICARE

## 2020-07-14 ENCOUNTER — ANESTHESIA (OUTPATIENT)
Dept: SURGERY | Facility: OTHER | Age: 75
End: 2020-07-14
Payer: MEDICARE

## 2020-07-14 VITALS
OXYGEN SATURATION: 98 % | BODY MASS INDEX: 29.84 KG/M2 | RESPIRATION RATE: 16 BRPM | HEIGHT: 60 IN | TEMPERATURE: 97 F | DIASTOLIC BLOOD PRESSURE: 66 MMHG | WEIGHT: 152 LBS | SYSTOLIC BLOOD PRESSURE: 126 MMHG | HEART RATE: 65 BPM

## 2020-07-14 DIAGNOSIS — H25.12 NUCLEAR SCLEROTIC CATARACT OF LEFT EYE: Primary | ICD-10-CM

## 2020-07-14 LAB — POCT GLUCOSE: 107 MG/DL (ref 70–110)

## 2020-07-14 PROCEDURE — 63600175 PHARM REV CODE 636 W HCPCS: Mod: HCNC | Performed by: NURSE ANESTHETIST, CERTIFIED REGISTERED

## 2020-07-14 PROCEDURE — 36000707: Mod: HCNC | Performed by: OPHTHALMOLOGY

## 2020-07-14 PROCEDURE — 25000003 PHARM REV CODE 250: Mod: HCNC | Performed by: OPHTHALMOLOGY

## 2020-07-14 PROCEDURE — 66984 PR REMOVAL, CATARACT, W/INSRT INTRAOC LENS, W/O ENDO CYCLO: ICD-10-PCS | Mod: 79,HCNC,LT, | Performed by: OPHTHALMOLOGY

## 2020-07-14 PROCEDURE — 37000008 HC ANESTHESIA 1ST 15 MINUTES: Mod: HCNC | Performed by: OPHTHALMOLOGY

## 2020-07-14 PROCEDURE — 36000706: Mod: HCNC | Performed by: OPHTHALMOLOGY

## 2020-07-14 PROCEDURE — 66984 XCAPSL CTRC RMVL W/O ECP: CPT | Mod: 79,HCNC,LT, | Performed by: OPHTHALMOLOGY

## 2020-07-14 PROCEDURE — 25000003 PHARM REV CODE 250: Mod: HCNC

## 2020-07-14 PROCEDURE — 71000015 HC POSTOP RECOV 1ST HR: Mod: HCNC | Performed by: OPHTHALMOLOGY

## 2020-07-14 PROCEDURE — V2632 POST CHMBR INTRAOCULAR LENS: HCPCS | Mod: HCNC | Performed by: OPHTHALMOLOGY

## 2020-07-14 PROCEDURE — 63600175 PHARM REV CODE 636 W HCPCS: Mod: HCNC | Performed by: OPHTHALMOLOGY

## 2020-07-14 PROCEDURE — 37000009 HC ANESTHESIA EA ADD 15 MINS: Mod: HCNC | Performed by: OPHTHALMOLOGY

## 2020-07-14 DEVICE — LENS 21.5 ACRYSOF: Type: IMPLANTABLE DEVICE | Site: EYE | Status: FUNCTIONAL

## 2020-07-14 RX ORDER — PHENYLEPHRINE HYDROCHLORIDE 25 MG/ML
1 SOLUTION/ DROPS OPHTHALMIC
Status: COMPLETED | OUTPATIENT
Start: 2020-07-14 | End: 2020-07-14

## 2020-07-14 RX ORDER — TROPICAMIDE 10 MG/ML
1 SOLUTION/ DROPS OPHTHALMIC
Status: COMPLETED | OUTPATIENT
Start: 2020-07-14 | End: 2020-07-14

## 2020-07-14 RX ORDER — HYDROCODONE BITARTRATE AND ACETAMINOPHEN 5; 325 MG/1; MG/1
1 TABLET ORAL EVERY 4 HOURS PRN
Status: DISCONTINUED | OUTPATIENT
Start: 2020-07-14 | End: 2020-07-14 | Stop reason: HOSPADM

## 2020-07-14 RX ORDER — TETRACAINE HYDROCHLORIDE 5 MG/ML
SOLUTION OPHTHALMIC
Status: DISCONTINUED | OUTPATIENT
Start: 2020-07-14 | End: 2020-07-14 | Stop reason: HOSPADM

## 2020-07-14 RX ORDER — CYCLOPENTOLATE HYDROCHLORIDE 10 MG/ML
1 SOLUTION/ DROPS OPHTHALMIC
Status: DISCONTINUED | OUTPATIENT
Start: 2020-07-14 | End: 2022-05-02

## 2020-07-14 RX ORDER — ACETAMINOPHEN 325 MG/1
650 TABLET ORAL EVERY 4 HOURS PRN
Status: DISCONTINUED | OUTPATIENT
Start: 2020-07-14 | End: 2020-07-14 | Stop reason: HOSPADM

## 2020-07-14 RX ORDER — SODIUM CHLORIDE 0.9 % (FLUSH) 0.9 %
10 SYRINGE (ML) INJECTION
Status: DISCONTINUED | OUTPATIENT
Start: 2020-07-14 | End: 2022-05-02

## 2020-07-14 RX ORDER — PREDNISOLONE ACETATE 10 MG/ML
SUSPENSION/ DROPS OPHTHALMIC
Status: DISCONTINUED | OUTPATIENT
Start: 2020-07-14 | End: 2020-07-14 | Stop reason: HOSPADM

## 2020-07-14 RX ORDER — MIDAZOLAM HYDROCHLORIDE 1 MG/ML
INJECTION INTRAMUSCULAR; INTRAVENOUS
Status: DISCONTINUED | OUTPATIENT
Start: 2020-07-14 | End: 2020-07-14

## 2020-07-14 RX ORDER — LIDOCAINE HYDROCHLORIDE 40 MG/ML
INJECTION, SOLUTION RETROBULBAR
Status: DISCONTINUED | OUTPATIENT
Start: 2020-07-14 | End: 2020-07-14 | Stop reason: HOSPADM

## 2020-07-14 RX ORDER — TETRACAINE HYDROCHLORIDE 5 MG/ML
1 SOLUTION OPHTHALMIC
Status: COMPLETED | OUTPATIENT
Start: 2020-07-14 | End: 2020-07-14

## 2020-07-14 RX ORDER — EPINEPHRINE 1 MG/ML
INJECTION, SOLUTION INTRACARDIAC; INTRAMUSCULAR; INTRAVENOUS; SUBCUTANEOUS
Status: DISCONTINUED | OUTPATIENT
Start: 2020-07-14 | End: 2020-07-14 | Stop reason: HOSPADM

## 2020-07-14 RX ORDER — OFLOXACIN 3 MG/ML
1 SOLUTION/ DROPS OPHTHALMIC
Status: DISCONTINUED | OUTPATIENT
Start: 2020-07-14 | End: 2022-05-02

## 2020-07-14 RX ADMIN — TROPICAMIDE 1 DROP: 10 SOLUTION/ DROPS OPHTHALMIC at 05:07

## 2020-07-14 RX ADMIN — MIDAZOLAM HYDROCHLORIDE 1 MG: 1 INJECTION, SOLUTION INTRAMUSCULAR; INTRAVENOUS at 07:07

## 2020-07-14 RX ADMIN — PHENYLEPHRINE HYDROCHLORIDE 1 DROP: 25 SOLUTION/ DROPS OPHTHALMIC at 05:07

## 2020-07-14 RX ADMIN — TETRACAINE HYDROCHLORIDE 1 DROP: 5 SOLUTION OPHTHALMIC at 05:07

## 2020-07-14 RX ADMIN — OFLOXACIN 1 DROP: 3 SOLUTION OPHTHALMIC at 05:07

## 2020-07-14 RX ADMIN — MIDAZOLAM HYDROCHLORIDE 2 MG: 1 INJECTION, SOLUTION INTRAMUSCULAR; INTRAVENOUS at 07:07

## 2020-07-14 RX ADMIN — CYCLOPENTOLATE HYDROCHLORIDE 1 DROP: 10 SOLUTION/ DROPS OPHTHALMIC at 05:07

## 2020-07-14 RX ADMIN — ACETAMINOPHEN 650 MG: 325 TABLET, FILM COATED ORAL at 08:07

## 2020-07-14 NOTE — BRIEF OP NOTE
[facility]  Brief Operative Note    Surgery Date: 7/14/2020     Surgeon(s) and Role:     * Joshua Robin MD - Primary    Assisting Surgeon: None    Pre-op Diagnosis:  NS (nuclear sclerosis), left [H25.12]    Post-op Diagnosis:  Post-Op Diagnosis Codes:     * NS (nuclear sclerosis), left [H25.12]    Procedure(s) (LRB):  EXTRACTION, CATARACT, WITH IOL INSERTION (Left)    Anesthesia: Local MAC    Description of the findings of the procedure(s):     Estimated Blood Loss: * No values recorded between 7/14/2020  7:11 AM and 7/14/2020  7:40 AM *         Specimens:   Specimen (12h ago, onward)    None            Discharge Note    OUTCOME: Patient tolerated treatment/procedure well without complication and is now ready for discharge.    DISPOSITION: Home or Self Care    FINAL DIAGNOSIS:  Nuclear sclerotic cataract of left eye    FOLLOWUP: In clinic    DISCHARGE INSTRUCTIONS:    Discharge Procedure Orders   Other restrictions (specify):   Order Comments: No heavy lifting or bending for 1 week.

## 2020-07-14 NOTE — ANESTHESIA PREPROCEDURE EVALUATION
07/14/2020  Peace Farmer is a 75 y.o., female.    Pre-op Assessment    I have reviewed the Patient Summary Reports.     I have reviewed the Nursing Notes. I have reviewed the NPO Status.   I have reviewed the Medications.     Review of Systems  Anesthesia Hx:  No problems with previous Anesthesia  Denies Family Hx of Anesthesia complications.   Denies Personal Hx of Anesthesia complications.   Social:  Non-Smoker    Hematology/Oncology:  Hematology Normal   Oncology Normal     EENT/Dental:EENT/Dental Normal   Cardiovascular:   Exercise tolerance: good Hypertension    Pulmonary:  Pulmonary Normal    Renal/:  Renal/ Normal     Musculoskeletal:  Musculoskeletal Normal    Neurological:   fibromyslgia  Peripheral Neuropathy    Endocrine:   Diabetes Hyperthyroidism    Dermatological:  Skin Normal    Psych:  Psychiatric Normal           Physical Exam  General:  Well nourished    Airway/Jaw/Neck:  Airway Findings: Mouth Opening: Normal Tongue: Normal  General Airway Assessment: Adult  Mallampati: I  TM Distance: Normal, at least 6 cm  Jaw/Neck Findings:  Neck ROM: Normal ROM      Dental:  Dental Findings: In tact             Anesthesia Plan  Type of Anesthesia, risks & benefits discussed:  Anesthesia Type:  MAC  Patient's Preference:   Intra-op Monitoring Plan: standard ASA monitors  Intra-op Monitoring Plan Comments:   Post Op Pain Control Plan: per primary service following discharge from PACU  Post Op Pain Control Plan Comments:   Induction:    Beta Blocker:         Informed Consent: Patient understands risks and agrees with Anesthesia plan.  Questions answered. Anesthesia consent signed with patient.  ASA Score: 3     Day of Surgery Review of History & Physical:    H&P update referred to the surgeon.     Anesthesia Plan Notes: Returns for L eye today.        Ready For Surgery From Anesthesia Perspective.

## 2020-07-14 NOTE — OP NOTE
Operative Date:  07/14/2020    Discharge Date:  07/14/2020    Discharge Patient Home    Report Title: Operative Note      SURGEON: Joshua Robin MD     ASSISTANT:     PREOPERATIVE DIAGNOSIS: Visually significant NSC cataract,  Left Eye.    POSTOPERATIVE DIAGNOSIS: Visually-significant NSC cataract,  Left Eye.    PROCEDURE PERFORMED: Phacoemulsification of the cataract with posterior chamber intraocular lens Left Eye.    ANESTHESIA: Topical with MAC     COMPLICATIONS: None    ESTIMATED BLOOD LOSS: Minimal    INDICATIONS FOR PROCEDURE:   The patient is a pleasant 75 year old woman with increasing difficulties with activities of daily living secondary to a dense visually significant cataract in the Left Eye.  Discussions have been carried out with this patient concerning the options to surgery, risks, benefits and expectations.  The patient voiced good understanding and wished to proceed with the above procedure.    PROCEDURE IN DETAIL: The patient was brought to the operating room and received topical anesthetic to the eye and then was prepped and draped in the usual sterile fashion.  Using the Bravo ring and the guarded julita blade set at 0.37 mm, a partial thickness clear cornea incision was made temporally.  The paracentesis site was made at the six o'clock position.  Omidria was injected into the anterior chamber through the paracentesis.  Viscoat was then injected into the anterior chamber.  The eye was then reentered at the primary surgical site with a 2.4 mm keratome followed by continuous capsulotomy, hydrodissection, hydrodelineation and phacoemulsification of the cataract.  Residual cortical material was removed using automated irrigation-aspiration technique.  Healon was injected into the posterior chamber and an SN60WF 21.5 diopter lens was placed in the bag without difficulty. Residual viscoelastic was removed using automated irrigation-aspiration technique. The eye was re-pressurized using  BSS solution and both the paracentesis site and the primary surgical site were demonstrated to be watertight at the end of the case with Weck--Marlen manipulation.  One drop of Ofloxacin and one drop of Pred acetate 1% was applied to the Left Eye .The eye was closed, patched and a Martinez shield placed.  The patient was taken to the recovery room in good and stable condition.  The patient tolerated the procedure well.  The patient was instructed to refrain from any heavy lifting, bending, stooping or straining activities, discharged home  and to follow-up in the morning for routine postoperative care with Joshua Robin MD.

## 2020-07-14 NOTE — ANESTHESIA POSTPROCEDURE EVALUATION
Anesthesia Post Evaluation    Patient: Peace Farmer    Procedure(s) Performed: Procedure(s) (LRB):  EXTRACTION, CATARACT, WITH IOL INSERTION (Left)    Final Anesthesia Type: MAC    Patient location during evaluation: [facility]  Patient participation: Yes- Able to Participate  Level of consciousness: awake and alert, awake and oriented  Post-procedure vital signs: reviewed and stable  Pain management: adequate  Airway patency: patent    PONV status at discharge: No PONV  Anesthetic complications: no      Cardiovascular status: blood pressure returned to baseline  Respiratory status: unassisted  Hydration status: euvolemic  Follow-up not needed.          Vitals Value Taken Time   /67 07/14/20 0553   Temp 36.2 °C (97.1 °F) 07/14/20 0553   Pulse 59 07/14/20 0553   Resp 18 07/14/20 0553   SpO2 96 % 07/14/20 0553         No case tracking events are documented in the log.      Pain/Pravin Score: No data recorded

## 2020-07-15 ENCOUNTER — OFFICE VISIT (OUTPATIENT)
Dept: OPHTHALMOLOGY | Facility: CLINIC | Age: 75
End: 2020-07-15
Payer: MEDICARE

## 2020-07-15 VITALS — DIASTOLIC BLOOD PRESSURE: 67 MMHG | SYSTOLIC BLOOD PRESSURE: 132 MMHG | HEART RATE: 59 BPM

## 2020-07-15 DIAGNOSIS — Z98.890 POST-OPERATIVE STATE: Primary | ICD-10-CM

## 2020-07-15 PROCEDURE — 99999 PR PBB SHADOW E&M-EST. PATIENT-LVL III: CPT | Mod: PBBFAC,HCNC,, | Performed by: OPHTHALMOLOGY

## 2020-07-15 PROCEDURE — 99999 PR PBB SHADOW E&M-EST. PATIENT-LVL III: ICD-10-PCS | Mod: PBBFAC,HCNC,, | Performed by: OPHTHALMOLOGY

## 2020-07-15 PROCEDURE — 99024 PR POST-OP FOLLOW-UP VISIT: ICD-10-PCS | Mod: HCNC,S$GLB,, | Performed by: OPHTHALMOLOGY

## 2020-07-15 PROCEDURE — 99024 POSTOP FOLLOW-UP VISIT: CPT | Mod: HCNC,S$GLB,, | Performed by: OPHTHALMOLOGY

## 2020-07-15 NOTE — PROGRESS NOTES
Subjective:       Patient ID: Peace Farmer is a 75 y.o. female.    Chief Complaint: Concerns About Ocular Health    HPI     DLS: 7/10/2020    75 y.o. Female is here for 1 Day PO OS. Pt states surgery went well. Pt   states this morning woke up with pain in the left eye. Pt states seeing a   few floaters and black lines are still occurring which is not a new   problem.        Eye Med's:   Ofloxacin OS QID   Durezol OS QID/OD QHS  Ilevro OU QHS    Last edited by Sarah Schneider on 7/15/2020  8:15 AM. (History)             Assessment:       1. Post-operative state        Plan:       S/p CE OU- Doing well.      CPM OS.  Taper gtts OD.  RTC 1 wk.

## 2020-07-24 ENCOUNTER — OFFICE VISIT (OUTPATIENT)
Dept: OPHTHALMOLOGY | Facility: CLINIC | Age: 75
End: 2020-07-24
Payer: MEDICARE

## 2020-07-24 DIAGNOSIS — Z98.890 POST-OPERATIVE STATE: Primary | ICD-10-CM

## 2020-07-24 PROCEDURE — 99024 PR POST-OP FOLLOW-UP VISIT: ICD-10-PCS | Mod: HCNC,S$GLB,, | Performed by: OPHTHALMOLOGY

## 2020-07-24 PROCEDURE — 99024 POSTOP FOLLOW-UP VISIT: CPT | Mod: HCNC,S$GLB,, | Performed by: OPHTHALMOLOGY

## 2020-07-24 PROCEDURE — 99999 PR PBB SHADOW E&M-EST. PATIENT-LVL III: CPT | Mod: PBBFAC,HCNC,, | Performed by: OPHTHALMOLOGY

## 2020-07-24 PROCEDURE — 99999 PR PBB SHADOW E&M-EST. PATIENT-LVL III: ICD-10-PCS | Mod: PBBFAC,HCNC,, | Performed by: OPHTHALMOLOGY

## 2020-07-24 NOTE — PROGRESS NOTES
"Subjective:       Patient ID: Peace Farmer is a 75 y.o. female.    Chief Complaint: Post-op Evaluation (1 week po os)    HPI     Post-op Evaluation      Additional comments: 1 week po os              Comments     S/p Phaco w/IOL OSD - 7/14/2020     76 y/o female is here for 1 week post op of the LT eye. Pt states VA OS   (near) is blurry. Pt c/o of floaters that are "smudging" over vision.  Pt   denies pain and discomfort.     Eyemeds  Durezol BID OS  Ilevro QD OS           Last edited by Linda Urias on 7/24/2020  9:53 AM. (History)             Assessment:       1. Post-operative state        Plan:       S/p CE OU- Doing well.      Taper gtts OU.  RTC 3 wks.     "

## 2020-08-14 ENCOUNTER — OFFICE VISIT (OUTPATIENT)
Dept: OPHTHALMOLOGY | Facility: CLINIC | Age: 75
End: 2020-08-14
Payer: MEDICARE

## 2020-08-14 DIAGNOSIS — Z98.890 POST-OPERATIVE STATE: Primary | ICD-10-CM

## 2020-08-14 DIAGNOSIS — H52.7 REFRACTIVE ERROR: ICD-10-CM

## 2020-08-14 PROCEDURE — 99024 PR POST-OP FOLLOW-UP VISIT: ICD-10-PCS | Mod: HCNC,S$GLB,, | Performed by: OPHTHALMOLOGY

## 2020-08-14 PROCEDURE — 99024 POSTOP FOLLOW-UP VISIT: CPT | Mod: HCNC,S$GLB,, | Performed by: OPHTHALMOLOGY

## 2020-08-14 PROCEDURE — 99999 PR PBB SHADOW E&M-EST. PATIENT-LVL III: CPT | Mod: PBBFAC,HCNC,, | Performed by: OPHTHALMOLOGY

## 2020-08-14 PROCEDURE — 99999 PR PBB SHADOW E&M-EST. PATIENT-LVL III: ICD-10-PCS | Mod: PBBFAC,HCNC,, | Performed by: OPHTHALMOLOGY

## 2020-08-14 NOTE — PROGRESS NOTES
Subjective:       Patient ID: Peace Farmer is a 75 y.o. female.    Chief Complaint: Post-op Evaluation    HPI     S/p Phaco w/IOL OSD - 7/14/2020     74 y/o female is here for 3 week post op OU. Pt states near va OS is not   as great as it was after the sx. Pt c/o of floaters and flashes. Pt denies   pain and discomfort.     No gtts     Last edited by Sarah Schneider on 8/14/2020  9:47 AM. (History)             Assessment:       1. Post-operative state    2. Refractive error        Plan:       S/p CE OU- Doing well.  RE-Pt wants MRx.      Give separate MRx for distance & for needle work.     RTC Dr Freeman in 6 mos.

## 2020-08-28 DIAGNOSIS — E11.9 TYPE 2 DIABETES MELLITUS WITHOUT COMPLICATION: ICD-10-CM

## 2020-09-29 ENCOUNTER — TELEPHONE (OUTPATIENT)
Dept: FAMILY MEDICINE | Facility: CLINIC | Age: 75
End: 2020-09-29

## 2020-09-29 NOTE — TELEPHONE ENCOUNTER
----- Message from Danuta Macedo sent at 9/29/2020  2:57 PM CDT -----  Regarding: Self/   [phone]  Type: Patient Call Back    Who called:  Patient    What is the request in detail:  Patient has a virtual visit scheduled for 10/12 and would like to know if all her labs can be done before appt.  Thank you    Would the patient rather a call back or a response via [facility]?   Call back    Best call back number:  [phone] (home)       Thank you

## 2020-10-08 ENCOUNTER — TELEPHONE (OUTPATIENT)
Dept: FAMILY MEDICINE | Facility: CLINIC | Age: 75
End: 2020-10-08

## 2020-10-08 DIAGNOSIS — E78.5 HYPERLIPIDEMIA LDL GOAL <100: Primary | ICD-10-CM

## 2020-10-08 DIAGNOSIS — E53.8 VITAMIN B12 DEFICIENCY: ICD-10-CM

## 2020-10-08 NOTE — TELEPHONE ENCOUNTER
Spoke with pt she is requesting appt be pushed back and lab appt be scheduled so she can have additional labs drawn so her insurance can cover. Pt is requesting cmp and B12 be added. Pt only has A1c in epic.

## 2020-10-08 NOTE — TELEPHONE ENCOUNTER
Spoke with patient and scheduled labs for 10/12/20. Patient request that virtual visit be rescheduled. Scheduled for 10/19/20.

## 2020-10-12 ENCOUNTER — LAB VISIT (OUTPATIENT)
Dept: LAB | Facility: HOSPITAL | Age: 75
End: 2020-10-12
Attending: INTERNAL MEDICINE
Payer: MEDICARE

## 2020-10-12 DIAGNOSIS — E53.8 VITAMIN B12 DEFICIENCY: ICD-10-CM

## 2020-10-12 DIAGNOSIS — E78.5 HYPERLIPIDEMIA LDL GOAL <100: ICD-10-CM

## 2020-10-12 DIAGNOSIS — E11.9 TYPE 2 DIABETES MELLITUS WITHOUT COMPLICATION: ICD-10-CM

## 2020-10-12 LAB
ALBUMIN SERPL BCP-MCNC: 4.2 G/DL (ref 3.5–5.2)
ALP SERPL-CCNC: 59 U/L (ref 55–135)
ALT SERPL W/O P-5'-P-CCNC: 19 U/L (ref 10–44)
ANION GAP SERPL CALC-SCNC: 10 MMOL/L (ref 8–16)
AST SERPL-CCNC: 15 U/L (ref 10–40)
BILIRUB SERPL-MCNC: 0.3 MG/DL (ref 0.1–1)
BUN SERPL-MCNC: 11 MG/DL (ref 8–23)
CALCIUM SERPL-MCNC: 10 MG/DL (ref 8.7–10.5)
CHLORIDE SERPL-SCNC: 102 MMOL/L (ref 95–110)
CO2 SERPL-SCNC: 27 MMOL/L (ref 23–29)
CREAT SERPL-MCNC: 0.6 MG/DL (ref 0.5–1.4)
EST. GFR  (AFRICAN AMERICAN): >60 ML/MIN/1.73 M^2
EST. GFR  (NON AFRICAN AMERICAN): >60 ML/MIN/1.73 M^2
ESTIMATED AVG GLUCOSE: 120 MG/DL (ref 68–131)
GLUCOSE SERPL-MCNC: 129 MG/DL (ref 70–110)
HBA1C MFR BLD HPLC: 5.8 % (ref 4–5.6)
POTASSIUM SERPL-SCNC: 3.9 MMOL/L (ref 3.5–5.1)
PROT SERPL-MCNC: 7 G/DL (ref 6–8.4)
SODIUM SERPL-SCNC: 139 MMOL/L (ref 136–145)
VIT B12 SERPL-MCNC: 1581 PG/ML (ref 210–950)

## 2020-10-12 PROCEDURE — 82607 VITAMIN B-12: CPT | Mod: HCNC

## 2020-10-12 PROCEDURE — 83036 HEMOGLOBIN GLYCOSYLATED A1C: CPT | Mod: HCNC

## 2020-10-12 PROCEDURE — 80053 COMPREHEN METABOLIC PANEL: CPT | Mod: HCNC

## 2020-10-12 PROCEDURE — 36415 COLL VENOUS BLD VENIPUNCTURE: CPT | Mod: HCNC,PO

## 2020-10-15 PROBLEM — E11.9 DM TYPE 2 WITHOUT RETINOPATHY: Status: RESOLVED | Noted: 2020-06-11 | Resolved: 2020-10-15

## 2020-10-27 PROBLEM — H25.12 NUCLEAR SCLEROTIC CATARACT OF LEFT EYE: Status: RESOLVED | Noted: 2020-07-14 | Resolved: 2020-10-27

## 2020-10-27 PROBLEM — H25.11 NUCLEAR SCLEROTIC CATARACT OF RIGHT EYE: Status: RESOLVED | Noted: 2020-06-30 | Resolved: 2020-10-27

## 2020-10-27 PROBLEM — H25.13 NUCLEAR SCLEROSIS, BILATERAL: Status: RESOLVED | Noted: 2018-11-16 | Resolved: 2020-10-27

## 2020-10-27 PROBLEM — H26.9 CORTICAL CATARACT OF BOTH EYES: Status: RESOLVED | Noted: 2018-11-16 | Resolved: 2020-10-27

## 2020-11-04 ENCOUNTER — OFFICE VISIT (OUTPATIENT)
Dept: FAMILY MEDICINE | Facility: CLINIC | Age: 75
End: 2020-11-04
Payer: MEDICARE

## 2020-11-04 VITALS
SYSTOLIC BLOOD PRESSURE: 138 MMHG | BODY MASS INDEX: 31.55 KG/M2 | OXYGEN SATURATION: 99 % | HEIGHT: 60 IN | TEMPERATURE: 98 F | WEIGHT: 160.69 LBS | DIASTOLIC BLOOD PRESSURE: 60 MMHG | HEART RATE: 60 BPM

## 2020-11-04 DIAGNOSIS — E66.9 OBESITY (BMI 30-39.9): ICD-10-CM

## 2020-11-04 DIAGNOSIS — I10 BENIGN HYPERTENSION: ICD-10-CM

## 2020-11-04 DIAGNOSIS — G47.9 SLEEP DISTURBANCE: ICD-10-CM

## 2020-11-04 DIAGNOSIS — Z12.11 COLON CANCER SCREENING: ICD-10-CM

## 2020-11-04 DIAGNOSIS — R53.83 FATIGUE, UNSPECIFIED TYPE: ICD-10-CM

## 2020-11-04 DIAGNOSIS — Z00.00 ROUTINE MEDICAL EXAM: Primary | ICD-10-CM

## 2020-11-04 DIAGNOSIS — Z23 NEED FOR SHINGLES VACCINE: ICD-10-CM

## 2020-11-04 DIAGNOSIS — E78.5 HYPERLIPIDEMIA LDL GOAL <100: ICD-10-CM

## 2020-11-04 DIAGNOSIS — Z71.89 ADVANCED DIRECTIVES, COUNSELING/DISCUSSION: ICD-10-CM

## 2020-11-04 DIAGNOSIS — E05.90 SUBCLINICAL HYPERTHYROIDISM: ICD-10-CM

## 2020-11-04 DIAGNOSIS — M81.8 OTHER OSTEOPOROSIS WITHOUT CURRENT PATHOLOGICAL FRACTURE: ICD-10-CM

## 2020-11-04 DIAGNOSIS — E11.40 CONTROLLED TYPE 2 DIABETES WITH NEUROPATHY: ICD-10-CM

## 2020-11-04 PROCEDURE — 3075F PR MOST RECENT SYSTOLIC BLOOD PRESS GE 130-139MM HG: ICD-10-PCS | Mod: HCNC,CPTII,S$GLB, | Performed by: INTERNAL MEDICINE

## 2020-11-04 PROCEDURE — 3075F SYST BP GE 130 - 139MM HG: CPT | Mod: HCNC,CPTII,S$GLB, | Performed by: INTERNAL MEDICINE

## 2020-11-04 PROCEDURE — 99397 PR PREVENTIVE VISIT,EST,65 & OVER: ICD-10-PCS | Mod: HCNC,S$GLB,, | Performed by: INTERNAL MEDICINE

## 2020-11-04 PROCEDURE — 99999 PR PBB SHADOW E&M-EST. PATIENT-LVL V: CPT | Mod: PBBFAC,HCNC,, | Performed by: INTERNAL MEDICINE

## 2020-11-04 PROCEDURE — 3078F DIAST BP <80 MM HG: CPT | Mod: HCNC,CPTII,S$GLB, | Performed by: INTERNAL MEDICINE

## 2020-11-04 PROCEDURE — 3044F PR MOST RECENT HEMOGLOBIN A1C LEVEL <7.0%: ICD-10-PCS | Mod: HCNC,CPTII,S$GLB, | Performed by: INTERNAL MEDICINE

## 2020-11-04 PROCEDURE — 99397 PER PM REEVAL EST PAT 65+ YR: CPT | Mod: HCNC,S$GLB,, | Performed by: INTERNAL MEDICINE

## 2020-11-04 PROCEDURE — 99999 PR PBB SHADOW E&M-EST. PATIENT-LVL V: ICD-10-PCS | Mod: PBBFAC,HCNC,, | Performed by: INTERNAL MEDICINE

## 2020-11-04 PROCEDURE — 3078F PR MOST RECENT DIASTOLIC BLOOD PRESSURE < 80 MM HG: ICD-10-PCS | Mod: HCNC,CPTII,S$GLB, | Performed by: INTERNAL MEDICINE

## 2020-11-04 PROCEDURE — 3044F HG A1C LEVEL LT 7.0%: CPT | Mod: HCNC,CPTII,S$GLB, | Performed by: INTERNAL MEDICINE

## 2020-11-04 RX ORDER — METFORMIN HYDROCHLORIDE 1000 MG/1
1000 TABLET ORAL 2 TIMES DAILY
Qty: 180 TABLET | Refills: 1 | Status: SHIPPED | OUTPATIENT
Start: 2020-11-04 | End: 2021-05-07 | Stop reason: SDUPTHER

## 2020-11-04 RX ORDER — AMLODIPINE BESYLATE 5 MG/1
5 TABLET ORAL DAILY
Qty: 90 TABLET | Refills: 1 | Status: SHIPPED | OUTPATIENT
Start: 2020-11-04 | End: 2021-05-07 | Stop reason: SDUPTHER

## 2020-11-04 RX ORDER — ROSUVASTATIN CALCIUM 5 MG/1
5 TABLET, COATED ORAL NIGHTLY
Qty: 90 TABLET | Refills: 1 | Status: SHIPPED | OUTPATIENT
Start: 2020-11-04 | End: 2021-05-07 | Stop reason: SDUPTHER

## 2020-11-04 RX ORDER — ATENOLOL 25 MG/1
25 TABLET ORAL DAILY
Qty: 90 TABLET | Refills: 1 | Status: SHIPPED | OUTPATIENT
Start: 2020-11-04 | End: 2021-05-07 | Stop reason: SDUPTHER

## 2020-11-04 RX ORDER — LOSARTAN POTASSIUM AND HYDROCHLOROTHIAZIDE 25; 100 MG/1; MG/1
1 TABLET ORAL DAILY
Qty: 90 TABLET | Refills: 1 | Status: SHIPPED | OUTPATIENT
Start: 2020-11-04 | End: 2021-05-07 | Stop reason: SDUPTHER

## 2020-11-04 RX ORDER — GLIMEPIRIDE 4 MG/1
TABLET ORAL
Qty: 180 TABLET | Refills: 1 | Status: SHIPPED | OUTPATIENT
Start: 2020-11-04 | End: 2021-05-07 | Stop reason: SDUPTHER

## 2020-11-04 NOTE — PROGRESS NOTES
HISTORY OF PRESENT ILLNESS:  Peace Farmer is a 75 y.o. female who presents to the clinic today for a routine physical exam. Her last physical exam was approximately 1 years(s) ago.        PAST MEDICAL HISTORY:  Past Medical History:   Diagnosis Date    Abnormal mammogram 10/13    s/p biopsy    Cortical cataract of both eyes 11/16/2018    Diabetes mellitus type II     Fibromyalgia     Hyperlipidemia     Hypertension     Obesity (BMI 30-39.9)     Osteopenia     Psoriasis     Skin cancer of face 2011    forehead       PAST SURGICAL HISTORY:  Past Surgical History:   Procedure Laterality Date    CATARACT EXTRACTION W/  INTRAOCULAR LENS IMPLANT Right 6/30/2020    Procedure: EXTRACTION, CATARACT, WITH IOL INSERTION;  Surgeon: Joshua Robin MD;  Location: [facility];  Service: Ophthalmology;  Laterality: Right;    CATARACT EXTRACTION W/  INTRAOCULAR LENS IMPLANT Left 7/14/2020    Procedure: EXTRACTION, CATARACT, WITH IOL INSERTION;  Surgeon: Joshua Robin MD;  Location: [facility];  Service: Ophthalmology;  Laterality: Left;    PARTIAL HYSTERECTOMY      age 34    skin cancer face  2011       SOCIAL HISTORY:  Social History     Socioeconomic History    Marital status:      Spouse name: Not on file    Number of children: 3    Years of education: some colle    Highest education level: Not on file   Occupational History    Occupation: homemaker   Social Needs    Financial resource strain: Not on file    Food insecurity     Worry: Not on file     Inability: Not on file    Transportation needs     Medical: Not on file     Non-medical: Not on file   Tobacco Use    Smoking status: Never Smoker    Smokeless tobacco: Never Used   Substance and Sexual Activity    Alcohol use: Yes     Comment: rare    Drug use: No    Sexual activity: Not Currently     Partners: Male     Birth control/protection: Post-menopausal   Lifestyle    Physical activity     Days per week: Not on file      Minutes per session: Not on file    Stress: Very much   Relationships    Social connections     Talks on phone: Not on file     Gets together: Not on file     Attends Baptism service: Not on file     Active member of club or organization: Not on file     Attends meetings of clubs or organizations: Not on file     Relationship status: Not on file   Other Topics Concern    Are you pregnant or think you may be? Not Asked    Breast-feeding Not Asked   Social History Narrative    Adult Screenings Reviewed and updated  5/20/14    Mammogram( for females) October 2013 abnromal left     Pap ( for females) partial hysterectomy     Colonoscopy  2009  Reported normal.    Flu shot 2013     Td updated today  As Tdap  5/20/14    Pneumovax  done once    Zostavax done    Eye exam 2013 due for  2014    Bone density 7/2012 due again in  2016       FAMILY HISTORY:  Family History   Problem Relation Age of Onset    Heart disease Father     Skin cancer Father     Hypertension Father     Lung cancer Father     Cataracts Father     Hypertension Mother     Skin cancer Mother     Cataracts Mother     Macular degeneration Mother     Cancer Mother         skin     Multiple births Mother     No Known Problems Brother     Stroke Maternal Grandmother     No Known Problems Maternal Grandfather     No Known Problems Paternal Grandmother     No Known Problems Paternal Grandfather     No Known Problems Maternal Aunt     No Known Problems Maternal Uncle     No Known Problems Paternal Aunt     No Known Problems Paternal Uncle     Multiple sclerosis Son     Diabetes Mellitus Neg Hx     Breast cancer Neg Hx     Colon cancer Neg Hx     Amblyopia Neg Hx     Blindness Neg Hx     Diabetes Neg Hx     Glaucoma Neg Hx     Retinal detachment Neg Hx     Strabismus Neg Hx     Thyroid disease Neg Hx        ALLERGIES AND MEDICATIONS: updated and reviewed.  Review of patient's allergies indicates:   Allergen Reactions    Pcn  [penicillins] Hives and Shortness Of Breath     Medication List with Changes/Refills   Current Medications    ASPIRIN 325 MG TABLET    Take 325 mg by mouth once daily.     BLOOD SUGAR DIAGNOSTIC (FREESTYLE LITE STRIPS) STRP    Apply 1 strip topically once daily.    BLOOD-GLUCOSE METER KIT    Use as instructed    CALCIUM CARB/VIT D3/MINERALS (CALCIUM-VITAMIN D ORAL)    Take by mouth.    CALCIUM-VITAMIN D 500-125 MG-UNIT TABLET    Take 1 tablet by mouth 2 (two) times daily.      CETIRIZINE 10 MG CAP    Take 1 capsule by mouth as needed.     TRUEPLUS LANCETS 30 GAUGE MISC    USE ONCE DAILY AS DIRECTED   Changed and/or Refilled Medications    Modified Medication Previous Medication    AMLODIPINE (NORVASC) 5 MG TABLET amLODIPine (NORVASC) 5 MG tablet       Take 1 tablet (5 mg total) by mouth once daily.    TAKE 1 TABLET (5 MG TOTAL) BY MOUTH ONCE DAILY.    ATENOLOL (TENORMIN) 25 MG TABLET atenoloL (TENORMIN) 25 MG tablet       Take 1 tablet (25 mg total) by mouth once daily.    TAKE 1 TABLET (25 MG TOTAL) BY MOUTH ONCE DAILY.    GLIMEPIRIDE (AMARYL) 4 MG TABLET glimepiride (AMARYL) 4 MG tablet       TAKE 1 TABLET TWO TIMES DAILY BEFORE MEALS (HOLD FOR BLOOD SUGAR LESS THAN 100    TAKE 1 TABLET TWO TIMES DAILY BEFORE MEALS (HOLD FOR BLOOD SUGAR LESS THAN 100    LOSARTAN-HYDROCHLOROTHIAZIDE 100-25 MG (HYZAAR) 100-25 MG PER TABLET losartan-hydrochlorothiazide 100-25 mg (HYZAAR) 100-25 mg per tablet       Take 1 tablet by mouth once daily.    TAKE 1 TABLET EVERY DAY    METFORMIN (GLUCOPHAGE) 1000 MG TABLET metFORMIN (GLUCOPHAGE) 1000 MG tablet       Take 1 tablet (1,000 mg total) by mouth 2 (two) times daily.    TAKE 1 TABLET (1,000 MG TOTAL) BY MOUTH 2 (TWO) TIMES DAILY.    ROSUVASTATIN (CRESTOR) 5 MG TABLET rosuvastatin (CRESTOR) 5 MG tablet       Take 1 tablet (5 mg total) by mouth every evening.    TAKE 1 TABLET (5 MG TOTAL) BY MOUTH EVERY EVENING.          CARE TEAM:  Patient Care Team:  Rosa Garber MD as PCP -  General (Internal Medicine)  Ana Warren LPN as Licensed Practical Nurse           SCREENING HISTORY:  Health Maintenance       Date Due Completion Date    Shingles Vaccine (2 of 3) 10/20/2009 8/25/2009    Colorectal Cancer Screening 03/18/2019 3/18/2009    Foot Exam 09/09/2020 9/9/2019    Override on 5/28/2018: Done    Hemoglobin A1c 01/12/2021 10/12/2020    Mammogram 02/08/2021 2/8/2019    Lipid Panel 05/13/2021 5/13/2020    Urine Microalbumin 05/14/2021 5/14/2020    Eye Exam 06/11/2021 6/11/2020    Low Dose Statin 11/04/2021 11/4/2020    DEXA SCAN 01/16/2022 1/16/2020    TETANUS VACCINE 05/20/2024 5/20/2014            REVIEW OF SYSTEMS:   The patient reports: good dietary habits.  The patient reports : that they do not exercise, but stay active.  Review of Systems   Constitutional: Positive for fatigue. Negative for chills, fever and unexpected weight change.   HENT: Negative for congestion, postnasal drip, sinus pain and sore throat.    Eyes: Negative for pain and visual disturbance.   Respiratory: Negative for cough, shortness of breath and wheezing.    Cardiovascular: Negative for chest pain, palpitations and leg swelling.   Gastrointestinal: Negative for abdominal pain, constipation, diarrhea, nausea and vomiting.   Genitourinary: Positive for frequency (- especially at night). Negative for dysuria and hematuria.   Musculoskeletal: Negative for arthralgias and back pain.   Skin: Negative for rash.   Neurological: Negative for weakness and headaches.   Psychiatric/Behavioral: Positive for sleep disturbance (- she reports a lifelong history of problems with sleeping.  She states she has trouble getting out of bed.  She does get up frequently to urinate.  She states she cannot limit fluids because then she is to thirsty at night.  ). Negative for dysphoric mood. The patient is not nervous/anxious.       ROS (Optional)-: no pelvic pain  Breast ROS (Optional)-: negative for breast  lumps/discharge            Physical Examination:   Vitals:    11/04/20 1623   BP: 138/60   Pulse:    Temp:      Weight: 72.9 kg (160 lb 11.5 oz)   Height: 5' (152.4 cm)   Body mass index is 31.39 kg/m².      Patient did not require to have a chaperone present during the exam today.    General appearance - alert and in no distress, obese, mildly fatigued appearing  Psychiatric - alert, oriented to person, place, and time, normal behavior, speech, dress, motor activity and thought process  Eyes - pupils equal and reactive, extraocular eye movements intact, sclera anicteric  Mouth - not examined; patient wearing mask due to Covid 19 pandemic  Neck - supple, no significant adenopathy, carotids upstroke normal bilaterally, no bruits  Lymphatics - no palpable cervical lymphadenopathy  Chest - clear to auscultation, no wheezes, rales or rhonchi, symmetric air entry  Heart - normal rate and regular rhythm, no gallops noted  Abdomen - not examined  Breasts - not examined  Back exam - not examined  Neurological - alert, normal speech, no focal findings or movement disorder noted, cranial nerves II through XII intact  Musculoskeletal - patient noted to have Mild-Moderate osteoarthritic changes to both knee joints. No joint effusions noted., no muscular tenderness noted  Extremities - peripheral pulses normal, no pedal edema, no clubbing or cyanosis  Skin - normal coloration and turgor, no rashes, no suspicious skin lesions noted  Diabetic Foot Exam -   Protective Sensation (w/ 10 gram monofilament):  Right: Intact  Left: Intact    Visual Inspection:  Normal -  Bilateral    Pedal Pulses:   Right: Present  Left: Present    Posterior tibialis:   Right:Present  Left: Present        Labs:  Lab Results   Component Value Date    HGBA1C 5.8 (H) 10/12/2020    HGBA1C 5.4 05/13/2020    HGBA1C 5.2 01/09/2020      Lab Results   Component Value Date    CHOL 116 (L) 05/13/2020    CHOL 107 (L) 05/15/2019    CHOL 124 02/04/2019     Lab Results    Component Value Date    LDLCALC 55.8 (L) 05/13/2020    LDLCALC 50.0 (L) 05/15/2019    LDLCALC 53.8 (L) 02/04/2019         ASSESSMENT AND PLAN:  1. Routine medical exam  Counseled on age appropriate medical preventative services including age appropriate cancer screenings, age appropriate eye and dental exams, over all nutritional health, need for a consistent exercise regimen, and an over all push towards maintaining a vigorous and active lifestyle.  Counseled on age appropriate vaccines and discussed upcoming health care needs based on age/gender. Discussed good sleep hygiene and stress management.    2. Type 2 diabetes, controlled, with neuropathy  Diabetes is under good control control at this time for age and comorbid conditions. We discussed diabetic diet and regular exercise. We discussed home blood sugar monitoring, if appropriate - the patient should test once daily and as needed. Continue current medication regimen. Recheck A1c in 3 months.  Diabetic complications addressed: Neuropathy pain controlled.  Patient was counseled on the need for yearly eye exam to screen for/monitor diabetic retinopathy and yearly diabetic foot exam.  - Ambulatory referral/consult to Podiatry; Future    3. Benign hypertension  Discussed sodium restriction, maintaining ideal body weight and regular exercise program as physiologic means to achieve blood pressure control. The patient will strive towards this.   The current medical regimen is effective;  continue present plan and medications. Recommended patient to check home readings to monitor and see me for followup as scheduled or sooner as needed.   Patient was educated that both decongestant and anti-inflammatory medication may raise blood pressure.  The patient declined participation in the digital hypertension program.  - metFORMIN (GLUCOPHAGE) 1000 MG tablet; Take 1 tablet (1,000 mg total) by mouth 2 (two) times daily.  Dispense: 180 tablet; Refill: 1  -  losartan-hydrochlorothiazide 100-25 mg (HYZAAR) 100-25 mg per tablet; Take 1 tablet by mouth once daily.  Dispense: 90 tablet; Refill: 1  - glimepiride (AMARYL) 4 MG tablet; TAKE 1 TABLET TWO TIMES DAILY BEFORE MEALS (HOLD FOR BLOOD SUGAR LESS THAN 100  Dispense: 180 tablet; Refill: 1  - atenoloL (TENORMIN) 25 MG tablet; Take 1 tablet (25 mg total) by mouth once daily.  Dispense: 90 tablet; Refill: 1  - amLODIPine (NORVASC) 5 MG tablet; Take 1 tablet (5 mg total) by mouth once daily.  Dispense: 90 tablet; Refill: 1    4. Hyperlipidemia LDL goal <100  We discussed low fat diet and regular exercise.The current medical regimen is effective;  continue present plan and medications.   - rosuvastatin (CRESTOR) 5 MG tablet; Take 1 tablet (5 mg total) by mouth every evening.  Dispense: 90 tablet; Refill: 1    5. Subclinical hyperthyroidism  Stable. Asymptomatic. Observe.    6. Obesity (BMI 30-39.9)  The patient is asked to make an attempt to improve diet and exercise patterns to aid in medical management of this problem.    7. Need for shingles vaccine  Patient was advised to get immunization at the pharmacy.    8. Colon cancer screening  She will complete her fitkit at her earliest convenience.    9. Advanced directives, counseling/discussion  Not discussed this office visit.    10. Fatigue, unspecified type/11. Sleep disturbance  A majority of this office visit was use to discuss her fatigue and chronic sleep disturbance.  I advised her that we cannot make changes quickly and it will take time.  She was given a handout with some ideas.  She does get up frequently to urinate.  We may need to see urology in the future to discuss treatment options.  She will try to limit her fluids at night.  I did discuss with her that she needs to have a regular routine and not be in bed for more than 8 hr at night total.  She should also avoid napping during the day.  I will refer her to Sleep Medicine to rule out sleep apnea or other  sleep disturbances.  - Ambulatory referral/consult to Sleep Disorders; Future    12. Other osteoporosis without current pathological fracture  The patient previously was not able to start medication for her osteoporosis as she was getting dental work done.  She is now ready to see endocrinology and a referral order was placed.  - Ambulatory referral/consult to Endocrinology; Future          Follow up in about 3 months (around 2/4/2021), or if symptoms worsen or fail to improve, for follow up chronic medical conditions.. or sooner as needed.

## 2020-11-13 ENCOUNTER — PES CALL (OUTPATIENT)
Dept: ADMINISTRATIVE | Facility: CLINIC | Age: 75
End: 2020-11-13

## 2020-11-23 ENCOUNTER — OFFICE VISIT (OUTPATIENT)
Dept: PODIATRY | Facility: CLINIC | Age: 75
End: 2020-11-23
Payer: MEDICARE

## 2020-11-23 VITALS
DIASTOLIC BLOOD PRESSURE: 68 MMHG | HEIGHT: 60 IN | BODY MASS INDEX: 31.55 KG/M2 | WEIGHT: 160.69 LBS | SYSTOLIC BLOOD PRESSURE: 134 MMHG

## 2020-11-23 DIAGNOSIS — E11.40 CONTROLLED TYPE 2 DIABETES WITH NEUROPATHY: ICD-10-CM

## 2020-11-23 DIAGNOSIS — L84 CORN OR CALLUS: Primary | ICD-10-CM

## 2020-11-23 PROCEDURE — 99999 PR PBB SHADOW E&M-EST. PATIENT-LVL IV: ICD-10-PCS | Mod: PBBFAC,HCNC,, | Performed by: PODIATRIST

## 2020-11-23 PROCEDURE — 1126F AMNT PAIN NOTED NONE PRSNT: CPT | Mod: HCNC,S$GLB,, | Performed by: PODIATRIST

## 2020-11-23 PROCEDURE — 3075F PR MOST RECENT SYSTOLIC BLOOD PRESS GE 130-139MM HG: ICD-10-PCS | Mod: HCNC,CPTII,S$GLB, | Performed by: PODIATRIST

## 2020-11-23 PROCEDURE — 3044F PR MOST RECENT HEMOGLOBIN A1C LEVEL <7.0%: ICD-10-PCS | Mod: HCNC,CPTII,S$GLB, | Performed by: PODIATRIST

## 2020-11-23 PROCEDURE — 3288F PR FALLS RISK ASSESSMENT DOCUMENTED: ICD-10-PCS | Mod: HCNC,CPTII,S$GLB, | Performed by: PODIATRIST

## 2020-11-23 PROCEDURE — 3075F SYST BP GE 130 - 139MM HG: CPT | Mod: HCNC,CPTII,S$GLB, | Performed by: PODIATRIST

## 2020-11-23 PROCEDURE — 3008F BODY MASS INDEX DOCD: CPT | Mod: HCNC,CPTII,S$GLB, | Performed by: PODIATRIST

## 2020-11-23 PROCEDURE — 3078F PR MOST RECENT DIASTOLIC BLOOD PRESSURE < 80 MM HG: ICD-10-PCS | Mod: HCNC,CPTII,S$GLB, | Performed by: PODIATRIST

## 2020-11-23 PROCEDURE — 1101F PT FALLS ASSESS-DOCD LE1/YR: CPT | Mod: HCNC,CPTII,S$GLB, | Performed by: PODIATRIST

## 2020-11-23 PROCEDURE — 3078F DIAST BP <80 MM HG: CPT | Mod: HCNC,CPTII,S$GLB, | Performed by: PODIATRIST

## 2020-11-23 PROCEDURE — 99999 PR PBB SHADOW E&M-EST. PATIENT-LVL IV: CPT | Mod: PBBFAC,HCNC,, | Performed by: PODIATRIST

## 2020-11-23 PROCEDURE — 1159F MED LIST DOCD IN RCRD: CPT | Mod: HCNC,S$GLB,, | Performed by: PODIATRIST

## 2020-11-23 PROCEDURE — 3008F PR BODY MASS INDEX (BMI) DOCUMENTED: ICD-10-PCS | Mod: HCNC,CPTII,S$GLB, | Performed by: PODIATRIST

## 2020-11-23 PROCEDURE — 3288F FALL RISK ASSESSMENT DOCD: CPT | Mod: HCNC,CPTII,S$GLB, | Performed by: PODIATRIST

## 2020-11-23 PROCEDURE — 1126F PR PAIN SEVERITY QUANTIFIED, NO PAIN PRESENT: ICD-10-PCS | Mod: HCNC,S$GLB,, | Performed by: PODIATRIST

## 2020-11-23 PROCEDURE — 1159F PR MEDICATION LIST DOCUMENTED IN MEDICAL RECORD: ICD-10-PCS | Mod: HCNC,S$GLB,, | Performed by: PODIATRIST

## 2020-11-23 PROCEDURE — 1101F PR PT FALLS ASSESS DOC 0-1 FALLS W/OUT INJ PAST YR: ICD-10-PCS | Mod: HCNC,CPTII,S$GLB, | Performed by: PODIATRIST

## 2020-11-23 PROCEDURE — 3044F HG A1C LEVEL LT 7.0%: CPT | Mod: HCNC,CPTII,S$GLB, | Performed by: PODIATRIST

## 2020-11-23 PROCEDURE — 99214 PR OFFICE/OUTPT VISIT, EST, LEVL IV, 30-39 MIN: ICD-10-PCS | Mod: HCNC,S$GLB,, | Performed by: PODIATRIST

## 2020-11-23 PROCEDURE — 99214 OFFICE O/P EST MOD 30 MIN: CPT | Mod: HCNC,S$GLB,, | Performed by: PODIATRIST

## 2020-11-23 NOTE — LETTER
November 26, 2020      Rosa Garber MD  [address] [place] [address]           [facility]  [address] [place] [address]  Phone: [phone]          Patient: Peace Farmer   MR Number: [id number]   YOB: 1945   Date of Visit: 11/23/2020       Dear Dr. Rosa Garber:    Thank you for referring Peace Farmer to me for evaluation. Attached you will find relevant portions of my assessment and plan of care.    If you have questions, please do not hesitate to call me. I look forward to following Peace Farmer along with you.    Sincerely,    Misti Allen, BRITTA    Enclosure  CC:  No Recipients    If you would like to receive this communication electronically, please contact [email or web address] or [phone] to request more information on [employer] Link access.    For providers and/or their staff who would like to refer a patient to [facility], please contact us through our one-stop-shop provider referral line, [facility], at [phone].    If you feel you have received this communication in error or would no longer like to receive these types of communications, please e-mail [email or web address]

## 2020-11-23 NOTE — PATIENT INSTRUCTIONS
Recommend lotions: eucerin, eucerin for diabetics, aquaphor, A&D ointment, gold bond for diabetics, sween, Cov[employer]'s Bees all purpose baby ointment,  urea 40 with aloe (found on amazon.com)    Shoe recommendations: (try 6pm.com, zappos.com , nordstromrack.SeeClickFix, or shoes.SeeClickFix for discounted prices) you can visit DSW shoes in [place]  or Real Time Tomography in the [facility] (there are also several shoe brand outlets in the [facility])    Asics (GT 2000 or gel foundations), new balance stability type shoes, saucony (stabil c3),  Anne (GTS or Beast or transcend), propet (tennis shoe)    Sofft Brand or axxiom (women) Desean&Herman (men), clarks, crocs, aerosoles, naturalizers, SAS, ecco, born, rupa boone, rockports (dress shoes)    Vionic, burkenstocks, fitflops, propet (sandals)  Nike comfort thong sandals, crocs, propet (house shoes)    Nail Home remedy:  Vicks Vapor rub to nails for easier manageability    Diabetes: Inspecting Your Feet  Diabetes increases your chances of developing foot problems. So inspect your feet every day. This helps you find small skin irritations before they become serious infections. If you have trouble seeing the bottoms of your feet, use a mirror or ask a family member or friend to help.     Pressure spots on the bottom of the foot are common areas where problems develop.   How to check your feet  Below are tips to help you look for foot problems. Try to check your feet at the same time each day, such as when you get out of bed in the morning:  · Check the top of each foot. The tops of toes, back of the heel, and outer edge of the foot can get a lot of rubbing from poor-fitting shoes.  · Check the bottom of each foot. Daily wear and tear often leads to problems at pressure spots.  · Check the toes and nails. Fungal infections often occur between toes. Toenail problems can also be a sign of fungal infections or lead to breaks in the skin.  · Check your shoes, too. Loose objects inside a shoe can  injure the foot. Use your hand to feel inside your shoes for things like joselyn, loose stitching, or rough areas that could irritate your skin.  Warning signs  Look for any color changes in the foot. Redness with streaks can signal a severe infection, which needs immediate medical attention. Tell your doctor right away if you have any of these problems:  · Swelling, sometimes with color changes, may be a sign of poor blood flow or infection. Symptoms include tenderness and an increase in the size of your foot.  · Warm or hot areas on your feet may be signs of infection. A foot that is cold may not be getting enough blood.  · Sensations such as burning, tingling, or pins and needles can be signs of a problem. Also check for areas that may be numb.  · Hot spots are caused by friction or pressure. Look for hot spots in areas that get a lot of rubbing. Hot spots can turn into blisters, calluses, or sores.  · Cracks and sores are caused by dry or irritated skin. They are a sign that the skin is breaking down, which can lead to infection.  · Toenail problems to watch for include nails growing into the skin (ingrown toenail) and causing redness or pain. Thick, yellow, or discolored nails can signal a fungal infection.  · Drainage and odor can develop from untreated sores and ulcers. Call your doctor right away if you notice white or yellow drainage, bleeding, or unpleasant odor.   © 4271-2700 [employer]. [address] [place] [address]. All rights reserved. This information is not intended as a substitute for professional medical care. Always follow your healthcare professional's instructions.        Step-by-Step:  Inspecting Your Feet (Diabetes)    Date Last Reviewed: 10/1/2016  © 6423-6914 [employer]. [address] [place] [address]. All rights reserved. This information is not intended as a substitute for professional medical care. Always follow your healthcare  professional's instructions.

## 2020-11-26 NOTE — PROGRESS NOTES
Subjective:      Patient ID: Peace Farmer is a 75 y.o. female.    Chief Complaint: Diabetes Mellitus (Dr Garber PCP 11/4/20), Diabetic Foot Exam    Peace is a 75 y.o. female who presents to the clinic upon referral from Dr. Garber  for evaluation and treatment of diabetic feet. Peace has a past medical history of Abnormal mammogram (10/13), Cortical cataract of both eyes (11/16/2018), Diabetes mellitus type II, Fibromyalgia, Hyperlipidemia, Hypertension, Obesity (BMI 30-39.9), Osteopenia, Psoriasis, and Skin cancer of face (2011). Presents for diabetic foot risk assessment.       PCP: Rosa Garber MD    Date Last Seen by PCP: per above     Current shoe gear: Tennis shoes    Hemoglobin A1C   Date Value Ref Range Status   10/12/2020 5.8 (H) 4.0 - 5.6 % Final     Comment:     ADA Screening Guidelines:  5.7-6.4%  Consistent with prediabetes  >or=6.5%  Consistent with diabetes  High levels of fetal hemoglobin interfere with the HbA1C  assay. Heterozygous hemoglobin variants (HbS, HgC, etc)do  not significantly interfere with this assay.   However, presence of multiple variants may affect accuracy.     05/13/2020 5.4 4.0 - 5.6 % Final     Comment:     ADA Screening Guidelines:  5.7-6.4%  Consistent with prediabetes  >or=6.5%  Consistent with diabetes  High levels of fetal hemoglobin interfere with the HbA1C  assay. Heterozygous hemoglobin variants (HbS, HgC, etc)do  not significantly interfere with this assay.   However, presence of multiple variants may affect accuracy.     01/09/2020 5.2 4.0 - 5.6 % Final     Comment:     ADA Screening Guidelines:  5.7-6.4%  Consistent with prediabetes  >or=6.5%  Consistent with diabetes  High levels of fetal hemoglobin interfere with the HbA1C  assay. Heterozygous hemoglobin variants (HbS, HgC, etc)do  not significantly interfere with this assay.   However, presence of multiple variants may affect accuracy.             Review of Systems   Constitution: Negative for chills.    Cardiovascular: Negative for chest pain and claudication.   Respiratory: Negative for cough.    Skin: Positive for color change, dry skin and nail changes.   Musculoskeletal: Positive for joint pain.   Gastrointestinal: Negative for nausea.   Neurological: Positive for paresthesias. Negative for numbness.   Psychiatric/Behavioral: The patient is not nervous/anxious.            Objective:      Physical Exam  Constitutional:       Appearance: She is well-developed.      Comments: Oriented to time, place, and person.   Cardiovascular:      Comments: DP and PT pulses are palpable bilaterally. 3 sec capillary refill time and toes and feet are warm to touch proximally .  There is  hair growth on the feet and toes b/l. There is no edema b/l. No spider veins or varicosities present b/l.     Musculoskeletal:      Comments: Equinus noted b/l ankles with < 10 deg DF noted. MMT 5/5 in DF/PF/Inv/Ev resistance with no reproduction of pain in any direction. Passive range of motion of ankle and pedal joints is painless b/l.    Decreased stride, station of gait.  apropulsive toe off.  Increased angle and base of gait.      Patient has hammertoes of digits 2-5 bilateral partially reducible without symptom today.     Visible and palpable bunion without pain at dorsomedial 1st metatarsal head right and left.  Hallux abducted right and left partially reducible, tracks laterally without being track bound.  No ecchymosis, erythema, edema, or cardinal signs infection or signs of trauma same foot.       Feet:      Right foot:      Skin integrity: No callus or dry skin.      Left foot:      Skin integrity: No callus or dry skin.   Lymphadenopathy:      Comments: Negative lymphadenopathy bilateral popliteal fossa and tarsal tunnel.   Skin:     Comments: No open lesions, lacerations or wounds noted.Interdigital spaces clean, dry and intact b/l. No erythema noted to b/l foot.  Nails normal color and trophic qualities.     Neurological:       Mental Status: She is alert.      Comments: Light touch, proprioception, and sharp/dull sensation are all intact bilaterally. Protective threshold with the Benton-Wienstein monofilament is intact bilaterally.    Psychiatric:         Behavior: Behavior is cooperative.               Assessment:       Encounter Diagnoses   Name Primary?    Controlled type 2 diabetes with neuropathy     Corn or callus Yes         Plan:       Peace was seen today for diabetes mellitus, diabetic foot exam and blister.    Diagnoses and all orders for this visit:    Corn or callus  -     DIABETIC SHOES FOR HOME USE    Controlled type 2 diabetes with neuropathy  -     Ambulatory referral/consult to Podiatry  -     DIABETIC SHOES FOR HOME USE      I counseled the patient on her conditions, their implications and medical management.    - Shoe inspection. Diabetic Foot Education. Patient reminded of the importance of good nutrition and blood sugar control to help prevent podiatric complications of diabetes. Patient instructed on proper foot hygeine. We discussed wearing proper shoe gear, daily foot inspections, never walking without protective shoe gear, caution putting sharp instruments to feet     - Discussed DM foot care:  Wear comfortable, proper fitting shoes. Wash feet daily. Dry well. After drying, apply moisturizer to feet (no lotion to webspaces). Inspect feet daily for skin breaks, blisters, swelling, or redness. Wear cotton socks (preferably white)  Change socks every day. Do NOT walk barefoot. Do NOT use heating pads or warm/hot water soaks     Rx diabetic shoes with custom molded inserts to be worn at all times while ambulating. Prescription provided with list of local retailers.

## 2021-01-03 ENCOUNTER — PATIENT MESSAGE (OUTPATIENT)
Dept: FAMILY MEDICINE | Facility: CLINIC | Age: 76
End: 2021-01-03

## 2021-01-07 ENCOUNTER — IMMUNIZATION (OUTPATIENT)
Dept: OBSTETRICS AND GYNECOLOGY | Facility: CLINIC | Age: 76
End: 2021-01-07
Payer: MEDICARE

## 2021-01-07 ENCOUNTER — LAB VISIT (OUTPATIENT)
Dept: LAB | Facility: HOSPITAL | Age: 76
End: 2021-01-07
Attending: INTERNAL MEDICINE
Payer: MEDICARE

## 2021-01-07 DIAGNOSIS — Z23 NEED FOR VACCINATION: ICD-10-CM

## 2021-01-07 DIAGNOSIS — E11.40 CONTROLLED TYPE 2 DIABETES WITH NEUROPATHY: ICD-10-CM

## 2021-01-07 LAB
ESTIMATED AVG GLUCOSE: 151 MG/DL (ref 68–131)
HBA1C MFR BLD HPLC: 6.9 % (ref 4–5.6)

## 2021-01-07 PROCEDURE — 83036 HEMOGLOBIN GLYCOSYLATED A1C: CPT | Mod: HCNC

## 2021-01-07 PROCEDURE — 91300 COVID-19, MRNA, LNP-S, PF, 30 MCG/0.3 ML DOSE VACCINE: CPT | Mod: PBBFAC | Performed by: FAMILY MEDICINE

## 2021-01-07 PROCEDURE — 36415 COLL VENOUS BLD VENIPUNCTURE: CPT | Mod: HCNC,PO

## 2021-01-28 ENCOUNTER — IMMUNIZATION (OUTPATIENT)
Dept: OBSTETRICS AND GYNECOLOGY | Facility: CLINIC | Age: 76
End: 2021-01-28
Payer: MEDICARE

## 2021-01-28 DIAGNOSIS — Z23 NEED FOR VACCINATION: Primary | ICD-10-CM

## 2021-01-28 PROCEDURE — 0002A COVID-19, MRNA, LNP-S, PF, 30 MCG/0.3 ML DOSE VACCINE: CPT | Mod: PBBFAC | Performed by: FAMILY MEDICINE

## 2021-01-28 PROCEDURE — 91300 COVID-19, MRNA, LNP-S, PF, 30 MCG/0.3 ML DOSE VACCINE: CPT | Mod: PBBFAC | Performed by: FAMILY MEDICINE

## 2021-02-02 ENCOUNTER — PATIENT OUTREACH (OUTPATIENT)
Dept: ADMINISTRATIVE | Facility: OTHER | Age: 76
End: 2021-02-02

## 2021-02-02 DIAGNOSIS — Z12.12 ENCOUNTER FOR COLORECTAL CANCER SCREENING: Primary | ICD-10-CM

## 2021-02-02 DIAGNOSIS — Z12.31 BREAST CANCER SCREENING BY MAMMOGRAM: ICD-10-CM

## 2021-02-02 DIAGNOSIS — Z12.11 ENCOUNTER FOR COLORECTAL CANCER SCREENING: Primary | ICD-10-CM

## 2021-02-03 ENCOUNTER — OFFICE VISIT (OUTPATIENT)
Dept: ENDOCRINOLOGY | Facility: CLINIC | Age: 76
End: 2021-02-03
Payer: MEDICARE

## 2021-02-03 VITALS
OXYGEN SATURATION: 99 % | BODY MASS INDEX: 32.25 KG/M2 | HEIGHT: 60 IN | SYSTOLIC BLOOD PRESSURE: 134 MMHG | HEART RATE: 67 BPM | DIASTOLIC BLOOD PRESSURE: 80 MMHG | WEIGHT: 164.25 LBS

## 2021-02-03 DIAGNOSIS — R53.83 FATIGUE, UNSPECIFIED TYPE: ICD-10-CM

## 2021-02-03 DIAGNOSIS — M81.8 OTHER OSTEOPOROSIS WITHOUT CURRENT PATHOLOGICAL FRACTURE: ICD-10-CM

## 2021-02-03 PROCEDURE — 99214 OFFICE O/P EST MOD 30 MIN: CPT | Mod: HCNC,S$GLB,, | Performed by: INTERNAL MEDICINE

## 2021-02-03 PROCEDURE — 3008F PR BODY MASS INDEX (BMI) DOCUMENTED: ICD-10-PCS | Mod: HCNC,CPTII,S$GLB, | Performed by: INTERNAL MEDICINE

## 2021-02-03 PROCEDURE — 3075F PR MOST RECENT SYSTOLIC BLOOD PRESS GE 130-139MM HG: ICD-10-PCS | Mod: HCNC,CPTII,S$GLB, | Performed by: INTERNAL MEDICINE

## 2021-02-03 PROCEDURE — 1159F PR MEDICATION LIST DOCUMENTED IN MEDICAL RECORD: ICD-10-PCS | Mod: HCNC,S$GLB,, | Performed by: INTERNAL MEDICINE

## 2021-02-03 PROCEDURE — 3072F LOW RISK FOR RETINOPATHY: CPT | Mod: HCNC,S$GLB,, | Performed by: INTERNAL MEDICINE

## 2021-02-03 PROCEDURE — 3288F PR FALLS RISK ASSESSMENT DOCUMENTED: ICD-10-PCS | Mod: HCNC,CPTII,S$GLB, | Performed by: INTERNAL MEDICINE

## 2021-02-03 PROCEDURE — 3008F BODY MASS INDEX DOCD: CPT | Mod: HCNC,CPTII,S$GLB, | Performed by: INTERNAL MEDICINE

## 2021-02-03 PROCEDURE — 1101F PR PT FALLS ASSESS DOC 0-1 FALLS W/OUT INJ PAST YR: ICD-10-PCS | Mod: HCNC,CPTII,S$GLB, | Performed by: INTERNAL MEDICINE

## 2021-02-03 PROCEDURE — 1126F AMNT PAIN NOTED NONE PRSNT: CPT | Mod: HCNC,S$GLB,, | Performed by: INTERNAL MEDICINE

## 2021-02-03 PROCEDURE — 1101F PT FALLS ASSESS-DOCD LE1/YR: CPT | Mod: HCNC,CPTII,S$GLB, | Performed by: INTERNAL MEDICINE

## 2021-02-03 PROCEDURE — 99999 PR PBB SHADOW E&M-EST. PATIENT-LVL IV: ICD-10-PCS | Mod: PBBFAC,HCNC,, | Performed by: INTERNAL MEDICINE

## 2021-02-03 PROCEDURE — 3079F DIAST BP 80-89 MM HG: CPT | Mod: HCNC,CPTII,S$GLB, | Performed by: INTERNAL MEDICINE

## 2021-02-03 PROCEDURE — 3072F PR LOW RISK FOR RETINOPATHY: ICD-10-PCS | Mod: HCNC,S$GLB,, | Performed by: INTERNAL MEDICINE

## 2021-02-03 PROCEDURE — 3079F PR MOST RECENT DIASTOLIC BLOOD PRESSURE 80-89 MM HG: ICD-10-PCS | Mod: HCNC,CPTII,S$GLB, | Performed by: INTERNAL MEDICINE

## 2021-02-03 PROCEDURE — 3288F FALL RISK ASSESSMENT DOCD: CPT | Mod: HCNC,CPTII,S$GLB, | Performed by: INTERNAL MEDICINE

## 2021-02-03 PROCEDURE — 1159F MED LIST DOCD IN RCRD: CPT | Mod: HCNC,S$GLB,, | Performed by: INTERNAL MEDICINE

## 2021-02-03 PROCEDURE — 1126F PR PAIN SEVERITY QUANTIFIED, NO PAIN PRESENT: ICD-10-PCS | Mod: HCNC,S$GLB,, | Performed by: INTERNAL MEDICINE

## 2021-02-03 PROCEDURE — 99214 PR OFFICE/OUTPT VISIT, EST, LEVL IV, 30-39 MIN: ICD-10-PCS | Mod: HCNC,S$GLB,, | Performed by: INTERNAL MEDICINE

## 2021-02-03 PROCEDURE — 99999 PR PBB SHADOW E&M-EST. PATIENT-LVL IV: CPT | Mod: PBBFAC,HCNC,, | Performed by: INTERNAL MEDICINE

## 2021-02-03 PROCEDURE — 3075F SYST BP GE 130 - 139MM HG: CPT | Mod: HCNC,CPTII,S$GLB, | Performed by: INTERNAL MEDICINE

## 2021-02-03 RX ORDER — ZOLEDRONIC ACID 5 MG/100ML
5 INJECTION, SOLUTION INTRAVENOUS
Status: CANCELLED | OUTPATIENT
Start: 2021-02-03

## 2021-02-03 RX ORDER — OMEPRAZOLE 20 MG/1
20 CAPSULE, DELAYED RELEASE ORAL DAILY
COMMUNITY
End: 2021-05-07 | Stop reason: SDUPTHER

## 2021-03-04 ENCOUNTER — PATIENT OUTREACH (OUTPATIENT)
Dept: ADMINISTRATIVE | Facility: HOSPITAL | Age: 76
End: 2021-03-04

## 2021-04-01 ENCOUNTER — OFFICE VISIT (OUTPATIENT)
Dept: FAMILY MEDICINE | Facility: CLINIC | Age: 76
End: 2021-04-01
Payer: MEDICARE

## 2021-04-01 VITALS
WEIGHT: 162.25 LBS | SYSTOLIC BLOOD PRESSURE: 132 MMHG | OXYGEN SATURATION: 96 % | HEIGHT: 60 IN | HEART RATE: 77 BPM | DIASTOLIC BLOOD PRESSURE: 64 MMHG | TEMPERATURE: 99 F | BODY MASS INDEX: 31.86 KG/M2

## 2021-04-01 DIAGNOSIS — B02.9 ACUTE PAIN ASSOCIATED WITH HERPES ZOSTER: Primary | ICD-10-CM

## 2021-04-01 DIAGNOSIS — M79.609 PAIN IN EXTREMITY, UNSPECIFIED EXTREMITY: ICD-10-CM

## 2021-04-01 PROCEDURE — 1159F MED LIST DOCD IN RCRD: CPT | Mod: S$GLB,,, | Performed by: INTERNAL MEDICINE

## 2021-04-01 PROCEDURE — 3072F LOW RISK FOR RETINOPATHY: CPT | Mod: S$GLB,,, | Performed by: INTERNAL MEDICINE

## 2021-04-01 PROCEDURE — 3072F PR LOW RISK FOR RETINOPATHY: ICD-10-PCS | Mod: S$GLB,,, | Performed by: INTERNAL MEDICINE

## 2021-04-01 PROCEDURE — 1101F PT FALLS ASSESS-DOCD LE1/YR: CPT | Mod: CPTII,S$GLB,, | Performed by: INTERNAL MEDICINE

## 2021-04-01 PROCEDURE — 3075F PR MOST RECENT SYSTOLIC BLOOD PRESS GE 130-139MM HG: ICD-10-PCS | Mod: CPTII,S$GLB,, | Performed by: INTERNAL MEDICINE

## 2021-04-01 PROCEDURE — 3288F PR FALLS RISK ASSESSMENT DOCUMENTED: ICD-10-PCS | Mod: CPTII,S$GLB,, | Performed by: INTERNAL MEDICINE

## 2021-04-01 PROCEDURE — 3288F FALL RISK ASSESSMENT DOCD: CPT | Mod: CPTII,S$GLB,, | Performed by: INTERNAL MEDICINE

## 2021-04-01 PROCEDURE — 99999 PR PBB SHADOW E&M-EST. PATIENT-LVL III: CPT | Mod: PBBFAC,,, | Performed by: INTERNAL MEDICINE

## 2021-04-01 PROCEDURE — 3078F PR MOST RECENT DIASTOLIC BLOOD PRESSURE < 80 MM HG: ICD-10-PCS | Mod: CPTII,S$GLB,, | Performed by: INTERNAL MEDICINE

## 2021-04-01 PROCEDURE — 3075F SYST BP GE 130 - 139MM HG: CPT | Mod: CPTII,S$GLB,, | Performed by: INTERNAL MEDICINE

## 2021-04-01 PROCEDURE — 99999 PR PBB SHADOW E&M-EST. PATIENT-LVL III: ICD-10-PCS | Mod: PBBFAC,,, | Performed by: INTERNAL MEDICINE

## 2021-04-01 PROCEDURE — 3008F BODY MASS INDEX DOCD: CPT | Mod: CPTII,S$GLB,, | Performed by: INTERNAL MEDICINE

## 2021-04-01 PROCEDURE — 99214 PR OFFICE/OUTPT VISIT, EST, LEVL IV, 30-39 MIN: ICD-10-PCS | Mod: S$GLB,,, | Performed by: INTERNAL MEDICINE

## 2021-04-01 PROCEDURE — 1159F PR MEDICATION LIST DOCUMENTED IN MEDICAL RECORD: ICD-10-PCS | Mod: S$GLB,,, | Performed by: INTERNAL MEDICINE

## 2021-04-01 PROCEDURE — 99214 OFFICE O/P EST MOD 30 MIN: CPT | Mod: S$GLB,,, | Performed by: INTERNAL MEDICINE

## 2021-04-01 PROCEDURE — 3008F PR BODY MASS INDEX (BMI) DOCUMENTED: ICD-10-PCS | Mod: CPTII,S$GLB,, | Performed by: INTERNAL MEDICINE

## 2021-04-01 PROCEDURE — 3078F DIAST BP <80 MM HG: CPT | Mod: CPTII,S$GLB,, | Performed by: INTERNAL MEDICINE

## 2021-04-01 PROCEDURE — 1101F PR PT FALLS ASSESS DOC 0-1 FALLS W/OUT INJ PAST YR: ICD-10-PCS | Mod: CPTII,S$GLB,, | Performed by: INTERNAL MEDICINE

## 2021-04-01 RX ORDER — GABAPENTIN 100 MG/1
CAPSULE ORAL
Qty: 90 CAPSULE | Refills: 11 | Status: SHIPPED | OUTPATIENT
Start: 2021-04-01 | End: 2021-05-07 | Stop reason: SDUPTHER

## 2021-04-01 RX ORDER — VALACYCLOVIR HYDROCHLORIDE 1 G/1
1000 TABLET, FILM COATED ORAL 3 TIMES DAILY
Qty: 21 TABLET | Refills: 0 | Status: SHIPPED | OUTPATIENT
Start: 2021-04-01 | End: 2021-05-07

## 2021-04-07 ENCOUNTER — PATIENT MESSAGE (OUTPATIENT)
Dept: FAMILY MEDICINE | Facility: CLINIC | Age: 76
End: 2021-04-07

## 2021-04-16 DIAGNOSIS — E11.9 TYPE 2 DIABETES MELLITUS WITHOUT COMPLICATION: ICD-10-CM

## 2021-04-30 DIAGNOSIS — E05.90 SUBCLINICAL HYPERTHYROIDISM: ICD-10-CM

## 2021-04-30 DIAGNOSIS — E78.5 HYPERLIPIDEMIA LDL GOAL <100: ICD-10-CM

## 2021-04-30 DIAGNOSIS — I10 BENIGN HYPERTENSION: Primary | ICD-10-CM

## 2021-04-30 PROBLEM — R53.83 FATIGUE: Status: RESOLVED | Noted: 2021-02-03 | Resolved: 2021-04-30

## 2021-05-03 ENCOUNTER — PATIENT MESSAGE (OUTPATIENT)
Dept: FAMILY MEDICINE | Facility: CLINIC | Age: 76
End: 2021-05-03

## 2021-05-04 ENCOUNTER — LAB VISIT (OUTPATIENT)
Dept: LAB | Facility: HOSPITAL | Age: 76
End: 2021-05-04
Attending: INTERNAL MEDICINE
Payer: MEDICARE

## 2021-05-04 DIAGNOSIS — E78.5 HYPERLIPIDEMIA LDL GOAL <100: ICD-10-CM

## 2021-05-04 DIAGNOSIS — I10 BENIGN HYPERTENSION: ICD-10-CM

## 2021-05-04 DIAGNOSIS — E05.90 SUBCLINICAL HYPERTHYROIDISM: ICD-10-CM

## 2021-05-04 LAB
BASOPHILS # BLD AUTO: 0.09 K/UL (ref 0–0.2)
BASOPHILS NFR BLD: 1.1 % (ref 0–1.9)
DIFFERENTIAL METHOD: ABNORMAL
EOSINOPHIL # BLD AUTO: 0.3 K/UL (ref 0–0.5)
EOSINOPHIL NFR BLD: 3.8 % (ref 0–8)
ERYTHROCYTE [DISTWIDTH] IN BLOOD BY AUTOMATED COUNT: 13.9 % (ref 11.5–14.5)
HCT VFR BLD AUTO: 42.3 % (ref 37–48.5)
HGB BLD-MCNC: 14.1 G/DL (ref 12–16)
IMM GRANULOCYTES # BLD AUTO: 0.06 K/UL (ref 0–0.04)
IMM GRANULOCYTES NFR BLD AUTO: 0.8 % (ref 0–0.5)
LYMPHOCYTES # BLD AUTO: 3 K/UL (ref 1–4.8)
LYMPHOCYTES NFR BLD: 37.6 % (ref 18–48)
MCH RBC QN AUTO: 28.1 PG (ref 27–31)
MCHC RBC AUTO-ENTMCNC: 33.3 G/DL (ref 32–36)
MCV RBC AUTO: 84 FL (ref 82–98)
MONOCYTES # BLD AUTO: 0.5 K/UL (ref 0.3–1)
MONOCYTES NFR BLD: 6.5 % (ref 4–15)
NEUTROPHILS # BLD AUTO: 4 K/UL (ref 1.8–7.7)
NEUTROPHILS NFR BLD: 50.2 % (ref 38–73)
NRBC BLD-RTO: 0 /100 WBC
PLATELET # BLD AUTO: 262 K/UL (ref 150–450)
PMV BLD AUTO: 13.3 FL (ref 9.2–12.9)
RBC # BLD AUTO: 5.02 M/UL (ref 4–5.4)
WBC # BLD AUTO: 7.95 K/UL (ref 3.9–12.7)

## 2021-05-04 PROCEDURE — 84443 ASSAY THYROID STIM HORMONE: CPT | Performed by: INTERNAL MEDICINE

## 2021-05-04 PROCEDURE — 36415 COLL VENOUS BLD VENIPUNCTURE: CPT | Mod: PO | Performed by: INTERNAL MEDICINE

## 2021-05-04 PROCEDURE — 80061 LIPID PANEL: CPT | Performed by: INTERNAL MEDICINE

## 2021-05-04 PROCEDURE — 83036 HEMOGLOBIN GLYCOSYLATED A1C: CPT | Performed by: INTERNAL MEDICINE

## 2021-05-04 PROCEDURE — 85025 COMPLETE CBC W/AUTO DIFF WBC: CPT | Performed by: INTERNAL MEDICINE

## 2021-05-05 LAB
CHOLEST SERPL-MCNC: 106 MG/DL (ref 120–199)
CHOLEST/HDLC SERPL: 2.7 {RATIO} (ref 2–5)
ESTIMATED AVG GLUCOSE: 177 MG/DL (ref 68–131)
HBA1C MFR BLD: 7.8 % (ref 4–5.6)
HDLC SERPL-MCNC: 40 MG/DL (ref 40–75)
HDLC SERPL: 37.7 % (ref 20–50)
LDLC SERPL CALC-MCNC: 50.8 MG/DL (ref 63–159)
NONHDLC SERPL-MCNC: 66 MG/DL
TRIGL SERPL-MCNC: 76 MG/DL (ref 30–150)
TSH SERPL DL<=0.005 MIU/L-ACNC: 3.65 UIU/ML (ref 0.4–4)

## 2021-05-07 ENCOUNTER — OFFICE VISIT (OUTPATIENT)
Dept: FAMILY MEDICINE | Facility: CLINIC | Age: 76
End: 2021-05-07
Payer: MEDICARE

## 2021-05-07 VITALS
HEIGHT: 60 IN | WEIGHT: 162.5 LBS | OXYGEN SATURATION: 97 % | HEART RATE: 77 BPM | TEMPERATURE: 98 F | BODY MASS INDEX: 31.9 KG/M2 | SYSTOLIC BLOOD PRESSURE: 118 MMHG | DIASTOLIC BLOOD PRESSURE: 72 MMHG

## 2021-05-07 DIAGNOSIS — E05.90 SUBCLINICAL HYPERTHYROIDISM: ICD-10-CM

## 2021-05-07 DIAGNOSIS — E66.9 OBESITY (BMI 30-39.9): ICD-10-CM

## 2021-05-07 DIAGNOSIS — R15.9 INCONTINENCE OF FECES, UNSPECIFIED FECAL INCONTINENCE TYPE: ICD-10-CM

## 2021-05-07 DIAGNOSIS — L29.9 ITCHING: ICD-10-CM

## 2021-05-07 DIAGNOSIS — E78.5 HYPERLIPIDEMIA LDL GOAL <100: ICD-10-CM

## 2021-05-07 DIAGNOSIS — E55.9 MILD VITAMIN D DEFICIENCY: ICD-10-CM

## 2021-05-07 DIAGNOSIS — G47.00 INSOMNIA, UNSPECIFIED TYPE: ICD-10-CM

## 2021-05-07 DIAGNOSIS — Z12.31 ENCOUNTER FOR SCREENING MAMMOGRAM FOR BREAST CANCER: ICD-10-CM

## 2021-05-07 DIAGNOSIS — Z23 NEED FOR SHINGLES VACCINE: ICD-10-CM

## 2021-05-07 DIAGNOSIS — E53.8 VITAMIN B12 DEFICIENCY: ICD-10-CM

## 2021-05-07 DIAGNOSIS — R53.82 CHRONIC FATIGUE: ICD-10-CM

## 2021-05-07 DIAGNOSIS — I10 BENIGN HYPERTENSION: ICD-10-CM

## 2021-05-07 DIAGNOSIS — K21.9 GASTROESOPHAGEAL REFLUX DISEASE WITHOUT ESOPHAGITIS: ICD-10-CM

## 2021-05-07 DIAGNOSIS — Z12.11 COLON CANCER SCREENING: ICD-10-CM

## 2021-05-07 DIAGNOSIS — J30.9 ALLERGIC RHINITIS, UNSPECIFIED SEASONALITY, UNSPECIFIED TRIGGER: ICD-10-CM

## 2021-05-07 PROCEDURE — 1100F PTFALLS ASSESS-DOCD GE2>/YR: CPT | Mod: CPTII,S$GLB,, | Performed by: INTERNAL MEDICINE

## 2021-05-07 PROCEDURE — 1159F PR MEDICATION LIST DOCUMENTED IN MEDICAL RECORD: ICD-10-PCS | Mod: S$GLB,,, | Performed by: INTERNAL MEDICINE

## 2021-05-07 PROCEDURE — 1126F PR PAIN SEVERITY QUANTIFIED, NO PAIN PRESENT: ICD-10-PCS | Mod: S$GLB,,, | Performed by: INTERNAL MEDICINE

## 2021-05-07 PROCEDURE — 99999 PR PBB SHADOW E&M-EST. PATIENT-LVL V: CPT | Mod: PBBFAC,,, | Performed by: INTERNAL MEDICINE

## 2021-05-07 PROCEDURE — 3072F PR LOW RISK FOR RETINOPATHY: ICD-10-PCS | Mod: S$GLB,,, | Performed by: INTERNAL MEDICINE

## 2021-05-07 PROCEDURE — 3051F HG A1C>EQUAL 7.0%<8.0%: CPT | Mod: CPTII,S$GLB,, | Performed by: INTERNAL MEDICINE

## 2021-05-07 PROCEDURE — 3072F LOW RISK FOR RETINOPATHY: CPT | Mod: S$GLB,,, | Performed by: INTERNAL MEDICINE

## 2021-05-07 PROCEDURE — 3008F PR BODY MASS INDEX (BMI) DOCUMENTED: ICD-10-PCS | Mod: CPTII,S$GLB,, | Performed by: INTERNAL MEDICINE

## 2021-05-07 PROCEDURE — 99999 PR PBB SHADOW E&M-EST. PATIENT-LVL V: ICD-10-PCS | Mod: PBBFAC,,, | Performed by: INTERNAL MEDICINE

## 2021-05-07 PROCEDURE — 99215 OFFICE O/P EST HI 40 MIN: CPT | Mod: S$GLB,,, | Performed by: INTERNAL MEDICINE

## 2021-05-07 PROCEDURE — 3288F PR FALLS RISK ASSESSMENT DOCUMENTED: ICD-10-PCS | Mod: CPTII,S$GLB,, | Performed by: INTERNAL MEDICINE

## 2021-05-07 PROCEDURE — 1159F MED LIST DOCD IN RCRD: CPT | Mod: S$GLB,,, | Performed by: INTERNAL MEDICINE

## 2021-05-07 PROCEDURE — 3288F FALL RISK ASSESSMENT DOCD: CPT | Mod: CPTII,S$GLB,, | Performed by: INTERNAL MEDICINE

## 2021-05-07 PROCEDURE — 1100F PR PT FALLS ASSESS DOC 2+ FALLS/FALL W/INJURY/YR: ICD-10-PCS | Mod: CPTII,S$GLB,, | Performed by: INTERNAL MEDICINE

## 2021-05-07 PROCEDURE — 3051F PR MOST RECENT HEMOGLOBIN A1C LEVEL 7.0 - < 8.0%: ICD-10-PCS | Mod: CPTII,S$GLB,, | Performed by: INTERNAL MEDICINE

## 2021-05-07 PROCEDURE — 99215 PR OFFICE/OUTPT VISIT, EST, LEVL V, 40-54 MIN: ICD-10-PCS | Mod: S$GLB,,, | Performed by: INTERNAL MEDICINE

## 2021-05-07 PROCEDURE — 99499 RISK ADDL DX/OHS AUDIT: ICD-10-PCS | Mod: S$GLB,,, | Performed by: INTERNAL MEDICINE

## 2021-05-07 PROCEDURE — 1126F AMNT PAIN NOTED NONE PRSNT: CPT | Mod: S$GLB,,, | Performed by: INTERNAL MEDICINE

## 2021-05-07 PROCEDURE — 99499 UNLISTED E&M SERVICE: CPT | Mod: S$GLB,,, | Performed by: INTERNAL MEDICINE

## 2021-05-07 PROCEDURE — 3008F BODY MASS INDEX DOCD: CPT | Mod: CPTII,S$GLB,, | Performed by: INTERNAL MEDICINE

## 2021-05-07 RX ORDER — GABAPENTIN 100 MG/1
400 CAPSULE ORAL NIGHTLY PRN
Qty: 150 CAPSULE | Refills: 1 | Status: SHIPPED | OUTPATIENT
Start: 2021-05-07 | End: 2021-05-11 | Stop reason: SDUPTHER

## 2021-05-07 RX ORDER — AMLODIPINE BESYLATE 5 MG/1
5 TABLET ORAL DAILY
Qty: 90 TABLET | Refills: 1 | Status: SHIPPED | OUTPATIENT
Start: 2021-05-07 | End: 2021-11-11

## 2021-05-07 RX ORDER — GLIMEPIRIDE 4 MG/1
TABLET ORAL
Qty: 180 TABLET | Refills: 1 | Status: SHIPPED | OUTPATIENT
Start: 2021-05-07 | End: 2021-11-23

## 2021-05-07 RX ORDER — OMEPRAZOLE 20 MG/1
20 CAPSULE, DELAYED RELEASE ORAL DAILY PRN
Qty: 90 CAPSULE | Refills: 0 | Status: SHIPPED | OUTPATIENT
Start: 2021-05-07 | End: 2021-07-14

## 2021-05-07 RX ORDER — ROSUVASTATIN CALCIUM 5 MG/1
5 TABLET, COATED ORAL NIGHTLY
Qty: 90 TABLET | Refills: 1 | Status: SHIPPED | OUTPATIENT
Start: 2021-05-07 | End: 2021-12-21

## 2021-05-07 RX ORDER — METFORMIN HYDROCHLORIDE 1000 MG/1
1000 TABLET ORAL 2 TIMES DAILY
Qty: 180 TABLET | Refills: 1 | Status: SHIPPED | OUTPATIENT
Start: 2021-05-07 | End: 2021-12-21

## 2021-05-07 RX ORDER — LOSARTAN POTASSIUM AND HYDROCHLOROTHIAZIDE 25; 100 MG/1; MG/1
1 TABLET ORAL DAILY
Qty: 90 TABLET | Refills: 1 | Status: SHIPPED | OUTPATIENT
Start: 2021-05-07 | End: 2021-12-21

## 2021-05-07 RX ORDER — ATENOLOL 25 MG/1
25 TABLET ORAL DAILY
Qty: 90 TABLET | Refills: 1 | Status: SHIPPED | OUTPATIENT
Start: 2021-05-07 | End: 2021-11-11

## 2021-05-11 ENCOUNTER — TELEPHONE (OUTPATIENT)
Dept: FAMILY MEDICINE | Facility: CLINIC | Age: 76
End: 2021-05-11

## 2021-05-11 DIAGNOSIS — L29.9 ITCHING: ICD-10-CM

## 2021-05-11 DIAGNOSIS — I10 BENIGN HYPERTENSION: ICD-10-CM

## 2021-05-11 RX ORDER — GABAPENTIN 100 MG/1
400 CAPSULE ORAL NIGHTLY PRN
Qty: 150 CAPSULE | Refills: 1 | Status: SHIPPED | OUTPATIENT
Start: 2021-05-11 | End: 2021-12-21

## 2021-05-18 ENCOUNTER — HOSPITAL ENCOUNTER (OUTPATIENT)
Dept: RADIOLOGY | Facility: HOSPITAL | Age: 76
Discharge: HOME | End: 2021-05-18
Attending: INTERNAL MEDICINE
Payer: MEDICARE

## 2021-05-18 DIAGNOSIS — Z12.31 BREAST CANCER SCREENING BY MAMMOGRAM: ICD-10-CM

## 2021-05-18 PROCEDURE — 77067 SCR MAMMO BI INCL CAD: CPT | Mod: 26,,, | Performed by: RADIOLOGY

## 2021-05-18 PROCEDURE — 77063 MAMMO DIGITAL SCREENING BILAT WITH TOMO: ICD-10-PCS | Mod: 26,,, | Performed by: RADIOLOGY

## 2021-05-18 PROCEDURE — 77067 SCR MAMMO BI INCL CAD: CPT | Mod: TC,PO

## 2021-05-18 PROCEDURE — 77063 BREAST TOMOSYNTHESIS BI: CPT | Mod: 26,,, | Performed by: RADIOLOGY

## 2021-05-18 PROCEDURE — 77067 MAMMO DIGITAL SCREENING BILAT WITH TOMO: ICD-10-PCS | Mod: 26,,, | Performed by: RADIOLOGY

## 2021-08-04 ENCOUNTER — PATIENT OUTREACH (OUTPATIENT)
Dept: ADMINISTRATIVE | Facility: OTHER | Age: 76
End: 2021-08-04

## 2021-08-10 ENCOUNTER — OFFICE VISIT (OUTPATIENT)
Dept: OPTOMETRY | Facility: CLINIC | Age: 76
End: 2021-08-10
Payer: MEDICARE

## 2021-08-10 ENCOUNTER — LAB VISIT (OUTPATIENT)
Dept: LAB | Facility: HOSPITAL | Age: 76
End: 2021-08-10
Attending: INTERNAL MEDICINE
Payer: MEDICARE

## 2021-08-10 DIAGNOSIS — H52.7 REFRACTIVE ERROR: ICD-10-CM

## 2021-08-10 DIAGNOSIS — Z96.1 PSEUDOPHAKIA: ICD-10-CM

## 2021-08-10 DIAGNOSIS — E11.9 TYPE 2 DIABETES MELLITUS WITHOUT RETINOPATHY: Primary | ICD-10-CM

## 2021-08-10 LAB
ESTIMATED AVG GLUCOSE: 194 MG/DL (ref 68–131)
HBA1C MFR BLD: 8.4 % (ref 4–5.6)

## 2021-08-10 PROCEDURE — 3051F PR MOST RECENT HEMOGLOBIN A1C LEVEL 7.0 - < 8.0%: ICD-10-PCS | Mod: CPTII,S$GLB,, | Performed by: OPTOMETRIST

## 2021-08-10 PROCEDURE — 99499 UNLISTED E&M SERVICE: CPT | Mod: S$GLB,,, | Performed by: OPTOMETRIST

## 2021-08-10 PROCEDURE — 3288F PR FALLS RISK ASSESSMENT DOCUMENTED: ICD-10-PCS | Mod: CPTII,S$GLB,, | Performed by: OPTOMETRIST

## 2021-08-10 PROCEDURE — 2023F DILAT RTA XM W/O RTNOPTHY: CPT | Mod: CPTII,S$GLB,, | Performed by: OPTOMETRIST

## 2021-08-10 PROCEDURE — 1159F MED LIST DOCD IN RCRD: CPT | Mod: CPTII,S$GLB,, | Performed by: OPTOMETRIST

## 2021-08-10 PROCEDURE — 83036 HEMOGLOBIN GLYCOSYLATED A1C: CPT | Performed by: INTERNAL MEDICINE

## 2021-08-10 PROCEDURE — 3051F HG A1C>EQUAL 7.0%<8.0%: CPT | Mod: CPTII,S$GLB,, | Performed by: OPTOMETRIST

## 2021-08-10 PROCEDURE — 99999 PR PBB SHADOW E&M-EST. PATIENT-LVL II: ICD-10-PCS | Mod: PBBFAC,,, | Performed by: OPTOMETRIST

## 2021-08-10 PROCEDURE — 36415 COLL VENOUS BLD VENIPUNCTURE: CPT | Mod: PO | Performed by: INTERNAL MEDICINE

## 2021-08-10 PROCEDURE — 1160F PR REVIEW ALL MEDS BY PRESCRIBER/CLIN PHARMACIST DOCUMENTED: ICD-10-PCS | Mod: CPTII,S$GLB,, | Performed by: OPTOMETRIST

## 2021-08-10 PROCEDURE — 92015 DETERMINE REFRACTIVE STATE: CPT | Mod: S$GLB,,, | Performed by: OPTOMETRIST

## 2021-08-10 PROCEDURE — 1100F PTFALLS ASSESS-DOCD GE2>/YR: CPT | Mod: CPTII,S$GLB,, | Performed by: OPTOMETRIST

## 2021-08-10 PROCEDURE — 92014 PR EYE EXAM, EST PATIENT,COMPREHESV: ICD-10-PCS | Mod: S$GLB,,, | Performed by: OPTOMETRIST

## 2021-08-10 PROCEDURE — 1159F PR MEDICATION LIST DOCUMENTED IN MEDICAL RECORD: ICD-10-PCS | Mod: CPTII,S$GLB,, | Performed by: OPTOMETRIST

## 2021-08-10 PROCEDURE — 99999 PR PBB SHADOW E&M-EST. PATIENT-LVL II: CPT | Mod: PBBFAC,,, | Performed by: OPTOMETRIST

## 2021-08-10 PROCEDURE — 1100F PR PT FALLS ASSESS DOC 2+ FALLS/FALL W/INJURY/YR: ICD-10-PCS | Mod: CPTII,S$GLB,, | Performed by: OPTOMETRIST

## 2021-08-10 PROCEDURE — 92015 PR REFRACTION: ICD-10-PCS | Mod: S$GLB,,, | Performed by: OPTOMETRIST

## 2021-08-10 PROCEDURE — 99499 RISK ADDL DX/OHS AUDIT: ICD-10-PCS | Mod: S$GLB,,, | Performed by: OPTOMETRIST

## 2021-08-10 PROCEDURE — 3288F FALL RISK ASSESSMENT DOCD: CPT | Mod: CPTII,S$GLB,, | Performed by: OPTOMETRIST

## 2021-08-10 PROCEDURE — 1160F RVW MEDS BY RX/DR IN RCRD: CPT | Mod: CPTII,S$GLB,, | Performed by: OPTOMETRIST

## 2021-08-10 PROCEDURE — 92014 COMPRE OPH EXAM EST PT 1/>: CPT | Mod: S$GLB,,, | Performed by: OPTOMETRIST

## 2021-08-10 PROCEDURE — 2023F PR DILATED RETINAL EXAM W/O EVID OF RETINOPATHY: ICD-10-PCS | Mod: CPTII,S$GLB,, | Performed by: OPTOMETRIST

## 2021-08-11 ENCOUNTER — TELEPHONE (OUTPATIENT)
Dept: FAMILY MEDICINE | Facility: CLINIC | Age: 76
End: 2021-08-11

## 2021-08-13 PROBLEM — Z12.31 ENCOUNTER FOR SCREENING MAMMOGRAM FOR BREAST CANCER: Status: RESOLVED | Noted: 2021-05-07 | Resolved: 2021-08-13

## 2021-08-23 ENCOUNTER — OFFICE VISIT (OUTPATIENT)
Dept: FAMILY MEDICINE | Facility: CLINIC | Age: 76
End: 2021-08-23
Payer: MEDICARE

## 2021-08-23 VITALS
BODY MASS INDEX: 31.5 KG/M2 | OXYGEN SATURATION: 97 % | TEMPERATURE: 98 F | SYSTOLIC BLOOD PRESSURE: 132 MMHG | WEIGHT: 161.25 LBS | HEART RATE: 68 BPM | DIASTOLIC BLOOD PRESSURE: 78 MMHG

## 2021-08-23 DIAGNOSIS — Z71.89 ADVANCED DIRECTIVES, COUNSELING/DISCUSSION: ICD-10-CM

## 2021-08-23 DIAGNOSIS — Z12.11 COLON CANCER SCREENING: ICD-10-CM

## 2021-08-23 DIAGNOSIS — E78.5 HYPERLIPIDEMIA LDL GOAL <100: ICD-10-CM

## 2021-08-23 DIAGNOSIS — E53.8 VITAMIN B12 DEFICIENCY: ICD-10-CM

## 2021-08-23 DIAGNOSIS — E66.9 OBESITY (BMI 30-39.9): ICD-10-CM

## 2021-08-23 DIAGNOSIS — R15.9 INCONTINENCE OF FECES, UNSPECIFIED FECAL INCONTINENCE TYPE: ICD-10-CM

## 2021-08-23 DIAGNOSIS — E55.9 MILD VITAMIN D DEFICIENCY: ICD-10-CM

## 2021-08-23 DIAGNOSIS — I10 BENIGN HYPERTENSION: ICD-10-CM

## 2021-08-23 DIAGNOSIS — Z23 NEED FOR SHINGLES VACCINE: ICD-10-CM

## 2021-08-23 DIAGNOSIS — M81.8 OTHER OSTEOPOROSIS WITHOUT CURRENT PATHOLOGICAL FRACTURE: ICD-10-CM

## 2021-08-23 DIAGNOSIS — E05.90 SUBCLINICAL HYPERTHYROIDISM: ICD-10-CM

## 2021-08-23 PROCEDURE — 99214 OFFICE O/P EST MOD 30 MIN: CPT | Mod: S$GLB,,, | Performed by: INTERNAL MEDICINE

## 2021-08-23 PROCEDURE — 3008F PR BODY MASS INDEX (BMI) DOCUMENTED: ICD-10-PCS | Mod: CPTII,S$GLB,, | Performed by: INTERNAL MEDICINE

## 2021-08-23 PROCEDURE — 1160F RVW MEDS BY RX/DR IN RCRD: CPT | Mod: CPTII,S$GLB,, | Performed by: INTERNAL MEDICINE

## 2021-08-23 PROCEDURE — 3078F PR MOST RECENT DIASTOLIC BLOOD PRESSURE < 80 MM HG: ICD-10-PCS | Mod: CPTII,S$GLB,, | Performed by: INTERNAL MEDICINE

## 2021-08-23 PROCEDURE — 99999 PR PBB SHADOW E&M-EST. PATIENT-LVL III: ICD-10-PCS | Mod: PBBFAC,,, | Performed by: INTERNAL MEDICINE

## 2021-08-23 PROCEDURE — 1101F PR PT FALLS ASSESS DOC 0-1 FALLS W/OUT INJ PAST YR: ICD-10-PCS | Mod: CPTII,S$GLB,, | Performed by: INTERNAL MEDICINE

## 2021-08-23 PROCEDURE — 3288F PR FALLS RISK ASSESSMENT DOCUMENTED: ICD-10-PCS | Mod: CPTII,S$GLB,, | Performed by: INTERNAL MEDICINE

## 2021-08-23 PROCEDURE — 3052F HG A1C>EQUAL 8.0%<EQUAL 9.0%: CPT | Mod: CPTII,S$GLB,, | Performed by: INTERNAL MEDICINE

## 2021-08-23 PROCEDURE — 1126F PR PAIN SEVERITY QUANTIFIED, NO PAIN PRESENT: ICD-10-PCS | Mod: CPTII,S$GLB,, | Performed by: INTERNAL MEDICINE

## 2021-08-23 PROCEDURE — 1160F PR REVIEW ALL MEDS BY PRESCRIBER/CLIN PHARMACIST DOCUMENTED: ICD-10-PCS | Mod: CPTII,S$GLB,, | Performed by: INTERNAL MEDICINE

## 2021-08-23 PROCEDURE — 3008F BODY MASS INDEX DOCD: CPT | Mod: CPTII,S$GLB,, | Performed by: INTERNAL MEDICINE

## 2021-08-23 PROCEDURE — 1101F PT FALLS ASSESS-DOCD LE1/YR: CPT | Mod: CPTII,S$GLB,, | Performed by: INTERNAL MEDICINE

## 2021-08-23 PROCEDURE — 1159F PR MEDICATION LIST DOCUMENTED IN MEDICAL RECORD: ICD-10-PCS | Mod: CPTII,S$GLB,, | Performed by: INTERNAL MEDICINE

## 2021-08-23 PROCEDURE — 1159F MED LIST DOCD IN RCRD: CPT | Mod: CPTII,S$GLB,, | Performed by: INTERNAL MEDICINE

## 2021-08-23 PROCEDURE — 3075F SYST BP GE 130 - 139MM HG: CPT | Mod: CPTII,S$GLB,, | Performed by: INTERNAL MEDICINE

## 2021-08-23 PROCEDURE — 3072F LOW RISK FOR RETINOPATHY: CPT | Mod: CPTII,S$GLB,, | Performed by: INTERNAL MEDICINE

## 2021-08-23 PROCEDURE — 1126F AMNT PAIN NOTED NONE PRSNT: CPT | Mod: CPTII,S$GLB,, | Performed by: INTERNAL MEDICINE

## 2021-08-23 PROCEDURE — 3072F PR LOW RISK FOR RETINOPATHY: ICD-10-PCS | Mod: CPTII,S$GLB,, | Performed by: INTERNAL MEDICINE

## 2021-08-23 PROCEDURE — 3075F PR MOST RECENT SYSTOLIC BLOOD PRESS GE 130-139MM HG: ICD-10-PCS | Mod: CPTII,S$GLB,, | Performed by: INTERNAL MEDICINE

## 2021-08-23 PROCEDURE — 99214 PR OFFICE/OUTPT VISIT, EST, LEVL IV, 30-39 MIN: ICD-10-PCS | Mod: S$GLB,,, | Performed by: INTERNAL MEDICINE

## 2021-08-23 PROCEDURE — 99999 PR PBB SHADOW E&M-EST. PATIENT-LVL III: CPT | Mod: PBBFAC,,, | Performed by: INTERNAL MEDICINE

## 2021-08-23 PROCEDURE — 3078F DIAST BP <80 MM HG: CPT | Mod: CPTII,S$GLB,, | Performed by: INTERNAL MEDICINE

## 2021-08-23 PROCEDURE — 3288F FALL RISK ASSESSMENT DOCD: CPT | Mod: CPTII,S$GLB,, | Performed by: INTERNAL MEDICINE

## 2021-08-23 PROCEDURE — 3052F PR MOST RECENT HEMOGLOBIN A1C LEVEL 8.0 - < 9.0%: ICD-10-PCS | Mod: CPTII,S$GLB,, | Performed by: INTERNAL MEDICINE

## 2021-09-28 ENCOUNTER — IMMUNIZATION (OUTPATIENT)
Dept: OBSTETRICS AND GYNECOLOGY | Facility: CLINIC | Age: 76
End: 2021-09-28
Payer: MEDICARE

## 2021-09-28 DIAGNOSIS — Z23 NEED FOR VACCINATION: Primary | ICD-10-CM

## 2021-09-28 PROCEDURE — 0003A COVID-19, MRNA, LNP-S, PF, 30 MCG/0.3 ML DOSE VACCINE: CPT | Mod: HCNC,PBBFAC | Performed by: FAMILY MEDICINE

## 2021-09-28 PROCEDURE — 91300 COVID-19, MRNA, LNP-S, PF, 30 MCG/0.3 ML DOSE VACCINE: CPT | Mod: HCNC,PBBFAC | Performed by: FAMILY MEDICINE

## 2021-11-11 DIAGNOSIS — I10 BENIGN HYPERTENSION: ICD-10-CM

## 2021-11-11 RX ORDER — ATENOLOL 25 MG/1
TABLET ORAL
Qty: 90 TABLET | Refills: 0 | Status: SHIPPED | OUTPATIENT
Start: 2021-11-11 | End: 2021-12-21

## 2021-11-11 RX ORDER — AMLODIPINE BESYLATE 5 MG/1
TABLET ORAL
Qty: 90 TABLET | Refills: 0 | Status: SHIPPED | OUTPATIENT
Start: 2021-11-11 | End: 2021-12-21

## 2021-11-12 ENCOUNTER — TELEPHONE (OUTPATIENT)
Dept: FAMILY MEDICINE | Facility: CLINIC | Age: 76
End: 2021-11-12
Payer: MEDICARE

## 2021-11-12 ENCOUNTER — LAB VISIT (OUTPATIENT)
Dept: LAB | Facility: HOSPITAL | Age: 76
End: 2021-11-12
Attending: INTERNAL MEDICINE
Payer: MEDICARE

## 2021-11-12 LAB
ESTIMATED AVG GLUCOSE: 186 MG/DL (ref 68–131)
HBA1C MFR BLD: 8.1 % (ref 4–5.6)

## 2021-11-12 PROCEDURE — 83036 HEMOGLOBIN GLYCOSYLATED A1C: CPT | Performed by: INTERNAL MEDICINE

## 2021-11-12 PROCEDURE — 36415 COLL VENOUS BLD VENIPUNCTURE: CPT | Mod: PO | Performed by: INTERNAL MEDICINE

## 2021-11-23 DIAGNOSIS — I10 BENIGN HYPERTENSION: ICD-10-CM

## 2021-11-23 RX ORDER — GLIMEPIRIDE 4 MG/1
TABLET ORAL
Qty: 180 TABLET | Refills: 0 | Status: SHIPPED | OUTPATIENT
Start: 2021-11-23 | End: 2022-02-04

## 2021-12-17 DIAGNOSIS — L29.9 ITCHING: ICD-10-CM

## 2021-12-17 DIAGNOSIS — I10 BENIGN HYPERTENSION: ICD-10-CM

## 2021-12-17 DIAGNOSIS — E78.5 HYPERLIPIDEMIA LDL GOAL <100: ICD-10-CM

## 2021-12-17 DIAGNOSIS — K21.9 GASTROESOPHAGEAL REFLUX DISEASE WITHOUT ESOPHAGITIS: ICD-10-CM

## 2021-12-21 RX ORDER — METFORMIN HYDROCHLORIDE 1000 MG/1
TABLET ORAL
Qty: 180 TABLET | Refills: 0 | Status: SHIPPED | OUTPATIENT
Start: 2021-12-21 | End: 2022-03-29

## 2021-12-21 RX ORDER — OMEPRAZOLE 20 MG/1
CAPSULE, DELAYED RELEASE ORAL
Qty: 90 CAPSULE | Refills: 0 | Status: SHIPPED | OUTPATIENT
Start: 2021-12-21 | End: 2022-03-29

## 2021-12-21 RX ORDER — ROSUVASTATIN CALCIUM 5 MG/1
5 TABLET, COATED ORAL NIGHTLY
Qty: 90 TABLET | Refills: 0 | Status: SHIPPED | OUTPATIENT
Start: 2021-12-21 | End: 2022-03-29

## 2021-12-21 RX ORDER — ATENOLOL 25 MG/1
TABLET ORAL
Qty: 90 TABLET | Refills: 0 | Status: SHIPPED | OUTPATIENT
Start: 2021-12-21 | End: 2022-03-29

## 2021-12-21 RX ORDER — LOSARTAN POTASSIUM AND HYDROCHLOROTHIAZIDE 25; 100 MG/1; MG/1
TABLET ORAL
Qty: 90 TABLET | Refills: 0 | Status: SHIPPED | OUTPATIENT
Start: 2021-12-21 | End: 2022-03-29

## 2021-12-21 RX ORDER — AMLODIPINE BESYLATE 5 MG/1
TABLET ORAL
Qty: 90 TABLET | Refills: 0 | Status: SHIPPED | OUTPATIENT
Start: 2021-12-21 | End: 2022-03-29

## 2021-12-21 RX ORDER — GABAPENTIN 100 MG/1
CAPSULE ORAL
Qty: 150 CAPSULE | Refills: 0 | Status: SHIPPED | OUTPATIENT
Start: 2021-12-21 | End: 2022-03-29

## 2021-12-31 NOTE — TELEPHONE ENCOUNTER
New order placed.   Please advise patient that in the future, for 'fasting labs' she can drink all the water she wants/needs.   30-Dec-2021 01:48

## 2022-01-31 ENCOUNTER — PATIENT OUTREACH (OUTPATIENT)
Dept: ADMINISTRATIVE | Facility: HOSPITAL | Age: 77
End: 2022-01-31
Payer: MEDICARE

## 2022-01-31 RX ORDER — INFLUENZA VACCINE, ADJUVANTED 15; 15; 15; 15 UG/.5ML; UG/.5ML; UG/.5ML; UG/.5ML
INJECTION, SUSPENSION INTRAMUSCULAR
COMMUNITY
Start: 2021-10-14 | End: 2022-05-02

## 2022-01-31 NOTE — PROGRESS NOTES
[facility] Colorectal Screening gap report - no colonoscopy, FitKit, or Cologuard results found. Patient issued a FitKit, she will try to complete it.

## 2022-02-10 ENCOUNTER — LAB VISIT (OUTPATIENT)
Dept: LAB | Facility: HOSPITAL | Age: 77
End: 2022-02-10
Attending: INTERNAL MEDICINE
Payer: MEDICARE

## 2022-02-10 LAB
ALBUMIN SERPL BCP-MCNC: 4.1 G/DL (ref 3.5–5.2)
ALP SERPL-CCNC: 59 U/L (ref 55–135)
ALT SERPL W/O P-5'-P-CCNC: 19 U/L (ref 10–44)
ANION GAP SERPL CALC-SCNC: 17 MMOL/L (ref 8–16)
AST SERPL-CCNC: 19 U/L (ref 10–40)
BILIRUB SERPL-MCNC: 0.4 MG/DL (ref 0.1–1)
BUN SERPL-MCNC: 14 MG/DL (ref 8–23)
CALCIUM SERPL-MCNC: 10.1 MG/DL (ref 8.7–10.5)
CHLORIDE SERPL-SCNC: 101 MMOL/L (ref 95–110)
CO2 SERPL-SCNC: 25 MMOL/L (ref 23–29)
CREAT SERPL-MCNC: 0.7 MG/DL (ref 0.5–1.4)
EST. GFR  (AFRICAN AMERICAN): >60 ML/MIN/1.73 M^2
EST. GFR  (NON AFRICAN AMERICAN): >60 ML/MIN/1.73 M^2
ESTIMATED AVG GLUCOSE: 189 MG/DL (ref 68–131)
GLUCOSE SERPL-MCNC: 235 MG/DL (ref 70–110)
HBA1C MFR BLD: 8.2 % (ref 4–5.6)
POTASSIUM SERPL-SCNC: 4 MMOL/L (ref 3.5–5.1)
PROT SERPL-MCNC: 7 G/DL (ref 6–8.4)
SODIUM SERPL-SCNC: 143 MMOL/L (ref 136–145)

## 2022-02-10 PROCEDURE — 83036 HEMOGLOBIN GLYCOSYLATED A1C: CPT | Mod: HCNC | Performed by: INTERNAL MEDICINE

## 2022-02-10 PROCEDURE — 80053 COMPREHEN METABOLIC PANEL: CPT | Mod: HCNC | Performed by: INTERNAL MEDICINE

## 2022-02-10 PROCEDURE — 36415 COLL VENOUS BLD VENIPUNCTURE: CPT | Mod: HCNC,PO | Performed by: INTERNAL MEDICINE

## 2022-03-28 DIAGNOSIS — E78.5 HYPERLIPIDEMIA LDL GOAL <100: ICD-10-CM

## 2022-03-28 DIAGNOSIS — K21.9 GASTROESOPHAGEAL REFLUX DISEASE WITHOUT ESOPHAGITIS: ICD-10-CM

## 2022-03-28 DIAGNOSIS — I10 BENIGN HYPERTENSION: ICD-10-CM

## 2022-03-28 DIAGNOSIS — L29.9 ITCHING: ICD-10-CM

## 2022-03-29 RX ORDER — GABAPENTIN 100 MG/1
CAPSULE ORAL
Qty: 150 CAPSULE | Refills: 0 | Status: SHIPPED | OUTPATIENT
Start: 2022-03-29 | End: 2022-05-02 | Stop reason: SDUPTHER

## 2022-03-29 RX ORDER — OMEPRAZOLE 20 MG/1
CAPSULE, DELAYED RELEASE ORAL
Qty: 90 CAPSULE | Refills: 0 | Status: SHIPPED | OUTPATIENT
Start: 2022-03-29 | End: 2022-05-02 | Stop reason: SDUPTHER

## 2022-03-29 RX ORDER — AMLODIPINE BESYLATE 5 MG/1
TABLET ORAL
Qty: 90 TABLET | Refills: 1 | Status: SHIPPED | OUTPATIENT
Start: 2022-03-29 | End: 2022-05-02 | Stop reason: SDUPTHER

## 2022-03-29 RX ORDER — ROSUVASTATIN CALCIUM 5 MG/1
TABLET, COATED ORAL
Qty: 90 TABLET | Refills: 0 | Status: SHIPPED | OUTPATIENT
Start: 2022-03-29 | End: 2022-05-02 | Stop reason: SDUPTHER

## 2022-03-29 RX ORDER — METFORMIN HYDROCHLORIDE 1000 MG/1
TABLET ORAL
Qty: 180 TABLET | Refills: 0 | Status: SHIPPED | OUTPATIENT
Start: 2022-03-29 | End: 2022-05-02 | Stop reason: SDUPTHER

## 2022-03-29 RX ORDER — ATENOLOL 25 MG/1
TABLET ORAL
Qty: 90 TABLET | Refills: 1 | Status: SHIPPED | OUTPATIENT
Start: 2022-03-29 | End: 2022-05-02 | Stop reason: SDUPTHER

## 2022-03-29 RX ORDER — LOSARTAN POTASSIUM AND HYDROCHLOROTHIAZIDE 25; 100 MG/1; MG/1
TABLET ORAL
Qty: 90 TABLET | Refills: 1 | Status: SHIPPED | OUTPATIENT
Start: 2022-03-29 | End: 2022-05-02 | Stop reason: SDUPTHER

## 2022-03-29 RX ORDER — GLIMEPIRIDE 4 MG/1
TABLET ORAL
Qty: 180 TABLET | Refills: 0 | Status: SHIPPED | OUTPATIENT
Start: 2022-03-29 | End: 2022-05-02 | Stop reason: SDUPTHER

## 2022-03-29 NOTE — TELEPHONE ENCOUNTER
Care Due:                  Date            Visit Type   Department     Provider  --------------------------------------------------------------------------------                                 -         [facility]                              PRIMARY      MED/ INTERNAL  [facility]  Last Visit: 08-      CARE ([facility])   MED/ PEDS      Tayokeldelicia Garber                              [facility]                              PRIMARY      MED/ INTERNAL  Laurentia Chely  Next Visit: 05-      CARE ([facility])   MED/ PEDS      Tayokeler  Jcarlos                                                            Last  Test          Frequency    Reason                     Performed    Due Date  --------------------------------------------------------------------------------    Lipid Panel.  12 months..  rosuvastatin.............  05- 04-    Powered by [employer]. Reference number: [id number].   3/28/2022 8:02:16 PM CDT

## 2022-03-30 NOTE — TELEPHONE ENCOUNTER
Refill Routing Note   Medication(s) are not appropriate for processing by [facility] for the following reason(s):      - Outside of protocol  - Drug-Disease Interaction (prilosec and osteoporosis)  - Required indication for medication not on problem list (prilosec)    [facility] action(s):  Defer  Route  Approve Medication-related problems identified: Requires labs     Medication Therapy Plan: Defer prilosec - needs indication on problem list and drug-disease interaction. Route gabapentin - OOS  --->Care Gap information included in message below if applicable.   Medication reconciliation completed: No   Automatic Epic Generated Protocol Data:    Orders Placed This Encounter    metFORMIN (GLUCOPHAGE) 1000 MG tablet    rosuvastatin (CRESTOR) 5 MG tablet    losartan-hydrochlorothiazide 100-25 mg (HYZAAR) 100-25 mg per tablet    atenoloL (TENORMIN) 25 MG tablet    amLODIPine (NORVASC) 5 MG tablet    glimepiride (AMARYL) 4 MG tablet      Requested Prescriptions   Pending Prescriptions Disp Refills    gabapentin (NEURONTIN) 100 MG capsule [Pharmacy Med Name: GABAPENTIN 100 MG Capsule] 150 capsule 0     Sig: TAKE 4 CAPSULES NIGHTLY AS NEEDED (ITCHING/SLEEP)       Neurology: Anticonvulsants - gabapentin Failed - 3/28/2022  8:01 PM        Failed - This refill cannot be delegated        Passed - Valid encounter within last 12 months     Recent Visits  Date Type Provider Dept   08/23/21 Office Visit Rosa Garber MD [facility] Family Med/ Internal Med/ Peds   05/07/21 Office Visit Rosa Garber MD [facility]/ Internal Med/ Peds   11/04/20 Office Visit Rosa Garber MD [facility]/ Internal Med/ Peds   04/15/20 Office Visit Rosa Garber MD [facility]/ Internal Med/ Peds   Showing recent visits within past 720 days and meeting all other requirements  Future Appointments  No visits were found meeting these conditions.  Showing future appointments within next 150 days and meeting all other  requirements      Future Appointments              In 1 month Rosa Garber MD [facility] [place]                Passed - Cr within 360 days     Lab Results   Component Value Date    CREATININE 0.7 02/10/2022    CREATININE 0.6 10/12/2020    CREATININE 0.8 05/13/2020              Passed - eGFR within 360 days     Lab Results   Component Value Date    EGFRNONAA >60.0 02/10/2022    EGFRNONAA >60.0 10/12/2020    EGFRNONAA >60.0 05/13/2020                  omeprazole (PRILOSEC) 20 MG capsule [Pharmacy Med Name: OMEPRAZOLE 20 MG Capsule Delayed Release] 90 capsule 1     Sig: TAKE 1 CAPSULE DAILY ONLY AS NEEDED FOR HEARTBURN       Gastroenterology: Proton Pump Inhibitors Failed - 3/29/2022  7:37 PM        Failed - Osteoporosis is not on problem list        Failed - An appropriate indication is on the problem list        Failed - Matches previous order       Previous Authorizing Provider: Rosa Garber MD (metFORMIN (GLUCOPHAGE) 1000 MG tablet)  Previous Authorizing Provider: Rosa Garber MD (rosuvastatin (CRESTOR) 5 MG tablet)  Previous Authorizing Provider: Rosa Garber MD (losartan-hydrochlorothiazide 100-25 mg (HYZAAR) 100-25 mg per tablet)  Previous Authorizing Provider: Rosa Garber MD (atenoloL (TENORMIN) 25 MG tablet)  Previous Authorizing Provider: Rosa Garber MD (amLODIPine (NORVASC) 5 MG tablet)  Previous Authorizing Provider: Rosa Garber MD (gabapentin (NEURONTIN) 100 MG capsule)  Previous Authorizing Provider: Rosa Garber MD (omeprazole (PRILOSEC) 20 MG capsule)  Previous Authorizing Provider: Rosa Garber MD (glimepiride (AMARYL) 4 MG tablet)  Previous Pharmacy: [facility] [address]  Previous Pharmacy: [facility] Mail [address]  Previous Pharmacy: [facility] [address]  Previous Pharmacy: [facility]  Delivery - [facility] [address]   Previous Pharmacy: [facility] [place] - [place], [place] - [address]  Previous Pharmacy[facility] [address]   Previous Pharmacy: [facility] [place], [place] - [address] [facility]  Previous Pharmacy: [facility] [address]             Passed - Patient is at least 18 years old        Passed - Plavix is not on active medication list        Passed - Valid encounter within last 15 months     Recent Visits  Date Type Provider Dept   08/23/21 Office Visit Rosa Garber MD [facility] Family Med/ Internal Med/ Peds   05/07/21 Office Visit Rosa Garber MD [facility] Family Med/ Internal Med/ Peds   11/04/20 Office Visit Rosa Garber MD [facility] Family Med/ Internal Med/ Peds   04/15/20 Office Visit Rosa Garber MD [facility] Family Med/ Internal Med/ Peds   Showing recent visits within past 720 days and meeting all other requirements  Future Appointments  No visits were found meeting these conditions.  Showing future appointments within next 150 days and meeting all other requirements      Future Appointments              In 1 month Rosa Garber MD [facility]                Passed - No ED/Hospital visits since last PCP visit     Last PCP Visit: 8/23/2021 Last Admission: 7/14/2020 Last ED Visit:            Signed Prescriptions Disp Refills    metFORMIN (GLUCOPHAGE) 1000 MG tablet 180 tablet 0     Sig: TAKE 1 TABLET TWICE DAILY       Endocrinology:  Diabetes - Biguanides Passed - 3/29/2022  7:35 PM        Passed - Patient is at least 18 years old        Passed - Valid encounter within last 15 months     Recent Visits  Date Type Provider Dept   08/23/21 Office Visit Rosa Garber MD [facility] Family Med/ Internal Med/ Peds   05/07/21 Office Visit Rosa Garber MD [facility] Family Med/ Internal Med/ Peds   11/04/20 Office Visit Rosa  ATTILA Garber MD [facility] Family Med/ Internal Med/ Peds   04/15/20 Office Visit Rosa Garber MD [facility] Family Med/ Internal Med/ Peds   Showing recent visits within past 720 days and meeting all other requirements  Future Appointments  No visits were found meeting these conditions.  Showing future appointments within next 150 days and meeting all other requirements      Future Appointments              In 1 month [facility] Garber MD [facility] [place]                Passed - Matches previous order       Previous Authorizing Provider: Rosa Garber MD (metFORMIN (GLUCOPHAGE) 1000 MG tablet)  Previous Authorizing Provider: Rosa Garber MD (rosuvastatin (CRESTOR) 5 MG tablet)  Previous Authorizing Provider: Rosa Garber MD (losartan-hydrochlorothiazide 100-25 mg (HYZAAR) 100-25 mg per tablet)  Previous Authorizing Provider: Rosa Garber MD (atenoloL (TENORMIN) 25 MG tablet)  Previous Authorizing Provider: Rosa Garber MD (amLODIPine (NORVASC) 5 MG tablet)  Previous Authorizing Provider: Rosa Garber MD (gabapentin (NEURONTIN) 100 MG capsule)  Previous Authorizing Provider: Rosa Garber MD (omeprazole (PRILOSEC) 20 MG capsule)  Previous Authorizing Provider: Rosa Garber MD (glimepiride (AMARYL) 4 MG tablet)  Previous Pharmacy: [facility] [place] [address]   [facility] [place] [address] [facility]  Previous Pharmacy: [facility] [place] [phone]   Previous Pharmacy: [facility] [place] [address]  Previous Pharmacy: [facility] [place] [address]  Previous Pharmacy: [facility] [place] [phone]  Previous Pharmacy: [facility] [place] [phone] [facility]  Previous Pharmacy: [facility]  [facility] [address]            Passed - No ED/Hospital visits since last PCP visit     Last PCP Visit: 8/23/2021 Last Admission: 7/14/2020 Last ED Visit:           Passed - Cr is 1.39 or below and within 360 days     Lab Results   Component Value Date    CREATININE 0.7 02/10/2022    CREATININE 0.6 10/12/2020    CREATININE 0.8 05/13/2020              Passed - HBA1C within 180 days     Lab Results   Component Value Date    HGBA1C 8.2 (H) 02/10/2022    HGBA1C 8.1 (H) 11/12/2021    HGBA1C 8.4 (H) 08/10/2021              Passed - eGFR is 45 or above and within 360 days     Lab Results   Component Value Date    EGFRNONAA >60.0 02/10/2022    EGFRNONAA >60.0 10/12/2020    EGFRNONAA >60.0 05/13/2020                  rosuvastatin (CRESTOR) 5 MG tablet 90 tablet 0     Sig: TAKE 1 TABLET EVERY EVENING.       Cardiovascular:  Antilipid - Statins Passed - 3/29/2022  7:35 PM        Passed - Patient is at least 18 years old        Passed - Valid encounter within last 15 months     Recent Visits  Date Type Provider Dept   08/23/21 Office Visit Rosa Garber MD [facility] Family Med/ Internal Med/ Peds   05/07/21 Office Visit Rosa Garber MD [facility] Family Med/ Internal Med/ Peds   11/04/20 Office Visit Rosa Garber MD [facility] Family Med/ Internal Med/ Peds   04/15/20 Office Visit Rosa Garber MD [facility] Family Med/ Internal Med/ Peds   Showing recent visits within past 720 days and meeting all other requirements  Future Appointments  No visits were found meeting these conditions.  Showing future appointments within next 150 days and meeting all other requirements      Future Appointments              In 1 month Rosa Garber MD [facility]                Passed - Matches previous order       Previous Authorizing Provider: Rosa Garber MD (metFORMIN (GLUCOPHAGE) 1000 MG tablet)  Previous Authorizing Provider: Rosa Garber MD (rosuvastatin (CRESTOR) 5 MG tablet)  Previous  Authorizing Provider: Rosa Garber MD (losartan-hydrochlorothiazide 100-25 mg (HYZAAR) 100-25 mg per tablet)  Previous Authorizing Provider: Rosa Garber MD (atenoloL (TENORMIN) 25 MG tablet)  Previous Authorizing Provider: Rosa Garber MD (amLODIPine (NORVASC) 5 MG tablet)  Previous Authorizing Provider: Rosa Garber MD (gabapentin (NEURONTIN) 100 MG capsule)  Previous Authorizing Provider: Rosa Garber MD (omeprazole (PRILOSEC) 20 MG capsule)  Previous Authorizing Provider: Rosa Garber MD (glimepiride (AMARYL) 4 MG tablet)  Previous Pharmacy[facility] [address]   [facility] [address]   Previous Pharmacy: [facility] [address] [facility]  Previous Pharmacy: [facility] [address]   [facility] [address]   Previous Pharmacy: [facility] [address] [facility]  [facility]: [facility] [address]   Previous Pharmacy: [facility] [address] [facility]             Passed - No ED/Hospital visits since last PCP visit     Last PCP Visit: 8/23/2021 Last Admission: 7/14/2020 Last ED Visit:           Passed - ALT is 131 or below and within 360 days     ALT   Date Value Ref Range Status   02/10/2022 19 10 - 44 U/L Final   10/12/2020 19 10 - 44 U/L Final   05/13/2020 20 10 - 44 U/L Final              Passed - AST is 119 or below and within 360 days     AST   Date Value Ref Range Status   02/10/2022 19 10 - 40 U/L Final   10/12/2020 15 10 - 40 U/L Final   05/13/2020 17 10 - 40 U/L Final              Passed - Total Cholesterol within 360 days     Lab Results   Component Value Date    CHOL 106 (L) 05/04/2021    CHOL 116 (L) 05/13/2020     CHOL 107 (L) 05/15/2019              Passed - LDL within 360 days     LDL Cholesterol   Date Value Ref Range Status   05/04/2021 50.8 (L) 63.0 - 159.0 mg/dL Final     Comment:     The National Cholesterol Education Program (NCEP) has set the  following guidelines (reference values) for LDL Cholesterol:  Optimal.......................<130 mg/dL  Borderline High...............130-159 mg/dL  High..........................160-189 mg/dL  Very High.....................>190 mg/dL              Passed - HDL within 360 days     HDL   Date Value Ref Range Status   05/04/2021 40 40 - 75 mg/dL Final     Comment:     The National Cholesterol Education Program (NCEP) has set the  following guidelines (reference values) for HDL Cholesterol:  Low...............<40 mg/dL  Optimal...........>60 mg/dL              Passed - Triglycerides within 360 days     Lab Results   Component Value Date    TRIG 76 05/04/2021    TRIG 81 05/13/2020    TRIG 75 05/15/2019                losartan-hydrochlorothiazide 100-25 mg (HYZAAR) 100-25 mg per tablet 90 tablet 1     Sig: TAKE 1 TABLET EVERY DAY       Cardiovascular: ARB + Diuretic Combos Passed - 3/29/2022  7:35 PM        Passed - Patient is at least 18 years old        Passed - Last BP in normal range within 360 days     BP Readings from Last 3 Encounters:   08/23/21 132/78   05/07/21 118/72   04/01/21 132/64               Passed - Valid encounter within last 15 months     Recent Visits  Date Type Provider Dept   08/23/21 Office Visit Rosa Garber MD [facility] Family Med/ Internal Med/ Peds   05/07/21 Office Visit Rosa Garber MD [facility] Family Med/ Internal Med/ Peds   11/04/20 Office Visit Rosa Garber MD [facility] Family Med/ Internal Med/ Peds   04/15/20 Office Visit Rosa Garber MD [facility] Family Med/ Internal Med/ Peds   Showing recent visits within past 720 days and meeting all other requirements  Future Appointments  No visits were found meeting these conditions.  Showing future  appointments within next 150 days and meeting all other requirements      Future Appointments              In 1 month Rosa Garber MD [facility]                Passed - Matches previous order       Previous Authorizing Provider: Rosa Garber MD (metFORMIN (GLUCOPHAGE) 1000 MG tablet)  Previous Authorizing Provider: Rosa Garber MD (rosuvastatin (CRESTOR) 5 MG tablet)  Previous Authorizing Provider: Rsoa Garber MD (losartan-hydrochlorothiazide 100-25 mg (HYZAAR) 100-25 mg per tablet)  Previous Authorizing Provider: Rosa Garber MD (atenoloL (TENORMIN) 25 MG tablet)  Previous Authorizing Provider: Rosa Garber MD (amLODIPine (NORVASC) 5 MG tablet)  Previous Authorizing Provider: Rosa Garber MD (gabapentin (NEURONTIN) 100 MG capsule)  Previous Authorizing Provider: Rosa Garber MD (omeprazole (PRILOSEC) 20 MG capsule)  Previous Authorizing Provider: Rosa Garber MD (glimepiride (AMARYL) 4 MG tablet)  Previous Pharmacy: [facility] [address]  Previous Pharmacy: [facility] [address]  Previous Pharmacy: [facility] [address]  Previous Pharmacy: [facility] [address]  Previous Pharmacy: [facility] [address]  Previous Pharmacy: [facility] [address]  Previous Pharmacy: [facility] [address]  Previous Pharmacy: [facility] [address]            Passed - No ED/Hospital visits since last PCP visit     Last PCP Visit: 8/23/2021 Last Admission: 7/14/2020 Last ED Visit:           Passed - K in normal range and within 360 days     Potassium   Date Value Ref Range Status   02/10/2022 4.0 3.5 - 5.1  mmol/L Final   10/12/2020 3.9 3.5 - 5.1 mmol/L Final   05/13/2020 5.3 (H) 3.5 - 5.1 mmol/L Final     Comment:     *No Visible Hemolysis              Passed - Na is between 130 and 148 and within 360 days     Sodium   Date Value Ref Range Status   02/10/2022 143 136 - 145 mmol/L Final   10/12/2020 139 136 - 145 mmol/L Final   05/13/2020 138 136 - 145 mmol/L Final              Passed - Cr is 1.39 or below and within 360 days     Lab Results   Component Value Date    CREATININE 0.7 02/10/2022    CREATININE 0.6 10/12/2020    CREATININE 0.8 05/13/2020              Passed - eGFR within 360 days     Lab Results   Component Value Date    EGFRNONAA >60.0 02/10/2022    EGFRNONAA >60.0 10/12/2020    EGFRNONAA >60.0 05/13/2020                  atenoloL (TENORMIN) 25 MG tablet 90 tablet 1     Sig: TAKE 1 TABLET EVERY DAY       Cardiovascular:  Beta Blockers Passed - 3/29/2022  7:36 PM        Passed - Patient is at least 18 years old        Passed - Last BP in normal range within 360 days     BP Readings from Last 3 Encounters:   08/23/21 132/78   05/07/21 118/72   04/01/21 132/64               Passed - Last Heart Rate in normal range within 360 days     Pulse Readings from Last 1 Encounters:   08/23/21 68              Passed - Valid encounter within last 15 months     Recent Visits  Date Type Provider Dept   08/23/21 Office Visit Rosa Garber MD [facility] Family Med/ Internal Med/ Peds   05/07/21 Office Visit Rosa Garber MD [facility] Family Med/ Internal Med/ Peds   11/04/20 Office Visit Rosa Garber MD [facility] Family Med/ Internal Med/ Peds   04/15/20 Office Visit Rosa Garber MD [facility] Family Med/ Internal Med/ Peds   Showing recent visits within past 720 days and meeting all other requirements  Future Appointments  No visits were found meeting these conditions.  Showing future appointments within next 150 days and meeting all other requirements      Future Appointments              In 1 month Rosa Garber MD  [facility]                Passed - Matches previous order       Previous Authorizing Provider: Rosa Garber MD (metFORMIN (GLUCOPHAGE) 1000 MG tablet)  Previous Authorizing Provider: Rosa Garber MD (rosuvastatin (CRESTOR) 5 MG tablet)  Previous Authorizing Provider: Rosa Garber MD (losartan-hydrochlorothiazide 100-25 mg (HYZAAR) 100-25 mg per tablet)  Previous Authorizing Provider: Rosa Garber MD (atenoloL (TENORMIN) 25 MG tablet)  Previous Authorizing Provider: Rosa Garber MD (amLODIPine (NORVASC) 5 MG tablet)  Previous Authorizing Provider: Rosa Garber MD (gabapentin (NEURONTIN) 100 MG capsule)  Previous Authorizing Provider: Rosa Garber MD (omeprazole (PRILOSEC) 20 MG capsule)  Previous Authorizing Provider: Rosa Garber MD (glimepiride (AMARYL) 4 MG tablet)  Previous Pharmacy: [facility] [address]  Previous Pharmacy: [facility] [address]  Previous Pharmacy: [facility] [address]  Previous Pharmacy: [facility] [place] [phone] [facility]  Previous Pharmacy: [facility] [place] [phone] [facility]  Previous Pharmacy: [facility] [address]  Previous Pharmacy: [facility] [address]  Previous Pharmacy: [facility] [address]            Passed - No ED/Hospital visits since last PCP visit     Last PCP Visit: 8/23/2021 Last Admission: 7/14/2020 Last ED Visit:             amLODIPine (NORVASC) 5 MG tablet 90 tablet 1     Sig: TAKE 1 TABLET EVERY DAY       Cardiovascular:  Calcium Channel Blockers Passed - 3/29/2022  7:36 PM        Passed - Patient is at least 18 years old        Passed - Last BP in normal range within 360  days     BP Readings from Last 3 Encounters:   08/23/21 132/78   05/07/21 118/72   04/01/21 132/64               Passed - Valid encounter within last 15 months     Recent Visits  Date Type Provider Dept   08/23/21 Office Visit Rosa Garber MD [facility] Family Med/ Internal Med/ Peds   05/07/21 Office Visit Rosa Garber MD [facility] Family Med/ Internal Med/ Peds   11/04/20 Office Visit Rosa Garber MD [facility] Family Med/ Internal Med/ Peds   04/15/20 Office Visit Rosa Garber MD [facility] Family Med/ Internal Med/ Peds   Showing recent visits within past 720 days and meeting all other requirements  Future Appointments  No visits were found meeting these conditions.  Showing future appointments within next 150 days and meeting all other requirements      Future Appointments              In 1 month [facility]                Passed - Matches previous order       Previous Authorizing Provider: Rosa Garber MD (metFORMIN (GLUCOPHAGE) 1000 MG tablet)  Previous Authorizing Provider: Rosa Garber MD (rosuvastatin (CRESTOR) 5 MG tablet)  Previous Authorizing Provider: Rosa Garber MD (losartan-hydrochlorothiazide 100-25 mg (HYZAAR) 100-25 mg per tablet)  Previous Authorizing Provider: Rosa Garber MD (atenoloL (TENORMIN) 25 MG tablet)  Previous Authorizing Provider: Rosa Garber MD (amLODIPine (NORVASC) 5 MG tablet)  Previous Authorizing Provider: Rosa Garber MD (gabapentin (NEURONTIN) 100 MG capsule)  Previous Authorizing Provider: Rosa Garber MD (omeprazole (PRILOSEC) 20 MG capsule)  Previous Authorizing Provider: Rosa Garber MD (glimepiride (AMARYL) 4 MG tablet)  Previous Pharmacy: [facility] [address] [place] [address]  Previous Pharmacy: [facility] [place] [address] [facility]  Previous Pharmacy: [facility] [place] [address] [facility]  Previous  Pharmacy: [facility] [address] [facility]  Previous Pharmacy: [facility] [place] [phone]   Previous Pharmacy: [facility] [place] [address] [facility]  Previous Pharmacy: [facility] [place] [phone] [facility]  Previous Pharmacy: [facility] [address]            Passed - No ED/Hospital visits since last PCP visit     Last PCP Visit: 8/23/2021 Last Admission: 7/14/2020 Last ED Visit:             glimepiride (AMARYL) 4 MG tablet 180 tablet 0     Sig: TAKE 1 TABLET TWO TIMES DAILY BEFORE A MEAL (HOLD FOR BLOOD SUGAR LESS THAN 100)       Endocrinology:  Diabetes - Sulfonylureas Passed - 3/29/2022  7:37 PM        Passed - Patient is at least 18 years old        Passed - Valid encounter within last 15 months     Recent Visits  Date Type Provider Dept   08/23/21 Office Visit Rosa Garber MD [facility] Family Med/ Internal Med/ Peds   05/07/21 Office Visit Rosa Garber MD [facility] Family Med/ Internal Med/ Peds   11/04/20 Office Visit Rosa Garber MD [facility] Family Med/ Internal Med/ Peds   04/15/20 Office Visit Rosa Garber MD [facility] Family Med/ Internal Med/ Peds   Showing recent visits within past 720 days and meeting all other requirements  Future Appointments  No visits were found meeting these conditions.  Showing future appointments within next 150 days and meeting all other requirements      Future Appointments              In 1 month Rosa Garber MD [facility]                Passed - Matches previous order       Previous Authorizing Provider: Rosa Garber MD (metFORMIN (GLUCOPHAGE) 1000 MG tablet)  Previous Authorizing Provider: Rosa Garber MD (rosuvastatin (CRESTOR) 5 MG tablet)  Previous Authorizing Provider: Rosa Garber MD (losartan-hydrochlorothiazide 100-25 mg (HYZAAR) 100-25 mg per  tablet)  Previous Authorizing Provider: Rosa Garber MD (atenoloL (TENORMIN) 25 MG tablet)  Previous Authorizing Provider: Rosa Garber MD (amLODIPine (NORVASC) 5 MG tablet)  Previous Authorizing Provider: Rosa Garber MD (gabapentin (NEURONTIN) 100 MG capsule)  Previous Authorizing Provider: Rosa Garber MD (omeprazole (PRILOSEC) 20 MG capsule)  Previous Authorizing Provider: Rosa Garber MD (glimepiride (AMARYL) 4 MG tablet)  Previous Pharmacy: [facility], [place] - [address]  Previous Pharmacy: [facility], [place] - [address]   Previous Pharmacy: [facility], [place] - [address]   Previous Pharmacy: [facility], [place] - [address]  Previous Pharmacy: [facility], [place] - [address]   Previous Pharmacy: [facility], [place] - [address]  Previous Pharmacy: [facility], [place] - [address]  Previous Pharmacy: [facility] - [place], [place] - [address]             Passed - No ED/Hospital visits since last PCP visit     Last PCP Visit: 8/23/2021 Last Admission: 7/14/2020 Last ED Visit:           Passed - HBA1C within 180 days     Lab Results   Component Value Date    HGBA1C 8.2 (H) 02/10/2022    HGBA1C 8.1 (H) 11/12/2021    HGBA1C 8.4 (H) 08/10/2021              Passed - Cr is 1.39 or below and within 360 days     Lab Results   Component Value Date    CREATININE 0.7 02/10/2022    CREATININE 0.6 10/12/2020    CREATININE 0.8 05/13/2020              Passed - eGFR is 59 or above and within 360 days     Lab Results   Component Value Date    EGFRNONAA >60.0 02/10/2022    EGFRNONAA >60.0 10/12/2020    EGFRNONAA >60.0 05/13/2020                      Appointments  past 12m or future 3m with PCP    Date Provider   Last Visit   8/23/2021  Rosa Garber MD   Next Visit   5/2/2022 Rosa Garber MD   ED visits in past 90 days: 0        Note composed:7:40 PM 03/29/2022

## 2022-04-01 ENCOUNTER — IMMUNIZATION (OUTPATIENT)
Dept: OBSTETRICS AND GYNECOLOGY | Facility: CLINIC | Age: 77
End: 2022-04-01
Payer: MEDICARE

## 2022-04-01 DIAGNOSIS — Z23 NEED FOR VACCINATION: Primary | ICD-10-CM

## 2022-04-01 PROCEDURE — 0054A COVID-19, MRNA, LNP-S, PF, 30 MCG/0.3 ML DOSE VACCINE (PFIZER): CPT | Mod: PBBFAC | Performed by: FAMILY MEDICINE

## 2022-04-20 ENCOUNTER — TELEPHONE (OUTPATIENT)
Dept: FAMILY MEDICINE | Facility: CLINIC | Age: 77
End: 2022-04-20
Payer: MEDICARE

## 2022-04-20 NOTE — TELEPHONE ENCOUNTER
----- Message from Deepika Simms sent at 4/18/2022 12:06 PM CDT -----  Type: Patient Call Back    Who called: self     What is the request in detail: Patient would like to speak with a nurse regarding her medications. Did not go into further detail.     Can the clinic reply by [facility]? No     Would the patient rather a call back or a response via [facility]? Call back     Best call back number: [phone]

## 2022-04-20 NOTE — TELEPHONE ENCOUNTER
Spoke to patient and she sts that she was having a problem with receiving her med from [facility]. She just received it just yesterday. She sts that she will talk to Dr. Garber concerning receiving her meds on time.

## 2022-04-22 ENCOUNTER — PATIENT MESSAGE (OUTPATIENT)
Dept: FAMILY MEDICINE | Facility: CLINIC | Age: 77
End: 2022-04-22
Payer: MEDICARE

## 2022-04-22 DIAGNOSIS — E78.5 HYPERLIPIDEMIA LDL GOAL <100: ICD-10-CM

## 2022-04-22 DIAGNOSIS — E05.90 SUBCLINICAL HYPERTHYROIDISM: ICD-10-CM

## 2022-04-22 DIAGNOSIS — Z78.0 MENOPAUSE: ICD-10-CM

## 2022-04-22 DIAGNOSIS — I10 BENIGN HYPERTENSION: ICD-10-CM

## 2022-04-25 ENCOUNTER — LAB VISIT (OUTPATIENT)
Dept: LAB | Facility: HOSPITAL | Age: 77
End: 2022-04-25
Attending: INTERNAL MEDICINE
Payer: MEDICARE

## 2022-04-25 DIAGNOSIS — E05.90 SUBCLINICAL HYPERTHYROIDISM: ICD-10-CM

## 2022-04-25 DIAGNOSIS — E78.5 HYPERLIPIDEMIA LDL GOAL <100: ICD-10-CM

## 2022-04-25 DIAGNOSIS — I10 BENIGN HYPERTENSION: ICD-10-CM

## 2022-04-25 LAB
BASOPHILS # BLD AUTO: 0.13 K/UL (ref 0–0.2)
BASOPHILS NFR BLD: 1.4 % (ref 0–1.9)
CHOLEST SERPL-MCNC: 105 MG/DL (ref 120–199)
CHOLEST/HDLC SERPL: 2.6 {RATIO} (ref 2–5)
DIFFERENTIAL METHOD: ABNORMAL
EOSINOPHIL # BLD AUTO: 0.4 K/UL (ref 0–0.5)
EOSINOPHIL NFR BLD: 4.2 % (ref 0–8)
ERYTHROCYTE [DISTWIDTH] IN BLOOD BY AUTOMATED COUNT: 13 % (ref 11.5–14.5)
ESTIMATED AVG GLUCOSE: 197 MG/DL (ref 68–131)
HBA1C MFR BLD: 8.5 % (ref 4–5.6)
HCT VFR BLD AUTO: 43.9 % (ref 37–48.5)
HDLC SERPL-MCNC: 40 MG/DL (ref 40–75)
HDLC SERPL: 38.1 % (ref 20–50)
HGB BLD-MCNC: 14.3 G/DL (ref 12–16)
IMM GRANULOCYTES # BLD AUTO: 0.06 K/UL (ref 0–0.04)
IMM GRANULOCYTES NFR BLD AUTO: 0.6 % (ref 0–0.5)
LDLC SERPL CALC-MCNC: 44.8 MG/DL (ref 63–159)
LYMPHOCYTES # BLD AUTO: 3.5 K/UL (ref 1–4.8)
LYMPHOCYTES NFR BLD: 37.2 % (ref 18–48)
MCH RBC QN AUTO: 27.3 PG (ref 27–31)
MCHC RBC AUTO-ENTMCNC: 32.6 G/DL (ref 32–36)
MCV RBC AUTO: 84 FL (ref 82–98)
MONOCYTES # BLD AUTO: 0.7 K/UL (ref 0.3–1)
MONOCYTES NFR BLD: 7.3 % (ref 4–15)
NEUTROPHILS # BLD AUTO: 4.6 K/UL (ref 1.8–7.7)
NEUTROPHILS NFR BLD: 49.3 % (ref 38–73)
NONHDLC SERPL-MCNC: 65 MG/DL
NRBC BLD-RTO: 0 /100 WBC
PLATELET # BLD AUTO: 320 K/UL (ref 150–450)
PMV BLD AUTO: 12.5 FL (ref 9.2–12.9)
RBC # BLD AUTO: 5.23 M/UL (ref 4–5.4)
T4 FREE SERPL-MCNC: 0.99 NG/DL (ref 0.71–1.51)
TRIGL SERPL-MCNC: 101 MG/DL (ref 30–150)
TSH SERPL DL<=0.005 MIU/L-ACNC: 4.07 UIU/ML (ref 0.4–4)
WBC # BLD AUTO: 9.3 K/UL (ref 3.9–12.7)

## 2022-04-25 PROCEDURE — 80061 LIPID PANEL: CPT | Performed by: INTERNAL MEDICINE

## 2022-04-25 PROCEDURE — 83036 HEMOGLOBIN GLYCOSYLATED A1C: CPT | Performed by: INTERNAL MEDICINE

## 2022-04-25 PROCEDURE — 85025 COMPLETE CBC W/AUTO DIFF WBC: CPT | Performed by: INTERNAL MEDICINE

## 2022-04-25 PROCEDURE — 84443 ASSAY THYROID STIM HORMONE: CPT | Performed by: INTERNAL MEDICINE

## 2022-04-25 PROCEDURE — 36415 COLL VENOUS BLD VENIPUNCTURE: CPT | Mod: PO | Performed by: INTERNAL MEDICINE

## 2022-04-25 PROCEDURE — 84439 ASSAY OF FREE THYROXINE: CPT | Performed by: INTERNAL MEDICINE

## 2022-05-02 ENCOUNTER — HOSPITAL ENCOUNTER (OUTPATIENT)
Dept: RADIOLOGY | Facility: CLINIC | Age: 77
Discharge: HOME | End: 2022-05-02
Attending: INTERNAL MEDICINE
Payer: MEDICARE

## 2022-05-02 ENCOUNTER — OFFICE VISIT (OUTPATIENT)
Dept: FAMILY MEDICINE | Facility: CLINIC | Age: 77
End: 2022-05-02
Payer: MEDICARE

## 2022-05-02 VITALS
WEIGHT: 158.81 LBS | SYSTOLIC BLOOD PRESSURE: 118 MMHG | DIASTOLIC BLOOD PRESSURE: 62 MMHG | TEMPERATURE: 98 F | BODY MASS INDEX: 29.98 KG/M2 | OXYGEN SATURATION: 96 % | HEART RATE: 67 BPM | HEIGHT: 61 IN

## 2022-05-02 DIAGNOSIS — E66.9 OBESITY (BMI 30-39.9): ICD-10-CM

## 2022-05-02 DIAGNOSIS — Z00.00 ROUTINE MEDICAL EXAM: Primary | ICD-10-CM

## 2022-05-02 DIAGNOSIS — E05.90 SUBCLINICAL HYPERTHYROIDISM: ICD-10-CM

## 2022-05-02 DIAGNOSIS — K21.9 GASTROESOPHAGEAL REFLUX DISEASE WITHOUT ESOPHAGITIS: ICD-10-CM

## 2022-05-02 DIAGNOSIS — Z23 NEED FOR SHINGLES VACCINE: ICD-10-CM

## 2022-05-02 DIAGNOSIS — Z12.11 COLON CANCER SCREENING: ICD-10-CM

## 2022-05-02 DIAGNOSIS — I10 BENIGN HYPERTENSION: ICD-10-CM

## 2022-05-02 DIAGNOSIS — Z78.0 MENOPAUSE: ICD-10-CM

## 2022-05-02 DIAGNOSIS — L29.9 ITCHING: ICD-10-CM

## 2022-05-02 DIAGNOSIS — E78.5 HYPERLIPIDEMIA LDL GOAL <100: ICD-10-CM

## 2022-05-02 DIAGNOSIS — R15.9 INCONTINENCE OF FECES, UNSPECIFIED FECAL INCONTINENCE TYPE: ICD-10-CM

## 2022-05-02 DIAGNOSIS — M81.8 OTHER OSTEOPOROSIS WITHOUT CURRENT PATHOLOGICAL FRACTURE: ICD-10-CM

## 2022-05-02 DIAGNOSIS — R32 URINARY INCONTINENCE, UNSPECIFIED TYPE: ICD-10-CM

## 2022-05-02 PROCEDURE — 3288F PR FALLS RISK ASSESSMENT DOCUMENTED: ICD-10-PCS | Mod: CPTII,S$GLB,, | Performed by: INTERNAL MEDICINE

## 2022-05-02 PROCEDURE — 3052F HG A1C>EQUAL 8.0%<EQUAL 9.0%: CPT | Mod: CPTII,S$GLB,, | Performed by: INTERNAL MEDICINE

## 2022-05-02 PROCEDURE — 99397 PR PREVENTIVE VISIT,EST,65 & OVER: ICD-10-PCS | Mod: S$GLB,,, | Performed by: INTERNAL MEDICINE

## 2022-05-02 PROCEDURE — 99397 PER PM REEVAL EST PAT 65+ YR: CPT | Mod: S$GLB,,, | Performed by: INTERNAL MEDICINE

## 2022-05-02 PROCEDURE — 99499 RISK ADDL DX/OHS AUDIT: ICD-10-PCS | Mod: S$GLB,,, | Performed by: INTERNAL MEDICINE

## 2022-05-02 PROCEDURE — 3061F NEG MICROALBUMINURIA REV: CPT | Mod: CPTII,S$GLB,, | Performed by: INTERNAL MEDICINE

## 2022-05-02 PROCEDURE — 3078F DIAST BP <80 MM HG: CPT | Mod: CPTII,S$GLB,, | Performed by: INTERNAL MEDICINE

## 2022-05-02 PROCEDURE — 77080 DXA BONE DENSITY AXIAL: CPT | Mod: TC,PO

## 2022-05-02 PROCEDURE — 3288F FALL RISK ASSESSMENT DOCD: CPT | Mod: CPTII,S$GLB,, | Performed by: INTERNAL MEDICINE

## 2022-05-02 PROCEDURE — 3061F PR NEG MICROALBUMINURIA RESULT DOCUMENTED/REVIEW: ICD-10-PCS | Mod: CPTII,S$GLB,, | Performed by: INTERNAL MEDICINE

## 2022-05-02 PROCEDURE — 99999 PR PBB SHADOW E&M-EST. PATIENT-LVL V: CPT | Mod: PBBFAC,,, | Performed by: INTERNAL MEDICINE

## 2022-05-02 PROCEDURE — 1126F PR PAIN SEVERITY QUANTIFIED, NO PAIN PRESENT: ICD-10-PCS | Mod: CPTII,S$GLB,, | Performed by: INTERNAL MEDICINE

## 2022-05-02 PROCEDURE — 77080 DXA BONE DENSITY AXIAL: CPT | Mod: 26,,, | Performed by: INTERNAL MEDICINE

## 2022-05-02 PROCEDURE — 3072F PR LOW RISK FOR RETINOPATHY: ICD-10-PCS | Mod: CPTII,S$GLB,, | Performed by: INTERNAL MEDICINE

## 2022-05-02 PROCEDURE — 99499 UNLISTED E&M SERVICE: CPT | Mod: S$GLB,,, | Performed by: INTERNAL MEDICINE

## 2022-05-02 PROCEDURE — 3052F PR MOST RECENT HEMOGLOBIN A1C LEVEL 8.0 - < 9.0%: ICD-10-PCS | Mod: CPTII,S$GLB,, | Performed by: INTERNAL MEDICINE

## 2022-05-02 PROCEDURE — 1159F MED LIST DOCD IN RCRD: CPT | Mod: CPTII,S$GLB,, | Performed by: INTERNAL MEDICINE

## 2022-05-02 PROCEDURE — 1160F RVW MEDS BY RX/DR IN RCRD: CPT | Mod: CPTII,S$GLB,, | Performed by: INTERNAL MEDICINE

## 2022-05-02 PROCEDURE — 3078F PR MOST RECENT DIASTOLIC BLOOD PRESSURE < 80 MM HG: ICD-10-PCS | Mod: CPTII,S$GLB,, | Performed by: INTERNAL MEDICINE

## 2022-05-02 PROCEDURE — 1101F PT FALLS ASSESS-DOCD LE1/YR: CPT | Mod: CPTII,S$GLB,, | Performed by: INTERNAL MEDICINE

## 2022-05-02 PROCEDURE — 3066F PR DOCUMENTATION OF TREATMENT FOR NEPHROPATHY: ICD-10-PCS | Mod: CPTII,S$GLB,, | Performed by: INTERNAL MEDICINE

## 2022-05-02 PROCEDURE — 77080 DEXA BONE DENSITY SPINE HIP: ICD-10-PCS | Mod: 26,,, | Performed by: INTERNAL MEDICINE

## 2022-05-02 PROCEDURE — 3074F PR MOST RECENT SYSTOLIC BLOOD PRESSURE < 130 MM HG: ICD-10-PCS | Mod: CPTII,S$GLB,, | Performed by: INTERNAL MEDICINE

## 2022-05-02 PROCEDURE — 1160F PR REVIEW ALL MEDS BY PRESCRIBER/CLIN PHARMACIST DOCUMENTED: ICD-10-PCS | Mod: CPTII,S$GLB,, | Performed by: INTERNAL MEDICINE

## 2022-05-02 PROCEDURE — 3072F LOW RISK FOR RETINOPATHY: CPT | Mod: CPTII,S$GLB,, | Performed by: INTERNAL MEDICINE

## 2022-05-02 PROCEDURE — 3066F NEPHROPATHY DOC TX: CPT | Mod: CPTII,S$GLB,, | Performed by: INTERNAL MEDICINE

## 2022-05-02 PROCEDURE — 99999 PR PBB SHADOW E&M-EST. PATIENT-LVL V: ICD-10-PCS | Mod: PBBFAC,,, | Performed by: INTERNAL MEDICINE

## 2022-05-02 PROCEDURE — 1101F PR PT FALLS ASSESS DOC 0-1 FALLS W/OUT INJ PAST YR: ICD-10-PCS | Mod: CPTII,S$GLB,, | Performed by: INTERNAL MEDICINE

## 2022-05-02 PROCEDURE — 1159F PR MEDICATION LIST DOCUMENTED IN MEDICAL RECORD: ICD-10-PCS | Mod: CPTII,S$GLB,, | Performed by: INTERNAL MEDICINE

## 2022-05-02 PROCEDURE — 3074F SYST BP LT 130 MM HG: CPT | Mod: CPTII,S$GLB,, | Performed by: INTERNAL MEDICINE

## 2022-05-02 PROCEDURE — 1126F AMNT PAIN NOTED NONE PRSNT: CPT | Mod: CPTII,S$GLB,, | Performed by: INTERNAL MEDICINE

## 2022-05-02 RX ORDER — METFORMIN HYDROCHLORIDE 1000 MG/1
1000 TABLET ORAL 2 TIMES DAILY
Qty: 180 TABLET | Refills: 1 | Status: SHIPPED | OUTPATIENT
Start: 2022-05-02 | End: 2022-06-01

## 2022-05-02 RX ORDER — ATENOLOL 25 MG/1
25 TABLET ORAL DAILY
Qty: 90 TABLET | Refills: 1 | Status: SHIPPED | OUTPATIENT
Start: 2022-05-02 | End: 2022-10-26

## 2022-05-02 RX ORDER — GLIMEPIRIDE 4 MG/1
TABLET ORAL
Qty: 60 TABLET | Refills: 0 | Status: SHIPPED | OUTPATIENT
Start: 2022-05-02 | End: 2022-07-07 | Stop reason: SDUPTHER

## 2022-05-02 RX ORDER — AMLODIPINE BESYLATE 5 MG/1
5 TABLET ORAL DAILY
Qty: 90 TABLET | Refills: 1 | Status: SHIPPED | OUTPATIENT
Start: 2022-05-02 | End: 2022-10-26

## 2022-05-02 RX ORDER — GABAPENTIN 100 MG/1
CAPSULE ORAL
Qty: 150 CAPSULE | Refills: 1 | Status: SHIPPED | OUTPATIENT
Start: 2022-05-02

## 2022-05-02 RX ORDER — LANCETS
1 EACH MISCELLANEOUS 2 TIMES DAILY WITH MEALS
Qty: 100 EACH | Refills: 11 | Status: SHIPPED | OUTPATIENT
Start: 2022-05-02

## 2022-05-02 RX ORDER — ROSUVASTATIN CALCIUM 5 MG/1
5 TABLET, COATED ORAL NIGHTLY
Qty: 90 TABLET | Refills: 1 | Status: SHIPPED | OUTPATIENT
Start: 2022-05-02 | End: 2022-06-11

## 2022-05-02 RX ORDER — LOSARTAN POTASSIUM AND HYDROCHLOROTHIAZIDE 25; 100 MG/1; MG/1
1 TABLET ORAL DAILY
Qty: 90 TABLET | Refills: 1 | Status: SHIPPED | OUTPATIENT
Start: 2022-05-02 | End: 2022-10-26

## 2022-05-02 RX ORDER — OMEPRAZOLE 20 MG/1
CAPSULE, DELAYED RELEASE ORAL
Qty: 90 CAPSULE | Refills: 1 | Status: SHIPPED | OUTPATIENT
Start: 2022-05-02 | End: 2022-10-26

## 2022-05-02 RX ORDER — INSULIN PUMP SYRINGE, 3 ML
EACH MISCELLANEOUS
Qty: 1 EACH | Refills: 0 | Status: SHIPPED | OUTPATIENT
Start: 2022-05-02 | End: 2022-05-06

## 2022-05-02 RX ORDER — LANCING DEVICE
1 EACH MISCELLANEOUS 2 TIMES DAILY WITH MEALS
Qty: 1 EACH | Refills: 0 | Status: SHIPPED | OUTPATIENT
Start: 2022-05-02 | End: 2023-05-02

## 2022-05-02 RX ORDER — GLIMEPIRIDE 4 MG/1
TABLET ORAL
Qty: 180 TABLET | Refills: 1 | Status: SHIPPED | OUTPATIENT
Start: 2022-05-02 | End: 2022-05-02

## 2022-05-02 NOTE — PROGRESS NOTES
HISTORY OF PRESENT ILLNESS:  Peace Farmer is a 77 y.o. female who presents to the clinic today for a routine physical exam. Her last physical exam was approximately 1 years(s) ago.        PAST MEDICAL HISTORY:  Past Medical History:   Diagnosis Date    Abnormal mammogram 10/13    s/p biopsy    Cortical cataract of both eyes 11/16/2018    Diabetes mellitus type II     Fibromyalgia     Hyperlipidemia     Hypertension     Obesity (BMI 30-39.9)     Osteopenia     Psoriasis     Skin cancer of face 2011    forehead       PAST SURGICAL HISTORY:  Past Surgical History:   Procedure Laterality Date    BREAST BIOPSY Left     no scars noted    CATARACT EXTRACTION W/  INTRAOCULAR LENS IMPLANT Right 6/30/2020    Procedure: EXTRACTION, CATARACT, WITH IOL INSERTION;  Surgeon: Joshua Robin MD;  Location: [facility];  Service: Ophthalmology;  Laterality: Right;    CATARACT EXTRACTION W/  INTRAOCULAR LENS IMPLANT Left 7/14/2020    Procedure: EXTRACTION, CATARACT, WITH IOL INSERTION;  Surgeon: Joshua Robin MD;  Location: [facility];  Service: Ophthalmology;  Laterality: Left;    PARTIAL HYSTERECTOMY      age 34    skin cancer face  2011       SOCIAL HISTORY:  Social History     Socioeconomic History    Marital status:     Number of children: 3    Years of education: some colle   Occupational History    Occupation: homemaker   Tobacco Use    Smoking status: Never Smoker    Smokeless tobacco: Never Used   Substance and Sexual Activity    Alcohol use: Yes     Comment: rare    Drug use: No    Sexual activity: Not Currently     Partners: Male     Birth control/protection: Post-menopausal   Social History Narrative    Adult Screenings Reviewed and updated  5/20/14    Mammogram( for females) October 2013 abnromal left     Pap ( for females) partial hysterectomy     Colonoscopy  2009  Reported normal.    Flu shot 2013     Td updated today  As Tdap  5/20/14    Pneumovax  done once    Zostavax  done    Eye exam 2013 due for  2014    Bone density 7/2012 due again in  2016       FAMILY HISTORY:  Family History   Problem Relation Age of Onset    Heart disease Father     Skin cancer Father     Hypertension Father     Lung cancer Father     Cataracts Father     Hypertension Mother     Skin cancer Mother     Cataracts Mother     Macular degeneration Mother     Cancer Mother         skin     Multiple births Mother     No Known Problems Brother     Stroke Maternal Grandmother     No Known Problems Maternal Grandfather     No Known Problems Paternal Grandmother     No Known Problems Paternal Grandfather     No Known Problems Maternal Aunt     No Known Problems Maternal Uncle     No Known Problems Paternal Aunt     No Known Problems Paternal Uncle     Multiple sclerosis Son     Diabetes Mellitus Neg Hx     Breast cancer Neg Hx     Colon cancer Neg Hx     Amblyopia Neg Hx     Blindness Neg Hx     Diabetes Neg Hx     Glaucoma Neg Hx     Retinal detachment Neg Hx     Strabismus Neg Hx     Thyroid disease Neg Hx        ALLERGIES AND MEDICATIONS: updated and reviewed.  Review of patient's allergies indicates:   Allergen Reactions    Pcn [penicillins] Hives and Shortness Of Breath     Medication List with Changes/Refills   New Medications    BLOOD SUGAR DIAGNOSTIC STRP    1 strip by Misc.(Non-Drug; Combo Route) route 2 (two) times daily with meals.    BLOOD-GLUCOSE METER KIT    Use as instructed    LANCETS MISC    1 lancet by Misc.(Non-Drug; Combo Route) route 2 (two) times daily with meals.    LANCING DEVICE MISC    1 Device by Misc.(Non-Drug; Combo Route) route 2 (two) times daily with meals.   Current Medications    ASPIRIN 325 MG TABLET    Take 325 mg by mouth once daily.     BLOOD SUGAR DIAGNOSTIC (FREESTYLE LITE STRIPS) STRP    Apply 1 strip topically once daily.    BLOOD-GLUCOSE METER KIT    Use as instructed    CALCIUM CARB/VIT D3/MINERALS (CALCIUM-VITAMIN D ORAL)    Take by mouth.     CALCIUM-VITAMIN D 500-125 MG-UNIT TABLET    Take 1 tablet by mouth 2 (two) times daily.    CETIRIZINE (ZYRTEC) 10 MG TABLET    Take 1 tablet (10 mg total) by mouth daily as needed for Allergies.    TRUEPLUS LANCETS 30 GAUGE MISC    USE ONCE DAILY AS DIRECTED   Changed and/or Refilled Medications    Modified Medication Previous Medication    AMLODIPINE (NORVASC) 5 MG TABLET amLODIPine (NORVASC) 5 MG tablet       Take 1 tablet (5 mg total) by mouth once daily.    TAKE 1 TABLET EVERY DAY    ATENOLOL (TENORMIN) 25 MG TABLET atenoloL (TENORMIN) 25 MG tablet       Take 1 tablet (25 mg total) by mouth once daily.    TAKE 1 TABLET EVERY DAY    GABAPENTIN (NEURONTIN) 100 MG CAPSULE gabapentin (NEURONTIN) 100 MG capsule       TAKE 4 CAPSULES NIGHTLY AS NEEDED (ITCHING/SLEEP)    TAKE 4 CAPSULES NIGHTLY AS NEEDED (ITCHING/SLEEP)    GLIMEPIRIDE (AMARYL) 4 MG TABLET glimepiride (AMARYL) 4 MG tablet       TAKE 1 TABLET TWO TIMES DAILY BEFORE A MEAL (HOLD FOR BLOOD SUGAR LESS THAN 100)    TAKE 1 TABLET TWO TIMES DAILY BEFORE A MEAL (HOLD FOR BLOOD SUGAR LESS THAN 100)    LOSARTAN-HYDROCHLOROTHIAZIDE 100-25 MG (HYZAAR) 100-25 MG PER TABLET losartan-hydrochlorothiazide 100-25 mg (HYZAAR) 100-25 mg per tablet       Take 1 tablet by mouth once daily.    TAKE 1 TABLET EVERY DAY    METFORMIN (GLUCOPHAGE) 1000 MG TABLET metFORMIN (GLUCOPHAGE) 1000 MG tablet       Take 1 tablet (1,000 mg total) by mouth 2 (two) times daily.    TAKE 1 TABLET TWICE DAILY    OMEPRAZOLE (PRILOSEC) 20 MG CAPSULE omeprazole (PRILOSEC) 20 MG capsule       TAKE 1 CAPSULE DAILY ONLY AS NEEDED FOR HEARTBURN    TAKE 1 CAPSULE DAILY ONLY AS NEEDED FOR HEARTBURN    ROSUVASTATIN (CRESTOR) 5 MG TABLET rosuvastatin (CRESTOR) 5 MG tablet       Take 1 tablet (5 mg total) by mouth every evening.    TAKE 1 TABLET EVERY EVENING.   Discontinued Medications    FLUAD QUAD 2021-22,67Y UP,,PF, 60 MCG (15 MCG X 4)/0.5 ML SYRG              CARE TEAM:  Patient Care Team:  Rosa AIKEN  "MD Jcarlos as PCP - General (Internal Medicine)  Ana Warren LPN as Licensed Practical Nurse           SCREENING HISTORY:  Health Maintenance       Date Due Completion Date    Shingles Vaccine (2 of 3) 10/20/2009 8/25/2009    Colorectal Cancer Screening 03/18/2019 3/18/2009    Foot Exam 11/04/2021 11/4/2020    Override on 5/28/2018: Done    DEXA Scan 01/16/2022 1/16/2020    Hemoglobin A1c 07/25/2022 4/25/2022    Eye Exam 08/10/2022 8/10/2021    Diabetes Urine Screening 04/25/2023 4/25/2022    Lipid Panel 04/25/2023 4/25/2022    Low Dose Statin 05/02/2023 5/2/2022    TETANUS VACCINE 05/20/2024 5/20/2014            REVIEW OF SYSTEMS:   The patient reports: fair dietary habits.  The patient reports : that they do not exercise.  Review of Systems   Constitutional: Positive for fatigue (- chronic; severe). Negative for chills and fever.   HENT: Negative for congestion.    Eyes: Positive for visual disturbance (- seeing opthalmology).   Respiratory: Positive for shortness of breath (- intermittent; on exertion). Negative for cough.    Cardiovascular: Negative for chest pain and leg swelling.   Gastrointestinal: Positive for diarrhea (- intermittent). Negative for abdominal pain.        She reports some problems with fecal incontinence.   Genitourinary: Negative for dysuria and hematuria.        She reports some problems with urinary incontinence.   Neurological:        She reports that she has to hold on to her  (just touch him with 2 fingers) as she walks down the stairs - not sure if this is a balance or a strength issue.   Psychiatric/Behavioral: Positive for sleep disturbance.      ROS (Optional)-: no pelvic pain  Breast ROS (Optional)-: negative for breast lumps/discharge            Physical Examination:   Vitals:    05/02/22 1422   BP: 118/62   Pulse: 67   Temp: 98.2 °F (36.8 °C)     Weight: 72 kg (158 lb 13.5 oz)   Height: 5' 0.63" (154 cm)   Body mass index is 30.38 kg/m².      Patient did not " require to have a chaperone present during the exam today.    General appearance - alert, well appearing, and in no distress, obese  Psychiatric - alert, oriented to person, place, and time, normal behavior, speech, dress, motor activity and thought process, mildly depressed mood (baseline)  Eyes - pupils equal and reactive, extraocular eye movements intact, sclera anicteric  Mouth - not examined; patient wearing mask due to Covid 19 pandemic  Neck - supple, no significant adenopathy, carotids upstroke normal bilaterally, no bruits  Lymphatics - no palpable cervical lymphadenopathy  Chest - clear to auscultation, no wheezes, rales or rhonchi, symmetric air entry  Heart - normal rate and regular rhythm, no gallops noted  Pelvic - not examined  Rectal - not examined  Neurological - alert, normal speech, no focal findings, cranial nerves II through XII intact  Musculoskeletal - patient noted to have Mild-Moderate osteoarthritic changes to both knee joints. No joint effusions noted., no muscular tenderness noted, mild osteoarthritic changes noted in both hands  Extremities - no pedal edema noted  Skin - normal coloration, no suspicious skin lesions      Labs:  Lab Results   Component Value Date    HGBA1C 8.5 (H) 04/25/2022    HGBA1C 8.2 (H) 02/10/2022    HGBA1C 8.1 (H) 11/12/2021      Lab Results   Component Value Date    CHOL 105 (L) 04/25/2022    CHOL 106 (L) 05/04/2021    CHOL 116 (L) 05/13/2020     Lab Results   Component Value Date    LDLCALC 44.8 (L) 04/25/2022    LDLCALC 50.8 (L) 05/04/2021    LDLCALC 55.8 (L) 05/13/2020         ASSESSMENT AND PLAN:  1. Routine medical exam  Counseled on age appropriate medical preventative services including age appropriate cancer screenings, age appropriate eye and dental exams, over all nutritional health, need for a consistent exercise regimen, and an over all push towards maintaining a vigorous and active lifestyle.  Counseled on age appropriate vaccines and discussed  upcoming health care needs based on age/gender. Discussed good sleep hygiene and stress management.    2. Type 2 diabetes, uncontrolled, with neuropathy  Diabetes is not controlled at this time (due to hyperglycemia) for age and comorbid conditions.   We discussed diabetic diet and regular exercise.   We discussed home blood sugar monitoring, if appropriate - the patient should test twice daily and as needed.   Continue current medication regimen. I am referring her to endocrinology for further treatment options.  Diabetic complications addressed: Neuropathy pain controlled. Discussed daily foot exam using a mirror.  Patient was counseled on the need for yearly eye exam to screen for/monitor diabetic retinopathy and yearly diabetic foot exam.  - metFORMIN (GLUCOPHAGE) 1000 MG tablet; Take 1 tablet (1,000 mg total) by mouth 2 (two) times daily.  Dispense: 180 tablet; Refill: 1  - glimepiride (AMARYL) 4 MG tablet; TAKE 1 TABLET TWO TIMES DAILY BEFORE A MEAL (HOLD FOR BLOOD SUGAR LESS THAN 100)  Dispense: 60 tablet; Refill: 0  - Ambulatory referral/consult to Endocrinology; Future  - blood-glucose meter kit; Use as instructed  Dispense: 1 each; Refill: 0  - blood sugar diagnostic Strp; 1 strip by Misc.(Non-Drug; Combo Route) route 2 (two) times daily with meals.  Dispense: 100 strip; Refill: 11  - lancing device Misc; 1 Device by Misc.(Non-Drug; Combo Route) route 2 (two) times daily with meals.  Dispense: 1 each; Refill: 0  - lancets Misc; 1 lancet by Misc.(Non-Drug; Combo Route) route 2 (two) times daily with meals.  Dispense: 100 each; Refill: 11    3. Benign hypertension  The current medical regimen is effective;  continue present plan and medications. Recommended patient to check home readings to monitor and see me for followup as scheduled or sooner as needed.   Discussed sodium restriction, maintaining ideal body weight and regular exercise program as physiologic means to continue to achieve blood pressure  control in addition to medication compliance.  Patient was educated that both decongestant and anti-inflammatory medication may raise blood pressure.  The patient is not active on the digital hypertension program.  - amLODIPine (NORVASC) 5 MG tablet; Take 1 tablet (5 mg total) by mouth once daily.  Dispense: 90 tablet; Refill: 1  - atenoloL (TENORMIN) 25 MG tablet; Take 1 tablet (25 mg total) by mouth once daily.  Dispense: 90 tablet; Refill: 1  - losartan-hydrochlorothiazide 100-25 mg (HYZAAR) 100-25 mg per tablet; Take 1 tablet by mouth once daily.  Dispense: 90 tablet; Refill: 1    4. Hyperlipidemia LDL goal <100  We discussed low fat diet and regular exercise.The current medical regimen is effective;  continue present plan and medications.   - rosuvastatin (CRESTOR) 5 MG tablet; Take 1 tablet (5 mg total) by mouth every evening.  Dispense: 90 tablet; Refill: 1    5. Subclinical hyperthyroidism  If anything, now her TSH is increasing and her free T4 is decreasing.  She may need to start on medication.  Defer to endocrinology.    6. Other osteoporosis without current pathological fracture  We discussed adequate calcium and vitamin D supplementation. We discussed fall precautions. She is scheduled for her BMD.  The last several bone densities have recommended prescription treatment.  This has been ordered for her in the past.  She has not yet completed any treatment.    7. Obesity (BMI 30-39.9)  The patient is asked to make an attempt to improve diet and exercise patterns to aid in medical management of this problem.    8. Need for shingles vaccine  Patient was advised to get immunization at the pharmacy.    9. Colon cancer screening  The patient is aware that the fitkit may miss polyps that could develop into colon cancer in the future which could be removed during a colonoscopy or even miss early colon cancer. Patient also made aware that a positive fitkit result would necessitate a colonoscopy for further  evaluation.  FitKit was given to patient on 5/2/2022 6:45 PM       - Fecal Immunochemical Test (iFOBT); Future    10. Itching  The current medical regimen is effective;  continue present plan and medications.   - gabapentin (NEURONTIN) 100 MG capsule; TAKE 4 CAPSULES NIGHTLY AS NEEDED (ITCHING/SLEEP)  Dispense: 150 capsule; Refill: 1    11. Gastroesophageal reflux disease without esophagitis  Symptoms controlled: yes - takes medication occasionally as needed.   Reflux precautions discussed (eliminate tobacco if a smoker; minimize caffeine, chocolate and red/white peppermint intake; avoid heavy and spicy meals; don't lay down within 2-3 hours after eating; minimize the intake of NSAIDs). Medication as needed.   Patient asked to take medication breaks, if possible - discussed chronic use can limit calcium absorption (which can lead to osteopenia/osteoporosis), increases the risk for intestinal infections, and can cause kidney damage. There are also some newer studies that show possible increased risk of mortality.  - omeprazole (PRILOSEC) 20 MG capsule; TAKE 1 CAPSULE DAILY ONLY AS NEEDED FOR HEARTBURN  Dispense: 90 capsule; Refill: 1    12. Urinary incontinence, unspecified type/13. Incontinence of feces, unspecified fecal incontinence type  I had previously referred her to pelvic floor therapy as I suspect to weak pelvic muscles.  Unfortunately, at that time restarted the pandemic.  She is interested in looking at this again.  I do not think she needs surgery.  She does have some issues with loose stools which may be related to the metformin.  Hopefully this will be addressed by endocrinology.  - Ambulatory referral/consult to Physical/Occupational Therapy; Future      The patient has severe chronic fatigue.  I discussed with the patient that there is no great treatment for this.  We discussed healthy dietary habits, regular exercise, and good sleep hygiene.  I advised her to take advantage of her good  days.      Orders Placed This Encounter   Procedures    Fecal Immunochemical Test (iFOBT)    Ambulatory referral/consult to Physical/Occupational Therapy    Ambulatory referral/consult to Endocrinology      Follow up in about 6 months (around 11/2/2022), or if symptoms worsen or fail to improve, for follow up chronic medical conditions.. or sooner as needed.

## 2022-05-06 ENCOUNTER — OFFICE VISIT (OUTPATIENT)
Dept: ENDOCRINOLOGY | Facility: CLINIC | Age: 77
End: 2022-05-06
Payer: MEDICARE

## 2022-05-06 VITALS
SYSTOLIC BLOOD PRESSURE: 133 MMHG | TEMPERATURE: 98 F | BODY MASS INDEX: 30.58 KG/M2 | HEART RATE: 62 BPM | DIASTOLIC BLOOD PRESSURE: 65 MMHG | WEIGHT: 159.88 LBS

## 2022-05-06 DIAGNOSIS — E66.09 CLASS 1 OBESITY DUE TO EXCESS CALORIES WITH SERIOUS COMORBIDITY AND BODY MASS INDEX (BMI) OF 30.0 TO 30.9 IN ADULT: ICD-10-CM

## 2022-05-06 DIAGNOSIS — E11.40 CONTROLLED TYPE 2 DIABETES WITH NEUROPATHY: ICD-10-CM

## 2022-05-06 DIAGNOSIS — R79.89 ABNORMAL THYROID BLOOD TEST: ICD-10-CM

## 2022-05-06 DIAGNOSIS — E11.65 TYPE 2 DIABETES MELLITUS WITH HYPERGLYCEMIA, WITHOUT LONG-TERM CURRENT USE OF INSULIN: Primary | ICD-10-CM

## 2022-05-06 DIAGNOSIS — M81.8 OTHER OSTEOPOROSIS WITHOUT CURRENT PATHOLOGICAL FRACTURE: ICD-10-CM

## 2022-05-06 DIAGNOSIS — I10 BENIGN HYPERTENSION: ICD-10-CM

## 2022-05-06 DIAGNOSIS — E78.5 HYPERLIPIDEMIA LDL GOAL <100: ICD-10-CM

## 2022-05-06 DIAGNOSIS — R73.03 PREDIABETES: ICD-10-CM

## 2022-05-06 DIAGNOSIS — E55.9 MILD VITAMIN D DEFICIENCY: ICD-10-CM

## 2022-05-06 PROBLEM — E05.90 SUBCLINICAL HYPERTHYROIDISM: Status: RESOLVED | Noted: 2019-09-09 | Resolved: 2022-05-06

## 2022-05-06 PROBLEM — E66.811 CLASS 1 OBESITY DUE TO EXCESS CALORIES WITH SERIOUS COMORBIDITY AND BODY MASS INDEX (BMI) OF 30.0 TO 30.9 IN ADULT: Status: ACTIVE | Noted: 2022-05-06

## 2022-05-06 PROCEDURE — 3288F FALL RISK ASSESSMENT DOCD: CPT | Mod: CPTII,S$GLB,, | Performed by: HOSPITALIST

## 2022-05-06 PROCEDURE — 3078F PR MOST RECENT DIASTOLIC BLOOD PRESSURE < 80 MM HG: ICD-10-PCS | Mod: CPTII,S$GLB,, | Performed by: HOSPITALIST

## 2022-05-06 PROCEDURE — 3072F PR LOW RISK FOR RETINOPATHY: ICD-10-PCS | Mod: CPTII,S$GLB,, | Performed by: HOSPITALIST

## 2022-05-06 PROCEDURE — 1100F PTFALLS ASSESS-DOCD GE2>/YR: CPT | Mod: CPTII,S$GLB,, | Performed by: HOSPITALIST

## 2022-05-06 PROCEDURE — 99215 PR OFFICE/OUTPT VISIT, EST, LEVL V, 40-54 MIN: ICD-10-PCS | Mod: S$GLB,,, | Performed by: HOSPITALIST

## 2022-05-06 PROCEDURE — 1159F PR MEDICATION LIST DOCUMENTED IN MEDICAL RECORD: ICD-10-PCS | Mod: CPTII,S$GLB,, | Performed by: HOSPITALIST

## 2022-05-06 PROCEDURE — 99215 OFFICE O/P EST HI 40 MIN: CPT | Mod: S$GLB,,, | Performed by: HOSPITALIST

## 2022-05-06 PROCEDURE — 3052F HG A1C>EQUAL 8.0%<EQUAL 9.0%: CPT | Mod: CPTII,S$GLB,, | Performed by: HOSPITALIST

## 2022-05-06 PROCEDURE — 1126F AMNT PAIN NOTED NONE PRSNT: CPT | Mod: CPTII,S$GLB,, | Performed by: HOSPITALIST

## 2022-05-06 PROCEDURE — 3075F PR MOST RECENT SYSTOLIC BLOOD PRESS GE 130-139MM HG: ICD-10-PCS | Mod: CPTII,S$GLB,, | Performed by: HOSPITALIST

## 2022-05-06 PROCEDURE — 3075F SYST BP GE 130 - 139MM HG: CPT | Mod: CPTII,S$GLB,, | Performed by: HOSPITALIST

## 2022-05-06 PROCEDURE — 3066F NEPHROPATHY DOC TX: CPT | Mod: CPTII,S$GLB,, | Performed by: HOSPITALIST

## 2022-05-06 PROCEDURE — 3052F PR MOST RECENT HEMOGLOBIN A1C LEVEL 8.0 - < 9.0%: ICD-10-PCS | Mod: CPTII,S$GLB,, | Performed by: HOSPITALIST

## 2022-05-06 PROCEDURE — 3288F PR FALLS RISK ASSESSMENT DOCUMENTED: ICD-10-PCS | Mod: CPTII,S$GLB,, | Performed by: HOSPITALIST

## 2022-05-06 PROCEDURE — 3072F LOW RISK FOR RETINOPATHY: CPT | Mod: CPTII,S$GLB,, | Performed by: HOSPITALIST

## 2022-05-06 PROCEDURE — 99999 PR PBB SHADOW E&M-EST. PATIENT-LVL V: ICD-10-PCS | Mod: PBBFAC,,, | Performed by: HOSPITALIST

## 2022-05-06 PROCEDURE — 3061F NEG MICROALBUMINURIA REV: CPT | Mod: CPTII,S$GLB,, | Performed by: HOSPITALIST

## 2022-05-06 PROCEDURE — 3066F PR DOCUMENTATION OF TREATMENT FOR NEPHROPATHY: ICD-10-PCS | Mod: CPTII,S$GLB,, | Performed by: HOSPITALIST

## 2022-05-06 PROCEDURE — 1126F PR PAIN SEVERITY QUANTIFIED, NO PAIN PRESENT: ICD-10-PCS | Mod: CPTII,S$GLB,, | Performed by: HOSPITALIST

## 2022-05-06 PROCEDURE — 1100F PR PT FALLS ASSESS DOC 2+ FALLS/FALL W/INJURY/YR: ICD-10-PCS | Mod: CPTII,S$GLB,, | Performed by: HOSPITALIST

## 2022-05-06 PROCEDURE — 1160F PR REVIEW ALL MEDS BY PRESCRIBER/CLIN PHARMACIST DOCUMENTED: ICD-10-PCS | Mod: CPTII,S$GLB,, | Performed by: HOSPITALIST

## 2022-05-06 PROCEDURE — 1159F MED LIST DOCD IN RCRD: CPT | Mod: CPTII,S$GLB,, | Performed by: HOSPITALIST

## 2022-05-06 PROCEDURE — 99999 PR PBB SHADOW E&M-EST. PATIENT-LVL V: CPT | Mod: PBBFAC,,, | Performed by: HOSPITALIST

## 2022-05-06 PROCEDURE — 3078F DIAST BP <80 MM HG: CPT | Mod: CPTII,S$GLB,, | Performed by: HOSPITALIST

## 2022-05-06 PROCEDURE — 3061F PR NEG MICROALBUMINURIA RESULT DOCUMENTED/REVIEW: ICD-10-PCS | Mod: CPTII,S$GLB,, | Performed by: HOSPITALIST

## 2022-05-06 PROCEDURE — 1160F RVW MEDS BY RX/DR IN RCRD: CPT | Mod: CPTII,S$GLB,, | Performed by: HOSPITALIST

## 2022-05-06 RX ORDER — BLOOD-GLUCOSE METER
KIT MISCELLANEOUS
Qty: 200 STRIP | Refills: 3 | Status: SHIPPED | OUTPATIENT
Start: 2022-05-06 | End: 2022-05-09

## 2022-05-06 RX ORDER — BLOOD-GLUCOSE METER
KIT MISCELLANEOUS
Qty: 1 EACH | Refills: 0 | Status: SHIPPED | OUTPATIENT
Start: 2022-05-06 | End: 2022-05-09

## 2022-05-06 RX ORDER — DULAGLUTIDE 0.75 MG/.5ML
0.75 INJECTION, SOLUTION SUBCUTANEOUS
Qty: 4 PEN | Refills: 11 | Status: SHIPPED | OUTPATIENT
Start: 2022-05-06 | End: 2022-06-05

## 2022-05-06 NOTE — ASSESSMENT & PLAN NOTE
- ASCVD Risk below: Statin: Taking  The ASCVD Risk score (Jadenpolo MYERS Jr., et al., 2013) failed to calculate for the following reasons:    The valid total cholesterol range is 130 to 320 mg/dL

## 2022-05-06 NOTE — PROGRESS NOTES
"Subjective:      Patient ID: Peace Farmer is a 77 y.o. female presented to [facility] on 5/6/2022.  Chief Complaint:  Diabetes      History of Present Illness: Peace Farmer is a 77 y.o. female here for diabetes  Other significant past medical history:  Osteoporosis, abnormal thyroid function, hypertension    With regards to Diabetes Mellitus Type 2  Known diabetic complications: none  Diagnosed w/ DM: 2008?, worsening now    Interval history:  A1c continues to worsen, now 8.5%.  Sent by PCP for diabetes management.  Patient also request evaluation of thyroid as well as osteoporosis  Dietary indiscretion Eating icecream before going to bed. Hyperglycemia this AM, 197.   Carbhydrate in her diet    Report glucose  197, 227>> 274   214    Had better glucose "while on keto"    Current meds: On this regiment for "years"              Metformin 1000 mg twice a day   Glimepiride 4 mg twice a day  Previous meds:              Januvia  Home glucose checks: checks 2-3x a day, Logs reviewed/Unavailable: oral recall:   Hypoglycemia: denies  Diet/Exercise:               Eating multiple small meals  per day (7x)              Drink: diet coke  Weight trend: stable  Diabetes Education: yes, many years ago  Diabetes Related Hospitalization:  No  Hx of pancreatitis: No  Family history of diabetes: Yes. brother  Occupation: retired    Eye exam current (within one year): yes, DR: no  Reports cuts or ulcers on feet:   Denies    Statin: Taking  ACE/ARB: Taking    Diabetes Management Status: Reviewed     A1C Trend  Lab Results   Component Value Date    HGBA1C 8.5 (H) 04/25/2022    HGBA1C 8.2 (H) 02/10/2022    HGBA1C 8.1 (H) 11/12/2021       Lab Results   Component Value Date    MICALBCREAT 24.1 04/25/2022     Lab Results   Component Value Date    RFCGHVHM67 1581 (H) 10/12/2020     No results found for: TTGIGA    No results found for: CPEPTIDE, GLUTAMICACID, ISLETCELLANT, FRUCTOSAMINE     Screening or Prevention " Patient's value Goal Complete/Controlled?   Lipid profile : 04/25/2022 Annually Yes   LDL control Lab Results   Component Value Date    LDLCALC 44.8 (L) 04/25/2022    Annually/Less than 100 mg/dl  Yes   Nephropathy screening Lab Results   Component Value Date    LABMICR 13.0 04/25/2022     Lab Results   Component Value Date    PROTEINUA Negative 02/18/2020    Annually Yes   Blood pressure BP Readings from Last 1 Encounters:   05/06/22 133/65    Less than 140/90 Yes   Dilated retinal exam : 08/10/2021 Annually Yes   Foot exam   : 11/04/2020 Annually No       2) Osteoporosis  Previously with osteopenia, 2020  Was on Boniva for treatment of osteopenia, leading to heartburn> she does not to be on this medication  Previously seen by Dr. Rashid >> never got Reclast    Recent bone density 05/2022 show osteoporosis  She is taking Calcium and Vit D supplements. 500mg/1000iu    Asymptomatic menopausal state.  78 y/o female with no history of fractures.  She had a hysterectomy at 33 y/o and menopausal symptoms at 50 y/o.     DXA 5/2022  Lumbar spine (L1-L4): BMD is 0.850 g/cm2, T-score is -1.8, and Z-score is 0.6.  Total hip:  BMD is 0.751 g/cm2, T-score is -1.6, and Z-score is 0.2.  Femoral neck: BMD is 0.555 g/cm2, T-score is -2.6, and Z-score is -0.6.     FRAX:  16% risk of a major osteoporotic fracture in the next 10 years.  5.1% risk of hip fracture in the next 10 years.     Impression:  *Osteoporosis based on T-score below -2.5      3)  Abnormal thyroid function  TSH elevation, 1x  Family history thyroid disorder: son with hypothyroidism   Denies goiter  - Fatigue  +Heat intolerance    Lab Results   Component Value Date    TSH 4.073 (H) 04/25/2022    TSH 3.649 05/04/2021    TSH 3.327 05/13/2020    FREET4 0.99 04/25/2022    FREET4 1.14 12/20/2018    FREET4 1.91 (H) 11/28/2018     Reviewed past surgical, medical, family, social history and updated as appropriate.    Review of Systems: see HPI above    Objective:   /65  (BP Location: Right arm)   Pulse 62   Temp 98.2 °F (36.8 °C) (Oral)   Wt 72.5 kg (159 lb 14.4 oz)   BMI 30.58 kg/m²     Body mass index is 30.58 kg/m².  Vital signs reviewed    Physical Exam  Vitals and nursing note reviewed.   Constitutional:       General: She is not in acute distress.     Appearance: Normal appearance. She is well-developed. She is obese. She is not toxic-appearing.   Neck:      Thyroid: No thyromegaly.      Comments: No goiter noted  Cardiovascular:      Heart sounds: Normal heart sounds.   Pulmonary:      Effort: Pulmonary effort is normal. No respiratory distress.   Abdominal:      Tenderness: There is no abdominal tenderness.   Musculoskeletal:         General: No deformity. Normal range of motion.      Cervical back: Normal range of motion.   Skin:     Findings: No bruising.   Neurological:      Mental Status: She is alert and oriented to person, place, and time.   Psychiatric:         Mood and Affect: Mood normal.         Lab Reviewed:   Lab Results   Component Value Date    HGBA1C 8.5 (H) 04/25/2022       Lab Results   Component Value Date    CHOL 105 (L) 04/25/2022    HDL 40 04/25/2022    LDLCALC 44.8 (L) 04/25/2022    TRIG 101 04/25/2022    CHOLHDL 38.1 04/25/2022       Lab Results   Component Value Date     02/10/2022    K 4.0 02/10/2022     02/10/2022    CO2 25 02/10/2022     (H) 02/10/2022    BUN 14 02/10/2022    CREATININE 0.7 02/10/2022    CALCIUM 10.1 02/10/2022    PROT 7.0 02/10/2022    ALBUMIN 4.1 02/10/2022    BILITOT 0.4 02/10/2022    ALKPHOS 59 02/10/2022    AST 19 02/10/2022    ALT 19 02/10/2022    ANIONGAP 17 (H) 02/10/2022    ESTGFRAFRICA >60.0 02/10/2022    EGFRNONAA >60.0 02/10/2022    TSH 4.073 (H) 04/25/2022        Lab Results   Component Value Date    PTH 64.0 05/13/2020    RIFHAEGK52FI 37 05/13/2020    UTRRKTDM85AS 29 (L) 11/28/2018    HLXPUSAJ44UD 43 10/02/2017    CALCIUM 10.1 02/10/2022    CALCIUM 10.0 10/12/2020    CALCIUM 10.6 (H) 05/13/2020     ALKPHOS 59 02/10/2022    ALKPHOS 59 10/12/2020    ALKPHOS 57 05/13/2020    TSH 4.073 (H) 04/25/2022    LABCALC 10.4 12/10/2018    ATPOYQC88EAS 7 12/10/2018       Assessment     1. Type 2 diabetes mellitus with hyperglycemia, without long-term current use of insulin  dulaglutide (TRULICITY) 0.75 mg/0.5 mL pen injector    Hemoglobin A1C    Basic Metabolic Panel    FREESTYLE LITE STRIPS Strp    FREESTYLE LITE METER kit   2. Type 2 diabetes, uncontrolled, with neuropathy  Ambulatory referral/consult to Endocrinology    Hemoglobin A1C    Basic Metabolic Panel    FREESTYLE LITE STRIPS Strp    FREESTYLE LITE METER kit   3. Other osteoporosis without current pathological fracture     4. Benign hypertension     5. Hyperlipidemia LDL goal <100     6. Mild vitamin D deficiency  Vitamin D   7. Class 1 obesity due to excess calories with serious comorbidity and body mass index (BMI) of 30.0 to 30.9 in adult     8. Abnormal thyroid blood test  TSH    T4, Free    Thyroid Peroxidase Antibody   9. Prediabetes   TSH    T4, Free   10. Controlled type 2 diabetes with neuropathy          Plan       Type 2 diabetes mellitus with hyperglycemia, without long-term current use of insulin  - Diabetes is not at goal, given most current A1C, Goal A1C for patient is 7%  - Complicated by reported hyperglycemia, due to dietary indiscretion  - Diabetic supplies/medications reviewed this visit to ensure continue refills and compliance  - Diabetes education referral: patient refused   - Advised patient to get periodic eye and feet exam.   - Reviewed routine maintenance: lipid, U:P/C     Plan  - given hyperglycemia we will start patient on Trulicity 0.7 mg Q weekly injection, sent to [facility] mail in pharmacy  - continue Metformin 1000 mg twice a day, Glimepiride 4mg twice a day   - Discussion with patient about dietary modification, portion size control, decreasing carbohydrates intake  - Advised pt to check glucoses regularly, asked to filled  glucose log and bring back for review at next office visit  - Clear written instruction given on AVS  - Follow up as scheduled with lab work prior      Other osteoporosis without current pathological fracture  - longstanding history of Osteopenia with high FRAX at the hip  >> unable to tolerate oral bisphosphonate in the past  - recent bone density 2022 showed osteoporosis femoral neck   - Risk factors: race, postmenopausal status and family history   - patient is quite resistant to initiation medication  - advised patient to continue vitamin-D and calcium supplement  - strong consideration to start Reclast versus Prolia  - patient said that she will think about it and contact me with decision soon.    - advised to start on medication given worsening of bone density over the years  - check vitamin-D at the next lab    Mild vitamin D deficiency  - stable, will check level at the next blood work      Class 1 obesity due to excess calories with serious comorbidity and body mass index (BMI) of 30.0 to 30.9 in adult  - Body mass index is 30.58 kg/m².  - dietary discussion as above  - continue to monitor weight    Benign hypertension  - BP reviewed today  - continue home medication (s)  - manage by PCP    Hyperlipidemia LDL goal <100  - ASCVD Risk below: Statin: Taking  The ASCVD Risk score (Jaden DC Jr., et al., 2013) failed to calculate for the following reasons:    The valid total cholesterol range is 130 to 320 mg/dL      Abnormal thyroid blood test  - no history of thyroid dysfunction, not on thyroid medication  - start with thyroid dysfunction  - mix symptoms of fatigue, which can be multifactorial   - TSH mildly elevated> indicating more of subclinical hypothyroidism  - will check antibodies plus TFTs, routine monitor       Advised patient to follow up with PCP for routine health maintenance care.   RTC in 2 months    Total Time for E/M Service preformed was greater than 45 minutes:  Including discussion  and management of multiple endocrine issue including osteoporosis, diabetes, abnormal thyroid function, along with direct face-to-face time with patient along with discussion and active medical decision-making.    Earnest Zavaleta M.D.  Endocrinology  [facility]  5/6/2022      Disclaimer: This note has been generated in part with the use of voice-recognition software. There may be typographical errors that have been missed during proof-reading.

## 2022-05-06 NOTE — ASSESSMENT & PLAN NOTE
- longstanding history of Osteopenia with high FRAX at the hip  >> unable to tolerate oral bisphosphonate in the past  - recent bone density 2022 showed osteoporosis femoral neck   - Risk factors: race, postmenopausal status and family history   - patient is quite resistant to initiation medication  - advised patient to continue vitamin-D and calcium supplement  - strong consideration to start Reclast versus Prolia  - patient said that she will think about it and contact me with decision soon.    - advised to start on medication given worsening of bone density over the years  - check vitamin-D at the next lab

## 2022-05-06 NOTE — ASSESSMENT & PLAN NOTE
- Diabetes is not at goal, given most current A1C, Goal A1C for patient is 7%  - Complicated by reported hyperglycemia, due to dietary indiscretion  - Diabetic supplies/medications reviewed this visit to ensure continue refills and compliance  - Diabetes education referral: patient refused   - Advised patient to get periodic eye and feet exam.   - Reviewed routine maintenance: lipid, U:P/C     Plan  - given hyperglycemia we will start patient on Trulicity 0.7 mg Q weekly injection, sent to [facility] mail in pharmacy  - continue Metformin 1000 mg twice a day, Glimepiride 4mg twice a day   - Discussion with patient about dietary modification, portion size control, decreasing carbohydrates intake  - Advised pt to check glucoses regularly, asked to filled glucose log and bring back for review at next office visit  - Clear written instruction given on AVS  - Follow up as scheduled with lab work prior

## 2022-05-06 NOTE — PATIENT INSTRUCTIONS
((Thank you for completing this visit with me and PLEASE follow up with me regularly for continue refills of your diabetes medications))     ----------------------------------------------------------    Regiment:   __ Metformin 1000 mg twice a day  __ Glimepiride 4mg twice a day,     Start: Trulicity 0.75mg once a week injection      Goal for your blood sugar   In the morning, before breakfast: 100-140  Before going to bed: 100-140  Do not go to bed with glucose less than 100, have a small snack if lower than 100      Please check glucose before breakfast and at bedtime.   You can keep track of the glucose with Glucose logs or any papers  Please filled out and bring back to next office visit for me to review  Document any (LOW BLOOD GLUCOSE) hypoglycemia  episode with date and time for me to review      Please make sure to get your dilated eyes examine once a year with your eye doctor.  Monitor your feet for any cuts or skin breakdown regularly, schedule a visit with your foot doctor/podiatrist yearly if you see one.      We will plan an in-clinic visit in 3 months, with labs prior to that appointment.    Contact information:    Earnest Zavaleta M.D  [facility] [address] [facility], [address]  [place] [address]    Office:  [phone]  Fax:  [phone]     ----------------------------------------------------------

## 2022-05-06 NOTE — ASSESSMENT & PLAN NOTE
- no history of thyroid dysfunction, not on thyroid medication  - start with thyroid dysfunction  - mix symptoms of fatigue, which can be multifactorial   - TSH mildly elevated> indicating more of subclinical hypothyroidism  - will check antibodies plus TFTs, routine monitor   Bill As A Line Item Or As Units: Line Item

## 2022-05-09 RX ORDER — INSULIN PUMP SYRINGE, 3 ML
EACH MISCELLANEOUS
Qty: 1 EACH | Refills: 0 | Status: SHIPPED | OUTPATIENT
Start: 2022-05-09 | End: 2022-07-07

## 2022-06-01 ENCOUNTER — PATIENT MESSAGE (OUTPATIENT)
Dept: ADMINISTRATIVE | Facility: HOSPITAL | Age: 77
End: 2022-06-01
Payer: MEDICARE

## 2022-06-08 ENCOUNTER — CLINICAL SUPPORT (OUTPATIENT)
Dept: REHABILITATION | Facility: HOSPITAL | Age: 77
End: 2022-06-08
Attending: INTERNAL MEDICINE
Payer: MEDICARE

## 2022-06-08 DIAGNOSIS — M62.81 WEAKNESS OF TRUNK MUSCULATURE: ICD-10-CM

## 2022-06-08 DIAGNOSIS — R27.8 COORDINATION IMPAIRMENT: ICD-10-CM

## 2022-06-08 DIAGNOSIS — R15.9 INCONTINENCE OF FECES, UNSPECIFIED FECAL INCONTINENCE TYPE: ICD-10-CM

## 2022-06-08 DIAGNOSIS — R32 URINARY INCONTINENCE, UNSPECIFIED TYPE: ICD-10-CM

## 2022-06-08 PROCEDURE — 97530 THERAPEUTIC ACTIVITIES: CPT | Mod: PN

## 2022-06-08 PROCEDURE — 97162 PT EVAL MOD COMPLEX 30 MIN: CPT | Mod: PN

## 2022-06-08 PROCEDURE — 97112 NEUROMUSCULAR REEDUCATION: CPT | Mod: PN

## 2022-06-08 NOTE — PATIENT INSTRUCTIONS
Home Exercise Program: 06/08/2022    DIAPHRAGMATIC BREATHING     The diaphragm is a dome shaped muscle that forms the floor of the rib cage. It is the most efficient muscle for breathing and relaxation, although most people are not used to using the diaphragm. Diaphragmatic or belly breathing is an important technique to learn because it helps settle down or relax the autonomic nervous system. The correct use of diaphragmatic breathing can help to quiet brain activity resulting in the relaxation of all the muscles and organs of the body. This is accomplished by slow rhythmic breathing concentrated in the diaphragm muscle rather than the chest.    360 Breath - Inhale long, slow and deep. You should feel as if your lower ribs are expanding in all directions like the way an umbrella opens. You should feel the belly, back and sides gently expand and you may notice a relaxation in the pelvic floor. If you notice that your belly is pulling up and flattening during your inhale - try to reverse this and allow your belly to gently expand during the inhale while it recoils and flattens during the exhale      Continue to breath like this for 1 minutes. Repeat 1 times/day.       Home Exercise Program: 06/08/2022    Juan Jose while laying down    1. Lay on your back comfortably with legs and buttocks relaxed.  2. Contract and LIFT the pelvic floor muscles as if you're trying to stop the stream of urine and passage of gas.  3. Hold LIFT for 1-2 seconds without holding breath.  4. Release and DROP the pelvic floor muscles for 10 seconds.  5. Repeat 5-10 times, 3 sets per day. Spread throughout the day.    Juan Jose in Sitting    1. Sit comfortably with legs and buttocks relaxed.  2. Contract and LIFT the pelvic floor muscles as if you're trying to stop the stream of urine and passage of gas.  3. Hold LIFT for 1-2 seconds without holding breath.  4. Release and DROP completely for 10 seconds.  5. Repeat 5-10 times, 3 sets per day. Spread  throughout the day.    No Kegels while urinating!         ([email or web address])    Initially it may be useful to practice squeezing at different layers of your pelvic floor muscles to help you find them, as each layer plays an important role in pelvic floor function. Eventually you want to be doing your kegel contractions to include squeezing at all 3 layers, and this should become one smooth movement.    To isolate layer 1: think about closing your openings (around vagina and anus) and nodding the clitoris    To isolate layer 2: imagine a drawstring in your urethra that you are pulling up inside or that you are telescoping the urethra in on itself.     To isolate layer 3: pull your tailbone up and forward towards your pubic bone    When putting it all together, it can be helpful to think about closing your openings and lifting up and in.

## 2022-06-13 PROBLEM — M62.81 WEAKNESS OF TRUNK MUSCULATURE: Status: ACTIVE | Noted: 2022-06-13

## 2022-06-13 PROBLEM — R27.8 COORDINATION IMPAIRMENT: Status: ACTIVE | Noted: 2022-06-13

## 2022-06-13 NOTE — PLAN OF CARE
[facility]  Pelvic Health Physical Therapy Initial Evaluation    Date: 6/8/2022   Name: Peace Farmer  Clinic Number: [id number]  Therapy Diagnosis:   Encounter Diagnoses   Name Primary?    Urinary incontinence, unspecified type     Incontinence of feces, unspecified fecal incontinence type     Weakness of trunk musculature     Coordination impairment      Physician: Rosa Garber MD    Physician Orders: PT Eval and Treat - Pelvic Floor PT   Medical Diagnosis from Referral:   R32 (ICD-10-CM) - Urinary incontinence, unspecified type   R15.9 (ICD-10-CM) - Incontinence of feces, unspecified fecal incontinence type   Evaluation Date: 6/8/2022  Authorization Period Expiration: 5/2/2023  Plan of Care Expiration: 9/8/2022  Visit # / Visits authorized: 1/ 1    Time In: 1300  Time Out: 1410  Total Appointment Time (timed & untimed codes): 70 minutes  Total Billing Time: 30    Precautions: universal    Subjective     Date of onset: 5 years ago    History of current condition - Peace reports: bladder and bowel incontinence that began approx 5 years ago and progressively worsened. Bladder leakage only occurs 5% of the time; only urge incontinence and she has learned to control urges. Recently she has been squeezing hands or a towel to help hold back urine. Bowel incontinence is rare, but occurs in the mornings if she leaves home without having BM first. Hx of frequent UTIs, though not lately. As she has aged, she feels irritated more often, though it was not due to UTI. Is not and has never taken replacement estrogen in any form.     OB/GYN History:  childbirth vaginal delivery x3, Hx of uterine prolapse and hysterectomy  Sexually active? No  Pain with vaginal exams, intercourse or tampon use? No    Bladder/Bowel History:    Frequency of urination:   Daytime: Approx. 9-12 times            Nighttime: >4 times - Every 2-2.5 hrs    Difficulty initiating urine stream: No   Urine stream: weak, strong  and interrupted   Complete emptying: No   Bladder leakage: Yes   Frequency of incidents: A few times per week   Amount leaked (urine): few drops and teaspoon(s)   Urinary Urgency: Yes  Able to delay the urge for at least 2 minute(s).   Frequency of bowel movements: 1 to 4 times a day   Difficulty initiating BM: No   Quality/Shape of BM: [place] Stool Chart Type 4 with occasional 5   Does Patient Feel Empty after BM? Most times    Fiber Supplements or Laxative Use?  Yes- daily fiber    Colon leakage: Yes   Frequency of incidents: Occasionally    Amount leaked (bowels): moderate amount   Form of protection: panty liner   Number of pads required in 24 hours: 0-1; only when leaving home     PAIN: none        Medical History: Peace  has a past medical history of Abnormal mammogram (10/13), Cortical cataract of both eyes (11/16/2018), Diabetes mellitus type II, Fibromyalgia, Hyperlipidemia, Hypertension, Obesity (BMI 30-39.9), Osteopenia, Psoriasis, and Skin cancer of face (2011).     Surgical History:  Peace Farmer  has a past surgical history that includes Partial hysterectomy; skin cancer face (2011); Cataract extraction w/  intraocular lens implant (Right, 6/30/2020); Cataract extraction w/  intraocular lens implant (Left, 7/14/2020); and Breast biopsy (Left).    Medications: Peace has a current medication list which includes the following prescription(s): amlodipine, atenolol, blood sugar diagnostic, blood-glucose meter, calcium carb/vit d3/minerals, calcium-vitamin d, cetirizine, gabapentin, glimepiride, lancets, lancing device, losartan-hydrochlorothiazide 100-25 mg, metformin, omeprazole, rosuvastatin, and trueplus lancets.    Allergies:   Review of patient's allergies indicates:   Allergen Reactions    Pcn [penicillins] Hives and Shortness Of Breath        Imaging none    Prior Therapy/Previous treatment included: none   Social History:  lives with their spouse  Current exercise:None  currently; was walking prior to heat   Occupation: n/a   Prior Level of Function: independent   Current Level of Function: delays activities outside of home until she has had a BM, urge suppression techniques assist with continence, though they are time consuming and interrupting to activities    Types of fluid intake: 1x diet coke, tea mix added to 2 cups of water, pineapple crush mix added to 8 cup of water - diluted   Diet: high vegetable intake    Fiber: daily supplemental fiber   Habitus:well developed, well nourished  Abuse/Neglect: No     Pts goals: Improve bowel and bladder control     OBJECTIVE     See EMR under MEDIA for written consent provided 6/8/2022  Chaperone: declined    ORTHO SCREEN  Posture in sitting: slouched   Posture in standing: increased kyphosis and decreased lordosis  Pelvic alignment: no sign of deviations noted in supine   SI Joint Palpation: Denies tenderness to SI joint palpation bilaterally.    LUMBAR ROM - WFL     HIP STRENGTH:      Left      Right  Hip flexion: 4+/5 Hip flexion: 4/5   Hip Internal Rotation:  4+/5    Hip Internal Rotation: 4/5      Hip External Rotation: 4+/5    Hip External Rotation: 4+/5      Hip extension:  4+/5 Hip extension: 4+/5   Hip abduction: 5/5 Hip abduction: 5/5   Hip adduction: 5/5 Hip adduction 5/5     Special Tests for Load Transfer Assessment Between the Trunk and Lower Extremities:     Active Straight Leg Test:    o Right: pelvic rotation or drop    o Left: pelvic rotation or drop     Single Leg Stance Test:    o Right: impaired balance reactions   o Left: impaired balance reactions     ABDOMINAL WALL ASSESSMENT  Palpation: boggy  Abdominal strength: Rectus abdominus: 4+/5     Transverse abdominus: 4/5  Scarring: lower abdominal scarring   Pelvic Floor Muscle and Transverse Abdominus Synergy: present  Diastasis: absent      BREATHING MECHANICS ASSESSMENT   Thorax Assessment During Quiet Respiration: WNL excursion of ribcage and Decreased excursion  of abdominal wall   Thorax Assessment During Deep Respiration: Decreased excursion of abdominal wall  and Decreased excursion bilaterally of lateral ribs     VAGINAL PELVIC FLOOR EXAM    EXTERNAL ASSESSMENT  Sensation: WNL   Pain:  none  Voluntary contraction: palpable lift  Voluntary relaxation: palpable drop  Involuntary contraction: nil  Bearing down: palpable bulge  Comments: performed external to clothing             TREATMENT     Treatment Time In: 1340  Treatment Time Out: 1410   Total Treatment time (time-based codes) separate from Evaluation: 30 minutes      Neuromuscular Re-education to develop Coordination and Control for 15 minutes including: pelvic floor relaxation speed after performing a kegel, diaphragmatic breathing with Kegel and diaphragmatic breathing     Kegel performance in sitting, supine - focus on complete relaxation     Therapeutic Activity Patient participated in dynamic functional therapeutic activities to improve functional performance for 15 minutes. Including: Education as described below.     Patient Education provided:   general anatomy/physiology of urinary/ bowel  system, benefits of treatment, risks of treatment, and alternative methods of treatment were discussed with the pt. Additionally, anatomy/physiology of pelvic floor, bladder retraining, diaphragmatic breathing, double voiding techniques, kegels, fluid intake/dietary modifications and behavior modifications were reviewed.     Home Exercises Provided:  Written Home Exercises Provided: yes.  Exercises were reviewed and Peace was able to demonstrate them prior to the end of the session.    Peace demonstrated good  understanding of the education provided.     See EMR under Patient Instructions for exercises provided 6/8/2022.    Assessment     Peace is a 77 y.o. female referred to outpatient Physical Therapy with a medical diagnosis of bladder and bowel incontinence. Pt presents with altered posture and poor trunk  stability. Slight LE weakness also noted bilaterally. Abdominal assessment revealed fair mobility, slight weakness, and limited coordination. Complete pelvic floor assessment not performed this session, though external palpation revealed fair mobility and coordination with breathing. Together, deficits have contributed to significant bowel and bladder dysfunction, most notably incontinence, frequency, and nocturia. Based on presentation, Peace would benefit from continued pelvic floor PT for exercise to improve abdominopelvic muscle balance and continued education on behavioral modifications to increase bladder and bowel control.    Pt prognosis is Good.   Pt will benefit from skilled outpatient Physical Therapy to address the deficits stated above and in the chart below, provide pt/family education, and to maximize pt's level of independence.     Plan of care discussed with patient: Yes  Pt's spiritual, cultural and educational needs considered and patient is agreeable to the plan of care and goals as stated below:       Anticipated Barriers for therapy: none    Medical Necessity is demonstrated by the following    History  Co-morbidities and personal factors that may impact the plan of care Co-morbidities:   Psoriasis, HTN, hyperlipidemia, Hx of skin cancer, T2DM, Obesity, Osteoporosis, insomnia     Personal Factors:   lifestyle     moderate   Examination  Body Structures and Functions, activity limitations and participation restrictions that may impact the plan of care Body Regions/Systems/Functions:  altered posture, poor knowledge of body mechanics and posture, poor trunk stability, poor quality of pelvic muscle contraction, increased frequency of urination, increased nocturia, poor coordination of pelvic floor muscles during ADL's leading to urinary or fecal leakage, poor fluid intake, incomplete urination, dysfunctional voiding and dysfunctional defecation     Activity Limitations:  delaying urge to urinate,  delaying urge to urinate or have a BM, full bladder emptying, sleep uninterrupted by excessive nocturia and incontinence with ADLs    Participation Restrictions:  all ADLs/iADLs uninterrupted by urinary incontinence/urgency/frequency, all ADLs/iADLs uninterrupted by fecal incontinence/urgency/frequency, ADLs affected by inability to fully empty bladder , social activities with friends/family, Sleep restrictions and exercise restrictions due to incontinence     Activity limitations:   Learning and applying knowledge  no deficits    General Tasks and Commands  no deficits    Communication  no deficits    Mobility  walking  driving (bike, car, motorcycle)    Self care  no deficits    Domestic Life  doing house work (cleaning house, washing dishes, laundry)  assisting others    Interactions/Relationships  no deficits    Life Areas  no deficits    Community and Social Life  community life  recreation and leisure       moderate   Clinical Presentation evolving clinical presentation with changing clinical characteristics moderate   Decision Making/ Complexity Score: moderate       Goals:  Short Term Goals: 5 weeks   Pt to demonstrate being able to correctly and consistently perform a kegel which is needed  to increase pelvic floor muscle coordination and strength needed for continence.  Pt to be able to delay the urge to urinate at least 5 minutes with a strong urge to urinate in order to make it to the bathroom without leaking.  Pt to report a decrease in urinary frequency from every 1 hour to no more than once every 1.5 hour(s) to improve ability to participate in social activities.  Pt to voice understanding of the role that diet plays on urinary urgency.    Pt to report a decrease in nocturia to no more than 2x/night for improved ability to achieve restorative sleep.     Pt to report a decrease in bowel frequency from 1-4 times per day to no more than 3 times per day to improve ability to participate in social  activities.     Long Term Goals: 10 weeks   Pt to be discharged with home plan for carry over after discharge.    Pt to report an 90% reduction of urinary incontinence symptoms with ADL participation thereby demonstrating improved pelvic floor muscle control and strength.   Pt to be able to delay the urge to urinate at least 10 minutes with a strong urge to urinate in order to make it to the bathroom without leaking.  Pt to report a decrease in urinary frequency from every 1.5 hours to no more than once every 2 hour(s) to improve ability to participate in social activities.  Pt to report a decrease in nocturia to no more than 1x/night for improved restorative sleep.    Pt to report a decrease in bowel frequency from 1-3 times per day to no more than 2 times per day to improve ability to participate in social activities.   Pt to report an 90% reduction of bowel incontinence symptoms with ADL participation thereby demonstrating improved pelvic floor muscle control and strength.     Plan     Plan of care Certification: 6/8/2022 to 9/8/2022.    Outpatient Physical Therapy 1 times weekly for 10 weeks to include the following interventions: therapeutic exercises, therapeutic activity, neuromuscular re-education, manual therapy, modalities PRN, patient/family education and self care/home management    Plan for next visit: resume PFM assessment     ENRRIQUE MUÑOZ, PT  6/8/2022        I have seen the patient, reviewed the therapist's plan of care, and I agree with the plan of care.      I certify the need for these services furnished under this plan of treatment and while under my care.     ___________________ ________ Physician/Referring Practitioner            ___________________________ Date of Signature

## 2022-06-17 ENCOUNTER — CLINICAL SUPPORT (OUTPATIENT)
Dept: REHABILITATION | Facility: HOSPITAL | Age: 77
End: 2022-06-17
Payer: MEDICARE

## 2022-06-17 DIAGNOSIS — R27.8 COORDINATION IMPAIRMENT: ICD-10-CM

## 2022-06-17 DIAGNOSIS — M62.81 WEAKNESS OF TRUNK MUSCULATURE: Primary | ICD-10-CM

## 2022-06-17 PROCEDURE — 97112 NEUROMUSCULAR REEDUCATION: CPT | Mod: PN

## 2022-06-17 PROCEDURE — 97110 THERAPEUTIC EXERCISES: CPT | Mod: PN

## 2022-06-17 NOTE — PROGRESS NOTES
Pelvic Health Physical Therapy   Treatment Note     Name: Peace Farmer  Clinic Number: [id number]    Therapy Diagnosis:   Encounter Diagnoses   Name Primary?    Weakness of trunk musculature Yes    Coordination impairment      Physician: No ref. provider found    Visit Date: 6/17/2022    Physician Orders: PT Eval and Treat - Pelvic Floor PT   Medical Diagnosis from Referral:   R32 (ICD-10-CM) - Urinary incontinence, unspecified type   R15.9 (ICD-10-CM) - Incontinence of feces, unspecified fecal incontinence type   Evaluation Date: 6/8/2022  Authorization Period Expiration: 5/2/2023  Plan of Care Expiration: 9/8/2022  Visit # / Visits authorized: 2/pending      Cancelled Visits: 0  No Show Visits: 0    Time In: 1400  Time Out: 1500  Total Billable Time: 60 minutes    Precautions: Standard    Subjective     Pt reports: has been practicing kegels outlined in HEP, but not with focus on specific layers     She was compliant with home exercise program.  Response to previous treatment: Good   Functional change: ongoing     Pain: 0/10  Location:     Objective     Peace received therapeutic exercises to develop  strength and endurance for 10 minutes including: Kegels Endurance Holds and Kegels: Quick flicks     Peace received the following manual therapy techniques: to develop flexibility, extensibility and desensitization for 00 minutes including:     Peace participated in neuromuscular re-education activities to develop Coordination and Control for 40 minutes including: pelvic floor relaxation speed after performing a kegel, diaphragmatic breathing with Kegel, diaphragmatic breathing and pelvic floor mm contractions focus on activation at 3 layers sequentially in isolation and then sequentially    Kegels - complete vs layer specific    In sitting, forward lean, bent over at mat    Peace participated in dynamic functional therapeutic activities to improve functional performance for 10  minutes, including:    HEP  review and modifications        Home Exercises Provided and Patient Education Provided     Education provided:   - anatomy/physiology of pelvic floor, posture/body mechanices, diaphragmatic breathing, kegels and behavior modifications  Discussed progression of plan of care with patient; educated pt in activity modification; reviewed HEP with pt. Pt demonstrated and verbalized understanding of all instruction and was provided with a handout of HEP (see Patient Instructions).    Written Home Exercises Provided: yes.  Exercises were reviewed and Peace was able to demonstrate them prior to the end of the session.  Peace demonstrated good  understanding of the education provided.     See EMR under Patient Instructions for exercises provided prior visit.    Assessment     Peace performed well in today's session, demonstrating fair carry over from previous session when performing complete kegels. Because layer-specific recruitment not practiced at home, more extensive review performed this session. Peace performed fairly well, though she would benefit from continued practice. Relaxing breath added between each contraction to ensure complete relaxation and allow Peace time to prepare for next contraction. Based on performance she would benefit from continued practice of this routine prior to progression to more successive contractions.     Peace Is progressing well towards her goals.   Pt prognosis is Good.     Pt will continue to benefit from skilled outpatient physical therapy to address the deficits listed in the problem list box on initial evaluation, provide pt/family education and to maximize pt's level of independence in the home and community environment.     Pt's spiritual, cultural and educational needs considered and pt agreeable to plan of care and goals.     Anticipated barriers to physical therapy: none     Goals:   Short Term Goals: 5 weeks -progressing  Pt to demonstrate being able to correctly and  consistently perform a kegel which is needed  to increase pelvic floor muscle coordination and strength needed for continence.  Pt to be able to delay the urge to urinate at least 5 minutes with a strong urge to urinate in order to make it to the bathroom without leaking.  Pt to report a decrease in urinary frequency from every 1 hour to no more than once every 1.5 hour(s) to improve ability to participate in social activities.  Pt to voice understanding of the role that diet plays on urinary urgency.    Pt to report a decrease in nocturia to no more than 2x/night for improved ability to achieve restorative sleep.     Pt to report a decrease in bowel frequency from 1-4 times per day to no more than 3 times per day to improve ability to participate in social activities.      Long Term Goals: 10 weeks -progressing  Pt to be discharged with home plan for carry over after discharge.    Pt to report an 90% reduction of urinary incontinence symptoms with ADL participation thereby demonstrating improved pelvic floor muscle control and strength.   Pt to be able to delay the urge to urinate at least 10 minutes with a strong urge to urinate in order to make it to the bathroom without leaking.  Pt to report a decrease in urinary frequency from every 1.5 hours to no more than once every 2 hour(s) to improve ability to participate in social activities.  Pt to report a decrease in nocturia to no more than 1x/night for improved restorative sleep.    Pt to report a decrease in bowel frequency from 1-3 times per day to no more than 2 times per day to improve ability to participate in social activities.   Pt to report an 90% reduction of bowel incontinence symptoms with ADL participation thereby demonstrating improved pelvic floor muscle control and strength.    Plan     Plan of care Certification: 6/8/2022 to 9/8/2022.    Plan for next visit: reassess layer-specific recruitment     ENRRIQUE MUÑOZ, PT   06/20/2022

## 2022-06-22 ENCOUNTER — CLINICAL SUPPORT (OUTPATIENT)
Dept: REHABILITATION | Facility: HOSPITAL | Age: 77
End: 2022-06-22
Attending: INTERNAL MEDICINE
Payer: MEDICARE

## 2022-06-22 DIAGNOSIS — R27.8 COORDINATION IMPAIRMENT: ICD-10-CM

## 2022-06-22 DIAGNOSIS — M62.81 WEAKNESS OF TRUNK MUSCULATURE: Primary | ICD-10-CM

## 2022-06-22 PROCEDURE — 97530 THERAPEUTIC ACTIVITIES: CPT | Mod: PN

## 2022-06-22 PROCEDURE — 97112 NEUROMUSCULAR REEDUCATION: CPT | Mod: PN

## 2022-06-22 NOTE — PROGRESS NOTES
Pelvic Health Physical Therapy   Treatment Note     Name: Peace Farmer  Clinic Number: [id number]    Therapy Diagnosis:   Encounter Diagnoses   Name Primary?    Weakness of trunk musculature Yes    Coordination impairment      Physician: Rosa Garber MD    Visit Date: 6/22/2022    Physician Orders: PT Eval and Treat - Pelvic Floor PT   Medical Diagnosis from Referral:   R32 (ICD-10-CM) - Urinary incontinence, unspecified type   R15.9 (ICD-10-CM) - Incontinence of feces, unspecified fecal incontinence type   Evaluation Date: 6/8/2022  Authorization Period Expiration: 5/2/2023  Plan of Care Expiration: 9/8/2022  Visit # / Visits authorized: 3/pending      Cancelled Visits: 0  No Show Visits: 0    Time In: 1400  Time Out: 1500  Total Billable Time: 60 minutes    Precautions: Standard    Subjective     Pt reports: Improved awareness of layers independently, but cannot recruit all together. Urgency has decreased slightly. Has not had any mad dashes to the bathroom. Feels like breathing is most limiting factor for performance.     She was compliant with home exercise program.  Response to previous treatment: Good   Functional change: ongoing     Pain: 0/10  Location:     Objective     Peace received therapeutic exercises to develop  strength and endurance for 10 minutes including: Kegels Endurance Holds and Kegels: Quick flicks     Peace received the following manual therapy techniques: to develop flexibility, extensibility and desensitization for 00 minutes including:     Peace participated in neuromuscular re-education activities to develop Coordination and Control for 50 minutes including: pelvic floor relaxation speed after performing a kegel, diaphragmatic breathing with Kegel, diaphragmatic breathing and pelvic floor mm contractions focus on activation at 3 layers sequentially in isolation and then sequentially    Kegels - complete vs layer specific    In sitting, forward lean, bent over at mat,  standing at wall, standing    Quick flicks vs holds    Breathing vs counting     Peace participated in dynamic functional therapeutic activities to improve functional performance for 10  minutes, including:    HEP review and modifications    Urge suppression techniques        Home Exercises Provided and Patient Education Provided     Education provided:   - anatomy/physiology of pelvic floor, posture/body mechanices, diaphragmatic breathing, kegels and behavior modifications  Discussed progression of plan of care with patient; educated pt in activity modification; reviewed HEP with pt. Pt demonstrated and verbalized understanding of all instruction and was provided with a handout of HEP (see Patient Instructions).    Written Home Exercises Provided: yes.  Exercises were reviewed and Peace was able to demonstrate them prior to the end of the session.  Peace demonstrated good  understanding of the education provided.     See EMR under Patient Instructions for exercises provided prior visit.    Assessment     Peace performed well in today's session, demonstrating good carry over from previous session. Layers reviewed in sitting, as this remains most limiting position. Breathing continues to limit performance, so kegels practiced without; improved performance reported. Standing also introduced, with increased recruitment noted. HEP updated to include all. Overall, Peace is performing well. PT to reassess NV.     Peace Is progressing well towards her goals.   Pt prognosis is Good.     Pt will continue to benefit from skilled outpatient physical therapy to address the deficits listed in the problem list box on initial evaluation, provide pt/family education and to maximize pt's level of independence in the home and community environment.     Pt's spiritual, cultural and educational needs considered and pt agreeable to plan of care and goals.     Anticipated barriers to physical therapy: none     Goals:   Short Term  Goals: 5 weeks -progressing  Pt to demonstrate being able to correctly and consistently perform a kegel which is needed  to increase pelvic floor muscle coordination and strength needed for continence.  Pt to be able to delay the urge to urinate at least 5 minutes with a strong urge to urinate in order to make it to the bathroom without leaking.  Pt to report a decrease in urinary frequency from every 1 hour to no more than once every 1.5 hour(s) to improve ability to participate in social activities.  Pt to voice understanding of the role that diet plays on urinary urgency.    Pt to report a decrease in nocturia to no more than 2x/night for improved ability to achieve restorative sleep.     Pt to report a decrease in bowel frequency from 1-4 times per day to no more than 3 times per day to improve ability to participate in social activities.      Long Term Goals: 10 weeks -progressing  Pt to be discharged with home plan for carry over after discharge.    Pt to report an 90% reduction of urinary incontinence symptoms with ADL participation thereby demonstrating improved pelvic floor muscle control and strength.   Pt to be able to delay the urge to urinate at least 10 minutes with a strong urge to urinate in order to make it to the bathroom without leaking.  Pt to report a decrease in urinary frequency from every 1.5 hours to no more than once every 2 hour(s) to improve ability to participate in social activities.  Pt to report a decrease in nocturia to no more than 1x/night for improved restorative sleep.    Pt to report a decrease in bowel frequency from 1-3 times per day to no more than 2 times per day to improve ability to participate in social activities.   Pt to report an 90% reduction of bowel incontinence symptoms with ADL participation thereby demonstrating improved pelvic floor muscle control and strength.    Plan     Plan of care Certification: 6/8/2022 to 9/8/2022.    Plan for next visit: reassess PFM,  ANNA, goals     ENRRIQUE MUÑOZ, PT   06/22/2022

## 2022-06-30 ENCOUNTER — LAB VISIT (OUTPATIENT)
Dept: LAB | Facility: HOSPITAL | Age: 77
End: 2022-06-30
Attending: HOSPITALIST
Payer: MEDICARE

## 2022-06-30 DIAGNOSIS — E55.9 MILD VITAMIN D DEFICIENCY: ICD-10-CM

## 2022-06-30 DIAGNOSIS — R79.89 ABNORMAL THYROID BLOOD TEST: ICD-10-CM

## 2022-06-30 DIAGNOSIS — E11.65 TYPE 2 DIABETES MELLITUS WITH HYPERGLYCEMIA, WITHOUT LONG-TERM CURRENT USE OF INSULIN: ICD-10-CM

## 2022-06-30 DIAGNOSIS — R73.03 PREDIABETES: ICD-10-CM

## 2022-06-30 LAB
25(OH)D3+25(OH)D2 SERPL-MCNC: 35 NG/ML (ref 30–96)
ANION GAP SERPL CALC-SCNC: 9 MMOL/L (ref 8–16)
BUN SERPL-MCNC: 14 MG/DL (ref 8–23)
CALCIUM SERPL-MCNC: 10.7 MG/DL (ref 8.7–10.5)
CHLORIDE SERPL-SCNC: 105 MMOL/L (ref 95–110)
CO2 SERPL-SCNC: 25 MMOL/L (ref 23–29)
CREAT SERPL-MCNC: 0.7 MG/DL (ref 0.5–1.4)
EST. GFR  (AFRICAN AMERICAN): >60 ML/MIN/1.73 M^2
EST. GFR  (NON AFRICAN AMERICAN): >60 ML/MIN/1.73 M^2
ESTIMATED AVG GLUCOSE: 128 MG/DL (ref 68–131)
GLUCOSE SERPL-MCNC: 158 MG/DL (ref 70–110)
HBA1C MFR BLD: 6.1 % (ref 4–5.6)
POTASSIUM SERPL-SCNC: 5.5 MMOL/L (ref 3.5–5.1)
SODIUM SERPL-SCNC: 139 MMOL/L (ref 136–145)
T4 FREE SERPL-MCNC: 1 NG/DL (ref 0.71–1.51)
THYROPEROXIDASE IGG SERPL-ACNC: <6 IU/ML
TSH SERPL DL<=0.005 MIU/L-ACNC: 3.81 UIU/ML (ref 0.4–4)

## 2022-06-30 PROCEDURE — 80048 BASIC METABOLIC PNL TOTAL CA: CPT | Performed by: HOSPITALIST

## 2022-06-30 PROCEDURE — 86376 MICROSOMAL ANTIBODY EACH: CPT | Performed by: HOSPITALIST

## 2022-06-30 PROCEDURE — 82306 VITAMIN D 25 HYDROXY: CPT | Performed by: HOSPITALIST

## 2022-06-30 PROCEDURE — 36415 COLL VENOUS BLD VENIPUNCTURE: CPT | Mod: PO | Performed by: HOSPITALIST

## 2022-06-30 PROCEDURE — 84439 ASSAY OF FREE THYROXINE: CPT | Performed by: HOSPITALIST

## 2022-06-30 PROCEDURE — 84443 ASSAY THYROID STIM HORMONE: CPT | Performed by: HOSPITALIST

## 2022-06-30 PROCEDURE — 83036 HEMOGLOBIN GLYCOSYLATED A1C: CPT | Performed by: HOSPITALIST

## 2022-07-07 ENCOUNTER — OFFICE VISIT (OUTPATIENT)
Dept: ENDOCRINOLOGY | Facility: CLINIC | Age: 77
End: 2022-07-07
Payer: MEDICARE

## 2022-07-07 VITALS
SYSTOLIC BLOOD PRESSURE: 137 MMHG | DIASTOLIC BLOOD PRESSURE: 73 MMHG | BODY MASS INDEX: 29.65 KG/M2 | WEIGHT: 155 LBS | HEART RATE: 78 BPM | TEMPERATURE: 98 F

## 2022-07-07 DIAGNOSIS — I10 BENIGN HYPERTENSION: ICD-10-CM

## 2022-07-07 DIAGNOSIS — M81.8 OTHER OSTEOPOROSIS WITHOUT CURRENT PATHOLOGICAL FRACTURE: ICD-10-CM

## 2022-07-07 DIAGNOSIS — E55.9 MILD VITAMIN D DEFICIENCY: ICD-10-CM

## 2022-07-07 DIAGNOSIS — E66.09 CLASS 1 OBESITY DUE TO EXCESS CALORIES WITH SERIOUS COMORBIDITY AND BODY MASS INDEX (BMI) OF 30.0 TO 30.9 IN ADULT: ICD-10-CM

## 2022-07-07 DIAGNOSIS — R79.89 ABNORMAL THYROID BLOOD TEST: ICD-10-CM

## 2022-07-07 DIAGNOSIS — E11.65 TYPE 2 DIABETES MELLITUS WITH HYPERGLYCEMIA, WITHOUT LONG-TERM CURRENT USE OF INSULIN: Primary | ICD-10-CM

## 2022-07-07 PROCEDURE — 3072F LOW RISK FOR RETINOPATHY: CPT | Mod: CPTII,S$GLB,, | Performed by: HOSPITALIST

## 2022-07-07 PROCEDURE — 3044F HG A1C LEVEL LT 7.0%: CPT | Mod: CPTII,S$GLB,, | Performed by: HOSPITALIST

## 2022-07-07 PROCEDURE — 1101F PR PT FALLS ASSESS DOC 0-1 FALLS W/OUT INJ PAST YR: ICD-10-PCS | Mod: CPTII,S$GLB,, | Performed by: HOSPITALIST

## 2022-07-07 PROCEDURE — 1126F PR PAIN SEVERITY QUANTIFIED, NO PAIN PRESENT: ICD-10-PCS | Mod: CPTII,S$GLB,, | Performed by: HOSPITALIST

## 2022-07-07 PROCEDURE — 3061F NEG MICROALBUMINURIA REV: CPT | Mod: CPTII,S$GLB,, | Performed by: HOSPITALIST

## 2022-07-07 PROCEDURE — 3066F NEPHROPATHY DOC TX: CPT | Mod: CPTII,S$GLB,, | Performed by: HOSPITALIST

## 2022-07-07 PROCEDURE — 3075F PR MOST RECENT SYSTOLIC BLOOD PRESS GE 130-139MM HG: ICD-10-PCS | Mod: CPTII,S$GLB,, | Performed by: HOSPITALIST

## 2022-07-07 PROCEDURE — 99215 PR OFFICE/OUTPT VISIT, EST, LEVL V, 40-54 MIN: ICD-10-PCS | Mod: S$GLB,,, | Performed by: HOSPITALIST

## 2022-07-07 PROCEDURE — 1159F PR MEDICATION LIST DOCUMENTED IN MEDICAL RECORD: ICD-10-PCS | Mod: CPTII,S$GLB,, | Performed by: HOSPITALIST

## 2022-07-07 PROCEDURE — 99999 PR PBB SHADOW E&M-EST. PATIENT-LVL III: CPT | Mod: PBBFAC,,, | Performed by: HOSPITALIST

## 2022-07-07 PROCEDURE — 3288F PR FALLS RISK ASSESSMENT DOCUMENTED: ICD-10-PCS | Mod: CPTII,S$GLB,, | Performed by: HOSPITALIST

## 2022-07-07 PROCEDURE — 3044F PR MOST RECENT HEMOGLOBIN A1C LEVEL <7.0%: ICD-10-PCS | Mod: CPTII,S$GLB,, | Performed by: HOSPITALIST

## 2022-07-07 PROCEDURE — 3066F PR DOCUMENTATION OF TREATMENT FOR NEPHROPATHY: ICD-10-PCS | Mod: CPTII,S$GLB,, | Performed by: HOSPITALIST

## 2022-07-07 PROCEDURE — 3072F PR LOW RISK FOR RETINOPATHY: ICD-10-PCS | Mod: CPTII,S$GLB,, | Performed by: HOSPITALIST

## 2022-07-07 PROCEDURE — 3061F PR NEG MICROALBUMINURIA RESULT DOCUMENTED/REVIEW: ICD-10-PCS | Mod: CPTII,S$GLB,, | Performed by: HOSPITALIST

## 2022-07-07 PROCEDURE — 3288F FALL RISK ASSESSMENT DOCD: CPT | Mod: CPTII,S$GLB,, | Performed by: HOSPITALIST

## 2022-07-07 PROCEDURE — 99999 PR PBB SHADOW E&M-EST. PATIENT-LVL III: ICD-10-PCS | Mod: PBBFAC,,, | Performed by: HOSPITALIST

## 2022-07-07 PROCEDURE — 1159F MED LIST DOCD IN RCRD: CPT | Mod: CPTII,S$GLB,, | Performed by: HOSPITALIST

## 2022-07-07 PROCEDURE — 3075F SYST BP GE 130 - 139MM HG: CPT | Mod: CPTII,S$GLB,, | Performed by: HOSPITALIST

## 2022-07-07 PROCEDURE — 3078F DIAST BP <80 MM HG: CPT | Mod: CPTII,S$GLB,, | Performed by: HOSPITALIST

## 2022-07-07 PROCEDURE — 99215 OFFICE O/P EST HI 40 MIN: CPT | Mod: S$GLB,,, | Performed by: HOSPITALIST

## 2022-07-07 PROCEDURE — 1126F AMNT PAIN NOTED NONE PRSNT: CPT | Mod: CPTII,S$GLB,, | Performed by: HOSPITALIST

## 2022-07-07 PROCEDURE — 1101F PT FALLS ASSESS-DOCD LE1/YR: CPT | Mod: CPTII,S$GLB,, | Performed by: HOSPITALIST

## 2022-07-07 PROCEDURE — 3078F PR MOST RECENT DIASTOLIC BLOOD PRESSURE < 80 MM HG: ICD-10-PCS | Mod: CPTII,S$GLB,, | Performed by: HOSPITALIST

## 2022-07-07 RX ORDER — METFORMIN HYDROCHLORIDE 1000 MG/1
1000 TABLET ORAL 2 TIMES DAILY
Qty: 180 TABLET | Refills: 0 | Status: SHIPPED | OUTPATIENT
Start: 2022-07-07 | End: 2022-11-15

## 2022-07-07 RX ORDER — GLIMEPIRIDE 4 MG/1
4 TABLET ORAL 2 TIMES DAILY WITH MEALS
Qty: 180 TABLET | Refills: 1 | Status: SHIPPED | OUTPATIENT
Start: 2022-07-07 | End: 2023-01-23

## 2022-07-07 RX ORDER — BLOOD-GLUCOSE METER
KIT MISCELLANEOUS
Qty: 150 STRIP | Refills: 4 | Status: SHIPPED | OUTPATIENT
Start: 2022-07-07 | End: 2023-06-19

## 2022-07-07 RX ORDER — LEVOTHYROXINE SODIUM 25 UG/1
25 TABLET ORAL
Qty: 90 TABLET | Refills: 1 | Status: SHIPPED | OUTPATIENT
Start: 2022-07-07 | End: 2022-09-28

## 2022-07-07 RX ORDER — BLOOD-GLUCOSE METER
KIT MISCELLANEOUS
Qty: 1 EACH | Refills: 0 | Status: SHIPPED | OUTPATIENT
Start: 2022-07-07 | End: 2023-07-07

## 2022-07-07 NOTE — PATIENT INSTRUCTIONS
((Thank you for completing this visit with me and PLEASE follow up with me regularly for continue refills of your diabetes medications))     ----------------------------------------------------------    Regiment:   __ Metformin 1000 mg twice a day  __ Glimepiride 4mg twice a day  __ Trulicity 1.5mg once a week injection        Goal for your blood sugar   In the morning, before breakfast: 100-140  Before going to bed: 100-140  Do not go to bed with glucose less than 100, have a small snack if lower than 100      Please check glucose before breakfast and at bedtime.   You can keep track of the glucose with Glucose logs or any papers  Please filled out and bring back to next office visit for me to review  Document any (LOW BLOOD GLUCOSE) hypoglycemia  episode with date and time for me to review      Please make sure to get your dilated eyes examine once a year with your eye doctor.  Monitor your feet for any cuts or skin breakdown regularly, schedule a visit with your foot doctor/podiatrist yearly if you see one.      We will plan an in-clinic visit in 3 months, with labs prior to that appointment.    Contact information:    Earnest Zavaleta M.D  [facility] [address] [facility], [address]  [place] [address]    Office:  [phone]  Fax:  [phone]     ----------------------------------------------------------

## 2022-07-07 NOTE — ASSESSMENT & PLAN NOTE
- Body mass index is 29.65 kg/m².  - dietary discussion as above  - continue to monitor weight>> weight loss noted while on Trulicity and dietary modification

## 2022-07-07 NOTE — ASSESSMENT & PLAN NOTE
- Diabetes is at goal, given most current A1C, Goal A1C for patient is 7%  - better glycemic control after changes in diet, initiation Trulicity  - Diabetic supplies/medications reviewed this visit to ensure continue refills and compliance  - Diabetes education referral: patient refused   - Advised patient to get periodic eye and feet exam.   - Reviewed routine maintenance: lipid, U:P/C   - continue encouragement of dietary modification, portion size control, decreasing carbohydrates intake    Plan  - given better glycemic control:  We will increase Trulicity  1.5 mg Q weekly injection, with plan to decrease metformin/glimepiride in the future  - continue Metformin 1000 mg twice a day, Glimepiride 4mg twice a day (advised patient to monitor for hypoglycemia, can decrease if needed)  - Advised pt to check glucoses regularly, asked to filled glucose log and bring back for review at next office visit  - Clear written instruction given on AVS  - Follow up as scheduled with lab work prior

## 2022-07-07 NOTE — ASSESSMENT & PLAN NOTE
- longstanding history of Osteopenia with high FRAX at the hip  >> unable to tolerate oral bisphosphonate in the past  - recent bone density 2022 showed osteoporosis femoral neck   - Risk factors: race, postmenopausal status and family history   - continue vitamin-D and calcium supplement>> given within normal range vitamin-D  - mild hypercalcemia noted on blood work, we will continue to monitor  - after patient discussed with her insurance company, she would prefer to start on SC Prolia injection every 6 months  - order placed for Prolia, [facility] infusion  - next bone density 2024  - fall precaution discussed

## 2022-07-07 NOTE — PROGRESS NOTES
Subjective:      Patient ID: Peace Farmer is a 77 y.o. female presented to [facility] on 7/7/2022.  Chief Complaint:  Diabetes      History of Present Illness: Peace Farmer is a 77 y.o. female here for diabetes  Other significant past medical history:  Osteoporosis, abnormal thyroid function, hypertension      Interval history:  Patient is here for follow-up, to discuss multiple endocrine issues.  In regards to diabetes, has been doing much better, A1c improved to 6.1%.  After dietary modification, small portion, low carbohydrates.  Tolerating Trulicity well without any issues.  Denies any hypoglycemia.  Glucose log as below  In regards to osteoporosis:  After discussion with her insurance, she would prefer to pursue SC Prolia, taking OTC vitamin-D  In regards to abnormal thyroid function:  TPO antibodies negative, patient still with fatigue, family history of hypothyroidism, TSH 3.8, patient requests trial of TRT      1) With regards to Diabetes Mellitus Type 2  Known diabetic complications: none  Diagnosed w/ DM: 2008?, worsening now    On restrictive diet, small portion, low carbohydrates.  Patient unsure if she can continue long-term with this diet  Glucose log review:  At dinner>> 92, 93>174, 153, 124  After dinner> 163, 201, 111, 98, 126, 89    Current meds:     Trulicity 0.7 mg Q weekly injection    Metformin 1000 mg twice a day   Glimepiride 4mg twice a day   Previous meds:              Januvia  Home glucose checks: checks 2-3x a day, Logs reviewed/Unavailable: oral recall:   Hypoglycemia: denies  Diet/Exercise:               Eating multiple small meals  per day (7x)              Drink: diet coke  Weight trend: stable  Diabetes Education: yes, many years ago  Diabetes Related Hospitalization:  No  Hx of pancreatitis: No  Family history of diabetes: Yes. brother  Occupation: retired    Eye exam current (within one year): yes, DR: no  Reports cuts or ulcers on feet:    Denies    Statin: Taking  ACE/ARB: Taking    Diabetes Management Status: Reviewed     A1C Trend  Lab Results   Component Value Date    HGBA1C 6.1 (H) 06/30/2022    HGBA1C 8.5 (H) 04/25/2022    HGBA1C 8.2 (H) 02/10/2022       Lab Results   Component Value Date    MICALBCREAT 24.1 04/25/2022     Lab Results   Component Value Date    NHGNIGYZ46 1581 (H) 10/12/2020     No results found for: TTGIGA    No results found for: CPEPTIDE, GLUTAMICACID, ISLETCELLANT, FRUCTOSAMINE     Screening or Prevention Patient's value Goal Complete/Controlled?   Lipid profile : 04/25/2022 Annually Yes   LDL control Lab Results   Component Value Date    LDLCALC 44.8 (L) 04/25/2022    Annually/Less than 100 mg/dl  Yes   Nephropathy screening Lab Results   Component Value Date    LABMICR 13.0 04/25/2022     Lab Results   Component Value Date    PROTEINUA Negative 02/18/2020    Annually Yes   Blood pressure BP Readings from Last 1 Encounters:   07/07/22 137/73    Less than 140/90 Yes   Dilated retinal exam : 08/10/2021 Annually Yes   Foot exam   : 11/04/2020 Annually No       2) Osteoporosis  - Previously with osteopenia, 2020, previously managed by Dr. Rashid  - Was on Boniva for treatment of osteopenia, leading to heartburn> she does not to be on this medication  - Dr. Rashid wanted to switch patient to Reclast, for which she never started    - Recent bone density 05/2022 show osteoporosis  - She is taking Calcium and Vit D supplements. 500mg/1000iu  - She had a hysterectomy at 33 y/o and menopausal symptoms at 52 y/o.     DXA 5/2022  Lumbar spine (L1-L4): BMD is 0.850 g/cm2, T-score is -1.8, and Z-score is 0.6.  Total hip:  BMD is 0.751 g/cm2, T-score is -1.6, and Z-score is 0.2.  Femoral neck: BMD is 0.555 g/cm2, T-score is -2.6, and Z-score is -0.6.     FRAX:  16% risk of a major osteoporotic fracture in the next 10 years.  5.1% risk of hip fracture in the next 10 years.     Impression:  *Osteoporosis based on T-score below -2.5      3)   Abnormal thyroid function  - TSH elevation, 1x  - Family history thyroid disorder: son with hypothyroidism   - Denies goiter  - patient's main concern is:  Fatigue and Heat intolerance    Lab Results   Component Value Date    TSH 3.815 06/30/2022    TSH 4.073 (H) 04/25/2022    TSH 3.649 05/04/2021    FREET4 1.00 06/30/2022    FREET4 0.99 04/25/2022    FREET4 1.14 12/20/2018    THYROPEROXID <6.0 06/30/2022     Reviewed past surgical, medical, family, social history and updated as appropriate.    Review of Systems: see HPI above    Objective:   /73 (BP Location: Right arm)   Pulse 78   Temp 98.1 °F (36.7 °C) (Oral)   Wt 70.3 kg (155 lb)   BMI 29.65 kg/m²     Body mass index is 29.65 kg/m².  Vital signs reviewed    Physical Exam  Vitals and nursing note reviewed.   Constitutional:       General: She is not in acute distress.     Appearance: Normal appearance. She is well-developed. She is obese. She is not toxic-appearing.   Neck:      Thyroid: No thyromegaly.      Comments: No goiter noted  Cardiovascular:      Heart sounds: Normal heart sounds.   Pulmonary:      Effort: Pulmonary effort is normal. No respiratory distress.   Abdominal:      Tenderness: There is no abdominal tenderness.   Musculoskeletal:         General: No deformity. Normal range of motion.      Cervical back: Normal range of motion.   Skin:     Findings: No bruising.   Neurological:      Mental Status: She is alert and oriented to person, place, and time.   Psychiatric:         Mood and Affect: Mood normal.         Lab Reviewed:   Lab Results   Component Value Date    HGBA1C 6.1 (H) 06/30/2022       Lab Results   Component Value Date    CHOL 105 (L) 04/25/2022    HDL 40 04/25/2022    LDLCALC 44.8 (L) 04/25/2022    TRIG 101 04/25/2022    CHOLHDL 38.1 04/25/2022       Lab Results   Component Value Date     06/30/2022    K 5.5 (H) 06/30/2022     06/30/2022    CO2 25 06/30/2022     (H) 06/30/2022    BUN 14 06/30/2022     CREATININE 0.7 06/30/2022    CALCIUM 10.7 (H) 06/30/2022    PROT 7.0 02/10/2022    ALBUMIN 4.1 02/10/2022    BILITOT 0.4 02/10/2022    ALKPHOS 59 02/10/2022    AST 19 02/10/2022    ALT 19 02/10/2022    ANIONGAP 9 06/30/2022    ESTGFRAFRICA >60.0 06/30/2022    EGFRNONAA >60.0 06/30/2022    TSH 3.815 06/30/2022        Lab Results   Component Value Date    PTH 64.0 05/13/2020    YDDLAPPL40NJ 35 06/30/2022    QLPFGUGM23MM 37 05/13/2020    HSEXTLGK14UG 29 (L) 11/28/2018    CALCIUM 10.7 (H) 06/30/2022    CALCIUM 10.1 02/10/2022    CALCIUM 10.0 10/12/2020    ALKPHOS 59 02/10/2022    ALKPHOS 59 10/12/2020    ALKPHOS 57 05/13/2020    TSH 3.815 06/30/2022    LABCALC 10.4 12/10/2018    LPHDBZF42LDA 7 12/10/2018        Assessment     1. Type 2 diabetes mellitus with hyperglycemia, without long-term current use of insulin  dulaglutide (TRULICITY) 1.5 mg/0.5 mL pen injector    FREESTYLE LITE METER kit    FREESTYLE LITE STRIPS Strp    Hemoglobin A1C    Basic Metabolic Panel    TSH    T4, Free   2. Type 2 diabetes, uncontrolled, with neuropathy  glimepiride (AMARYL) 4 MG tablet    metFORMIN (GLUCOPHAGE) 1000 MG tablet   3. Abnormal thyroid blood test  TSH    T4, Free    levothyroxine (SYNTHROID) 25 MCG tablet   4. Other osteoporosis without current pathological fracture     5. Benign hypertension     6. Class 1 obesity due to excess calories with serious comorbidity and body mass index (BMI) of 30.0 to 30.9 in adult     7. Mild vitamin D deficiency          Plan     Type 2 diabetes mellitus with hyperglycemia, without long-term current use of insulin  - Diabetes is at goal, given most current A1C, Goal A1C for patient is 7%  - better glycemic control after changes in diet, initiation Trulicity  - Diabetic supplies/medications reviewed this visit to ensure continue refills and compliance  - Diabetes education referral: patient refused   - Advised patient to get periodic eye and feet exam.   - Reviewed routine maintenance: lipid,  U:P/C   - continue encouragement of dietary modification, portion size control, decreasing carbohydrates intake    Plan  - given better glycemic control:  We will increase Trulicity  1.5 mg Q weekly injection, with plan to decrease metformin/glimepiride in the future  - continue Metformin 1000 mg twice a day, Glimepiride 4mg twice a day (advised patient to monitor for hypoglycemia, can decrease if needed)  - Advised pt to check glucoses regularly, asked to filled glucose log and bring back for review at next office visit  - Clear written instruction given on AVS  - Follow up as scheduled with lab work prior      Other osteoporosis without current pathological fracture  - longstanding history of Osteopenia with high FRAX at the hip  >> unable to tolerate oral bisphosphonate in the past  - recent bone density 2022 showed osteoporosis femoral neck   - Risk factors: race, postmenopausal status and family history   - continue vitamin-D and calcium supplement>> given within normal range vitamin-D  - mild hypercalcemia noted on blood work, we will continue to monitor  - after patient discussed with her insurance company, she would prefer to start on [place] Prolia injection every 6 months  - order placed for Prolia, [facility] infusion  - next bone density 2024  - fall precaution discussed    Benign hypertension  - BP reviewed today  - continue home medications  - manage by PCP  - on HCTZ can lead to mild hypercalcemia, continue monitor    Class 1 obesity due to excess calories with serious comorbidity and body mass index (BMI) of 30.0 to 30.9 in adult  - Body mass index is 29.65 kg/m².  - dietary discussion as above  - continue to monitor weight>> weight loss noted while on Trulicity and dietary modification    Mild vitamin D deficiency  - lab work reviewed, on Vit D supplement  - lab work every 6 mo or yearly  - continue OTC VitD3 daily    Abnormal thyroid blood test  - no history of thyroid dysfunction, not  on thyroid medication  - patient is quite concerned about her thyroid function given her son's history of hypothyroidism  - mix symptoms of fatigue, which can be multifactorial   - TSH mildly elevated> indicating more of subclinical hypothyroidism  - TSH improved, TPO negative, patient still would like a trial of low-dose levothyroxine  - will do 3 month trial levothyroxine 25 mcg daily  - repeat TFTs 3 months, follow-up discussed       Advised patient to follow up with PCP for routine health maintenance care.   RTC in 2-3 months    Total Time for E/M Service preformed was greater than 40 minutes:  Including discussion and management of multiple endocrine issue including osteoporosis, diabetes, abnormal thyroid function, along with direct face-to-face time with patient along with discussion and active medical decision-making.    Earnest Zavaleta M.D.  Endocrinology  [facility]  7/7/2022      Disclaimer: This note has been generated in part with the use of voice-recognition software. There may be typographical errors that have been missed during proof-reading.

## 2022-07-07 NOTE — ASSESSMENT & PLAN NOTE
- no history of thyroid dysfunction, not on thyroid medication  - patient is quite concerned about her thyroid function given her son's history of hypothyroidism  - mix symptoms of fatigue, which can be multifactorial   - TSH mildly elevated> indicating more of subclinical hypothyroidism  - TSH improved, TPO negative, patient still would like a trial of low-dose levothyroxine  - will do 3 month trial levothyroxine 25 mcg daily  - repeat TFTs 3 months, follow-up discussed

## 2022-07-07 NOTE — ASSESSMENT & PLAN NOTE
- lab work reviewed, on Vit D supplement  - lab work every 6 mo or yearly  - continue OTC VitD3 daily

## 2022-07-07 NOTE — ASSESSMENT & PLAN NOTE
- BP reviewed today  - continue home medications  - manage by PCP  - on HCTZ can lead to mild hypercalcemia, continue monitor

## 2022-07-13 ENCOUNTER — CLINICAL SUPPORT (OUTPATIENT)
Dept: REHABILITATION | Facility: HOSPITAL | Age: 77
End: 2022-07-13
Attending: INTERNAL MEDICINE
Payer: MEDICARE

## 2022-07-13 DIAGNOSIS — R27.8 COORDINATION IMPAIRMENT: ICD-10-CM

## 2022-07-13 DIAGNOSIS — M62.81 WEAKNESS OF TRUNK MUSCULATURE: Primary | ICD-10-CM

## 2022-07-13 PROCEDURE — 97112 NEUROMUSCULAR REEDUCATION: CPT | Mod: PN

## 2022-07-13 PROCEDURE — 97530 THERAPEUTIC ACTIVITIES: CPT | Mod: PN

## 2022-07-13 NOTE — PROGRESS NOTES
Pelvic Health Physical Therapy   Treatment Note     Name: Peace Farmer  Clinic Number: [id number]    Therapy Diagnosis:   Encounter Diagnoses   Name Primary?    Weakness of trunk musculature Yes    Coordination impairment      Physician: Rosa Garber MD    Visit Date: 7/13/2022    Physician Orders: PT Eval and Treat - Pelvic Floor PT   Medical Diagnosis from Referral:   R32 (ICD-10-CM) - Urinary incontinence, unspecified type   R15.9 (ICD-10-CM) - Incontinence of feces, unspecified fecal incontinence type   Evaluation Date: 6/8/2022  Authorization Period Expiration: 5/2/2023  Plan of Care Expiration: 9/8/2022  Visit # / Visits authorized: 3/20     Cancelled Visits: 0  No Show Visits: 0    Time In: 1450  Time Out: 1550  Total Billable Time: 60 minutes    Precautions: Standard    Subjective     Pt reports: Only one episode of leakage, which occurred after second shingles vaccine and she forgot to use techniques. Continues with difficulty recruiting all layers together. Was trying at home, but then she noticed that exercises were causing feelings of fatigue in abdomen and pelvic floor. She thinks that she was concentrating too much on her breathing and holding her breath, because when she stopped worrying about the breathing, discomfort improved. Also noticed that she is unable to stop flow of urine when urinating.       She was compliant with home exercise program.  Response to previous treatment: Good   Functional change: decreased UUI     Pain: 0/10  Location:     Objective     Peace received therapeutic exercises to develop  strength and endurance for 00 minutes including: Kegels Endurance Holds and Kegels: Quick flicks     Peace received the following manual therapy techniques: to develop flexibility, extensibility and desensitization for 00 minutes including:     Peace participated in neuromuscular re-education activities to develop Coordination and Control for 45  minutes including: pelvic floor  relaxation speed after performing a kegel, diaphragmatic breathing with Kegel, diaphragmatic breathing and pelvic floor mm contractions focus on activation at 3 layers sequentially in isolation and then sequentially    Kegels - complete vs layer specific    In supine, side lying, sitting, forward lean, bent over at mat, standing at wall, standing    Quick flicks vs holds     Partial contractions   Abdominal down training - supine, sitting, mod quad     Peace participated in dynamic functional therapeutic activities to improve functional performance for 15 minutes, including:    HEP review and modifications        Home Exercises Provided and Patient Education Provided     Education provided:   - anatomy/physiology of pelvic floor, posture/body mechanices, diaphragmatic breathing, kegels and behavior modifications  Discussed progression of plan of care with patient; educated pt in activity modification; reviewed HEP with pt. Pt demonstrated and verbalized understanding of all instruction and was provided with a handout of HEP (see Patient Instructions).    Written Home Exercises Provided: yes.  Exercises were reviewed and Peace was able to demonstrate them prior to the end of the session.  Peace demonstrated good  understanding of the education provided.     See EMR under Patient Instructions for exercises provided prior visit.    Assessment     Peace presented with continued complaints of difficulty with breath coordination with pelvic floor, so extensive review performed this session. Improved coordination of exhale and contraction demonstrated, though significant overactivity of abdominals demonstrated. Peace was unable to perform complete kegel without high amount of abdominal recruitment, so partial kegels utilized. Breath training in modifed quadruped to decrease recruitment with exhale also introduced and added to HEP. Based on performance, Peace would benefit from further review NV prior to progression.      Peace Is progressing well towards her goals.   Pt prognosis is Good.     Pt will continue to benefit from skilled outpatient physical therapy to address the deficits listed in the problem list box on initial evaluation, provide pt/family education and to maximize pt's level of independence in the home and community environment.     Pt's spiritual, cultural and educational needs considered and pt agreeable to plan of care and goals.     Anticipated barriers to physical therapy: none     Goals:   Short Term Goals: 5 weeks -progressing  Pt to demonstrate being able to correctly and consistently perform a kegel which is needed  to increase pelvic floor muscle coordination and strength needed for continence.  Pt to be able to delay the urge to urinate at least 5 minutes with a strong urge to urinate in order to make it to the bathroom without leaking.  Pt to report a decrease in urinary frequency from every 1 hour to no more than once every 1.5 hour(s) to improve ability to participate in social activities.  Pt to voice understanding of the role that diet plays on urinary urgency.    Pt to report a decrease in nocturia to no more than 2x/night for improved ability to achieve restorative sleep.     Pt to report a decrease in bowel frequency from 1-4 times per day to no more than 3 times per day to improve ability to participate in social activities.      Long Term Goals: 10 weeks -progressing  Pt to be discharged with home plan for carry over after discharge.    Pt to report an 90% reduction of urinary incontinence symptoms with ADL participation thereby demonstrating improved pelvic floor muscle control and strength.   Pt to be able to delay the urge to urinate at least 10 minutes with a strong urge to urinate in order to make it to the bathroom without leaking.  Pt to report a decrease in urinary frequency from every 1.5 hours to no more than once every 2 hour(s) to improve ability to participate in social  activities.  Pt to report a decrease in nocturia to no more than 1x/night for improved restorative sleep.    Pt to report a decrease in bowel frequency from 1-3 times per day to no more than 2 times per day to improve ability to participate in social activities.   Pt to report an 90% reduction of bowel incontinence symptoms with ADL participation thereby demonstrating improved pelvic floor muscle control and strength.    Plan     Plan of care Certification: 6/8/2022 to 9/8/2022.    Plan for next visit: review breathing, FOTO, goals     ENRRIQUE UMÑOZ, PT   07/14/2022

## 2022-07-18 ENCOUNTER — OFFICE VISIT (OUTPATIENT)
Dept: PODIATRY | Facility: CLINIC | Age: 77
End: 2022-07-18
Payer: MEDICARE

## 2022-07-18 VITALS — HEIGHT: 61 IN | BODY MASS INDEX: 29.27 KG/M2 | WEIGHT: 155 LBS

## 2022-07-18 DIAGNOSIS — E11.40 CONTROLLED TYPE 2 DIABETES WITH NEUROPATHY: Primary | ICD-10-CM

## 2022-07-18 DIAGNOSIS — M20.40 HAMMER TOE, UNSPECIFIED LATERALITY: ICD-10-CM

## 2022-07-18 PROCEDURE — 3288F FALL RISK ASSESSMENT DOCD: CPT | Mod: CPTII,S$GLB,, | Performed by: PODIATRIST

## 2022-07-18 PROCEDURE — 1159F MED LIST DOCD IN RCRD: CPT | Mod: CPTII,S$GLB,, | Performed by: PODIATRIST

## 2022-07-18 PROCEDURE — 3061F PR NEG MICROALBUMINURIA RESULT DOCUMENTED/REVIEW: ICD-10-PCS | Mod: CPTII,S$GLB,, | Performed by: PODIATRIST

## 2022-07-18 PROCEDURE — 1101F PT FALLS ASSESS-DOCD LE1/YR: CPT | Mod: CPTII,S$GLB,, | Performed by: PODIATRIST

## 2022-07-18 PROCEDURE — 99999 PR PBB SHADOW E&M-EST. PATIENT-LVL III: ICD-10-PCS | Mod: PBBFAC,,, | Performed by: PODIATRIST

## 2022-07-18 PROCEDURE — 3072F LOW RISK FOR RETINOPATHY: CPT | Mod: CPTII,S$GLB,, | Performed by: PODIATRIST

## 2022-07-18 PROCEDURE — 3066F PR DOCUMENTATION OF TREATMENT FOR NEPHROPATHY: ICD-10-PCS | Mod: CPTII,S$GLB,, | Performed by: PODIATRIST

## 2022-07-18 PROCEDURE — 99999 PR PBB SHADOW E&M-EST. PATIENT-LVL III: CPT | Mod: PBBFAC,,, | Performed by: PODIATRIST

## 2022-07-18 PROCEDURE — 1159F PR MEDICATION LIST DOCUMENTED IN MEDICAL RECORD: ICD-10-PCS | Mod: CPTII,S$GLB,, | Performed by: PODIATRIST

## 2022-07-18 PROCEDURE — 1101F PR PT FALLS ASSESS DOC 0-1 FALLS W/OUT INJ PAST YR: ICD-10-PCS | Mod: CPTII,S$GLB,, | Performed by: PODIATRIST

## 2022-07-18 PROCEDURE — 1126F AMNT PAIN NOTED NONE PRSNT: CPT | Mod: CPTII,S$GLB,, | Performed by: PODIATRIST

## 2022-07-18 PROCEDURE — 3066F NEPHROPATHY DOC TX: CPT | Mod: CPTII,S$GLB,, | Performed by: PODIATRIST

## 2022-07-18 PROCEDURE — 99214 OFFICE O/P EST MOD 30 MIN: CPT | Mod: S$GLB,,, | Performed by: PODIATRIST

## 2022-07-18 PROCEDURE — 3072F PR LOW RISK FOR RETINOPATHY: ICD-10-PCS | Mod: CPTII,S$GLB,, | Performed by: PODIATRIST

## 2022-07-18 PROCEDURE — 3061F NEG MICROALBUMINURIA REV: CPT | Mod: CPTII,S$GLB,, | Performed by: PODIATRIST

## 2022-07-18 PROCEDURE — 1126F PR PAIN SEVERITY QUANTIFIED, NO PAIN PRESENT: ICD-10-PCS | Mod: CPTII,S$GLB,, | Performed by: PODIATRIST

## 2022-07-18 PROCEDURE — 3044F PR MOST RECENT HEMOGLOBIN A1C LEVEL <7.0%: ICD-10-PCS | Mod: CPTII,S$GLB,, | Performed by: PODIATRIST

## 2022-07-18 PROCEDURE — 99214 PR OFFICE/OUTPT VISIT, EST, LEVL IV, 30-39 MIN: ICD-10-PCS | Mod: S$GLB,,, | Performed by: PODIATRIST

## 2022-07-18 PROCEDURE — 3288F PR FALLS RISK ASSESSMENT DOCUMENTED: ICD-10-PCS | Mod: CPTII,S$GLB,, | Performed by: PODIATRIST

## 2022-07-18 PROCEDURE — 3044F HG A1C LEVEL LT 7.0%: CPT | Mod: CPTII,S$GLB,, | Performed by: PODIATRIST

## 2022-07-19 NOTE — PROGRESS NOTES
Subjective:      Patient ID: Peace Farmer is a 77 y.o. female.    Chief Complaint: Diabetes Mellitus (Dr Garber PCP 11/4/20), Diabetic Foot Exam    Peace is a 77 y.o. female who presents to the clinic upon referral from Dr. Rosanne fernandes. provider found  for evaluation and treatment of diabetic feet. Peace has a past medical history of Abnormal mammogram (10/13), Cortical cataract of both eyes (11/16/2018), Diabetes mellitus type II, Fibromyalgia, Hyperlipidemia, Hypertension, Obesity (BMI 30-39.9), Osteopenia, Psoriasis, and Skin cancer of face (2011). Presents for diabetic foot risk assessment.       PCP: Rosa Garber MD    Date Last Seen by PCP: per above     Current shoe gear: Tennis shoes    Hemoglobin A1C   Date Value Ref Range Status   06/30/2022 6.1 (H) 4.0 - 5.6 % Final     Comment:     ADA Screening Guidelines:  5.7-6.4%  Consistent with prediabetes  >or=6.5%  Consistent with diabetes    High levels of fetal hemoglobin interfere with the HbA1C  assay. Heterozygous hemoglobin variants (HbS, HgC, etc)do  not significantly interfere with this assay.   However, presence of multiple variants may affect accuracy.     04/25/2022 8.5 (H) 4.0 - 5.6 % Final     Comment:     ADA Screening Guidelines:  5.7-6.4%  Consistent with prediabetes  >or=6.5%  Consistent with diabetes    High levels of fetal hemoglobin interfere with the HbA1C  assay. Heterozygous hemoglobin variants (HbS, HgC, etc)do  not significantly interfere with this assay.   However, presence of multiple variants may affect accuracy.     02/10/2022 8.2 (H) 4.0 - 5.6 % Final     Comment:     ADA Screening Guidelines:  5.7-6.4%  Consistent with prediabetes  >or=6.5%  Consistent with diabetes    High levels of fetal hemoglobin interfere with the HbA1C  assay. Heterozygous hemoglobin variants (HbS, HgC, etc)do  not significantly interfere with this assay.   However, presence of multiple variants may affect accuracy.             Review of Systems    Constitutional: Negative for chills.   Cardiovascular: Negative for chest pain and claudication.   Respiratory: Negative for cough.    Skin: Positive for color change, dry skin and nail changes.   Musculoskeletal: Positive for joint pain.   Gastrointestinal: Negative for nausea.   Neurological: Positive for paresthesias. Negative for numbness.   Psychiatric/Behavioral: The patient is not nervous/anxious.            Objective:      Physical Exam  Constitutional:       Appearance: She is well-developed.      Comments: Oriented to time, place, and person.   Cardiovascular:      Comments: DP and PT pulses are palpable bilaterally. 3 sec capillary refill time and toes and feet are warm to touch proximally .  There is  hair growth on the feet and toes b/l. There is no edema b/l. No spider veins or varicosities present b/l.     Musculoskeletal:      Comments: Equinus noted b/l ankles with < 10 deg DF noted. MMT 5/5 in DF/PF/Inv/Ev resistance with no reproduction of pain in any direction. Passive range of motion of ankle and pedal joints is painless b/l.    Decreased stride, station of gait.  apropulsive toe off.  Increased angle and base of gait.      Patient has hammertoes of digits 2-5 bilateral partially reducible without symptom today.     Visible and palpable bunion without pain at dorsomedial 1st metatarsal head right and left.  Hallux abducted right and left partially reducible, tracks laterally without being track bound.  No ecchymosis, erythema, edema, or cardinal signs infection or signs of trauma same foot.       Feet:      Right foot:      Skin integrity: No callus or dry skin.      Left foot:      Skin integrity: No callus or dry skin.   Lymphadenopathy:      Comments: Negative lymphadenopathy bilateral popliteal fossa and tarsal tunnel.   Skin:     Comments: No open lesions, lacerations or wounds noted.Interdigital spaces clean, dry and intact b/l. No erythema noted to b/l foot.  Nails normal color and  trophic qualities.     Neurological:      Mental Status: She is alert.      Comments: Light touch, proprioception, and sharp/dull sensation are all intact bilaterally. Protective threshold with the [place]ienstein monofilament is intact bilaterally.    Psychiatric:         Behavior: Behavior is cooperative.               Assessment:       Encounter Diagnoses   Name Primary?    Controlled type 2 diabetes with neuropathy Yes    Hammer toe, unspecified laterality          Plan:       Peace was seen today for diabetes mellitus, diabetic foot exam and nail care.    Diagnoses and all orders for this visit:    Controlled type 2 diabetes with neuropathy  -     DIABETIC SHOES FOR HOME USE    Hammer toe, unspecified laterality  -     DIABETIC SHOES FOR HOME USE      I counseled the patient on her conditions, their implications and medical management.    - Shoe inspection. Diabetic Foot Education. Patient reminded of the importance of good nutrition and blood sugar control to help prevent podiatric complications of diabetes. Patient instructed on proper foot hygeine. We discussed wearing proper shoe gear, daily foot inspections, never walking without protective shoe gear, caution putting sharp instruments to feet     - Discussed DM foot care:  Wear comfortable, proper fitting shoes. Wash feet daily. Dry well. After drying, apply moisturizer to feet (no lotion to webspaces). Inspect feet daily for skin breaks, blisters, swelling, or redness. Wear cotton socks (preferably white)  Change socks every day. Do NOT walk barefoot. Do NOT use heating pads or warm/hot water soaks     Rx diabetic shoes with custom molded inserts to be worn at all times while ambulating. Prescription provided with list of local retailers.

## 2022-08-11 ENCOUNTER — CLINICAL SUPPORT (OUTPATIENT)
Dept: REHABILITATION | Facility: HOSPITAL | Age: 77
End: 2022-08-11
Attending: INTERNAL MEDICINE
Payer: MEDICARE

## 2022-08-11 DIAGNOSIS — M62.81 WEAKNESS OF TRUNK MUSCULATURE: Primary | ICD-10-CM

## 2022-08-11 DIAGNOSIS — R27.8 COORDINATION IMPAIRMENT: ICD-10-CM

## 2022-08-11 PROCEDURE — 97530 THERAPEUTIC ACTIVITIES: CPT | Mod: PN

## 2022-08-11 PROCEDURE — 97112 NEUROMUSCULAR REEDUCATION: CPT | Mod: PN

## 2022-08-11 NOTE — PROGRESS NOTES
Pelvic Health Physical Therapy   Treatment Note     Name: Peace Farmer  Clinic Number: [id number]    Therapy Diagnosis:   Encounter Diagnoses   Name Primary?    Weakness of trunk musculature Yes    Coordination impairment      Physician: Rosa Garber MD    Visit Date: 8/11/2022    Physician Orders: PT Eval and Treat - Pelvic Floor PT   Medical Diagnosis from Referral:   R32 (ICD-10-CM) - Urinary incontinence, unspecified type   R15.9 (ICD-10-CM) - Incontinence of feces, unspecified fecal incontinence type   Evaluation Date: 6/8/2022  Authorization Period Expiration: 5/2/2023  Plan of Care Expiration: 9/8/2022  Visit # / Visits authorized: 3/20     Cancelled Visits: 0  No Show Visits: 0    Time In: 1255  Time Out: 1350  Total Billable Time: 55 minutes    Precautions: Standard    Subjective     Pt reports: Still cannot perform pelvic floor contractions without breathing. She tried to practice contractions without inhale and abdominal contraction, but when doing this she did not feel as successful with urge suppression techniques. Is not sure if leakage was just because she had a bad week or if it was actually pelvic floor related. Since then, she has shifted focus back to performance of exercises with breathing. Now she feels like she has been getting back to improvement. She is no longer leaking.     She was compliant with home exercise program.  Response to previous treatment: Good   Functional change: decreased UUI     Pain: 0/10  Location:     Objective     Peace received therapeutic exercises to develop  strength and endurance for 00 minutes including: Kegels Endurance Holds and Kegels: Quick flicks     Peace received the following manual therapy techniques: to develop flexibility, extensibility and desensitization for 00 minutes including:     Peace participated in neuromuscular re-education activities to develop Coordination and Control for 45  minutes including: pelvic floor relaxation speed after  "performing a kegel, diaphragmatic breathing with Kegel, diaphragmatic breathing and pelvic floor mm contractions focus on activation at 3 layers sequentially in isolation and then sequentially    Kegels - complete vs layer specific    Performance while counting - "one" on contract, "two" on relaxation with relaxation breath between as needed    In supine, side lying, sitting, forward lean, bent over at mat, standing at wall, standing    Quick flicks vs holds - sets limited to 5     Partial contractions   Abdominal down training - supine, sitting, mod quad     Peace participated in dynamic functional therapeutic activities to improve functional performance for 10 minutes, including:    HEP review and modifications        Home Exercises Provided and Patient Education Provided     Education provided:   - anatomy/physiology of pelvic floor, posture/body mechanices, diaphragmatic breathing, kegels and behavior modifications  Discussed progression of plan of care with patient; educated pt in activity modification; reviewed HEP with pt. Pt demonstrated and verbalized understanding of all instruction and was provided with a handout of HEP (see Patient Instructions).    Written Home Exercises Provided: yes.  Exercises were reviewed and Peace was able to demonstrate them prior to the end of the session.  Peace demonstrated good  understanding of the education provided.     See EMR under Patient Instructions for exercises provided prior visit.    Assessment     Pelvic floor coordination training resumed this session, with introduction of counting aloud during kegels to prevent breath holding or inverse breathing. After significant VC and continued practice, significant improvement in performance and confidence demonstrated. HEP updated to include written instructions on performance. Layer-specific recruitment not utilized this session, as Peace reports good recruitment of all layers. Based on performance, PT to review once " again NV and consider external PFM reassessment.      Peace Is progressing well towards her goals.   Pt prognosis is Good.     Pt will continue to benefit from skilled outpatient physical therapy to address the deficits listed in the problem list box on initial evaluation, provide pt/family education and to maximize pt's level of independence in the home and community environment.     Pt's spiritual, cultural and educational needs considered and pt agreeable to plan of care and goals.     Anticipated barriers to physical therapy: none     Goals:   Short Term Goals: 5 weeks -progressing  Pt to demonstrate being able to correctly and consistently perform a kegel which is needed  to increase pelvic floor muscle coordination and strength needed for continence. -   Pt to be able to delay the urge to urinate at least 5 minutes with a strong urge to urinate in order to make it to the bathroom without leaking.   Pt to report a decrease in urinary frequency from every 1 hour to no more than once every 1.5 hour(s) to improve ability to participate in social activities.   Pt to voice understanding of the role that diet plays on urinary urgency.  - MET 8/11/2022  Pt to report a decrease in nocturia to no more than 2x/night for improved ability to achieve restorative sleep.   - 3 as of 8/11/2022  Pt to report a decrease in bowel frequency from 1-4 times per day to no more than 3 times per day to improve ability to participate in social activities. - 2 times in the mornings as of 8/11/2022     Long Term Goals: 10 weeks -progressing  Pt to be discharged with home plan for carry over after discharge.    Pt to report an 90% reduction of urinary incontinence symptoms with ADL participation thereby demonstrating improved pelvic floor muscle control and strength.  - MET 8/11/2022  Pt to be able to delay the urge to urinate at least 10 minutes with a strong urge to urinate in order to make it to the bathroom without leaking.  Pt to  report a decrease in urinary frequency from every 1.5 hours to no more than once every 2 hour(s) to improve ability to participate in social activities.  Pt to report a decrease in nocturia to no more than 1x/night for improved restorative sleep.    Pt to report a decrease in bowel frequency from 1-3 times per day to no more than 2 times per day to improve ability to participate in social activities.   Pt to report an 90% reduction of bowel incontinence symptoms with ADL participation thereby demonstrating improved pelvic floor muscle control and strength.     Plan     Plan of care Certification: 6/8/2022 to 9/8/2022.    Plan for next visit: review counting with ANNA jenkins, PT   08/15/2022

## 2022-08-25 ENCOUNTER — CLINICAL SUPPORT (OUTPATIENT)
Dept: REHABILITATION | Facility: HOSPITAL | Age: 77
End: 2022-08-25
Attending: INTERNAL MEDICINE
Payer: MEDICARE

## 2022-08-25 DIAGNOSIS — R27.8 COORDINATION IMPAIRMENT: ICD-10-CM

## 2022-08-25 DIAGNOSIS — M62.81 WEAKNESS OF TRUNK MUSCULATURE: Primary | ICD-10-CM

## 2022-08-25 PROCEDURE — 97112 NEUROMUSCULAR REEDUCATION: CPT | Mod: PN

## 2022-08-25 NOTE — PROGRESS NOTES
"  Pelvic Health Physical Therapy   Treatment Note     Name: Peace Farmer  Clinic Number: [id number]    Therapy Diagnosis:   Encounter Diagnoses   Name Primary?    Weakness of trunk musculature Yes    Coordination impairment      Physician: Rosa Garber MD    Visit Date: 8/25/2022    Physician Orders: PT Eval and Treat - Pelvic Floor PT   Medical Diagnosis from Referral:   R32 (ICD-10-CM) - Urinary incontinence, unspecified type   R15.9 (ICD-10-CM) - Incontinence of feces, unspecified fecal incontinence type   Evaluation Date: 6/8/2022  Authorization Period Expiration: 5/2/2023  Plan of Care Expiration: 9/8/2022  Visit # / Visits authorized: 5/20     Cancelled Visits: 0  No Show Visits: 0    Time In: 1404  Time Out: 1455  Total Billable Time: 51 minutes    Precautions: Standard    Subjective     Pt reports: Has continued with no-minimal leakage. Has not been using HEP as often as usual due to stressful week;  is starting chemo soon. Has been able to delay urge 10 minutes.     She was compliant with home exercise program.  Response to previous treatment: Good   Functional change: decreased UUI     Pain: 0/10  Location:     Objective     Peace received therapeutic exercises to develop  strength and endurance for 00 minutes including: Kegels Endurance Holds and Kegels: Quick flicks     Peace received the following manual therapy techniques: to develop flexibility, extensibility and desensitization for 00 minutes including:     Peace participated in neuromuscular re-education activities to develop Coordination and Control for 41 minutes including: pelvic floor relaxation speed after performing a kegel, diaphragmatic breathing with Kegel, diaphragmatic breathing and pelvic floor mm contractions focus on activation at 3 layers sequentially in isolation and then sequentially    Kegels - complete vs layer specific 5x5"    Performance while counting - "one" on contract, "two" on relaxation with relaxation " breath between as needed    In supine, side lying, sitting, forward lean, bent over at mat, standing at wall, standing, staggered stance, single leg on step    + sit <> stands (before/during), + step ups (before/during) - fwd, lateral   Quick flicks vs holds - sets limited to 5     Partial contractions   Abdominal down training - supine, sitting, mod quad     Peace participated in dynamic functional therapeutic activities to improve functional performance for 10 minutes, including:    HEP review and modifications        Home Exercises Provided and Patient Education Provided     Education provided:   - anatomy/physiology of pelvic floor, posture/body mechanices, diaphragmatic breathing, kegels and behavior modifications  Discussed progression of plan of care with patient; educated pt in activity modification; reviewed HEP with pt. Pt demonstrated and verbalized understanding of all instruction and was provided with a handout of HEP (see Patient Instructions).    Written Home Exercises Provided: yes.  Exercises were reviewed and Peace was able to demonstrate them prior to the end of the session.  Peace demonstrated good  understanding of the education provided.     See EMR under Patient Instructions for exercises provided prior visit.    Assessment     Peace performed well in today's session, tolerating progression of pelvic floor strength end coordination training. Today performance of kegels with static positioning reviewed, then progressed to include  Performance with dynamic activities. Good performance demonstrated with sit to stands, though decreased confidence reported with performance with step ups. Equal PFM activation on R and L side noted with static asymmetrical recruitment, though Peace performed better when weight shifted back to straight leg than when shifted forward to bent leg on step. Based on performance, Peace would benefit from continued review of strength training NV, when she returns after  her  begins chemotherapy. As confidence in performance improves, PT to resume coordination training with breath work to prevent breath holding.     Peace Is progressing well towards her goals.   Pt prognosis is Good.     Pt will continue to benefit from skilled outpatient physical therapy to address the deficits listed in the problem list box on initial evaluation, provide pt/family education and to maximize pt's level of independence in the home and community environment.     Pt's spiritual, cultural and educational needs considered and pt agreeable to plan of care and goals.     Anticipated barriers to physical therapy: none     Goals:   Short Term Goals: 5 weeks -progressing  Pt to demonstrate being able to correctly and consistently perform a kegel which is needed  to increase pelvic floor muscle coordination and strength needed for continence.   Pt to be able to delay the urge to urinate at least 5 minutes with a strong urge to urinate in order to make it to the bathroom without leaking. - MET 8/25/2022  Pt to report a decrease in urinary frequency from every 1 hour to no more than once every 1.5 hour(s) to improve ability to participate in social activities. - MET 8/25/2022  Pt to voice understanding of the role that diet plays on urinary urgency.  - MET 8/11/2022  Pt to report a decrease in nocturia to no more than 2x/night for improved ability to achieve restorative sleep.   - 3x 80%of time as of 8/11/2022  Pt to report a decrease in bowel frequency from 1-4 times per day to no more than 3 times per day to improve ability to participate in social activities. - 2 times in the mornings as of 8/11/2022     Long Term Goals: 10 weeks -progressing  Pt to be discharged with home plan for carry over after discharge.    Pt to report an 90% reduction of urinary incontinence symptoms with ADL participation thereby demonstrating improved pelvic floor muscle control and strength.  - MET 8/11/2022  Pt to be able  to delay the urge to urinate at least 10 minutes with a strong urge to urinate in order to make it to the bathroom without leaking. - MET 8/25/2022  Pt to report a decrease in urinary frequency from every 1.5 hours to no more than once every 2 hour(s) to improve ability to participate in social activities.  Pt to report a decrease in nocturia to no more than 1x/night for improved restorative sleep.    Pt to report a decrease in bowel frequency from 1-3 times per day to no more than 2 times per day to improve ability to participate in social activities.   Pt to report an 90% reduction of bowel incontinence symptoms with ADL participation thereby demonstrating improved pelvic floor muscle control and strength. - - MET 8/25/2022    Plan     Plan of care Certification: 6/8/2022 to 9/8/2022.    Plan for next visit: review breathing/counting with ANNA jenkins, PT   08/25/2022

## 2022-08-25 NOTE — PATIENT INSTRUCTIONS
Perform 5 x 3 second holds in sitting and 5 x 3 second holds in standing.   Perform 2 sets of 5 kegeling as you stand. When fully standing, relax pelvic floor and sit back down. Repeat        Perform 5 x 3 second holds with single leg on step. Repeat on both legs    (You can also perform kegel in staggered stance without the step)  Perform 1 set of 5 kegeling as you step up to the front and 1 set of 5 as you step up to the side. When fully standing, relax pelvic floor and step back down. Repeat on both sides.

## 2022-09-14 ENCOUNTER — OFFICE VISIT (OUTPATIENT)
Dept: OPTOMETRY | Facility: CLINIC | Age: 77
End: 2022-09-14
Payer: MEDICARE

## 2022-09-14 ENCOUNTER — LAB VISIT (OUTPATIENT)
Dept: LAB | Facility: HOSPITAL | Age: 77
End: 2022-09-14
Attending: HOSPITALIST
Payer: MEDICARE

## 2022-09-14 DIAGNOSIS — R79.89 ABNORMAL THYROID BLOOD TEST: ICD-10-CM

## 2022-09-14 DIAGNOSIS — E11.65 TYPE 2 DIABETES MELLITUS WITH HYPERGLYCEMIA, WITHOUT LONG-TERM CURRENT USE OF INSULIN: ICD-10-CM

## 2022-09-14 DIAGNOSIS — Z96.1 PSEUDOPHAKIA: ICD-10-CM

## 2022-09-14 DIAGNOSIS — E11.9 TYPE 2 DIABETES MELLITUS WITHOUT RETINOPATHY: Primary | ICD-10-CM

## 2022-09-14 DIAGNOSIS — H52.7 REFRACTIVE ERROR: ICD-10-CM

## 2022-09-14 LAB
ANION GAP SERPL CALC-SCNC: 11 MMOL/L (ref 8–16)
BUN SERPL-MCNC: 14 MG/DL (ref 8–23)
CALCIUM SERPL-MCNC: 10.3 MG/DL (ref 8.7–10.5)
CHLORIDE SERPL-SCNC: 99 MMOL/L (ref 95–110)
CO2 SERPL-SCNC: 25 MMOL/L (ref 23–29)
CREAT SERPL-MCNC: 0.7 MG/DL (ref 0.5–1.4)
EST. GFR  (NO RACE VARIABLE): >60 ML/MIN/1.73 M^2
ESTIMATED AVG GLUCOSE: 108 MG/DL (ref 68–131)
GLUCOSE SERPL-MCNC: 130 MG/DL (ref 70–110)
HBA1C MFR BLD: 5.4 % (ref 4–5.6)
POTASSIUM SERPL-SCNC: 4.3 MMOL/L (ref 3.5–5.1)
SODIUM SERPL-SCNC: 135 MMOL/L (ref 136–145)
T4 FREE SERPL-MCNC: 0.94 NG/DL (ref 0.71–1.51)
TSH SERPL DL<=0.005 MIU/L-ACNC: 3.16 UIU/ML (ref 0.4–4)

## 2022-09-14 PROCEDURE — 2023F DILAT RTA XM W/O RTNOPTHY: CPT | Mod: CPTII,S$GLB,, | Performed by: OPTOMETRIST

## 2022-09-14 PROCEDURE — 80048 BASIC METABOLIC PNL TOTAL CA: CPT | Performed by: HOSPITALIST

## 2022-09-14 PROCEDURE — 3288F FALL RISK ASSESSMENT DOCD: CPT | Mod: CPTII,S$GLB,, | Performed by: OPTOMETRIST

## 2022-09-14 PROCEDURE — 1160F PR REVIEW ALL MEDS BY PRESCRIBER/CLIN PHARMACIST DOCUMENTED: ICD-10-PCS | Mod: CPTII,S$GLB,, | Performed by: OPTOMETRIST

## 2022-09-14 PROCEDURE — 2023F PR DILATED RETINAL EXAM W/O EVID OF RETINOPATHY: ICD-10-PCS | Mod: CPTII,S$GLB,, | Performed by: OPTOMETRIST

## 2022-09-14 PROCEDURE — 3288F PR FALLS RISK ASSESSMENT DOCUMENTED: ICD-10-PCS | Mod: CPTII,S$GLB,, | Performed by: OPTOMETRIST

## 2022-09-14 PROCEDURE — 83036 HEMOGLOBIN GLYCOSYLATED A1C: CPT | Performed by: HOSPITALIST

## 2022-09-14 PROCEDURE — 3044F HG A1C LEVEL LT 7.0%: CPT | Mod: CPTII,S$GLB,, | Performed by: OPTOMETRIST

## 2022-09-14 PROCEDURE — 1159F PR MEDICATION LIST DOCUMENTED IN MEDICAL RECORD: ICD-10-PCS | Mod: CPTII,S$GLB,, | Performed by: OPTOMETRIST

## 2022-09-14 PROCEDURE — 3044F PR MOST RECENT HEMOGLOBIN A1C LEVEL <7.0%: ICD-10-PCS | Mod: CPTII,S$GLB,, | Performed by: OPTOMETRIST

## 2022-09-14 PROCEDURE — 3066F NEPHROPATHY DOC TX: CPT | Mod: CPTII,S$GLB,, | Performed by: OPTOMETRIST

## 2022-09-14 PROCEDURE — 84439 ASSAY OF FREE THYROXINE: CPT | Performed by: HOSPITALIST

## 2022-09-14 PROCEDURE — 3066F PR DOCUMENTATION OF TREATMENT FOR NEPHROPATHY: ICD-10-PCS | Mod: CPTII,S$GLB,, | Performed by: OPTOMETRIST

## 2022-09-14 PROCEDURE — 92014 COMPRE OPH EXAM EST PT 1/>: CPT | Mod: S$GLB,,, | Performed by: OPTOMETRIST

## 2022-09-14 PROCEDURE — 36415 COLL VENOUS BLD VENIPUNCTURE: CPT | Mod: PO | Performed by: HOSPITALIST

## 2022-09-14 PROCEDURE — 1100F PR PT FALLS ASSESS DOC 2+ FALLS/FALL W/INJURY/YR: ICD-10-PCS | Mod: CPTII,S$GLB,, | Performed by: OPTOMETRIST

## 2022-09-14 PROCEDURE — 3061F PR NEG MICROALBUMINURIA RESULT DOCUMENTED/REVIEW: ICD-10-PCS | Mod: CPTII,S$GLB,, | Performed by: OPTOMETRIST

## 2022-09-14 PROCEDURE — 3061F NEG MICROALBUMINURIA REV: CPT | Mod: CPTII,S$GLB,, | Performed by: OPTOMETRIST

## 2022-09-14 PROCEDURE — 99999 PR PBB SHADOW E&M-EST. PATIENT-LVL III: ICD-10-PCS | Mod: PBBFAC,,, | Performed by: OPTOMETRIST

## 2022-09-14 PROCEDURE — 99999 PR PBB SHADOW E&M-EST. PATIENT-LVL III: CPT | Mod: PBBFAC,,, | Performed by: OPTOMETRIST

## 2022-09-14 PROCEDURE — 1160F RVW MEDS BY RX/DR IN RCRD: CPT | Mod: CPTII,S$GLB,, | Performed by: OPTOMETRIST

## 2022-09-14 PROCEDURE — 92014 PR EYE EXAM, EST PATIENT,COMPREHESV: ICD-10-PCS | Mod: S$GLB,,, | Performed by: OPTOMETRIST

## 2022-09-14 PROCEDURE — 84443 ASSAY THYROID STIM HORMONE: CPT | Performed by: HOSPITALIST

## 2022-09-14 PROCEDURE — 92015 DETERMINE REFRACTIVE STATE: CPT | Mod: S$GLB,,, | Performed by: OPTOMETRIST

## 2022-09-14 PROCEDURE — 1126F AMNT PAIN NOTED NONE PRSNT: CPT | Mod: CPTII,S$GLB,, | Performed by: OPTOMETRIST

## 2022-09-14 PROCEDURE — 1159F MED LIST DOCD IN RCRD: CPT | Mod: CPTII,S$GLB,, | Performed by: OPTOMETRIST

## 2022-09-14 PROCEDURE — 1126F PR PAIN SEVERITY QUANTIFIED, NO PAIN PRESENT: ICD-10-PCS | Mod: CPTII,S$GLB,, | Performed by: OPTOMETRIST

## 2022-09-14 PROCEDURE — 92015 PR REFRACTION: ICD-10-PCS | Mod: S$GLB,,, | Performed by: OPTOMETRIST

## 2022-09-14 PROCEDURE — 1100F PTFALLS ASSESS-DOCD GE2>/YR: CPT | Mod: CPTII,S$GLB,, | Performed by: OPTOMETRIST

## 2022-09-14 NOTE — PROGRESS NOTES
Subjective:       Patient ID: Peace Farmer is a 77 y.o. female      Chief Complaint   Patient presents with    Concerns About Ocular Health    Diabetic Eye Exam     History of Present Illness  Dls: 8/10/21 Dr. Freeman     78 y/o female presents today for diabetic eye exam.  Pt c/o floaters ou for yrs. Pt states no changes in vision.   Pt does not wear any glasses. Pt wants new rx for glasses.      x 1 day     No tearing  + itching   No burning  No pain  No ha's  + floaters  No flashes    Eye meds  Otc gtts ou prn     Hemoglobin A1C       Date                     Value               Ref Range             Status                06/30/2022               6.1 (H)             4.0 - 5.6 %           Final                 04/25/2022               8.5 (H)             4.0 - 5.6 %           Final                  02/10/2022               8.2 (H)             4.0 - 5.6 %           Final                    Assessment/Plan:     1. Type 2 diabetes mellitus without retinopathy  No diabetic retinopathy. Discussed with pt the effects of diabetes on vision, importance of good blood sugar control, compliance with meds, and follow up care with PCP. Return in 1 year for dilated eye exam, sooner PRN.    2. Pseudophakia  Well-centered. Clear.     3. Refractive error  Educated patient on refractive error and discussed lens options. Dispensed updated spectacle Rx. Educated about adaptation period to new specs.    Eyeglass Final Rx       Eyeglass Final Rx         Sphere Cylinder Axis Add    Right +0.25 +0.50 180 +2.75    Left -1.50 Sphere  +2.75      Expiration Date: 9/14/2023    Comments: ANTIMETROPIA                          Follow up in about 1 year (around 9/14/2023) for Diabetic Eye Exam.

## 2022-09-27 ENCOUNTER — PATIENT MESSAGE (OUTPATIENT)
Dept: ENDOCRINOLOGY | Facility: CLINIC | Age: 77
End: 2022-09-27

## 2022-09-27 ENCOUNTER — PATIENT MESSAGE (OUTPATIENT)
Dept: PHARMACY | Facility: CLINIC | Age: 77
End: 2022-09-27
Payer: MEDICARE

## 2022-09-27 ENCOUNTER — TELEPHONE (OUTPATIENT)
Dept: PHARMACY | Facility: CLINIC | Age: 77
End: 2022-09-27
Payer: MEDICARE

## 2022-09-27 ENCOUNTER — OFFICE VISIT (OUTPATIENT)
Dept: ENDOCRINOLOGY | Facility: CLINIC | Age: 77
End: 2022-09-27
Payer: MEDICARE

## 2022-09-27 VITALS
WEIGHT: 152.81 LBS | DIASTOLIC BLOOD PRESSURE: 75 MMHG | TEMPERATURE: 98 F | BODY MASS INDEX: 29.22 KG/M2 | HEART RATE: 66 BPM | SYSTOLIC BLOOD PRESSURE: 146 MMHG

## 2022-09-27 DIAGNOSIS — I10 BENIGN HYPERTENSION: ICD-10-CM

## 2022-09-27 DIAGNOSIS — E03.8 SUBCLINICAL HYPOTHYROIDISM: Primary | ICD-10-CM

## 2022-09-27 DIAGNOSIS — E11.65 TYPE 2 DIABETES MELLITUS WITH HYPERGLYCEMIA, WITHOUT LONG-TERM CURRENT USE OF INSULIN: ICD-10-CM

## 2022-09-27 DIAGNOSIS — E66.09 CLASS 1 OBESITY DUE TO EXCESS CALORIES WITH SERIOUS COMORBIDITY AND BODY MASS INDEX (BMI) OF 30.0 TO 30.9 IN ADULT: ICD-10-CM

## 2022-09-27 DIAGNOSIS — M81.8 OTHER OSTEOPOROSIS WITHOUT CURRENT PATHOLOGICAL FRACTURE: ICD-10-CM

## 2022-09-27 PROCEDURE — 3061F PR NEG MICROALBUMINURIA RESULT DOCUMENTED/REVIEW: ICD-10-PCS | Mod: CPTII,S$GLB,, | Performed by: HOSPITALIST

## 2022-09-27 PROCEDURE — 1126F PR PAIN SEVERITY QUANTIFIED, NO PAIN PRESENT: ICD-10-PCS | Mod: CPTII,S$GLB,, | Performed by: HOSPITALIST

## 2022-09-27 PROCEDURE — 99999 PR PBB SHADOW E&M-EST. PATIENT-LVL IV: ICD-10-PCS | Mod: PBBFAC,,, | Performed by: HOSPITALIST

## 2022-09-27 PROCEDURE — 3078F DIAST BP <80 MM HG: CPT | Mod: CPTII,S$GLB,, | Performed by: HOSPITALIST

## 2022-09-27 PROCEDURE — 1100F PTFALLS ASSESS-DOCD GE2>/YR: CPT | Mod: CPTII,S$GLB,, | Performed by: HOSPITALIST

## 2022-09-27 PROCEDURE — 1159F MED LIST DOCD IN RCRD: CPT | Mod: CPTII,S$GLB,, | Performed by: HOSPITALIST

## 2022-09-27 PROCEDURE — 99214 OFFICE O/P EST MOD 30 MIN: CPT | Mod: S$GLB,,, | Performed by: HOSPITALIST

## 2022-09-27 PROCEDURE — 1100F PR PT FALLS ASSESS DOC 2+ FALLS/FALL W/INJURY/YR: ICD-10-PCS | Mod: CPTII,S$GLB,, | Performed by: HOSPITALIST

## 2022-09-27 PROCEDURE — 99999 PR PBB SHADOW E&M-EST. PATIENT-LVL IV: CPT | Mod: PBBFAC,,, | Performed by: HOSPITALIST

## 2022-09-27 PROCEDURE — 1126F AMNT PAIN NOTED NONE PRSNT: CPT | Mod: CPTII,S$GLB,, | Performed by: HOSPITALIST

## 2022-09-27 PROCEDURE — 3044F HG A1C LEVEL LT 7.0%: CPT | Mod: CPTII,S$GLB,, | Performed by: HOSPITALIST

## 2022-09-27 PROCEDURE — 3072F PR LOW RISK FOR RETINOPATHY: ICD-10-PCS | Mod: CPTII,S$GLB,, | Performed by: HOSPITALIST

## 2022-09-27 PROCEDURE — 3061F NEG MICROALBUMINURIA REV: CPT | Mod: CPTII,S$GLB,, | Performed by: HOSPITALIST

## 2022-09-27 PROCEDURE — 1159F PR MEDICATION LIST DOCUMENTED IN MEDICAL RECORD: ICD-10-PCS | Mod: CPTII,S$GLB,, | Performed by: HOSPITALIST

## 2022-09-27 PROCEDURE — 3078F PR MOST RECENT DIASTOLIC BLOOD PRESSURE < 80 MM HG: ICD-10-PCS | Mod: CPTII,S$GLB,, | Performed by: HOSPITALIST

## 2022-09-27 PROCEDURE — 3072F LOW RISK FOR RETINOPATHY: CPT | Mod: CPTII,S$GLB,, | Performed by: HOSPITALIST

## 2022-09-27 PROCEDURE — 3077F SYST BP >= 140 MM HG: CPT | Mod: CPTII,S$GLB,, | Performed by: HOSPITALIST

## 2022-09-27 PROCEDURE — 99214 PR OFFICE/OUTPT VISIT, EST, LEVL IV, 30-39 MIN: ICD-10-PCS | Mod: S$GLB,,, | Performed by: HOSPITALIST

## 2022-09-27 PROCEDURE — 3288F PR FALLS RISK ASSESSMENT DOCUMENTED: ICD-10-PCS | Mod: CPTII,S$GLB,, | Performed by: HOSPITALIST

## 2022-09-27 PROCEDURE — 3044F PR MOST RECENT HEMOGLOBIN A1C LEVEL <7.0%: ICD-10-PCS | Mod: CPTII,S$GLB,, | Performed by: HOSPITALIST

## 2022-09-27 PROCEDURE — 3066F NEPHROPATHY DOC TX: CPT | Mod: CPTII,S$GLB,, | Performed by: HOSPITALIST

## 2022-09-27 PROCEDURE — 3066F PR DOCUMENTATION OF TREATMENT FOR NEPHROPATHY: ICD-10-PCS | Mod: CPTII,S$GLB,, | Performed by: HOSPITALIST

## 2022-09-27 PROCEDURE — 3077F PR MOST RECENT SYSTOLIC BLOOD PRESSURE >= 140 MM HG: ICD-10-PCS | Mod: CPTII,S$GLB,, | Performed by: HOSPITALIST

## 2022-09-27 PROCEDURE — 3288F FALL RISK ASSESSMENT DOCD: CPT | Mod: CPTII,S$GLB,, | Performed by: HOSPITALIST

## 2022-09-27 NOTE — PROGRESS NOTES
"Subjective:      Patient ID: Peace Farmer is a 77 y.o. female presented to [facility] on 9/27/2022.  Chief Complaint:  Diabetes      History of Present Illness: Peace Farmer is a 77 y.o. female here for diabetes  Other significant past medical history:  Osteoporosis, abnormal thyroid function, hypertension      Interval history:  Patient is here for follow-up, to discuss multiple endocrine issues.  In regards to diabetes, has been doing much better, A1c improved to 5.4%.  After dietary modification, small portion, low carbohydrates.  Tolerating Trulicity well without any issues.    Denies any hypoglycemia.  Glucose log as below.  Occasional postprandial hyperglycemia    In regards to osteoporosis:  After discussion with her insurance, she would prefer to pursue SC Prolia, but holding off due to multiple stressors at home.  Impossible planned dental work  In regards to abnormal thyroid function:  TPO antibodies negative, patient still with fatigue, family history of hypothyroidism, trial of TRT has help "clear up brain fog"   with prostate cancer, with metastasis. On chemo. Stress       1) With regards to Diabetes Mellitus Type 2  Known diabetic complications: none  Diagnosed w/ DM: 2008?, worsening now    On restrictive diet, small portion, low carbohydrates.  Patient unsure if she can continue long-term with this diet  Glucose log review:  At dinner>>  120  124  120  176  112  135  135>197  135>149     Current meds:     Trulicity 1.5 mg Q weekly injection    Metformin 1000 mg twice a day   Glimepiride 4mg twice a day   Previous meds:              Januvia  Home glucose checks: checks 2-3x a day, Logs reviewed/Unavailable: oral recall:   Hypoglycemia: denies  Diet/Exercise:               Eating multiple small meals  per day (7x)              Drink: diet coke  Weight trend: stable  Diabetes Education: yes, many years ago  Diabetes Related Hospitalization:  No  Hx of pancreatitis: " No  Family history of diabetes: Yes. brother  Occupation: retired    Eye exam current (within one year): yes, DR: no  Reports cuts or ulcers on feet:   Denies  Statin: Taking, ACE/ARB: Taking    Diabetes lab work  Lab Results   Component Value Date    HGBA1C 5.4 09/14/2022    HGBA1C 6.1 (H) 06/30/2022    HGBA1C 8.5 (H) 04/25/2022    HGBA1C 8.2 (H) 02/10/2022     No results found for: CPEPTIDE, GLUTAMICACID, ISLETCELLANT   No results found for: FRUCTOSAMINE  Lab Results   Component Value Date    MICALBCREAT 24.1 04/25/2022     Lab Results   Component Value Date    DZRLBNKR97 1581 (H) 10/12/2020       Diabetes Management Status: Reviewed this office visit  Screening or Prevention Patient's value Goal Complete/Controlled?   Lipid profile : 04/25/2022 Annually Yes     Dilated retinal exam : 09/14/2022 Annually Yes     Foot exam   : 07/18/2022 Annually Yes          2) Osteoporosis  - Previously with osteopenia, 2020, previously managed by Dr. Rashid  - Was on Boniva for treatment of osteopenia, leading to heartburn> she does not to be on this medication  - Dr. Rashid wanted to switch patient to Reclast, for which she never started    - Recent bone density 05/2022 show osteoporosis  - She is taking Calcium and Vit D supplements. 500mg/1000iu  - She had a hysterectomy at 35 y/o and menopausal symptoms at 50 y/o.     DXA 5/2022  Lumbar spine (L1-L4): BMD is 0.850 g/cm2, T-score is -1.8, and Z-score is 0.6.  Total hip:  BMD is 0.751 g/cm2, T-score is -1.6, and Z-score is 0.2.  Femoral neck: BMD is 0.555 g/cm2, T-score is -2.6, and Z-score is -0.6.     FRAX:  16% risk of a major osteoporotic fracture in the next 10 years.  5.1% risk of hip fracture in the next 10 years.     Impression:  *Osteoporosis based on T-score below -2.5      3)  Abnormal thyroid function  - TSH elevation, 1x  - Family history thyroid disorder: son with hypothyroidism   - Denies goiter  - patient's main concern is:  Fatigue and Heat intolerance  - started  on low-dose levothyroxine 25 mcg daily 6/2022    Lab Results   Component Value Date    TSH 3.157 09/14/2022    TSH 3.815 06/30/2022    TSH 4.073 (H) 04/25/2022    FREET4 0.94 09/14/2022    FREET4 1.00 06/30/2022    FREET4 0.99 04/25/2022    THYROPEROXID <6.0 06/30/2022     Reviewed past surgical, medical, family, social history and updated as appropriate.    Review of Systems: see HPI above    Objective:   BP (!) 146/75 (BP Location: Right arm)   Pulse 66   Temp 98.2 °F (36.8 °C) (Oral)   Wt 69.3 kg (152 lb 12.8 oz)   BMI 29.22 kg/m²     Body mass index is 29.22 kg/m².  Vital signs reviewed    Physical Exam  Vitals and nursing note reviewed.   Constitutional:       General: She is not in acute distress.     Appearance: Normal appearance. She is well-developed. She is obese. She is not toxic-appearing.   Neck:      Thyroid: No thyromegaly.      Comments: No goiter noted  Cardiovascular:      Heart sounds: Normal heart sounds.   Pulmonary:      Effort: Pulmonary effort is normal. No respiratory distress.   Abdominal:      Tenderness: There is no abdominal tenderness.   Musculoskeletal:         General: No deformity. Normal range of motion.      Cervical back: Normal range of motion.   Skin:     Findings: No bruising.   Neurological:      Mental Status: She is alert and oriented to person, place, and time.   Psychiatric:         Mood and Affect: Mood normal.       Lab Reviewed:   Lab Results   Component Value Date    HGBA1C 5.4 09/14/2022       Lab Results   Component Value Date    CHOL 105 (L) 04/25/2022    HDL 40 04/25/2022    LDLCALC 44.8 (L) 04/25/2022    TRIG 101 04/25/2022    CHOLHDL 38.1 04/25/2022       Lab Results   Component Value Date     (L) 09/14/2022    K 4.3 09/14/2022    CL 99 09/14/2022    CO2 25 09/14/2022     (H) 09/14/2022    BUN 14 09/14/2022    CREATININE 0.7 09/14/2022    CALCIUM 10.3 09/14/2022    PROT 7.0 02/10/2022    ALBUMIN 4.1 02/10/2022    BILITOT 0.4 02/10/2022    ALKPHOS  59 02/10/2022    AST 19 02/10/2022    ALT 19 02/10/2022    ANIONGAP 11 09/14/2022    ESTGFRAFRICA >60.0 06/30/2022    EGFRNONAA >60.0 06/30/2022    TSH 3.157 09/14/2022     Lab Results   Component Value Date    PTH 64.0 05/13/2020    HQXLVCTA65CK 35 06/30/2022    MMIBDYAQ36WK 37 05/13/2020    XGQFGNOV51UJ 29 (L) 11/28/2018    CALCIUM 10.3 09/14/2022    CALCIUM 10.7 (H) 06/30/2022    CALCIUM 10.1 02/10/2022    ALKPHOS 59 02/10/2022    ALKPHOS 59 10/12/2020    ALKPHOS 57 05/13/2020    TSH 3.157 09/14/2022    LABCALC 10.4 12/10/2018    VWEKZOZ33MWZ 7 12/10/2018      Assessment     1. Subclinical hypothyroidism  TSH    T4, Free      2. Type 2 diabetes, uncontrolled, with neuropathy        3. Type 2 diabetes mellitus with hyperglycemia, without long-term current use of insulin  dulaglutide (TRULICITY) 1.5 mg/0.5 mL pen injector    Ambulatory referral/consult to Pharmacy Assistance    Hemoglobin A1C    Basic Metabolic Panel      4. Other osteoporosis without current pathological fracture        5. Benign hypertension        6. Class 1 obesity due to excess calories with serious comorbidity and body mass index (BMI) of 30.0 to 30.9 in adult            Plan     Type 2 diabetes mellitus with hyperglycemia, without long-term current use of insulin  - Diabetes is at goal, given most current A1C, Goal A1C for patient is 7%  - Diabetic supplies/medications reviewed this visit to ensure continue refills and compliance  - Diabetes education referral: patient refused   - Advised patient to get periodic eye and feet exam.   - Reviewed routine maintenance: lipid, U:P/C   - continue encouragement of dietary modification, portion size control, decreasing carbohydrates intake    Plan  - given better glycemic control:  We will continue Trulicity  1.5 mg Q weekly injection,  - continue Metformin 1000 mg twice a day, Glimepiride 4mg twice a day (advised patient to monitor for hypoglycemia, can decrease if needed)  - patient does not want  to change her regimen at this time.  Confirm with patient no hypoglycemic event  - Advised pt to check glucoses regularly, asked to filled glucose log and bring back for review at next office visit  - Clear written instruction given on AVS  - Follow up as scheduled with lab work prior    Other osteoporosis without current pathological fracture  - longstanding history of Osteopenia with high FRAX at the hip  >> unable to tolerate oral bisphosphonate in the past  - recent bone density 2022 showed osteoporosis femoral neck   - Risk factors: race, postmenopausal status and family history   - continue vitamin-D and calcium supplement>> given within normal range vitamin-D  - mild hypercalcemia noted on blood work, we will continue to monitor  - after patient discussed with her insurance company, she would prefer to start on SC Prolia injection every 6 months  - patient at this time wants to hold off on her Prolia injection due to ongoing issues at home with her   - next bone density 2024  - fall precaution discussed    Benign hypertension  - BP reviewed today  - continue home medications  - manage by PCP  - on HCTZ can lead to mild hypercalcemia, continue monitor    Class 1 obesity due to excess calories with serious comorbidity and body mass index (BMI) of 30.0 to 30.9 in adult  - Body mass index is 29.22 kg/m².  - dietary discussion as above  - continue to monitor weight>> weight loss noted while on Trulicity and dietary modification    Subclinical hypothyroidism  - no history of thyroid dysfunction, not on thyroid medication  - patient is quite concerned about her thyroid function given her son's history of hypothyroidism, patient with TPO negative antibody  - TSH mildly elevated> indicating more of subclinical hypothyroidism  - TSH improved, while on low-dose levothyroxine, with improvement in symptoms  - continue levothyroxine 25 mcg daily  - repeat TFTs 3 months, follow-up discussed       Advised  patient to follow up with PCP for routine health maintenance care.   RTC in 3-4 months      Earnest Zavaleta M.D.  Endocrinology  [facility]  9/27/2022      Disclaimer: This note has been generated in part with the use of voice-recognition software. There may be typographical errors that have been missed during proof-reading.

## 2022-09-27 NOTE — ASSESSMENT & PLAN NOTE
- no history of thyroid dysfunction, not on thyroid medication  - patient is quite concerned about her thyroid function given her son's history of hypothyroidism, patient with TPO negative antibody  - TSH mildly elevated> indicating more of subclinical hypothyroidism  - TSH improved, while on low-dose levothyroxine, with improvement in symptoms  - continue levothyroxine 25 mcg daily  - repeat TFTs 3 months, follow-up discussed

## 2022-09-27 NOTE — ASSESSMENT & PLAN NOTE
- Diabetes is at goal, given most current A1C, Goal A1C for patient is 7%  - Diabetic supplies/medications reviewed this visit to ensure continue refills and compliance  - Diabetes education referral: patient refused   - Advised patient to get periodic eye and feet exam.   - Reviewed routine maintenance: lipid, U:P/C   - continue encouragement of dietary modification, portion size control, decreasing carbohydrates intake    Plan  - given better glycemic control:  We will continue Trulicity  1.5 mg Q weekly injection,  - continue Metformin 1000 mg twice a day, Glimepiride 4mg twice a day (advised patient to monitor for hypoglycemia, can decrease if needed)  - patient does not want to change her regimen at this time.  Confirm with patient no hypoglycemic event  - Advised pt to check glucoses regularly, asked to filled glucose log and bring back for review at next office visit  - Clear written instruction given on AVS  - Follow up as scheduled with lab work prior

## 2022-09-27 NOTE — TELEPHONE ENCOUNTER
"I have spoken with Peace Farmer and informed her of the [facility] application process for Trulicity and what's required to apply.  Peace Farmer will provide the following documents:           "Copy of patient's current Insurance Card and Medicare Card (Red, White & Blue) front & back  "Proof of income (Social Security Award Letter or a Month of Check Stubs if employed or Tax Return)  "Print out from your Pharmacy and / or insurance company EOB that shows your out-of-pocket expense (copays spent for current year)      I will follow up with the patient in 5 business days.    "

## 2022-09-27 NOTE — ASSESSMENT & PLAN NOTE
- longstanding history of Osteopenia with high FRAX at the hip  >> unable to tolerate oral bisphosphonate in the past  - recent bone density 2022 showed osteoporosis femoral neck   - Risk factors: race, postmenopausal status and family history   - continue vitamin-D and calcium supplement>> given within normal range vitamin-D  - mild hypercalcemia noted on blood work, we will continue to monitor  - after patient discussed with her insurance company, she would prefer to start on SC Prolia injection every 6 months  - patient at this time wants to hold off on her Prolia injection due to ongoing issues at home with her   - next bone density 2024  - fall precaution discussed

## 2022-09-27 NOTE — ASSESSMENT & PLAN NOTE
- Body mass index is 29.22 kg/m².  - dietary discussion as above  - continue to monitor weight>> weight loss noted while on Trulicity and dietary modification

## 2022-09-28 RX ORDER — LEVOTHYROXINE SODIUM 50 UG/1
50 TABLET ORAL
Qty: 90 TABLET | Refills: 2 | Status: SHIPPED | OUTPATIENT
Start: 2022-09-28 | End: 2023-01-24 | Stop reason: SDUPTHER

## 2022-09-29 ENCOUNTER — IMMUNIZATION (OUTPATIENT)
Dept: OBSTETRICS AND GYNECOLOGY | Facility: CLINIC | Age: 77
End: 2022-09-29
Payer: MEDICARE

## 2022-09-29 DIAGNOSIS — Z23 NEED FOR VACCINATION: Primary | ICD-10-CM

## 2022-09-29 PROCEDURE — 91312 COVID-19, MRNA, LNP-S, BIVALENT BOOSTER, PF, 30 MCG/0.3 ML DOSE: ICD-10-PCS | Mod: S$GLB,,, | Performed by: FAMILY MEDICINE

## 2022-09-29 PROCEDURE — 91312 COVID-19, MRNA, LNP-S, BIVALENT BOOSTER, PF, 30 MCG/0.3 ML DOSE: CPT | Mod: S$GLB,,, | Performed by: FAMILY MEDICINE

## 2022-09-29 PROCEDURE — 0124A COVID-19, MRNA, LNP-S, BIVALENT BOOSTER, PF, 30 MCG/0.3 ML DOSE: CPT | Mod: PBBFAC | Performed by: FAMILY MEDICINE

## 2022-09-30 ENCOUNTER — PATIENT MESSAGE (OUTPATIENT)
Dept: FAMILY MEDICINE | Facility: CLINIC | Age: 77
End: 2022-09-30
Payer: MEDICARE

## 2022-10-14 DIAGNOSIS — E11.65 TYPE 2 DIABETES MELLITUS WITH HYPERGLYCEMIA, WITHOUT LONG-TERM CURRENT USE OF INSULIN: ICD-10-CM

## 2022-10-14 NOTE — TELEPHONE ENCOUNTER
----- Message from Marya Correa sent at 10/14/2022  2:52 PM CDT -----  Type: RX Refill Request    Who Called: pt     Have you contacted your pharmacy:    Refill or New Rx:dulaglutide (TRULICITY) 1.5 mg/0.5 mL pen injector - needs pa. Call [facility]     RX Name and Strength:    How is the patient currently taking it? (ex. 1XDay):    Is this a 30 day or 90 day RX:    Preferred Pharmacy with phone number:  [facility] - [place], [place] - [address]  [address] [address]  Phone: [phone] Fax: [phone]        Local or Mail Order:    Ordering Provider:    Would the patient rather a call back or a response via [facility]? call    Best Call Back Number:[phone] (home)       Additional Information:

## 2022-10-17 ENCOUNTER — PATIENT MESSAGE (OUTPATIENT)
Dept: ENDOCRINOLOGY | Facility: CLINIC | Age: 77
End: 2022-10-17
Payer: MEDICARE

## 2023-01-17 ENCOUNTER — LAB VISIT (OUTPATIENT)
Dept: LAB | Facility: HOSPITAL | Age: 78
End: 2023-01-17
Attending: HOSPITALIST
Payer: MEDICARE

## 2023-01-17 DIAGNOSIS — E11.65 TYPE 2 DIABETES MELLITUS WITH HYPERGLYCEMIA, WITHOUT LONG-TERM CURRENT USE OF INSULIN: ICD-10-CM

## 2023-01-17 DIAGNOSIS — E03.8 SUBCLINICAL HYPOTHYROIDISM: ICD-10-CM

## 2023-01-17 LAB
ANION GAP SERPL CALC-SCNC: 13 MMOL/L (ref 8–16)
BUN SERPL-MCNC: 13 MG/DL (ref 8–23)
CALCIUM SERPL-MCNC: 10.5 MG/DL (ref 8.7–10.5)
CHLORIDE SERPL-SCNC: 103 MMOL/L (ref 95–110)
CO2 SERPL-SCNC: 22 MMOL/L (ref 23–29)
CREAT SERPL-MCNC: 0.7 MG/DL (ref 0.5–1.4)
EST. GFR  (NO RACE VARIABLE): >60 ML/MIN/1.73 M^2
ESTIMATED AVG GLUCOSE: 111 MG/DL (ref 68–131)
GLUCOSE SERPL-MCNC: 161 MG/DL (ref 70–110)
HBA1C MFR BLD: 5.5 % (ref 4–5.6)
POTASSIUM SERPL-SCNC: 4 MMOL/L (ref 3.5–5.1)
SODIUM SERPL-SCNC: 138 MMOL/L (ref 136–145)
T4 FREE SERPL-MCNC: 1.12 NG/DL (ref 0.71–1.51)
TSH SERPL DL<=0.005 MIU/L-ACNC: 0.8 UIU/ML (ref 0.4–4)

## 2023-01-17 PROCEDURE — 83036 HEMOGLOBIN GLYCOSYLATED A1C: CPT | Mod: HCNC | Performed by: HOSPITALIST

## 2023-01-17 PROCEDURE — 36415 COLL VENOUS BLD VENIPUNCTURE: CPT | Mod: HCNC,PO | Performed by: HOSPITALIST

## 2023-01-17 PROCEDURE — 80048 BASIC METABOLIC PNL TOTAL CA: CPT | Mod: HCNC | Performed by: HOSPITALIST

## 2023-01-17 PROCEDURE — 84439 ASSAY OF FREE THYROXINE: CPT | Mod: HCNC | Performed by: HOSPITALIST

## 2023-01-17 PROCEDURE — 84443 ASSAY THYROID STIM HORMONE: CPT | Mod: HCNC | Performed by: HOSPITALIST

## 2023-01-18 ENCOUNTER — PATIENT OUTREACH (OUTPATIENT)
Dept: ADMINISTRATIVE | Facility: HOSPITAL | Age: 78
End: 2023-01-18
Payer: MEDICARE

## 2023-01-24 ENCOUNTER — OFFICE VISIT (OUTPATIENT)
Dept: ENDOCRINOLOGY | Facility: CLINIC | Age: 78
End: 2023-01-24
Payer: MEDICARE

## 2023-01-24 VITALS
DIASTOLIC BLOOD PRESSURE: 67 MMHG | TEMPERATURE: 99 F | WEIGHT: 146.81 LBS | HEART RATE: 75 BPM | SYSTOLIC BLOOD PRESSURE: 134 MMHG | BODY MASS INDEX: 28.08 KG/M2

## 2023-01-24 DIAGNOSIS — E03.8 SUBCLINICAL HYPOTHYROIDISM: ICD-10-CM

## 2023-01-24 DIAGNOSIS — M81.8 OTHER OSTEOPOROSIS WITHOUT CURRENT PATHOLOGICAL FRACTURE: ICD-10-CM

## 2023-01-24 DIAGNOSIS — E11.65 TYPE 2 DIABETES MELLITUS WITH HYPERGLYCEMIA, WITHOUT LONG-TERM CURRENT USE OF INSULIN: Primary | ICD-10-CM

## 2023-01-24 DIAGNOSIS — E55.9 MILD VITAMIN D DEFICIENCY: ICD-10-CM

## 2023-01-24 PROCEDURE — 1126F PR PAIN SEVERITY QUANTIFIED, NO PAIN PRESENT: ICD-10-PCS | Mod: HCNC,CPTII,S$GLB, | Performed by: HOSPITALIST

## 2023-01-24 PROCEDURE — 1101F PR PT FALLS ASSESS DOC 0-1 FALLS W/OUT INJ PAST YR: ICD-10-PCS | Mod: HCNC,CPTII,S$GLB, | Performed by: HOSPITALIST

## 2023-01-24 PROCEDURE — 3075F SYST BP GE 130 - 139MM HG: CPT | Mod: HCNC,CPTII,S$GLB, | Performed by: HOSPITALIST

## 2023-01-24 PROCEDURE — 99999 PR PBB SHADOW E&M-EST. PATIENT-LVL IV: ICD-10-PCS | Mod: PBBFAC,HCNC,, | Performed by: HOSPITALIST

## 2023-01-24 PROCEDURE — 99214 PR OFFICE/OUTPT VISIT, EST, LEVL IV, 30-39 MIN: ICD-10-PCS | Mod: HCNC,S$GLB,, | Performed by: HOSPITALIST

## 2023-01-24 PROCEDURE — 3288F FALL RISK ASSESSMENT DOCD: CPT | Mod: HCNC,CPTII,S$GLB, | Performed by: HOSPITALIST

## 2023-01-24 PROCEDURE — 1160F PR REVIEW ALL MEDS BY PRESCRIBER/CLIN PHARMACIST DOCUMENTED: ICD-10-PCS | Mod: HCNC,CPTII,S$GLB, | Performed by: HOSPITALIST

## 2023-01-24 PROCEDURE — 99999 PR PBB SHADOW E&M-EST. PATIENT-LVL IV: CPT | Mod: PBBFAC,HCNC,, | Performed by: HOSPITALIST

## 2023-01-24 PROCEDURE — 3072F PR LOW RISK FOR RETINOPATHY: ICD-10-PCS | Mod: HCNC,CPTII,S$GLB, | Performed by: HOSPITALIST

## 2023-01-24 PROCEDURE — 3072F LOW RISK FOR RETINOPATHY: CPT | Mod: HCNC,CPTII,S$GLB, | Performed by: HOSPITALIST

## 2023-01-24 PROCEDURE — 1101F PT FALLS ASSESS-DOCD LE1/YR: CPT | Mod: HCNC,CPTII,S$GLB, | Performed by: HOSPITALIST

## 2023-01-24 PROCEDURE — 1126F AMNT PAIN NOTED NONE PRSNT: CPT | Mod: HCNC,CPTII,S$GLB, | Performed by: HOSPITALIST

## 2023-01-24 PROCEDURE — 99214 OFFICE O/P EST MOD 30 MIN: CPT | Mod: HCNC,S$GLB,, | Performed by: HOSPITALIST

## 2023-01-24 PROCEDURE — 1159F MED LIST DOCD IN RCRD: CPT | Mod: HCNC,CPTII,S$GLB, | Performed by: HOSPITALIST

## 2023-01-24 PROCEDURE — 3288F PR FALLS RISK ASSESSMENT DOCUMENTED: ICD-10-PCS | Mod: HCNC,CPTII,S$GLB, | Performed by: HOSPITALIST

## 2023-01-24 PROCEDURE — 3078F PR MOST RECENT DIASTOLIC BLOOD PRESSURE < 80 MM HG: ICD-10-PCS | Mod: HCNC,CPTII,S$GLB, | Performed by: HOSPITALIST

## 2023-01-24 PROCEDURE — 1160F RVW MEDS BY RX/DR IN RCRD: CPT | Mod: HCNC,CPTII,S$GLB, | Performed by: HOSPITALIST

## 2023-01-24 PROCEDURE — 1159F PR MEDICATION LIST DOCUMENTED IN MEDICAL RECORD: ICD-10-PCS | Mod: HCNC,CPTII,S$GLB, | Performed by: HOSPITALIST

## 2023-01-24 PROCEDURE — 3075F PR MOST RECENT SYSTOLIC BLOOD PRESS GE 130-139MM HG: ICD-10-PCS | Mod: HCNC,CPTII,S$GLB, | Performed by: HOSPITALIST

## 2023-01-24 PROCEDURE — 3078F DIAST BP <80 MM HG: CPT | Mod: HCNC,CPTII,S$GLB, | Performed by: HOSPITALIST

## 2023-01-24 RX ORDER — LEVOTHYROXINE SODIUM 50 UG/1
50 TABLET ORAL
Qty: 90 TABLET | Refills: 3 | Status: SHIPPED | OUTPATIENT
Start: 2023-01-24 | End: 2023-07-07

## 2023-01-24 RX ORDER — GLIMEPIRIDE 4 MG/1
4 TABLET ORAL 2 TIMES DAILY WITH MEALS
Qty: 180 TABLET | Refills: 1 | Status: SHIPPED | OUTPATIENT
Start: 2023-01-24 | End: 2023-06-19

## 2023-01-24 RX ORDER — METFORMIN HYDROCHLORIDE 1000 MG/1
1000 TABLET ORAL 2 TIMES DAILY
Qty: 180 TABLET | Refills: 3 | Status: SHIPPED | OUTPATIENT
Start: 2023-01-24 | End: 2023-09-05

## 2023-01-24 NOTE — PROGRESS NOTES
"Subjective:      Patient ID: Peace Farmer is a 78 y.o. female presented to [facility] on 1/24/2023.  Chief Complaint:  Hypothyroidism and Diabetes      History of Present Illness: Peace Farmer is a 78 y.o. female here for diabetes  Other significant past medical history:  Osteoporosis, abnormal thyroid function, hypertension    Interval history:  Patient is here for follow-up, to discuss multiple endocrine issues.  In regards to diabetes, has been doing much better, A1c improved to 5.4%.  After dietary modification, small portion, low carbohydrates.  Tolerating Trulicity well without any issues.    Denies any hypoglycemia.   Pharmacy assistance for diabetes, need to complete paperwork    In regards to osteoporosis:  After discussion with her insurance, she would prefer to pursue SC Prolia, but holding off due to multiple stressors at home, taking care of her sick . She denies falls    In regards to abnormal thyroid function:  TPO antibodies negative, patient still with fatigue, family history of hypothyroidism, trial of TRT has help "clear up brain fog". Feeling a lot better     with prostate cancer, with metastasis. On chemo, with hospital admission. Stressors      1) With regards to Diabetes Mellitus Type 2  - Known diabetic complications: none  - Diagnosed w/ DM: 2008?, worsening now    On restrictive diet, small portion, low carbohydrates.  Patient unsure if she can continue long-term with this diet  Glucose log review:  At dinner>>  120  124  120  176     Current meds:     Trulicity 1.5 mg Q weekly injection  (expensive, inconsistently getting medication)   Metformin 1000 mg twice a day   Glimepiride 4mg twice a day   Previous meds:              Januvia  Home glucose checks: checks 2-3x a day, Logs reviewed/Unavailable: oral recall:   Hypoglycemia: denies  Diet/Exercise:               Eating multiple small meals  per day (7x)              Drink: diet coke  Weight trend: " stable  Diabetes Education: yes, many years ago  Diabetes Related Hospitalization:  No  Hx of pancreatitis: No  Family history of diabetes: Yes. brother  Occupation: retired    Eye exam current (within one year): yes, DR: no  Reports cuts or ulcers on feet:   Denies  Statin: Taking, ACE/ARB: Taking    Diabetes lab work  Lab Results   Component Value Date    HGBA1C 5.5 01/17/2023    HGBA1C 5.4 09/14/2022    HGBA1C 6.1 (H) 06/30/2022    HGBA1C 8.5 (H) 04/25/2022     No results found for: CPEPTIDE, GLUTAMICACID, ISLETCELLANT   No results found for: FRUCTOSAMINE  Lab Results   Component Value Date    MICALBCREAT 24.1 04/25/2022     Lab Results   Component Value Date    DQUSDXBJ55 1581 (H) 10/12/2020       Diabetes Management Status: Reviewed this office visit  Screening or Prevention Patient's value Goal Complete/Controlled?   Lipid profile : 04/25/2022 Annually Yes     Dilated retinal exam : 09/14/2022 Annually Yes     Foot exam   Most Recent Foot Exam Date: Not Found Annually Yes          2) Osteoporosis  - Previously with osteopenia, 2020, previously managed by Dr. Rashid  - Was on Boniva for treatment of osteopenia, leading to heartburn> she does not to be on this medication  - Dr. Rashid wanted to switch patient to Reclast, for which she never started    - Recent bone density 05/2022 show osteoporosis  - She is taking Calcium and Vit D supplements. 500mg/1000iu  - She had a hysterectomy at 35 y/o and menopausal symptoms at 50 y/o.   - Holding off on SC Prolia therapy due to  illnesses, she is having dif time balancing both  - No fall    DXA 5/2022  Lumbar spine (L1-L4): BMD is 0.850 g/cm2, T-score is -1.8, and Z-score is 0.6.  Total hip:  BMD is 0.751 g/cm2, T-score is -1.6, and Z-score is 0.2.  Femoral neck: BMD is 0.555 g/cm2, T-score is -2.6, and Z-score is -0.6.     FRAX:  16% risk of a major osteoporotic fracture in the next 10 years.  5.1% risk of hip fracture in the next 10 years.      Impression:  *Osteoporosis based on T-score below -2.5      3)  Abnormal thyroid function  - TSH elevation, 1x  - Family history thyroid disorder: son with hypothyroidism   - Denies goiter  - patient's main concern is:  Fatigue and Heat intolerance  - started on low-dose levothyroxine 25 mcg daily 6/2022  - Now on Levothyroxine 50mcg daily, doing well at this time    Lab Results   Component Value Date    TSH 0.795 01/17/2023    TSH 3.157 09/14/2022    TSH 3.815 06/30/2022    FREET4 1.12 01/17/2023    FREET4 0.94 09/14/2022    FREET4 1.00 06/30/2022    THYROPEROXID <6.0 06/30/2022     Reviewed past surgical, medical, family, social history and updated as appropriate.  Review of Systems: see HPI above    Objective:   /67 (BP Location: Left arm)   Pulse 75   Temp 98.5 °F (36.9 °C) (Oral)   Wt 66.6 kg (146 lb 12.8 oz)   BMI 28.08 kg/m²     Body mass index is 28.08 kg/m².  Vital signs reviewed    Physical Exam  Vitals and nursing note reviewed.   Constitutional:       General: She is not in acute distress.     Appearance: Normal appearance. She is well-developed. She is obese. She is not toxic-appearing.   Neck:      Thyroid: No thyromegaly.      Comments: No goiter noted  Cardiovascular:      Heart sounds: Normal heart sounds.   Pulmonary:      Effort: Pulmonary effort is normal. No respiratory distress.   Abdominal:      Tenderness: There is no abdominal tenderness.   Musculoskeletal:         General: No deformity. Normal range of motion.      Cervical back: Normal range of motion.   Skin:     Findings: No bruising.   Neurological:      Mental Status: She is alert and oriented to person, place, and time.   Psychiatric:         Mood and Affect: Mood normal.       Lab Reviewed:   Lab Results   Component Value Date    HGBA1C 5.5 01/17/2023       Lab Results   Component Value Date    CHOL 105 (L) 04/25/2022    HDL 40 04/25/2022    LDLCALC 44.8 (L) 04/25/2022    TRIG 101 04/25/2022    CHOLHDL 38.1 04/25/2022        Lab Results   Component Value Date     01/17/2023    K 4.0 01/17/2023     01/17/2023    CO2 22 (L) 01/17/2023     (H) 01/17/2023    BUN 13 01/17/2023    CREATININE 0.7 01/17/2023    CALCIUM 10.5 01/17/2023    PROT 7.0 02/10/2022    ALBUMIN 4.1 02/10/2022    BILITOT 0.4 02/10/2022    ALKPHOS 59 02/10/2022    AST 19 02/10/2022    ALT 19 02/10/2022    ANIONGAP 13 01/17/2023    ESTGFRAFRICA >60.0 06/30/2022    EGFRNONAA >60.0 06/30/2022    TSH 0.795 01/17/2023     Lab Results   Component Value Date    PTH 64.0 05/13/2020    [id number] 35 06/30/2022    PFIMCISM15BJ 37 05/13/2020    SJTLSLZB50DB 29 (L) 11/28/2018    CALCIUM 10.5 01/17/2023    CALCIUM 10.3 09/14/2022    CALCIUM 10.7 (H) 06/30/2022    ALKPHOS 59 02/10/2022    ALKPHOS 59 10/12/2020    ALKPHOS 57 05/13/2020    TSH 0.795 01/17/2023    LABCALC 10.4 12/10/2018    SMPXDKE52YXK 7 12/10/2018      Assessment     1. Type 2 diabetes mellitus with hyperglycemia, without long-term current use of insulin  glimepiride (AMARYL) 4 MG tablet    metFORMIN (GLUCOPHAGE) 1000 MG tablet    Hemoglobin A1C      2. Other osteoporosis without current pathological fracture  Basic Metabolic Panel    Vitamin D      3. Subclinical hypothyroidism  levothyroxine (SYNTHROID) 50 MCG tablet    T4, Free    TSH      4. Mild vitamin D deficiency          Plan     Type 2 diabetes mellitus with hyperglycemia, without long-term current use of insulin  - Diabetes is at goal, given most current A1C, Goal A1C for patient is 7%  - Diabetic supplies/medications reviewed this visit to ensure continue refills and compliance  - Diabetes education referral: patient refused   - Advised patient to get periodic eye and feet exam.   - Reviewed routine maintenance: lipid, U:P/C   - continue encouragement of dietary modification, portion size control, decreasing carbohydrates intake    Plan  - given better glycemic control:  We will continue Trulicity  1.5 mg Q weekly injection, phone  number given to patient assistance  - continue Metformin 1000 mg twice a day, Glimepiride 4mg twice a day (advised patient to monitor for hypoglycemia, can decrease if needed)  - patient does not want to change her regimen at this time.  Confirm with patient no hypoglycemic event  - Advised pt to check glucoses regularly, asked to filled glucose log and bring back for review at next office visit  - Clear written instruction given on AVS  - Follow up as scheduled with lab work prior    Other osteoporosis without current pathological fracture  - longstanding history of Osteopenia with high FRAX at the hip  >> unable to tolerate oral bisphosphonate in the past  - recent bone density 2022 showed osteoporosis femoral neck   - Risk factors: race, postmenopausal status and family history   - continue vitamin-D and calcium supplement>> given within normal range vitamin-D  - mild hypercalcemia noted on blood work, we will continue to monitor  - after patient discussed with her insurance company, she would prefer to start on SC Prolia injection every 6 months  - patient at this time wants to hold off on her Prolia injection due to ongoing issues at home with her   - next bone density 2024  - fall precaution discussed    Subclinical hypothyroidism  - no history of thyroid dysfunction, not on thyroid medication  - patient is quite concerned about her thyroid function given her son's history of hypothyroidism, patient with TPO negative antibody  - TSH improved, while on low-dose levothyroxine, with improvement in symptoms  - continue levothyroxine 50 mcg daily  - repeat TFTs 3 months, follow-up discussed    Mild vitamin D deficiency  - lab work reviewed, on Vit D supplement  - lab work every 6 mo or yearly  - continue OTC VitD3 daily    Advised patient to follow up with PCP for routine health maintenance care.   RTC in 4 months      Earnest Zavaleta M.D.  Endocrinology  [facility]  [place]  1/24/2023      Disclaimer: This note has been generated in part with the use of voice-recognition software. There may be typographical errors that have been missed during proof-reading.

## 2023-01-24 NOTE — PATIENT INSTRUCTIONS
---------------------------------------------------------    Regiment:   __ Metformin 1000 mg twice a day  __ Glimepiride 4mg twice a day  __ Trulicity 1.5mg once a week injection      Goal for your blood sugar   In the morning, before breakfast: 100-140  Before going to bed: 100-140  Do not go to bed with glucose less than 100, have a small snack if lower than 100    Please check glucose before breakfast and at bedtime.   You can keep track of the glucose with Glucose logs or any papers  Please filled out and bring back to next office visit for me to review  Document any (LOW BLOOD GLUCOSE) hypoglycemia  episode with date and time for me to review    Please make sure to get your dilated eyes examine once a year with your eye doctor.  Monitor your feet for any cuts or skin breakdown regularly, schedule a visit with your foot doctor/podiatrist yearly if you see one.      We will plan an in-clinic visit in 3 months, with labs prior to that appointment.    Contact information:    Earnest Zavaleta M.D  [facility] [address] [facility] [address], [address]  [place] [address]    Office:  [phone]  Fax:  [phone]     ----------------------------------------------------------

## 2023-01-25 NOTE — ASSESSMENT & PLAN NOTE
- Diabetes is at goal, given most current A1C, Goal A1C for patient is 7%  - Diabetic supplies/medications reviewed this visit to ensure continue refills and compliance  - Diabetes education referral: patient refused   - Advised patient to get periodic eye and feet exam.   - Reviewed routine maintenance: lipid, U:P/C   - continue encouragement of dietary modification, portion size control, decreasing carbohydrates intake    Plan  - given better glycemic control:  We will continue Trulicity  1.5 mg Q weekly injection, phone number given to patient assistance  - continue Metformin 1000 mg twice a day, Glimepiride 4mg twice a day (advised patient to monitor for hypoglycemia, can decrease if needed)  - patient does not want to change her regimen at this time.  Confirm with patient no hypoglycemic event  - Advised pt to check glucoses regularly, asked to filled glucose log and bring back for review at next office visit  - Clear written instruction given on AVS  - Follow up as scheduled with lab work prior

## 2023-01-25 NOTE — ASSESSMENT & PLAN NOTE
- no history of thyroid dysfunction, not on thyroid medication  - patient is quite concerned about her thyroid function given her son's history of hypothyroidism, patient with TPO negative antibody  - TSH improved, while on low-dose levothyroxine, with improvement in symptoms  - continue levothyroxine 50 mcg daily  - repeat TFTs 3 months, follow-up discussed

## 2023-01-26 ENCOUNTER — TELEPHONE (OUTPATIENT)
Dept: PHARMACY | Facility: CLINIC | Age: 78
End: 2023-01-26
Payer: MEDICARE

## 2023-01-26 NOTE — TELEPHONE ENCOUNTER
I have spoken with Peace Farmer and informed her of the [facility] application process for Trulicity and what's required to apply.  Peace Farmer will provide the following documents: Proof of household Income( such as social security statement, 1099 form, pension statement or 3 consecutive pay stubs, Copy of all Insurance cards( front and back), and OCCP & Patient Authorization Forms      I will follow up with the patient in 5 business days.

## 2023-02-03 NOTE — TELEPHONE ENCOUNTER
Peace Farmer has provided the necessary documents today to begin the enrollment process into the [place] [employer]aresTrul[employer]ity 1.5mg program. The prescription portion of the application has been sent to the providers office for approval and signature.

## 2023-02-03 NOTE — TELEPHONE ENCOUNTER
Hello,       A Patient Assistance Application for Peace Farmer - MRN  [id number] was faxed to your office @[phone]. Please have Dr. Earnest Zavaleta  review the application to ensure the prescription is correct. If correct, sign and fax the application back to the Pharmacy Patient Assistance Team @[phone].      If changes need to be made to this application, please let me know so corrections can be made, and the application will be faxed back to you for approval and signature.         Thank you,    Sheryl Theodore

## 2023-02-09 DIAGNOSIS — Z00.00 ENCOUNTER FOR MEDICARE ANNUAL WELLNESS EXAM: ICD-10-CM

## 2023-03-22 ENCOUNTER — TELEPHONE (OUTPATIENT)
Dept: FAMILY MEDICINE | Facility: CLINIC | Age: 78
End: 2023-03-22
Payer: MEDICARE

## 2023-03-22 NOTE — TELEPHONE ENCOUNTER
----- Message from Rosa Garber MD sent at 3/22/2023  1:54 PM CDT -----  Regarding: Due for ov/address hm  Please call patient to schedule office visit for May and address health maintenance.

## 2023-04-03 ENCOUNTER — TELEPHONE (OUTPATIENT)
Dept: ENDOCRINOLOGY | Facility: CLINIC | Age: 78
End: 2023-04-03
Payer: MEDICARE

## 2023-04-03 NOTE — TELEPHONE ENCOUNTER
Unfortunately, The Pharmacy Patient Assistance Team is unable to assist Ms. Farmer at this time due to the following reasons      The [facility] Patient Assistance Program has temporarily stopped accepting new applications for [facility]            Pharmacy Patient Assistance Team

## 2023-04-03 NOTE — TELEPHONE ENCOUNTER
----- Message from Kelby Ram MA sent at 4/3/2023 10:21 AM CDT -----  Type: Patient Call Back    Who called: Self    What is the request in detail: pt. States she had been out of her medication for 3 days .. levothyroxine (SYNTHROID) 50 MCG tablet.. she is waiting for [employer] to ship it... she is asking will she be ok with out the medication for that time .. If not will the provider be able to call her in a few until she gets it ..     Can the clinic reply by [facility]?No    Would the patient rather a call back or a response via [facility]? yes    Best call back number: [phone] (home)

## 2023-04-05 DIAGNOSIS — E11.65 TYPE 2 DIABETES MELLITUS WITH HYPERGLYCEMIA, WITHOUT LONG-TERM CURRENT USE OF INSULIN: ICD-10-CM

## 2023-04-05 NOTE — TELEPHONE ENCOUNTER
----- Message from Marlene Frye sent at 4/5/2023  3:36 PM CDT -----  .Type: Patient Call Back    Who called:self    What is the request in detail:dulaglutide (TRULICITY) 1.5 mg/0.5 mL pen injector needs medication sent to [facility]    Can the clinic reply by [facility]? call    Would the patient rather a call back or a response via [facility]? call    Best call back number: .[phone]     Additional Information: .  [facility] - [place], [place] - [address]  [address] [address]  Phone: [phone] Fax: [phone]

## 2023-05-16 DIAGNOSIS — I10 BENIGN HYPERTENSION: ICD-10-CM

## 2023-05-16 DIAGNOSIS — E78.5 HYPERLIPIDEMIA LDL GOAL <100: Primary | ICD-10-CM

## 2023-05-17 ENCOUNTER — LAB VISIT (OUTPATIENT)
Dept: LAB | Facility: HOSPITAL | Age: 78
End: 2023-05-17
Attending: HOSPITALIST
Payer: MEDICARE

## 2023-05-17 DIAGNOSIS — E78.5 HYPERLIPIDEMIA LDL GOAL <100: ICD-10-CM

## 2023-05-17 DIAGNOSIS — I10 BENIGN HYPERTENSION: ICD-10-CM

## 2023-05-17 DIAGNOSIS — M81.8 OTHER OSTEOPOROSIS WITHOUT CURRENT PATHOLOGICAL FRACTURE: ICD-10-CM

## 2023-05-17 DIAGNOSIS — E11.65 TYPE 2 DIABETES MELLITUS WITH HYPERGLYCEMIA, WITHOUT LONG-TERM CURRENT USE OF INSULIN: ICD-10-CM

## 2023-05-17 DIAGNOSIS — E03.8 SUBCLINICAL HYPOTHYROIDISM: ICD-10-CM

## 2023-05-17 LAB
25(OH)D3+25(OH)D2 SERPL-MCNC: 39 NG/ML (ref 30–96)
ALBUMIN SERPL BCP-MCNC: 4.2 G/DL (ref 3.5–5.2)
ALP SERPL-CCNC: 59 U/L (ref 55–135)
ALT SERPL W/O P-5'-P-CCNC: 16 U/L (ref 10–44)
ANION GAP SERPL CALC-SCNC: 7 MMOL/L (ref 8–16)
AST SERPL-CCNC: 17 U/L (ref 10–40)
BILIRUB DIRECT SERPL-MCNC: 0.2 MG/DL (ref 0.1–0.3)
BILIRUB SERPL-MCNC: 0.3 MG/DL (ref 0.1–1)
BUN SERPL-MCNC: 19 MG/DL (ref 8–23)
CALCIUM SERPL-MCNC: 10.5 MG/DL (ref 8.7–10.5)
CHLORIDE SERPL-SCNC: 105 MMOL/L (ref 95–110)
CHOLEST SERPL-MCNC: 116 MG/DL (ref 120–199)
CHOLEST/HDLC SERPL: 3.1 {RATIO} (ref 2–5)
CO2 SERPL-SCNC: 29 MMOL/L (ref 23–29)
CREAT SERPL-MCNC: 0.8 MG/DL (ref 0.5–1.4)
ERYTHROCYTE [DISTWIDTH] IN BLOOD BY AUTOMATED COUNT: 12.8 % (ref 11.5–14.5)
EST. GFR  (NO RACE VARIABLE): >60 ML/MIN/1.73 M^2
ESTIMATED AVG GLUCOSE: 105 MG/DL (ref 68–131)
GLUCOSE SERPL-MCNC: 130 MG/DL (ref 70–110)
HBA1C MFR BLD: 5.3 % (ref 4–5.6)
HCT VFR BLD AUTO: 42.2 % (ref 37–48.5)
HDLC SERPL-MCNC: 37 MG/DL (ref 40–75)
HDLC SERPL: 31.9 % (ref 20–50)
HGB BLD-MCNC: 13.6 G/DL (ref 12–16)
LDLC SERPL CALC-MCNC: 61.6 MG/DL (ref 63–159)
MCH RBC QN AUTO: 27.4 PG (ref 27–31)
MCHC RBC AUTO-ENTMCNC: 32.2 G/DL (ref 32–36)
MCV RBC AUTO: 85 FL (ref 82–98)
NONHDLC SERPL-MCNC: 79 MG/DL
PLATELET # BLD AUTO: 273 K/UL (ref 150–450)
PMV BLD AUTO: 11.8 FL (ref 9.2–12.9)
POTASSIUM SERPL-SCNC: 4.7 MMOL/L (ref 3.5–5.1)
PROT SERPL-MCNC: 7.2 G/DL (ref 6–8.4)
RBC # BLD AUTO: 4.97 M/UL (ref 4–5.4)
SODIUM SERPL-SCNC: 141 MMOL/L (ref 136–145)
T4 FREE SERPL-MCNC: 0.97 NG/DL (ref 0.71–1.51)
TRIGL SERPL-MCNC: 87 MG/DL (ref 30–150)
TSH SERPL DL<=0.005 MIU/L-ACNC: 1.58 UIU/ML (ref 0.4–4)
WBC # BLD AUTO: 7.75 K/UL (ref 3.9–12.7)

## 2023-05-17 PROCEDURE — 82306 VITAMIN D 25 HYDROXY: CPT | Performed by: HOSPITALIST

## 2023-05-17 PROCEDURE — 80048 BASIC METABOLIC PNL TOTAL CA: CPT | Performed by: HOSPITALIST

## 2023-05-17 PROCEDURE — 84439 ASSAY OF FREE THYROXINE: CPT | Performed by: HOSPITALIST

## 2023-05-17 PROCEDURE — 36415 COLL VENOUS BLD VENIPUNCTURE: CPT | Performed by: HOSPITALIST

## 2023-05-17 PROCEDURE — 80076 HEPATIC FUNCTION PANEL: CPT | Performed by: INTERNAL MEDICINE

## 2023-05-17 PROCEDURE — 80061 LIPID PANEL: CPT | Performed by: INTERNAL MEDICINE

## 2023-05-17 PROCEDURE — 85027 COMPLETE CBC AUTOMATED: CPT | Performed by: INTERNAL MEDICINE

## 2023-05-17 PROCEDURE — 84443 ASSAY THYROID STIM HORMONE: CPT | Performed by: HOSPITALIST

## 2023-05-17 PROCEDURE — 83036 HEMOGLOBIN GLYCOSYLATED A1C: CPT | Performed by: HOSPITALIST

## 2023-05-22 ENCOUNTER — OFFICE VISIT (OUTPATIENT)
Dept: FAMILY MEDICINE | Facility: CLINIC | Age: 78
End: 2023-05-22
Payer: MEDICARE

## 2023-05-22 VITALS
HEART RATE: 63 BPM | TEMPERATURE: 98 F | BODY MASS INDEX: 27.88 KG/M2 | OXYGEN SATURATION: 96 % | SYSTOLIC BLOOD PRESSURE: 132 MMHG | WEIGHT: 147.69 LBS | HEIGHT: 61 IN | DIASTOLIC BLOOD PRESSURE: 70 MMHG

## 2023-05-22 DIAGNOSIS — E66.3 OVERWEIGHT (BMI 25.0-29.9): ICD-10-CM

## 2023-05-22 DIAGNOSIS — Z12.31 ENCOUNTER FOR SCREENING MAMMOGRAM FOR MALIGNANT NEOPLASM OF BREAST: ICD-10-CM

## 2023-05-22 DIAGNOSIS — E11.9 TYPE 2 DIABETES MELLITUS WITHOUT COMPLICATION, WITHOUT LONG-TERM CURRENT USE OF INSULIN: ICD-10-CM

## 2023-05-22 DIAGNOSIS — E78.5 HYPERLIPIDEMIA LDL GOAL <100: ICD-10-CM

## 2023-05-22 DIAGNOSIS — I10 BENIGN HYPERTENSION: ICD-10-CM

## 2023-05-22 DIAGNOSIS — H53.9 VISUAL DISTURBANCE: ICD-10-CM

## 2023-05-22 DIAGNOSIS — G47.00 INSOMNIA, UNSPECIFIED TYPE: ICD-10-CM

## 2023-05-22 DIAGNOSIS — Z12.39 ENCOUNTER FOR BREAST CANCER SCREENING OTHER THAN MAMMOGRAM: ICD-10-CM

## 2023-05-22 DIAGNOSIS — M81.8 OTHER OSTEOPOROSIS WITHOUT CURRENT PATHOLOGICAL FRACTURE: ICD-10-CM

## 2023-05-22 DIAGNOSIS — Z00.00 ROUTINE MEDICAL EXAM: Primary | ICD-10-CM

## 2023-05-22 DIAGNOSIS — R32 URINARY INCONTINENCE, UNSPECIFIED TYPE: ICD-10-CM

## 2023-05-22 DIAGNOSIS — R15.9 INCONTINENCE OF FECES, UNSPECIFIED FECAL INCONTINENCE TYPE: ICD-10-CM

## 2023-05-22 PROCEDURE — 3075F PR MOST RECENT SYSTOLIC BLOOD PRESS GE 130-139MM HG: ICD-10-PCS | Mod: CPTII,S$GLB,, | Performed by: INTERNAL MEDICINE

## 2023-05-22 PROCEDURE — 1101F PR PT FALLS ASSESS DOC 0-1 FALLS W/OUT INJ PAST YR: ICD-10-PCS | Mod: CPTII,S$GLB,, | Performed by: INTERNAL MEDICINE

## 2023-05-22 PROCEDURE — 99397 PR PREVENTIVE VISIT,EST,65 & OVER: ICD-10-PCS | Mod: S$GLB,,, | Performed by: INTERNAL MEDICINE

## 2023-05-22 PROCEDURE — 99999 PR PBB SHADOW E&M-EST. PATIENT-LVL V: ICD-10-PCS | Mod: PBBFAC,,, | Performed by: INTERNAL MEDICINE

## 2023-05-22 PROCEDURE — 3078F PR MOST RECENT DIASTOLIC BLOOD PRESSURE < 80 MM HG: ICD-10-PCS | Mod: CPTII,S$GLB,, | Performed by: INTERNAL MEDICINE

## 2023-05-22 PROCEDURE — 99397 PER PM REEVAL EST PAT 65+ YR: CPT | Mod: S$GLB,,, | Performed by: INTERNAL MEDICINE

## 2023-05-22 PROCEDURE — 3072F PR LOW RISK FOR RETINOPATHY: ICD-10-PCS | Mod: CPTII,S$GLB,, | Performed by: INTERNAL MEDICINE

## 2023-05-22 PROCEDURE — 1101F PT FALLS ASSESS-DOCD LE1/YR: CPT | Mod: CPTII,S$GLB,, | Performed by: INTERNAL MEDICINE

## 2023-05-22 PROCEDURE — 1160F RVW MEDS BY RX/DR IN RCRD: CPT | Mod: CPTII,S$GLB,, | Performed by: INTERNAL MEDICINE

## 2023-05-22 PROCEDURE — 3075F SYST BP GE 130 - 139MM HG: CPT | Mod: CPTII,S$GLB,, | Performed by: INTERNAL MEDICINE

## 2023-05-22 PROCEDURE — 3288F FALL RISK ASSESSMENT DOCD: CPT | Mod: CPTII,S$GLB,, | Performed by: INTERNAL MEDICINE

## 2023-05-22 PROCEDURE — 1159F PR MEDICATION LIST DOCUMENTED IN MEDICAL RECORD: ICD-10-PCS | Mod: CPTII,S$GLB,, | Performed by: INTERNAL MEDICINE

## 2023-05-22 PROCEDURE — 1126F PR PAIN SEVERITY QUANTIFIED, NO PAIN PRESENT: ICD-10-PCS | Mod: CPTII,S$GLB,, | Performed by: INTERNAL MEDICINE

## 2023-05-22 PROCEDURE — 1159F MED LIST DOCD IN RCRD: CPT | Mod: CPTII,S$GLB,, | Performed by: INTERNAL MEDICINE

## 2023-05-22 PROCEDURE — 99215 OFFICE O/P EST HI 40 MIN: CPT | Mod: PO | Performed by: INTERNAL MEDICINE

## 2023-05-22 PROCEDURE — 3288F PR FALLS RISK ASSESSMENT DOCUMENTED: ICD-10-PCS | Mod: CPTII,S$GLB,, | Performed by: INTERNAL MEDICINE

## 2023-05-22 PROCEDURE — 1160F PR REVIEW ALL MEDS BY PRESCRIBER/CLIN PHARMACIST DOCUMENTED: ICD-10-PCS | Mod: CPTII,S$GLB,, | Performed by: INTERNAL MEDICINE

## 2023-05-22 PROCEDURE — 1126F AMNT PAIN NOTED NONE PRSNT: CPT | Mod: CPTII,S$GLB,, | Performed by: INTERNAL MEDICINE

## 2023-05-22 PROCEDURE — 3072F LOW RISK FOR RETINOPATHY: CPT | Mod: CPTII,S$GLB,, | Performed by: INTERNAL MEDICINE

## 2023-05-22 PROCEDURE — 99999 PR PBB SHADOW E&M-EST. PATIENT-LVL V: CPT | Mod: PBBFAC,,, | Performed by: INTERNAL MEDICINE

## 2023-05-22 PROCEDURE — 3078F DIAST BP <80 MM HG: CPT | Mod: CPTII,S$GLB,, | Performed by: INTERNAL MEDICINE

## 2023-05-22 RX ORDER — ROSUVASTATIN CALCIUM 5 MG/1
5 TABLET, COATED ORAL NIGHTLY
Qty: 90 TABLET | Refills: 1 | Status: SHIPPED | OUTPATIENT
Start: 2023-05-22 | End: 2023-10-18

## 2023-05-22 RX ORDER — LOSARTAN POTASSIUM AND HYDROCHLOROTHIAZIDE 25; 100 MG/1; MG/1
1 TABLET ORAL DAILY
Qty: 90 TABLET | Refills: 1 | Status: SHIPPED | OUTPATIENT
Start: 2023-05-22 | End: 2023-10-18

## 2023-05-22 RX ORDER — ATENOLOL 25 MG/1
25 TABLET ORAL DAILY
Qty: 90 TABLET | Refills: 1 | Status: SHIPPED | OUTPATIENT
Start: 2023-05-22 | End: 2023-10-18

## 2023-05-22 RX ORDER — AMLODIPINE BESYLATE 5 MG/1
5 TABLET ORAL DAILY
Qty: 90 TABLET | Refills: 1 | Status: SHIPPED | OUTPATIENT
Start: 2023-05-22 | End: 2023-10-18

## 2023-05-22 NOTE — PROGRESS NOTES
CHIEF COMPLAINT:   Chief Complaint   Patient presents with    Annual Exam        HISTORY OF PRESENT ILLNESS:  Peace Farmer is a 78 y.o. female who presents to the clinic today for a routine physical exam. Her last physical exam was approximately 1 years(s) ago.      Objective     PAST MEDICAL HISTORY:  Past Medical History:   Diagnosis Date    Abnormal mammogram 10/13    s/p biopsy    Cortical cataract of both eyes 11/16/2018    Diabetes mellitus type II     Fibromyalgia     Hyperlipidemia     Hypertension     Obesity (BMI 30-39.9)     Osteopenia     Psoriasis     Skin cancer of face 2011    forehead       PAST SURGICAL HISTORY:  Past Surgical History:   Procedure Laterality Date    BREAST BIOPSY Left     no scars noted    CATARACT EXTRACTION W/  INTRAOCULAR LENS IMPLANT Right 6/30/2020    Procedure: EXTRACTION, CATARACT, WITH IOL INSERTION;  Surgeon: Joshua Robin MD;  Location: [facility];  Service: Ophthalmology;  Laterality: Right;    CATARACT EXTRACTION W/  INTRAOCULAR LENS IMPLANT Left 7/14/2020    Procedure: EXTRACTION, CATARACT, WITH IOL INSERTION;  Surgeon: Joshua Robin MD;  Location: [facility];  Service: Ophthalmology;  Laterality: Left;    PARTIAL HYSTERECTOMY      age 34    skin cancer face  2011       SOCIAL HISTORY:  Social History     Socioeconomic History    Marital status:     Number of children: 3    Years of education: some colle   Occupational History    Occupation: homemaker   Tobacco Use    Smoking status: Never    Smokeless tobacco: Never   Substance and Sexual Activity    Alcohol use: Yes     Comment: rare    Drug use: No    Sexual activity: Not Currently     Partners: Male     Birth control/protection: Post-menopausal   Social History Narrative    Adult Screenings Reviewed and updated  5/20/14    Mammogram( for females) October 2013 abnromal left     Pap ( for females) partial hysterectomy     Colonoscopy  2009  Reported normal.    Flu shot 2013     Td updated  today  As Tdap  5/20/14    Pneumovax  done once    Zostavax done    Eye exam 2013 due for  2014    Bone density 7/2012 due again in  2016       FAMILY HISTORY:  Family History   Problem Relation Age of Onset    Hypertension Mother     Skin cancer Mother     Cataracts Mother     Macular degeneration Mother     Cancer Mother         skin     Multiple births Mother     Heart disease Father     Skin cancer Father     Hypertension Father     Lung cancer Father     Cataracts Father     No Known Problems Brother     No Known Problems Maternal Aunt     No Known Problems Maternal Uncle     No Known Problems Paternal Aunt     No Known Problems Paternal Uncle     Stroke Maternal Grandmother     No Known Problems Maternal Grandfather     No Known Problems Paternal Grandmother     No Known Problems Paternal Grandfather     Multiple sclerosis Son     No Known Problems Other     Diabetes Mellitus Neg Hx     Breast cancer Neg Hx     Colon cancer Neg Hx     Amblyopia Neg Hx     Blindness Neg Hx     Diabetes Neg Hx     Glaucoma Neg Hx     Retinal detachment Neg Hx     Strabismus Neg Hx     Thyroid disease Neg Hx        ALLERGIES AND MEDICATIONS: updated and reviewed.  Review of patient's allergies indicates:   Allergen Reactions    Pcn [penicillins] Hives and Shortness Of Breath     Medication List with Changes/Refills   Current Medications    CALCIUM CARB/VIT D3/MINERALS (CALCIUM-VITAMIN D ORAL)    Take by mouth.    CALCIUM-VITAMIN D 500-125 MG-UNIT TABLET    Take 1 tablet by mouth 2 (two) times daily.    CETIRIZINE (ZYRTEC) 10 MG TABLET    Take 1 tablet (10 mg total) by mouth daily as needed for Allergies.    DULAGLUTIDE (TRULICITY) 1.5 MG/0.5 ML PEN INJECTOR    Inject 1.5 mg into the skin once a week.    FREESTYLE LITE METER KIT    Use meter to check glucose 2 times a day, ICD-10: E11.9    FREESTYLE LITE STRIPS STRP    One test strip use 2 times a day to check blood glucose, ICD-10: E11.9    GABAPENTIN (NEURONTIN) 100 MG CAPSULE     TAKE 4 CAPSULES NIGHTLY AS NEEDED (ITCHING/SLEEP)    GLIMEPIRIDE (AMARYL) 4 MG TABLET    Take 1 tablet (4 mg total) by mouth 2 (two) times daily with meals. (HOLD FOR BLOOD SUGAR LESS THAN 100)    LANCETS MISC    1 lancet by Misc.(Non-Drug; Combo Route) route 2 (two) times daily with meals.    LANCING DEVICE MISC    1 Device by Misc.(Non-Drug; Combo Route) route 2 (two) times daily with meals.    LEVOTHYROXINE (SYNTHROID) 50 MCG TABLET    Take 1 tablet (50 mcg total) by mouth before breakfast.    METFORMIN (GLUCOPHAGE) 1000 MG TABLET    Take 1 tablet (1,000 mg total) by mouth 2 (two) times daily.    OMEPRAZOLE (PRILOSEC) 20 MG CAPSULE    TAKE 1 CAPSULE DAILY ONLY AS NEEDED FOR HEARTBURN    TRUEPLUS LANCETS 30 GAUGE MISC    USE ONCE DAILY AS DIRECTED   Changed and/or Refilled Medications    Modified Medication Previous Medication    AMLODIPINE (NORVASC) 5 MG TABLET amLODIPine (NORVASC) 5 MG tablet       Take 1 tablet (5 mg total) by mouth once daily. Due for annual with PCP, Labs    Take 1 tablet (5 mg total) by mouth once daily. Due for annual with PCP, Labs    ATENOLOL (TENORMIN) 25 MG TABLET atenoloL (TENORMIN) 25 MG tablet       Take 1 tablet (25 mg total) by mouth once daily. Due for annual with PCP, Labs    Take 1 tablet (25 mg total) by mouth once daily. Due for annual with PCP, Labs    LOSARTAN-HYDROCHLOROTHIAZIDE 100-25 MG (HYZAAR) 100-25 MG PER TABLET losartan-hydrochlorothiazide 100-25 mg (HYZAAR) 100-25 mg per tablet       Take 1 tablet by mouth once daily. Due for annual with PCP, Labs    Take 1 tablet by mouth once daily. Due for annual with PCP, Labs    ROSUVASTATIN (CRESTOR) 5 MG TABLET rosuvastatin (CRESTOR) 5 MG tablet       Take 1 tablet (5 mg total) by mouth every evening.    TAKE 1 TABLET EVERY EVENING.          CARE TEAM:  Patient Care Team:  Rosa Garber MD as PCP - General (Internal Medicine)  Ana Warren LPN as Care Coordinator              SCREENING HISTORY:  Health  "Maintenance         Date Due Completion Date    Mammogram 05/18/2023 5/18/2021    Diabetes Urine Screening 04/25/2023 4/25/2022    Eye Exam 09/14/2023 9/14/2022    Hemoglobin A1c 11/17/2023 5/17/2023    DEXA Scan 05/02/2024 5/2/2022    Lipid Panel 05/17/2024 5/17/2023    TETANUS VACCINE 05/20/2024 5/20/2014              REVIEW OF SYSTEMS:   The patient reports : fair dietary habits.  The patient reports  : that they do not exercise regularly  Review of Systems   Constitutional:  Negative for chills and fever.        She reports feeling overall better since she was started on trulicity and thyroid medication by endocrinology.   HENT:  Negative for congestion.    Respiratory:  Negative for cough and shortness of breath.    Cardiovascular:  Negative for chest pain.   Gastrointestinal:  Negative for abdominal pain.   Genitourinary:  Negative for dysuria.    ROS (Optional)-: no pelvic pain  Breast ROS (Optional)-: negative for breast lumps/discharge          Physical Examination: 274}  Vitals:    05/22/23 1125 05/22/23 1211   BP: (!) 140/64 132/70   BP Location: Right arm Left arm   Patient Position: Sitting Sitting   BP Method: Medium (Manual) Medium (Manual)   Pulse: 63    Temp: 97.9 °F (36.6 °C)    TempSrc: Oral    SpO2: 96%    Weight: 67 kg (147 lb 11.3 oz)    Height: 5' 0.63" (1.54 m)         Body mass index is 28.25 kg/m².        General appearance - alert, well appearing, and in no distress, overweight  Psychiatric - alert, oriented to person, place, and time, normal behavior, speech, dress, motor activity and thought process  Eyes - pupils equal and reactive, extraocular eye movements intact, sclera anicteric  Mouth - not examined  Neck - supple, no significant adenopathy, carotids upstroke normal bilaterally, no bruits  Lymphatics - no palpable cervical lymphadenopathy  Chest - clear to auscultation, no wheezes, rales or rhonchi, symmetric air entry  Heart - normal rate and regular rhythm, no gallops " noted  Neurological - alert, normal speech, no focal findings; cranial nerves II through XII intact  Musculoskeletal - patient noted to have Mild-Moderate osteoarthritic changes to both knee joints. No joint effusions noted.  Extremities - no pedal edema noted  Skin - normal coloration, no suspicious skin lesions      Labs:  Lab Results   Component Value Date    HGBA1C 5.3 05/17/2023    HGBA1C 5.5 01/17/2023    HGBA1C 5.4 09/14/2022      Lab Results   Component Value Date    CHOL 116 (L) 05/17/2023    CHOL 105 (L) 04/25/2022    CHOL 106 (L) 05/04/2021     Lab Results   Component Value Date    LDLCALC 61.6 (L) 05/17/2023    LDLCALC 44.8 (L) 04/25/2022    LDLCALC 50.8 (L) 05/04/2021         ASSESSMENT AND PLAN:  274}  1. Routine medical exam  Counseled on age appropriate medical preventative services including age appropriate cancer screenings, age appropriate eye and dental exams, over all nutritional health, need for a consistent exercise regimen, and an over all push towards maintaining a vigorous and active lifestyle.  Counseled on age appropriate vaccines and discussed upcoming health care needs based on age/gender. Discussed good sleep hygiene and stress management.    2. Type 2 diabetes mellitus, without long-term current use of insulin  Lab Results   Component Value Date    HGBA1C 5.3 05/17/2023     Diabetes is under good control at this time for age and comorbid conditions.   We discussed diabetic diet and regular exercise.   We discussed home blood sugar monitoring, if appropriate - the patient should test once daily and as needed.   Continue current medication regimen. Patient is followed by endocrinology.  Diabetic complications addressed: Not applicable.  Patient was counseled on the need for yearly eye exam to screen for/monitor diabetic retinopathy and yearly diabetic foot exam.  -     Microalbumin/Creatinine Ratio, Urine; Future; Expected date: 05/22/2023    3. Benign hypertension  BP Readings from  Last 1 Encounters:   05/22/23 132/70      The current medical regimen is effective;  continue present plan and medications. Recommended patient to check home readings to monitor and see me for followup as scheduled or sooner as needed.   Discussed sodium restriction, maintaining ideal body weight and regular exercise program as physiologic means to continue to achieve blood pressure control in addition to medication compliance.  Patient was educated that both decongestant and anti-inflammatory medication may raise blood pressure.  The patient is not active on the digital hypertension program.  -     amLODIPine (NORVASC) 5 MG tablet; Take 1 tablet (5 mg total) by mouth once daily. Due for annual with PCP, Labs  Dispense: 90 tablet; Refill: 1  -     atenoloL (TENORMIN) 25 MG tablet; Take 1 tablet (25 mg total) by mouth once daily. Due for annual with PCP, Labs  Dispense: 90 tablet; Refill: 1  -     losartan-hydrochlorothiazide 100-25 mg (HYZAAR) 100-25 mg per tablet; Take 1 tablet by mouth once daily. Due for annual with PCP, Labs  Dispense: 90 tablet; Refill: 1    4. Hyperlipidemia LDL goal <100  Lab Results   Component Value Date    CHOL 116 (L) 05/17/2023     Lab Results   Component Value Date    HDL 37 (L) 05/17/2023     Lab Results   Component Value Date    LDLCALC 61.6 (L) 05/17/2023     Lab Results   Component Value Date    TRIG 87 05/17/2023     Lab Results   Component Value Date    LDLCALC 61.6 (L) 05/17/2023     We discussed low fat diet and regular exercise.The current medical regimen is effective;  continue present plan and medications.   -     rosuvastatin (CRESTOR) 5 MG tablet; Take 1 tablet (5 mg total) by mouth every evening.  Dispense: 90 tablet; Refill: 1      5. Other osteoporosis without current pathological fracture  We discussed adequate calcium and vitamin D supplementation. We discussed fall precautions. She is up to date on her BMD. Patient declines prescription medication at this time - aware  of increased fracture risk. Has some family health issues and personal dental concerns.  Overview:  1/2016 - No need for Rx tx at this time.   1/2018 - BMD recommends Rx tx; started fosamax 4/2018 7/2018 - worried about side effects of fosamax and did not start; referred to endo for other treatment options  11/2018 - saw endocrinology - will do Reclast infusion but needs to take care of dental work first  1/2020 - saw endocrinology who again recommended Reclast infusion; she has not yet completed the infusion      6. Overweight (BMI 25-29.9)  BMI Readings from Last 3 Encounters:   05/22/23 28.25 kg/m²   01/24/23 28.08 kg/m²   09/27/22 29.22 kg/m²     The patient is asked to continue to make an attempt to improve diet and exercise patterns to aid in medical management of this problem.     7. Insomnia, unspecified type  Feels like she is overall doing better with more energy when she wakes up in the morning.    8. Visual disturbance  She reports 'floaters' since cataract surgery - would like to see ophthalmologist for further evaluation.  -     Ambulatory referral/consult to Ophthalmology; Future; Expected date: 05/29/2023    9. Urinary incontinence, unspecified type/10. Incontinence of feces, unspecified fecal incontinence type  She reports persistent problems despite doing pelvic floor therapy. Would like to see if there is something structural preventing improvement in her symptoms.  -     Ambulatory referral/consult to Urogynecology; Future; Expected date: 05/29/2023      12. Encounter for breast cancer screening other than mammogram    -     Mammo Digital Screening Bilat w/ [facility]; Future; Expected date: 05/22/2023    13. Encounter for screening mammogram for malignant neoplasm of breast    -     Mammo Digital Screening Bilat w/ [facility]; Future; Expected date: 05/22/2023             Orders Placed This Encounter   Procedures    Mammo Digital Screening Bilat w/ [facility]    Microalbumin/Creatinine Ratio, Urine    Ambulatory  referral/consult to Ophthalmology    Ambulatory referral/consult to Urogynecology      Follow up in about 6 months (around 11/22/2023) for follow up chronic medical conditions. (or 1 year if she is still seeing endocrinology). or sooner as needed.

## 2023-05-23 ENCOUNTER — TELEPHONE (OUTPATIENT)
Dept: ADMINISTRATIVE | Facility: HOSPITAL | Age: 78
End: 2023-05-23
Payer: MEDICARE

## 2023-05-24 ENCOUNTER — OFFICE VISIT (OUTPATIENT)
Dept: ENDOCRINOLOGY | Facility: CLINIC | Age: 78
End: 2023-05-24
Payer: MEDICARE

## 2023-05-24 ENCOUNTER — PATIENT MESSAGE (OUTPATIENT)
Dept: PHARMACY | Facility: CLINIC | Age: 78
End: 2023-05-24
Payer: MEDICARE

## 2023-05-24 VITALS
HEART RATE: 72 BPM | DIASTOLIC BLOOD PRESSURE: 73 MMHG | BODY MASS INDEX: 28.08 KG/M2 | SYSTOLIC BLOOD PRESSURE: 124 MMHG | TEMPERATURE: 98 F | WEIGHT: 146.81 LBS

## 2023-05-24 DIAGNOSIS — E03.8 SUBCLINICAL HYPOTHYROIDISM: ICD-10-CM

## 2023-05-24 DIAGNOSIS — E11.65 TYPE 2 DIABETES MELLITUS WITH HYPERGLYCEMIA, WITHOUT LONG-TERM CURRENT USE OF INSULIN: Primary | ICD-10-CM

## 2023-05-24 DIAGNOSIS — M81.8 OTHER OSTEOPOROSIS WITHOUT CURRENT PATHOLOGICAL FRACTURE: ICD-10-CM

## 2023-05-24 DIAGNOSIS — E55.9 MILD VITAMIN D DEFICIENCY: ICD-10-CM

## 2023-05-24 PROCEDURE — 1159F MED LIST DOCD IN RCRD: CPT | Mod: CPTII,S$GLB,, | Performed by: HOSPITALIST

## 2023-05-24 PROCEDURE — 3074F PR MOST RECENT SYSTOLIC BLOOD PRESSURE < 130 MM HG: ICD-10-PCS | Mod: CPTII,S$GLB,, | Performed by: HOSPITALIST

## 2023-05-24 PROCEDURE — 1101F PR PT FALLS ASSESS DOC 0-1 FALLS W/OUT INJ PAST YR: ICD-10-PCS | Mod: CPTII,S$GLB,, | Performed by: HOSPITALIST

## 2023-05-24 PROCEDURE — 1159F PR MEDICATION LIST DOCUMENTED IN MEDICAL RECORD: ICD-10-PCS | Mod: CPTII,S$GLB,, | Performed by: HOSPITALIST

## 2023-05-24 PROCEDURE — 3072F PR LOW RISK FOR RETINOPATHY: ICD-10-PCS | Mod: CPTII,S$GLB,, | Performed by: HOSPITALIST

## 2023-05-24 PROCEDURE — 1160F RVW MEDS BY RX/DR IN RCRD: CPT | Mod: CPTII,S$GLB,, | Performed by: HOSPITALIST

## 2023-05-24 PROCEDURE — 3078F DIAST BP <80 MM HG: CPT | Mod: CPTII,S$GLB,, | Performed by: HOSPITALIST

## 2023-05-24 PROCEDURE — 3074F SYST BP LT 130 MM HG: CPT | Mod: CPTII,S$GLB,, | Performed by: HOSPITALIST

## 2023-05-24 PROCEDURE — 99214 OFFICE O/P EST MOD 30 MIN: CPT | Mod: S$GLB,,, | Performed by: HOSPITALIST

## 2023-05-24 PROCEDURE — 3288F PR FALLS RISK ASSESSMENT DOCUMENTED: ICD-10-PCS | Mod: CPTII,S$GLB,, | Performed by: HOSPITALIST

## 2023-05-24 PROCEDURE — 1101F PT FALLS ASSESS-DOCD LE1/YR: CPT | Mod: CPTII,S$GLB,, | Performed by: HOSPITALIST

## 2023-05-24 PROCEDURE — 1160F PR REVIEW ALL MEDS BY PRESCRIBER/CLIN PHARMACIST DOCUMENTED: ICD-10-PCS | Mod: CPTII,S$GLB,, | Performed by: HOSPITALIST

## 2023-05-24 PROCEDURE — 3078F PR MOST RECENT DIASTOLIC BLOOD PRESSURE < 80 MM HG: ICD-10-PCS | Mod: CPTII,S$GLB,, | Performed by: HOSPITALIST

## 2023-05-24 PROCEDURE — 3288F FALL RISK ASSESSMENT DOCD: CPT | Mod: CPTII,S$GLB,, | Performed by: HOSPITALIST

## 2023-05-24 PROCEDURE — 99214 PR OFFICE/OUTPT VISIT, EST, LEVL IV, 30-39 MIN: ICD-10-PCS | Mod: S$GLB,,, | Performed by: HOSPITALIST

## 2023-05-24 PROCEDURE — 99999 PR PBB SHADOW E&M-EST. PATIENT-LVL IV: CPT | Mod: PBBFAC,,, | Performed by: HOSPITALIST

## 2023-05-24 PROCEDURE — 99999 PR PBB SHADOW E&M-EST. PATIENT-LVL IV: ICD-10-PCS | Mod: PBBFAC,,, | Performed by: HOSPITALIST

## 2023-05-24 PROCEDURE — 3072F LOW RISK FOR RETINOPATHY: CPT | Mod: CPTII,S$GLB,, | Performed by: HOSPITALIST

## 2023-05-24 RX ORDER — SEMAGLUTIDE 0.68 MG/ML
0.5 INJECTION, SOLUTION SUBCUTANEOUS
Qty: 9 ML | Refills: 3
Start: 2023-05-24 | End: 2023-06-15

## 2023-05-24 NOTE — ASSESSMENT & PLAN NOTE
- no history of thyroid dysfunction, not on thyroid medication  - patient is quite concerned about her thyroid function given her son's history of hypothyroidism, patient with TPO negative antibody  - TSH improved, while on low-dose levothyroxine, with improvement in symptoms  - continue levothyroxine 50 mcg daily  - repeat TFTs in the future

## 2023-05-24 NOTE — PROGRESS NOTES
"Subjective:      Patient ID: Peace Farmer is a 78 y.o. female presented to [facility] on 5/24/2023.  Chief Complaint:  Diabetes    History of Present Illness: Peace Farmer is a 78 y.o. female here for diabetes  Other significant past medical history:  Osteoporosis, abnormal thyroid function, hypertension    Interval history:  Patient is here for follow-up, to discuss multiple endocrine issues.  In regards to diabetes, has been doing much better, A1c improved to 5.3%.  After dietary modification, small portion, low carbohydrates.  Tolerating Trulicity well without any issues.  Reports that Trulicity is expensive, unfortunately does not qualify for pharmacy assistant.  Requesting pharmacy assistant for alternative including Ozempic  Denies any hypoglycemia.   In regards to osteoporosis:  After discussion with her insurance, she does not want to start therapy at this time due to dental/to issue.  Ongoing stressor with her 's cancer  Patient denies any falls fractures.  Currently taking multivitamin  In regards to abnormal thyroid function:  TPO antibodies negative, patient still with fatigue, family history of hypothyroidism, trial of TRT has help "clear up brain fog". Feeling a lot better.      1) Diabetes Mellitus Type 2  - Known diabetic complications: none  - Diagnosed w/ DM: 2008?, worsening now  - On restrictive diet, small portion, low carbohydrates.  Patient unsure if she can continue long-term with this diet    Glucose log review:  - denies any hypoglycemia.  Did not bring glucose log for review     Current meds:     Trulicity 1.5 mg Q weekly injection  (expensive, inconsistently getting medication)   Metformin 1000 mg twice a day   Glimepiride 4mg twice a day   Previous meds:              Januvia  Home glucose checks: checks 2-3x a day, Logs reviewed/Unavailable: oral recall:   Hypoglycemia: denies  Diet/Exercise:               Eating multiple small meals  per day (7x)         "      Drink: diet coke  Weight trend: stable  Diabetes Education: yes, many years ago  Diabetes Related Hospitalization:  No  Hx of pancreatitis: No  Family history of diabetes: Yes. brother  Occupation: retired    Eye exam current (within one year): yes, DR: no  Reports cuts or ulcers on feet:   Denies  Statin: Taking, ACE/ARB: Taking    Diabetes lab work  Lab Results   Component Value Date    HGBA1C 5.3 05/17/2023    HGBA1C 5.5 01/17/2023    HGBA1C 5.4 09/14/2022    HGBA1C 6.1 (H) 06/30/2022     No results found for: CPEPTIDE, GLUTAMICACID, ISLETCELLANT   No results found for: FRUCTOSAMINE  Lab Results   Component Value Date    MICALBCREAT 24.1 04/25/2022     Lab Results   Component Value Date    IZDLFITI93 1581 (H) 10/12/2020     Diabetes Management Status: Reviewed this office visit  Screening or Prevention Patient's value Goal Complete/Controlled?   Lipid profile : 05/17/2023 Annually Yes     Dilated retinal exam : 09/14/2022 Annually Yes     Foot exam   Most Recent Foot Exam Date: Not Found Annually Yes          2) Osteoporosis  - Previously with osteopenia, 2020, previously managed by Dr. Rashid  - Was on Boniva for treatment of osteopenia, leading to heartburn> she does not to be on this medication  - Dr. Rashid wanted to switch patient to Reclast, for which she never started    - Recent bone density 05/2022 show osteoporosis  - She is taking Calcium and Vit D supplements. 500mg/1000iu  - She had a hysterectomy at 33 y/o and menopausal symptoms at 52 y/o.   - Holding off on treatment including Proliaa therapy due to  illnesses, she is having dif time balancing both  - No fall  - She does not want therapy  - She report dental issue, possible tooth issue      DXA 5/2022  Lumbar spine (L1-L4): BMD is 0.850 g/cm2, T-score is -1.8, and Z-score is 0.6.  Total hip:  BMD is 0.751 g/cm2, T-score is -1.6, and Z-score is 0.2.  Femoral neck: BMD is 0.555 g/cm2, T-score is -2.6, and Z-score is -0.6.     FRAX:  16% risk  of a major osteoporotic fracture in the next 10 years.  5.1% risk of hip fracture in the next 10 years.     Impression:  *Osteoporosis based on T-score below -2.5    3)  Abnormal thyroid function  - TSH elevation, 1x  - Family history thyroid disorder: son with hypothyroidism   - Denies goiter  - patient's main concern is:  Fatigue and Heat intolerance  - started on low-dose levothyroxine 25 mcg daily 6/2022  - Now on Levothyroxine 50mcg daily, doing well at this time    Lab Results   Component Value Date    TSH 1.576 05/17/2023    TSH 0.795 01/17/2023    TSH 3.157 09/14/2022    FREET4 0.97 05/17/2023    FREET4 1.12 01/17/2023    FREET4 0.94 09/14/2022    THYROPEROXID <6.0 06/30/2022     Reviewed past surgical, medical, family, social history and updated as appropriate.  Review of Systems: see HPI above    Objective:   /73 (BP Location: Right arm)   Pulse 72   Temp 98.1 °F (36.7 °C) (Oral)   Wt 66.6 kg (146 lb 12.8 oz)   BMI 28.08 kg/m²     Body mass index is 28.08 kg/m².  Vital signs reviewed    Physical Exam  Vitals and nursing note reviewed.   Constitutional:       General: She is not in acute distress.     Appearance: Normal appearance. She is well-developed. She is obese. She is not toxic-appearing.   Neck:      Thyroid: No thyromegaly.      Comments: No goiter noted  Cardiovascular:      Heart sounds: Normal heart sounds.   Pulmonary:      Effort: Pulmonary effort is normal. No respiratory distress.   Abdominal:      Tenderness: There is no abdominal tenderness.   Musculoskeletal:         General: No deformity. Normal range of motion.      Cervical back: Normal range of motion.   Skin:     Findings: No bruising.   Neurological:      Mental Status: She is alert and oriented to person, place, and time.   Psychiatric:         Mood and Affect: Mood normal.     Lab Reviewed:  See results in subjective  Lab Results   Component Value Date    HGBA1C 5.3 05/17/2023     Lab Results   Component Value Date     CHOL 116 (L) 05/17/2023    HDL 37 (L) 05/17/2023    LDLCALC 61.6 (L) 05/17/2023    TRIG 87 05/17/2023    CHOLHDL 31.9 05/17/2023     Lab Results   Component Value Date     05/17/2023    K 4.7 05/17/2023     05/17/2023    CO2 29 05/17/2023     (H) 05/17/2023    BUN 19 05/17/2023    CREATININE 0.8 05/17/2023    CALCIUM 10.5 05/17/2023    PROT 7.2 05/17/2023    ALBUMIN 4.2 05/17/2023    BILITOT 0.3 05/17/2023    ALKPHOS 59 05/17/2023    AST 17 05/17/2023    ALT 16 05/17/2023    ANIONGAP 7 (L) 05/17/2023    ESTGFRAFRICA >60.0 06/30/2022    EGFRNONAA >60.0 06/30/2022    TSH 1.576 05/17/2023    PTH 64.0 05/13/2020    [id number] 39 05/17/2023      Assessment     1. Type 2 diabetes mellitus with hyperglycemia, without long-term current use of insulin  semaglutide (OZEMPIC) 0.25 mg or 0.5 mg (2 mg/3 mL) pen injector    Ambulatory referral/consult to Pharmacy Assistance    Hemoglobin A1C    Comprehensive Metabolic Panel      2. Other osteoporosis without current pathological fracture        3. Subclinical hypothyroidism  TSH    T4, Free      4. Mild vitamin D deficiency  Vitamin D          Plan     Type 2 diabetes mellitus with hyperglycemia, without long-term current use of insulin  - Diabetes is at goal, given most current A1C, Goal A1C for patient is 7%  - Diabetic supplies/medications reviewed this visit to ensure continue refills and compliance  - Diabetes education referral: patient refused   - Advised patient to get periodic eye and feet exam.   - Reviewed routine maintenance: lipid, U:P/C   - continue encouragement of dietary modification, portion size control, decreasing carbohydrates intake    Plan  - given better glycemic control:  We will continue Trulicity  1.5 mg Q weekly injection  - patient expressed interest in switching to Ozempic 0.5 mg once a week injection, and applying for pharmacy assistant to help with cost.    - Otherwise she would continue paying for Trulicity  - continue  Metformin 1000 mg twice a day, Glimepiride 4mg twice a day (advised patient to monitor for hypoglycemia, can decrease if needed)  - patient does not want to change her regimen at this time.  Confirm with patient no hypoglycemic event  - Advised pt to check glucoses regularly  - Follow up as scheduled with lab work prior    Other osteoporosis without current pathological fracture  - longstanding history of Osteopenia with high FRAX at the hip  >> unable to tolerate oral bisphosphonate in the past  - recent bone density 2022 showed osteoporosis femoral neck   - Risk factors: race, postmenopausal status and family history   - continue vitamin-D and calcium supplement>> given within normal range vitamin-D  - mild hypercalcemia noted on blood work, we will continue to monitor  - I previously discussed IV Reclast versus Prolia, patient declined  - after patient discussed with patient, she does NOT want to start on medical therapy for her osteoporosis  - she is aware of the risk of fractures and broken bone.  - will repeat bone density in 2024 for re-evaluation.  - fall precaution discussed  - advised patient to follow up with her dentist given ongoing dental issues    Subclinical hypothyroidism  - no history of thyroid dysfunction, not on thyroid medication  - patient is quite concerned about her thyroid function given her son's history of hypothyroidism, patient with TPO negative antibody  - TSH improved, while on low-dose levothyroxine, with improvement in symptoms  - continue levothyroxine 50 mcg daily  - repeat TFTs in the future    Mild vitamin D deficiency  - lab work reviewed, on Vit D supplement  - lab work every 6 mo or yearly  - continue OTC VitD3 daily      Advised patient to follow up with PCP for routine health maintenance care.   RTC in 5-6 months    Earnest Zavaleta M.D.  Endocrinology  [facility]  5/24/2023      Disclaimer: This note has been generated in part with the  use of voice-recognition software. There may be typographical errors that have been missed during proof-reading.

## 2023-05-24 NOTE — ASSESSMENT & PLAN NOTE
- longstanding history of Osteopenia with high FRAX at the hip  >> unable to tolerate oral bisphosphonate in the past  - recent bone density 2022 showed osteoporosis femoral neck   - Risk factors: race, postmenopausal status and family history   - continue vitamin-D and calcium supplement>> given within normal range vitamin-D  - mild hypercalcemia noted on blood work, we will continue to monitor  - I previously discussed IV Reclast versus Prolia, patient declined  - after patient discussed with patient, she does NOT want to start on medical therapy for her osteoporosis  - she is aware of the risk of fractures and broken bone.  - will repeat bone density in 2024 for re-evaluation.  - fall precaution discussed  - advised patient to follow up with her dentist given ongoing dental issues

## 2023-05-24 NOTE — ASSESSMENT & PLAN NOTE
- Diabetes is at goal, given most current A1C, Goal A1C for patient is 7%  - Diabetic supplies/medications reviewed this visit to ensure continue refills and compliance  - Diabetes education referral: patient refused   - Advised patient to get periodic eye and feet exam.   - Reviewed routine maintenance: lipid, U:P/C   - continue encouragement of dietary modification, portion size control, decreasing carbohydrates intake    Plan  - given better glycemic control:  We will continue Trulicity  1.5 mg Q weekly injection  - patient expressed interest in switching to Ozempic 0.5 mg once a week injection, and applying for pharmacy assistant to help with cost.    - Otherwise she would continue paying for Trulicity  - continue Metformin 1000 mg twice a day, Glimepiride 4mg twice a day (advised patient to monitor for hypoglycemia, can decrease if needed)  - patient does not want to change her regimen at this time.  Confirm with patient no hypoglycemic event  - Advised pt to check glucoses regularly  - Follow up as scheduled with lab work prior

## 2023-05-25 NOTE — TELEPHONE ENCOUNTER
Hello,       A Patient Assistance Application for Peace Farmer - MRN  [id number] was faxed to your office @[phone]. Please have Dr. Earnest Zavaleta  review the application to ensure the prescription is correct. If correct, sign and fax the application back to the Pharmacy Patient Assistance Team @[phone].      If changes need to be made to this application, please let me know so corrections can be made, and the application will be faxed back to you for approval and signature.

## 2023-05-26 NOTE — TELEPHONE ENCOUNTER
The signed and completed patient assistance application was received from the providers office and  has been submitted to [employer](Ozempic) for consideration of eligibility.

## 2023-05-30 ENCOUNTER — PES CALL (OUTPATIENT)
Dept: ADMINISTRATIVE | Facility: CLINIC | Age: 78
End: 2023-05-30
Payer: MEDICARE

## 2023-06-14 NOTE — TELEPHONE ENCOUNTER
I have informed Peace Farmer she has been approved in the [employer] Patient Assistance Program through 11/30/23. A 120 day supply of Ozempic will be shipped to [facility] in 10-14 business days. Patient has 3 refills remaining and enrolled in auto-refills.  Updated proof of eligibility is required to re-enroll for 2024 calendar year.

## 2023-06-15 ENCOUNTER — TELEPHONE (OUTPATIENT)
Dept: ENDOCRINOLOGY | Facility: CLINIC | Age: 78
End: 2023-06-15
Payer: MEDICARE

## 2023-06-15 DIAGNOSIS — E11.65 TYPE 2 DIABETES MELLITUS WITH HYPERGLYCEMIA, WITHOUT LONG-TERM CURRENT USE OF INSULIN: ICD-10-CM

## 2023-06-15 RX ORDER — SEMAGLUTIDE 0.68 MG/ML
0.5 INJECTION, SOLUTION SUBCUTANEOUS
Qty: 15 ML | Refills: 3 | Status: SHIPPED | OUTPATIENT
Start: 2023-06-15

## 2023-06-15 NOTE — TELEPHONE ENCOUNTER
----- Message from Joe Stephenson sent at 6/15/2023  2:05 PM CDT -----  Regarding: Ozempic 0.25mg/0.5mg Patient Assistance Program  [facility] has received medication from [facility] patient assistance program for your patient Peace Farmer ([id number]).  At your earliest convenience please send to [facility] escript for.  1) Ozempic 0.25mg/0.5mg (qty 5 Pens)   Thanks

## 2023-06-17 DIAGNOSIS — E11.65 TYPE 2 DIABETES MELLITUS WITH HYPERGLYCEMIA, WITHOUT LONG-TERM CURRENT USE OF INSULIN: ICD-10-CM

## 2023-06-19 RX ORDER — GLIMEPIRIDE 4 MG/1
TABLET ORAL
Qty: 180 TABLET | Refills: 1 | Status: SHIPPED | OUTPATIENT
Start: 2023-06-19 | End: 2023-11-01 | Stop reason: SDUPTHER

## 2023-06-19 RX ORDER — CALCIUM CITRATE/VITAMIN D3 200MG-6.25
TABLET ORAL
Qty: 200 STRIP | Refills: 3 | Status: SHIPPED | OUTPATIENT
Start: 2023-06-19

## 2023-06-22 ENCOUNTER — TELEPHONE (OUTPATIENT)
Dept: FAMILY MEDICINE | Facility: CLINIC | Age: 78
End: 2023-06-22
Payer: MEDICARE

## 2023-07-07 DIAGNOSIS — E03.8 SUBCLINICAL HYPOTHYROIDISM: ICD-10-CM

## 2023-07-07 RX ORDER — LEVOTHYROXINE SODIUM 50 UG/1
50 TABLET ORAL
Qty: 90 TABLET | Refills: 3 | Status: SHIPPED | OUTPATIENT
Start: 2023-07-07 | End: 2024-01-25

## 2023-07-11 ENCOUNTER — HOSPITAL ENCOUNTER (OUTPATIENT)
Dept: RADIOLOGY | Facility: HOSPITAL | Age: 78
Discharge: HOME | End: 2023-07-11
Attending: INTERNAL MEDICINE
Payer: MEDICARE

## 2023-07-11 DIAGNOSIS — Z12.39 ENCOUNTER FOR BREAST CANCER SCREENING OTHER THAN MAMMOGRAM: ICD-10-CM

## 2023-07-11 DIAGNOSIS — Z12.31 ENCOUNTER FOR SCREENING MAMMOGRAM FOR MALIGNANT NEOPLASM OF BREAST: ICD-10-CM

## 2023-07-11 PROCEDURE — 77067 MAMMO DIGITAL SCREENING BILAT WITH TOMO: ICD-10-PCS | Mod: 26,HCNC,, | Performed by: RADIOLOGY

## 2023-07-11 PROCEDURE — 77063 BREAST TOMOSYNTHESIS BI: CPT | Mod: 26,HCNC,, | Performed by: RADIOLOGY

## 2023-07-11 PROCEDURE — 77063 MAMMO DIGITAL SCREENING BILAT WITH TOMO: ICD-10-PCS | Mod: 26,HCNC,, | Performed by: RADIOLOGY

## 2023-07-11 PROCEDURE — 77067 SCR MAMMO BI INCL CAD: CPT | Mod: TC,HCNC,PO

## 2023-07-11 PROCEDURE — 77067 SCR MAMMO BI INCL CAD: CPT | Mod: 26,HCNC,, | Performed by: RADIOLOGY

## 2023-09-02 DIAGNOSIS — E11.65 TYPE 2 DIABETES MELLITUS WITH HYPERGLYCEMIA, WITHOUT LONG-TERM CURRENT USE OF INSULIN: ICD-10-CM

## 2023-09-05 RX ORDER — METFORMIN HYDROCHLORIDE 1000 MG/1
1000 TABLET ORAL 2 TIMES DAILY
Qty: 180 TABLET | Refills: 3 | Status: SHIPPED | OUTPATIENT
Start: 2023-09-05 | End: 2023-11-01 | Stop reason: SDUPTHER

## 2023-10-03 ENCOUNTER — PATIENT MESSAGE (OUTPATIENT)
Dept: FAMILY MEDICINE | Facility: CLINIC | Age: 78
End: 2023-10-03
Payer: MEDICARE

## 2023-10-25 ENCOUNTER — LAB VISIT (OUTPATIENT)
Dept: LAB | Facility: HOSPITAL | Age: 78
End: 2023-10-25
Attending: HOSPITALIST
Payer: MEDICARE

## 2023-10-25 DIAGNOSIS — E55.9 MILD VITAMIN D DEFICIENCY: ICD-10-CM

## 2023-10-25 DIAGNOSIS — E11.65 TYPE 2 DIABETES MELLITUS WITH HYPERGLYCEMIA, WITHOUT LONG-TERM CURRENT USE OF INSULIN: ICD-10-CM

## 2023-10-25 DIAGNOSIS — E03.8 SUBCLINICAL HYPOTHYROIDISM: ICD-10-CM

## 2023-10-25 LAB
25(OH)D3+25(OH)D2 SERPL-MCNC: 34 NG/ML (ref 30–96)
ALBUMIN SERPL BCP-MCNC: 4.1 G/DL (ref 3.5–5.2)
ALP SERPL-CCNC: 57 U/L (ref 55–135)
ALT SERPL W/O P-5'-P-CCNC: 19 U/L (ref 10–44)
ANION GAP SERPL CALC-SCNC: 7 MMOL/L (ref 8–16)
AST SERPL-CCNC: 16 U/L (ref 10–40)
BILIRUB SERPL-MCNC: 0.3 MG/DL (ref 0.1–1)
BUN SERPL-MCNC: 15 MG/DL (ref 8–23)
CALCIUM SERPL-MCNC: 9.8 MG/DL (ref 8.7–10.5)
CHLORIDE SERPL-SCNC: 104 MMOL/L (ref 95–110)
CO2 SERPL-SCNC: 27 MMOL/L (ref 23–29)
CREAT SERPL-MCNC: 0.7 MG/DL (ref 0.5–1.4)
EST. GFR  (NO RACE VARIABLE): >60 ML/MIN/1.73 M^2
ESTIMATED AVG GLUCOSE: 105 MG/DL (ref 68–131)
GLUCOSE SERPL-MCNC: 121 MG/DL (ref 70–110)
HBA1C MFR BLD: 5.3 % (ref 4–5.6)
POTASSIUM SERPL-SCNC: 4 MMOL/L (ref 3.5–5.1)
PROT SERPL-MCNC: 6.9 G/DL (ref 6–8.4)
SODIUM SERPL-SCNC: 138 MMOL/L (ref 136–145)
T4 FREE SERPL-MCNC: 1.12 NG/DL (ref 0.71–1.51)
TSH SERPL DL<=0.005 MIU/L-ACNC: 0.58 UIU/ML (ref 0.4–4)

## 2023-10-25 PROCEDURE — 36415 COLL VENOUS BLD VENIPUNCTURE: CPT | Performed by: HOSPITALIST

## 2023-10-25 PROCEDURE — 84439 ASSAY OF FREE THYROXINE: CPT | Performed by: HOSPITALIST

## 2023-10-25 PROCEDURE — 84443 ASSAY THYROID STIM HORMONE: CPT | Performed by: HOSPITALIST

## 2023-10-25 PROCEDURE — 82306 VITAMIN D 25 HYDROXY: CPT | Performed by: HOSPITALIST

## 2023-10-25 PROCEDURE — 80053 COMPREHEN METABOLIC PANEL: CPT | Performed by: HOSPITALIST

## 2023-10-25 PROCEDURE — 83036 HEMOGLOBIN GLYCOSYLATED A1C: CPT | Performed by: HOSPITALIST

## 2023-11-01 ENCOUNTER — OFFICE VISIT (OUTPATIENT)
Dept: ENDOCRINOLOGY | Facility: CLINIC | Age: 78
End: 2023-11-01
Payer: MEDICARE

## 2023-11-01 VITALS
DIASTOLIC BLOOD PRESSURE: 69 MMHG | TEMPERATURE: 98 F | BODY MASS INDEX: 27.35 KG/M2 | HEART RATE: 77 BPM | SYSTOLIC BLOOD PRESSURE: 132 MMHG | WEIGHT: 143 LBS

## 2023-11-01 DIAGNOSIS — E66.3 OVERWEIGHT (BMI 25.0-29.9): ICD-10-CM

## 2023-11-01 DIAGNOSIS — E03.8 SUBCLINICAL HYPOTHYROIDISM: ICD-10-CM

## 2023-11-01 DIAGNOSIS — E11.65 TYPE 2 DIABETES MELLITUS WITH HYPERGLYCEMIA, WITHOUT LONG-TERM CURRENT USE OF INSULIN: Primary | ICD-10-CM

## 2023-11-01 DIAGNOSIS — M81.8 OTHER OSTEOPOROSIS WITHOUT CURRENT PATHOLOGICAL FRACTURE: ICD-10-CM

## 2023-11-01 DIAGNOSIS — I10 BENIGN HYPERTENSION: ICD-10-CM

## 2023-11-01 PROCEDURE — 99214 OFFICE O/P EST MOD 30 MIN: CPT | Mod: S$GLB,,, | Performed by: HOSPITALIST

## 2023-11-01 PROCEDURE — 99999 PR PBB SHADOW E&M-EST. PATIENT-LVL IV: CPT | Mod: PBBFAC,,, | Performed by: HOSPITALIST

## 2023-11-01 PROCEDURE — 99999 PR PBB SHADOW E&M-EST. PATIENT-LVL IV: ICD-10-PCS | Mod: PBBFAC,,, | Performed by: HOSPITALIST

## 2023-11-01 PROCEDURE — 99214 PR OFFICE/OUTPT VISIT, EST, LEVL IV, 30-39 MIN: ICD-10-PCS | Mod: S$GLB,,, | Performed by: HOSPITALIST

## 2023-11-01 PROCEDURE — 3288F PR FALLS RISK ASSESSMENT DOCUMENTED: ICD-10-PCS | Mod: CPTII,S$GLB,, | Performed by: HOSPITALIST

## 2023-11-01 PROCEDURE — 3075F SYST BP GE 130 - 139MM HG: CPT | Mod: CPTII,S$GLB,, | Performed by: HOSPITALIST

## 2023-11-01 PROCEDURE — 3078F PR MOST RECENT DIASTOLIC BLOOD PRESSURE < 80 MM HG: ICD-10-PCS | Mod: CPTII,S$GLB,, | Performed by: HOSPITALIST

## 2023-11-01 PROCEDURE — 3072F PR LOW RISK FOR RETINOPATHY: ICD-10-PCS | Mod: CPTII,S$GLB,, | Performed by: HOSPITALIST

## 2023-11-01 PROCEDURE — 3288F FALL RISK ASSESSMENT DOCD: CPT | Mod: CPTII,S$GLB,, | Performed by: HOSPITALIST

## 2023-11-01 PROCEDURE — 1159F MED LIST DOCD IN RCRD: CPT | Mod: CPTII,S$GLB,, | Performed by: HOSPITALIST

## 2023-11-01 PROCEDURE — 1159F PR MEDICATION LIST DOCUMENTED IN MEDICAL RECORD: ICD-10-PCS | Mod: CPTII,S$GLB,, | Performed by: HOSPITALIST

## 2023-11-01 PROCEDURE — 3072F LOW RISK FOR RETINOPATHY: CPT | Mod: CPTII,S$GLB,, | Performed by: HOSPITALIST

## 2023-11-01 PROCEDURE — 1101F PR PT FALLS ASSESS DOC 0-1 FALLS W/OUT INJ PAST YR: ICD-10-PCS | Mod: CPTII,S$GLB,, | Performed by: HOSPITALIST

## 2023-11-01 PROCEDURE — 1101F PT FALLS ASSESS-DOCD LE1/YR: CPT | Mod: CPTII,S$GLB,, | Performed by: HOSPITALIST

## 2023-11-01 PROCEDURE — 3078F DIAST BP <80 MM HG: CPT | Mod: CPTII,S$GLB,, | Performed by: HOSPITALIST

## 2023-11-01 PROCEDURE — 3075F PR MOST RECENT SYSTOLIC BLOOD PRESS GE 130-139MM HG: ICD-10-PCS | Mod: CPTII,S$GLB,, | Performed by: HOSPITALIST

## 2023-11-01 RX ORDER — GLIMEPIRIDE 4 MG/1
TABLET ORAL
Qty: 180 TABLET | Refills: 1 | Status: SHIPPED | OUTPATIENT
Start: 2023-11-01

## 2023-11-01 RX ORDER — METFORMIN HYDROCHLORIDE 1000 MG/1
1000 TABLET ORAL 2 TIMES DAILY
Qty: 180 TABLET | Refills: 3 | Status: SHIPPED | OUTPATIENT
Start: 2023-11-01

## 2023-11-01 NOTE — ASSESSMENT & PLAN NOTE
- Body mass index is 27.35 kg/m².  - dietary discussion as above  - continue to monitor weight>> weight loss noted

## 2023-11-01 NOTE — PATIENT INSTRUCTIONS
Call [facility] [phone] or  Call [facility] pharmacy assistance with Sheryl Theodore, [phone]  For Ozempic      Regiment:   __ Metformin 1000 mg twice a day  __ Glimepiride 4mg twice a day (OK TO DECREASE)  __ Ozempic 0.5 mg once a week injection      Goal for your blood sugar   In the morning, before breakfast: 100-140  Before going to bed: 100-140  Do not go to bed with glucose less than 100, have a small snack if lower than 100    Please check glucose before breakfast and at bedtime.   You can keep track of the glucose with Glucose logs or any papers  Please filled out and bring back to next office visit for me to review  Document any (LOW BLOOD GLUCOSE) hypoglycemia  episode with date and time for me to review    Please make sure to get your dilated eyes examine once a year with your eye doctor.  Monitor your feet for any cuts or skin breakdown regularly, schedule a visit with your foot doctor/podiatrist yearly if you see one.      Contact information:    Earnest Zavaleta M.D  [facility] [address] [facility] [address], [address]  [place] [address]    Office:  [phone]  Fax:  [phone]     ----------------------------------------------------------

## 2023-11-01 NOTE — PROGRESS NOTES
"Subjective:      Patient ID: Peace Farmer is a 78 y.o. female presented to [facility] on 11/1/2023.  Chief Complaint:  Diabetes    History of Present Illness: Peace Farmer is a 78 y.o. female here for diabetes  Other significant past medical history:  Osteoporosis, abnormal thyroid function, hypertension      Interval history:  Patient is here for follow-up, to discuss multiple endocrine issues.  In regards to diabetes, has been doing much better, A1c improved to 5.3%.  After dietary modification, small portion, low carbohydrates.  Tolerating Ozempic well without any issues.  Getting medication through PAP  Patient reports constipation on Ozempic but tolerable  Denies any hypoglycemia.   Current weight:  143, previous office visit weight: 146    In regards to osteoporosis:  After discussion with her insurance, she does not want to start therapy at this time due to dental/to issue.  Ongoing stressor with her 's cancer  Patient denies any falls fractures.  Currently taking multivitamin  In regards to abnormal thyroid function:  TPO antibodies negative, patient still with fatigue, family history of hypothyroidism, trial of TRT has help "clear up brain fog". Feeling a lot better.      1) Diabetes Mellitus Type 2  - Known diabetic complications: none  - Diagnosed w/ DM: 2008?, worsening now  - On restrictive diet, small portion, low carbohydrates.  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  - switch to Ozempic, getting medication from PAP  - prefers to continue metformin and glimepiride.    Glucose log review:  - denies any hypoglycemia.  Did not bring glucose log for review     Current meds:    Ozempic 0.5 mg once a week injection   Metformin 1000 mg twice a day   Glimepiride 4mg twice a day  (some time taking once a day)   Previous " "meds:              Russuvia   Trulicity 1.5 mg Q weekly injection  (expensive, inconsistently getting medication)  Home glucose checks: checks 2-3x a day, Logs reviewed/Unavailable: oral recall:   Hypoglycemia: denies  Diet/Exercise:               Eating multiple small meals  per day (7x)              Drink: diet coke  Weight trend: stable  Diabetes Education: yes, many years ago  Diabetes Related Hospitalization:  No  Hx of pancreatitis: No  Family history of diabetes: Yes. brother  Occupation: retired    Eye exam current (within one year): yes, DR: no  Reports cuts or ulcers on feet:   Denies  Statin: Taking, ACE/ARB: Taking    Diabetes lab work  Lab Results   Component Value Date    HGBA1C 5.3 10/25/2023    HGBA1C 5.3 05/17/2023    HGBA1C 5.5 01/17/2023    HGBA1C 5.4 09/14/2022     No results found for: "CPEPTIDE", "GLUTAMICACID", "ISLETCELLANT"   No results found for: "FRUCTOSAMINE"  Lab Results   Component Value Date    MICALBCREAT 24.1 04/25/2022     Lab Results   Component Value Date    OGMAJHBN49 1581 (H) 10/12/2020     Diabetes Management Status: Reviewed this office visit  Screening or Prevention Patient's value Goal Complete/Controlled?   Lipid profile : 05/17/2023 Annually Yes     Dilated retinal exam : 09/14/2022 Annually Yes     Foot exam   Most Recent Foot Exam Date: Not Found Annually Yes          2) Osteoporosis  - Previously with osteopenia, 2020, previously managed by Dr. Rashid  - Was on Boniva for treatment of osteopenia, leading to heartburn> she does not to be on this medication  - Dr. Rashid wanted to switch patient to Reclast, for which she never started    - Recent bone density 05/2022 show osteoporosis  - She is taking Calcium and Vit D supplements. 500mg/1000iu  - She had a hysterectomy at 33 y/o and menopausal symptoms at 52 y/o.   - Holding off on treatment including Prolia therapy due to  illnesses, she is having dif time balancing both  - No fall  - She does not want therapy  - She " report dental issue, possible tooth issue  - Denies any fall    DXA 5/2022  Lumbar spine (L1-L4): BMD is 0.850 g/cm2, T-score is -1.8, and Z-score is 0.6.  Total hip:  BMD is 0.751 g/cm2, T-score is -1.6, and Z-score is 0.2.  Femoral neck: BMD is 0.555 g/cm2, T-score is -2.6, and Z-score is -0.6.     FRAX:  16% risk of a major osteoporotic fracture in the next 10 years.  5.1% risk of hip fracture in the next 10 years.     Impression:  *Osteoporosis based on T-score below -2.5    3)  Abnormal thyroid function  - TSH elevation, 1x  - Family history thyroid disorder: son with hypothyroidism   - Denies goiter  - patient's main concern is:  Fatigue and Heat intolerance  - started on low-dose levothyroxine 25 mcg daily 6/2022  - Now on Levothyroxine 50mcg daily, doing well at this time    Lab Results   Component Value Date    TSH 0.581 10/25/2023    TSH 1.576 05/17/2023    TSH 0.795 01/17/2023    FREET4 1.12 10/25/2023    FREET4 0.97 05/17/2023    FREET4 1.12 01/17/2023    THYROPEROXID <6.0 06/30/2022     Reviewed past surgical, medical, family, social history and updated as appropriate.  Review of Systems: see HPI above    Objective:   /69   Pulse 77   Temp 98 °F (36.7 °C) (Oral)   Wt 64.9 kg (143 lb)   BMI 27.35 kg/m²     Body mass index is 27.35 kg/m².  Vital signs reviewed    Physical Exam  Vitals and nursing note reviewed.   Constitutional:       General: She is not in acute distress.     Appearance: Normal appearance. She is well-developed. She is obese. She is not toxic-appearing.   Neck:      Thyroid: No thyromegaly.      Comments: No goiter noted  Cardiovascular:      Heart sounds: Normal heart sounds.   Pulmonary:      Effort: Pulmonary effort is normal. No respiratory distress.   Abdominal:      Tenderness: There is no abdominal tenderness.   Musculoskeletal:         General: No deformity. Normal range of motion.      Cervical back: Normal range of motion.   Skin:     Findings: No bruising.    Neurological:      Mental Status: She is alert and oriented to person, place, and time.   Psychiatric:         Mood and Affect: Mood normal.       Lab Reviewed:  See results in subjective  Lab Results   Component Value Date    HGBA1C 5.3 10/25/2023     Lab Results   Component Value Date    CHOL 116 (L) 05/17/2023    HDL 37 (L) 05/17/2023    LDLCALC 61.6 (L) 05/17/2023    TRIG 87 05/17/2023    CHOLHDL 31.9 05/17/2023     Lab Results   Component Value Date     10/25/2023    K 4.0 10/25/2023     10/25/2023    CO2 27 10/25/2023     (H) 10/25/2023    BUN 15 10/25/2023    CREATININE 0.7 10/25/2023    CALCIUM 9.8 10/25/2023    PROT 6.9 10/25/2023    ALBUMIN 4.1 10/25/2023    BILITOT 0.3 10/25/2023    ALKPHOS 57 10/25/2023    AST 16 10/25/2023    ALT 19 10/25/2023    ANIONGAP 7 (L) 10/25/2023    ESTGFRAFRICA >60.0 06/30/2022    EGFRNONAA >60.0 06/30/2022    TSH 0.581 10/25/2023    PTH 64.0 05/13/2020    [id number] 34 10/25/2023      Assessment     1. Type 2 diabetes mellitus with hyperglycemia, without long-term current use of insulin  metFORMIN (GLUCOPHAGE) 1000 MG tablet    glimepiride (AMARYL) 4 MG tablet    Renal Function Panel    Hemoglobin A1C    Microalbumin/Creatinine Ratio, Urine    Ambulatory referral/consult to Podiatry      2. Subclinical hypothyroidism        3. Other osteoporosis without current pathological fracture        4. Benign hypertension        5. Overweight (BMI 25.0-29.9)          Plan     Type 2 diabetes mellitus with hyperglycemia, without long-term current use of insulin  - Diabetes is at goal, given most current A1C, Goal A1C for patient is 7%  - Diabetic supplies/medications reviewed this visit to ensure continue refills and compliance  - Diabetes education referral: patient refused   - Advised patient to get periodic eye and feet exam.   - Reviewed routine maintenance: lipid, U:P/C   - continue encouragement of dietary modification, portion size control, decreasing  carbohydrates intake  - Trulicity too expensive, currently on Ozempic, getting it through PAP    Plan  - given better glycemic control:  Continue Ozempic 0.5 mg once a week injection, and applying for pharmacy assistant to help with cost.    - continue Metformin 1000 mg twice a day, Glimepiride 4mg twice a day (advised patient to monitor for hypoglycemia, patient prefer to continue)  - patient does not want to change her regimen at this time.  Confirm with patient no hypoglycemic event  - Advised pt to check glucoses regularly  - Follow up as scheduled with lab work prior    Subclinical hypothyroidism  - no history of thyroid dysfunction, not on thyroid medication  - patient is quite concerned about her thyroid function given her son's history of hypothyroidism, patient with TPO negative antibody  - TSH improved, while on low-dose levothyroxine, with improvement in symptoms  - continue levothyroxine 50 mcg daily  - repeat TFTs in the future    Other osteoporosis without current pathological fracture  - longstanding history of Osteopenia with high FRAX at the hip  >> unable to tolerate oral bisphosphonate in the past  - recent bone density 2022 showed osteoporosis femoral neck   - Risk factors: race, postmenopausal status and family history   - continue vitamin-D and calcium supplement>> given within normal range vitamin-D  - mild hypercalcemia noted on blood work, we will continue to monitor  - I previously discussed IV Reclast versus Prolia, patient declined  - after patient discussed with patient, she does NOT want to start on medical therapy for her osteoporosis  - she is aware of the risk of fractures and broken bone.  - will repeat bone density in 2024 for re-evaluation.  - fall precaution discussed  - advised patient to follow up with her dentist given ongoing dental issues    Benign hypertension  - BP reviewed today  - continue home medications  - manage by PCP  - on HCTZ can lead to mild  hypercalcemia, continue monitor    Overweight (BMI 25.0-29.9)  - Body mass index is 27.35 kg/m².  - dietary discussion as above  - continue to monitor weight>> weight loss noted    Advised patient to follow up with PCP for routine health maintenance care.   RTC in 5-6 months    Earnest Zavaleta M.D.  Endocrinology  [facility]  11/1/2023      Disclaimer: This note has been generated in part with the use of voice-recognition software. There may be typographical errors that have been missed during proof-reading.

## 2023-11-01 NOTE — ASSESSMENT & PLAN NOTE
- Diabetes is at goal, given most current A1C, Goal A1C for patient is 7%  - Diabetic supplies/medications reviewed this visit to ensure continue refills and compliance  - Diabetes education referral: patient refused   - Advised patient to get periodic eye and feet exam.   - Reviewed routine maintenance: lipid, U:P/C   - continue encouragement of dietary modification, portion size control, decreasing carbohydrates intake  - Trulicity too expensive, currently on Ozempic, getting it through PAP    Plan  - given better glycemic control:  Continue Ozempic 0.5 mg once a week injection, and applying for pharmacy assistant to help with cost.    - continue Metformin 1000 mg twice a day, Glimepiride 4mg twice a day (advised patient to monitor for hypoglycemia, patient prefer to continue)  - patient does not want to change her regimen at this time.  Confirm with patient no hypoglycemic event  - Advised pt to check glucoses regularly  - Follow up as scheduled with lab work prior

## 2023-11-07 ENCOUNTER — OFFICE VISIT (OUTPATIENT)
Dept: PODIATRY | Facility: CLINIC | Age: 78
End: 2023-11-07
Payer: MEDICARE

## 2023-11-07 VITALS
WEIGHT: 143.06 LBS | HEIGHT: 61 IN | HEART RATE: 72 BPM | BODY MASS INDEX: 27.01 KG/M2 | DIASTOLIC BLOOD PRESSURE: 74 MMHG | SYSTOLIC BLOOD PRESSURE: 125 MMHG

## 2023-11-07 DIAGNOSIS — M20.40 HAMMER TOE, UNSPECIFIED LATERALITY: ICD-10-CM

## 2023-11-07 DIAGNOSIS — E11.65 TYPE 2 DIABETES MELLITUS WITH HYPERGLYCEMIA, WITHOUT LONG-TERM CURRENT USE OF INSULIN: Primary | ICD-10-CM

## 2023-11-07 PROCEDURE — 1101F PR PT FALLS ASSESS DOC 0-1 FALLS W/OUT INJ PAST YR: ICD-10-PCS | Mod: CPTII,S$GLB,, | Performed by: PODIATRIST

## 2023-11-07 PROCEDURE — 3078F DIAST BP <80 MM HG: CPT | Mod: CPTII,S$GLB,, | Performed by: PODIATRIST

## 2023-11-07 PROCEDURE — 3072F LOW RISK FOR RETINOPATHY: CPT | Mod: CPTII,S$GLB,, | Performed by: PODIATRIST

## 2023-11-07 PROCEDURE — 1159F MED LIST DOCD IN RCRD: CPT | Mod: CPTII,S$GLB,, | Performed by: PODIATRIST

## 2023-11-07 PROCEDURE — 1101F PT FALLS ASSESS-DOCD LE1/YR: CPT | Mod: CPTII,S$GLB,, | Performed by: PODIATRIST

## 2023-11-07 PROCEDURE — 3072F PR LOW RISK FOR RETINOPATHY: ICD-10-PCS | Mod: CPTII,S$GLB,, | Performed by: PODIATRIST

## 2023-11-07 PROCEDURE — 3074F SYST BP LT 130 MM HG: CPT | Mod: CPTII,S$GLB,, | Performed by: PODIATRIST

## 2023-11-07 PROCEDURE — 3074F PR MOST RECENT SYSTOLIC BLOOD PRESSURE < 130 MM HG: ICD-10-PCS | Mod: CPTII,S$GLB,, | Performed by: PODIATRIST

## 2023-11-07 PROCEDURE — 3288F FALL RISK ASSESSMENT DOCD: CPT | Mod: CPTII,S$GLB,, | Performed by: PODIATRIST

## 2023-11-07 PROCEDURE — 99999 PR PBB SHADOW E&M-EST. PATIENT-LVL IV: CPT | Mod: PBBFAC,,, | Performed by: PODIATRIST

## 2023-11-07 PROCEDURE — 1126F AMNT PAIN NOTED NONE PRSNT: CPT | Mod: CPTII,S$GLB,, | Performed by: PODIATRIST

## 2023-11-07 PROCEDURE — 3078F PR MOST RECENT DIASTOLIC BLOOD PRESSURE < 80 MM HG: ICD-10-PCS | Mod: CPTII,S$GLB,, | Performed by: PODIATRIST

## 2023-11-07 PROCEDURE — 1126F PR PAIN SEVERITY QUANTIFIED, NO PAIN PRESENT: ICD-10-PCS | Mod: CPTII,S$GLB,, | Performed by: PODIATRIST

## 2023-11-07 PROCEDURE — 99213 PR OFFICE/OUTPT VISIT, EST, LEVL III, 20-29 MIN: ICD-10-PCS | Mod: S$GLB,,, | Performed by: PODIATRIST

## 2023-11-07 PROCEDURE — 1159F PR MEDICATION LIST DOCUMENTED IN MEDICAL RECORD: ICD-10-PCS | Mod: CPTII,S$GLB,, | Performed by: PODIATRIST

## 2023-11-07 PROCEDURE — 99213 OFFICE O/P EST LOW 20 MIN: CPT | Mod: S$GLB,,, | Performed by: PODIATRIST

## 2023-11-07 PROCEDURE — 99999 PR PBB SHADOW E&M-EST. PATIENT-LVL IV: ICD-10-PCS | Mod: PBBFAC,,, | Performed by: PODIATRIST

## 2023-11-07 PROCEDURE — 3288F PR FALLS RISK ASSESSMENT DOCUMENTED: ICD-10-PCS | Mod: CPTII,S$GLB,, | Performed by: PODIATRIST

## 2023-11-07 NOTE — PROGRESS NOTES
Subjective:      Patient ID: Peace Farmer is a 78 y.o. female.    Chief Complaint: Diabetes Mellitus (Dr Garber PCP 11/4/20), Diabetic Foot Exam    Peace is a 78 y.o. female who presents to the clinic upon referral from Dr. Zavaleta  for evaluation and treatment of diabetic feet. Peace has a past medical history of Abnormal mammogram (10/13), Cortical cataract of both eyes (11/16/2018), Diabetes mellitus type II, Fibromyalgia, Hyperlipidemia, Hypertension, Obesity (BMI 30-39.9), Osteopenia, Psoriasis, and Skin cancer of face (2011). Presents for diabetic foot risk assessment. Requesting new rx for DM shoes.       PCP: Rosa Garber MD    Date Last Seen by PCP: per above     Current shoe gear: Tennis shoes    Hemoglobin A1C   Date Value Ref Range Status   10/25/2023 5.3 4.0 - 5.6 % Final     Comment:     ADA Screening Guidelines:  5.7-6.4%  Consistent with prediabetes  >or=6.5%  Consistent with diabetes    High levels of fetal hemoglobin interfere with the HbA1C  assay. Heterozygous hemoglobin variants (HbS, HgC, etc)do  not significantly interfere with this assay.   However, presence of multiple variants may affect accuracy.     05/17/2023 5.3 4.0 - 5.6 % Final     Comment:     ADA Screening Guidelines:  5.7-6.4%  Consistent with prediabetes  >or=6.5%  Consistent with diabetes    High levels of fetal hemoglobin interfere with the HbA1C  assay. Heterozygous hemoglobin variants (HbS, HgC, etc)do  not significantly interfere with this assay.   However, presence of multiple variants may affect accuracy.     01/17/2023 5.5 4.0 - 5.6 % Final     Comment:     ADA Screening Guidelines:  5.7-6.4%  Consistent with prediabetes  >or=6.5%  Consistent with diabetes    High levels of fetal hemoglobin interfere with the HbA1C  assay. Heterozygous hemoglobin variants (HbS, HgC, etc)do  not significantly interfere with this assay.   However, presence of multiple variants may affect accuracy.             Review of Systems    Constitutional: Negative for chills.   Cardiovascular:  Negative for chest pain and claudication.   Respiratory:  Negative for cough.    Skin:  Positive for color change, dry skin and nail changes.   Musculoskeletal:  Positive for joint pain.   Gastrointestinal:  Negative for nausea.   Neurological:  Positive for paresthesias. Negative for numbness.   Psychiatric/Behavioral:  The patient is not nervous/anxious.            Objective:      Physical Exam  Constitutional:       Appearance: She is well-developed.      Comments: Oriented to time, place, and person.   Cardiovascular:      Comments: DP and PT pulses are palpable bilaterally. 3 sec capillary refill time and toes and feet are warm to touch proximally .  There is  hair growth on the feet and toes b/l. There is no edema b/l. No spider veins or varicosities present b/l.     Musculoskeletal:      Comments: Equinus noted b/l ankles with < 10 deg DF noted. MMT 5/5 in DF/PF/Inv/Ev resistance with no reproduction of pain in any direction. Passive range of motion of ankle and pedal joints is painless b/l.    Decreased stride, station of gait.  apropulsive toe off.  Increased angle and base of gait.      Patient has hammertoes of digits 2-5 bilateral partially reducible without symptom today.     Visible and palpable bunion without pain at dorsomedial 1st metatarsal head right and left.  Hallux abducted right and left partially reducible, tracks laterally without being track bound.  No ecchymosis, erythema, edema, or cardinal signs infection or signs of trauma same foot.       Feet:      Right foot:      Skin integrity: No callus or dry skin.      Left foot:      Skin integrity: No callus or dry skin.   Lymphadenopathy:      Comments: Negative lymphadenopathy bilateral popliteal fossa and tarsal tunnel.   Skin:     Comments: No open lesions, lacerations or wounds noted.Interdigital spaces clean, dry and intact b/l. No erythema noted to b/l foot.  Nails normal color  and trophic qualities.     Neurological:      Mental Status: She is alert.      Comments: Light touch, proprioception, and sharp/dull sensation are all intact bilaterally. Protective threshold with the [place]-Wienstein monofilament is intact bilaterally.    Psychiatric:         Behavior: Behavior is cooperative.               Assessment:       Encounter Diagnoses   Name Primary?    Type 2 diabetes mellitus with hyperglycemia, without long-term current use of insulin Yes    Hammer toe, unspecified laterality          Plan:       Peace was seen today for diabetes mellitus and diabetic foot exam.    Diagnoses and all orders for this visit:    Type 2 diabetes mellitus with hyperglycemia, without long-term current use of insulin  -     Ambulatory referral/consult to Podiatry  -     DIABETIC SHOES FOR HOME USE    Hammer toe, unspecified laterality  -     DIABETIC SHOES FOR HOME USE      I counseled the patient on her conditions, their implications and medical management.    - Shoe inspection. Diabetic Foot Education. Patient reminded of the importance of good nutrition and blood sugar control to help prevent podiatric complications of diabetes. Patient instructed on proper foot hygeine. We discussed wearing proper shoe gear, daily foot inspections, never walking without protective shoe gear, caution putting sharp instruments to feet     - Discussed DM foot care:  Wear comfortable, proper fitting shoes. Wash feet daily. Dry well. After drying, apply moisturizer to feet (no lotion to webspaces). Inspect feet daily for skin breaks, blisters, swelling, or redness. Wear cotton socks (preferably white)  Change socks every day. Do NOT walk barefoot. Do NOT use heating pads or warm/hot water soaks     Rx diabetic shoes with custom molded inserts to be worn at all times while ambulating. Prescription provided with list of local retailers.

## 2023-11-07 NOTE — TELEPHONE ENCOUNTER
We have contacted Peace Farmer to informed her of the re-enrollment process for the  [employer] and what's she needs to provide to continue receiving Ozempic through [employer].      The following documents may be required to re enroll for the 2023 calendar year: Proof of household Income( such as social security statement, 1099 form, pension statement or 3 consecutive pay stubs, Copy of all Insurance cards( front and back), and Signed and dated HIPAA /Patient Information Forms          Thank you   Pharmacy Patient Assistance

## 2023-12-08 ENCOUNTER — OFFICE VISIT (OUTPATIENT)
Dept: FAMILY MEDICINE | Facility: CLINIC | Age: 78
End: 2023-12-08
Payer: MEDICARE

## 2023-12-08 VITALS
TEMPERATURE: 98 F | OXYGEN SATURATION: 96 % | SYSTOLIC BLOOD PRESSURE: 122 MMHG | BODY MASS INDEX: 27.06 KG/M2 | HEIGHT: 61 IN | DIASTOLIC BLOOD PRESSURE: 68 MMHG | HEART RATE: 64 BPM | WEIGHT: 143.31 LBS

## 2023-12-08 DIAGNOSIS — I10 BENIGN HYPERTENSION: ICD-10-CM

## 2023-12-08 DIAGNOSIS — R32 BOWEL AND BLADDER INCONTINENCE: ICD-10-CM

## 2023-12-08 DIAGNOSIS — R15.9 BOWEL AND BLADDER INCONTINENCE: ICD-10-CM

## 2023-12-08 DIAGNOSIS — R53.82 CHRONIC FATIGUE: ICD-10-CM

## 2023-12-08 DIAGNOSIS — E11.40 CONTROLLED TYPE 2 DIABETES WITH NEUROPATHY: Primary | ICD-10-CM

## 2023-12-08 DIAGNOSIS — E78.5 HYPERLIPIDEMIA LDL GOAL <100: ICD-10-CM

## 2023-12-08 DIAGNOSIS — Z23 FLU VACCINE NEED: ICD-10-CM

## 2023-12-08 PROCEDURE — 3078F PR MOST RECENT DIASTOLIC BLOOD PRESSURE < 80 MM HG: ICD-10-PCS | Mod: HCNC,CPTII,S$GLB, | Performed by: INTERNAL MEDICINE

## 2023-12-08 PROCEDURE — 1160F RVW MEDS BY RX/DR IN RCRD: CPT | Mod: HCNC,CPTII,S$GLB, | Performed by: INTERNAL MEDICINE

## 2023-12-08 PROCEDURE — 99999 PR PBB SHADOW E&M-EST. PATIENT-LVL V: ICD-10-PCS | Mod: PBBFAC,HCNC,, | Performed by: INTERNAL MEDICINE

## 2023-12-08 PROCEDURE — 99999 PR PBB SHADOW E&M-EST. PATIENT-LVL V: CPT | Mod: PBBFAC,HCNC,, | Performed by: INTERNAL MEDICINE

## 2023-12-08 PROCEDURE — 1126F PR PAIN SEVERITY QUANTIFIED, NO PAIN PRESENT: ICD-10-PCS | Mod: HCNC,CPTII,S$GLB, | Performed by: INTERNAL MEDICINE

## 2023-12-08 PROCEDURE — 1159F PR MEDICATION LIST DOCUMENTED IN MEDICAL RECORD: ICD-10-PCS | Mod: HCNC,CPTII,S$GLB, | Performed by: INTERNAL MEDICINE

## 2023-12-08 PROCEDURE — 3072F PR LOW RISK FOR RETINOPATHY: ICD-10-PCS | Mod: HCNC,CPTII,S$GLB, | Performed by: INTERNAL MEDICINE

## 2023-12-08 PROCEDURE — 1160F PR REVIEW ALL MEDS BY PRESCRIBER/CLIN PHARMACIST DOCUMENTED: ICD-10-PCS | Mod: HCNC,CPTII,S$GLB, | Performed by: INTERNAL MEDICINE

## 2023-12-08 PROCEDURE — 3288F PR FALLS RISK ASSESSMENT DOCUMENTED: ICD-10-PCS | Mod: HCNC,CPTII,S$GLB, | Performed by: INTERNAL MEDICINE

## 2023-12-08 PROCEDURE — 3078F DIAST BP <80 MM HG: CPT | Mod: HCNC,CPTII,S$GLB, | Performed by: INTERNAL MEDICINE

## 2023-12-08 PROCEDURE — 3288F FALL RISK ASSESSMENT DOCD: CPT | Mod: HCNC,CPTII,S$GLB, | Performed by: INTERNAL MEDICINE

## 2023-12-08 PROCEDURE — 99214 OFFICE O/P EST MOD 30 MIN: CPT | Mod: HCNC,S$GLB,, | Performed by: INTERNAL MEDICINE

## 2023-12-08 PROCEDURE — 1101F PR PT FALLS ASSESS DOC 0-1 FALLS W/OUT INJ PAST YR: ICD-10-PCS | Mod: HCNC,CPTII,S$GLB, | Performed by: INTERNAL MEDICINE

## 2023-12-08 PROCEDURE — 3074F PR MOST RECENT SYSTOLIC BLOOD PRESSURE < 130 MM HG: ICD-10-PCS | Mod: HCNC,CPTII,S$GLB, | Performed by: INTERNAL MEDICINE

## 2023-12-08 PROCEDURE — 1126F AMNT PAIN NOTED NONE PRSNT: CPT | Mod: HCNC,CPTII,S$GLB, | Performed by: INTERNAL MEDICINE

## 2023-12-08 PROCEDURE — 3074F SYST BP LT 130 MM HG: CPT | Mod: HCNC,CPTII,S$GLB, | Performed by: INTERNAL MEDICINE

## 2023-12-08 PROCEDURE — 1159F MED LIST DOCD IN RCRD: CPT | Mod: HCNC,CPTII,S$GLB, | Performed by: INTERNAL MEDICINE

## 2023-12-08 PROCEDURE — 99214 PR OFFICE/OUTPT VISIT, EST, LEVL IV, 30-39 MIN: ICD-10-PCS | Mod: HCNC,S$GLB,, | Performed by: INTERNAL MEDICINE

## 2023-12-08 PROCEDURE — 3072F LOW RISK FOR RETINOPATHY: CPT | Mod: HCNC,CPTII,S$GLB, | Performed by: INTERNAL MEDICINE

## 2023-12-08 PROCEDURE — 1101F PT FALLS ASSESS-DOCD LE1/YR: CPT | Mod: HCNC,CPTII,S$GLB, | Performed by: INTERNAL MEDICINE

## 2023-12-08 NOTE — PROGRESS NOTES
CHIEF COMPLAINT:   Chief Complaint   Patient presents with    Follow-up          HISTORY OF PRESENT ILLNESS:  Peace Farmer is a 78 y.o. female who presents to the clinic today for Follow-up  .     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  She has been seeing endocrinology.  Her blood sugars are doing much better.  She states this has resulted in her feeling less foggy in her head, but it has not helped her with her chronic fatigue.  She is very frustrated as she feels like she has no energy to do to thinks that she should do, like cleaning her house.  She is willing to see a mental health specialist to address her chronic fatigue as she thinks she may also have some ADD.  She also reports persistent bowel and bladder incontinence.  She did see pelvic floor therapy twice.  She states that they did not help much with muscle strength but help more with the psychological aspect of bowel and bladder incontinence.  She states that some of her bowel incontinence has improved as she is now on Ozempic which causes some constipation.  This may be contributing to her bladder incontinence, however.    Subjective    PAST MEDICAL HISTORY:  Past Medical History:   Diagnosis Date    Abnormal mammogram 10/13    s/p biopsy    Cortical cataract of both eyes 11/16/2018    Diabetes mellitus type II     Fibromyalgia     Hyperlipidemia     Hypertension     Obesity (BMI 30-39.9)     Osteopenia     Psoriasis     Skin cancer of face 2011    forehead       PAST SURGICAL HISTORY:  Past Surgical History:   Procedure Laterality Date    BREAST BIOPSY Left     no scars noted    CATARACT EXTRACTION W/  INTRAOCULAR LENS IMPLANT Right 6/30/2020    Procedure: EXTRACTION, CATARACT, WITH IOL INSERTION;  Surgeon: Joshua Robin MD;  Location: [facility];  Service: Ophthalmology;  Laterality: Right;    CATARACT EXTRACTION W/  INTRAOCULAR  LENS IMPLANT Left 7/14/2020    Procedure: EXTRACTION, CATARACT, WITH IOL INSERTION;  Surgeon: Joshua Robin MD;  Location: [facility];  Service: Ophthalmology;  Laterality: Left;    PARTIAL HYSTERECTOMY      age 34    skin cancer face  2011       SOCIAL HISTORY:  Social History     Socioeconomic History    Marital status:     Number of children: 3    Years of education: some colle   Occupational History    Occupation: homemaker   Tobacco Use    Smoking status: Never    Smokeless tobacco: Never   Substance and Sexual Activity    Alcohol use: Yes     Comment: rare    Drug use: No    Sexual activity: Not Currently     Partners: Male     Birth control/protection: Post-menopausal   Social History Narrative    Adult Screenings Reviewed and updated  5/20/14    Mammogram( for females) October 2013 abnromal left     Pap ( for females) partial hysterectomy     Colonoscopy  2009  Reported normal.    Flu shot 2013     Td updated today  As Tdap  5/20/14    Pneumovax  done once    Zostavax done    Eye exam 2013 due for  2014    Bone density 7/2012 due again in  2016     Social Determinants of Health     Stress: Stress Concern Present (9/9/2019)    [employer] - Occupational Stress Questionnaire     Feeling of Stress : Very much       FAMILY HISTORY:  Family History   Problem Relation Age of Onset    Hypertension Mother     Skin cancer Mother     Cataracts Mother     Macular degeneration Mother     Cancer Mother         skin     Multiple births Mother     Heart disease Father     Skin cancer Father     Hypertension Father     Lung cancer Father     Cataracts Father     No Known Problems Brother     No Known Problems Maternal Aunt     No Known Problems Maternal Uncle     No Known Problems Paternal Aunt     No Known Problems Paternal Uncle     Stroke Maternal Grandmother     No Known Problems Maternal Grandfather     No Known Problems Paternal Grandmother     No Known Problems Paternal  Grandfather     Multiple sclerosis Son     No Known Problems Other     Diabetes Mellitus Neg Hx     Breast cancer Neg Hx     Colon cancer Neg Hx     Amblyopia Neg Hx     Blindness Neg Hx     Diabetes Neg Hx     Glaucoma Neg Hx     Retinal detachment Neg Hx     Strabismus Neg Hx     Thyroid disease Neg Hx        ALLERGIES AND MEDICATIONS: updated and reviewed.  Review of patient's allergies indicates:   Allergen Reactions    Pcn [penicillins] Hives and Shortness Of Breath     Medication List with Changes/Refills   Current Medications    AMLODIPINE (NORVASC) 5 MG TABLET    TAKE 1 TABLET EVERY DAY (DUE FOR ANNUAL WITH PCP, LABS)    ATENOLOL (TENORMIN) 25 MG TABLET    TAKE 1 TABLET EVERY DAY (DUE FOR ANNUAL WITH PCP, LABS)    BLOOD SUGAR DIAGNOSTIC (TRUE METRIX GLUCOSE TEST STRIP) STRP    TEST BLOOD SUGAR TWICE DAILY    CALCIUM CARB/VIT D3/MINERALS (CALCIUM-VITAMIN D ORAL)    Take by mouth.    CALCIUM-VITAMIN D 500-125 MG-UNIT TABLET    Take 1 tablet by mouth 2 (two) times daily.    CETIRIZINE (ZYRTEC) 10 MG TABLET    Take 1 tablet (10 mg total) by mouth daily as needed for Allergies.    FREESTYLE LITE METER KIT    Use meter to check glucose 2 times a day, ICD-10: E11.9    GABAPENTIN (NEURONTIN) 100 MG CAPSULE    TAKE 4 CAPSULES NIGHTLY AS NEEDED (ITCHING/SLEEP)    GLIMEPIRIDE (AMARYL) 4 MG TABLET    TAKE 1 TABLET TWICE DAILY WITH MEALS. (HOLD FOR BLOOD SUGAR LESS THAN 100)    LANCETS MISC    1 lancet by Misc.(Non-Drug; Combo Route) route 2 (two) times daily with meals.    LANCING DEVICE MISC    1 Device by Misc.(Non-Drug; Combo Route) route 2 (two) times daily with meals.    LEVOTHYROXINE (SYNTHROID) 50 MCG TABLET    TAKE 1 TABLET (50 MCG TOTAL) BY MOUTH BEFORE BREAKFAST.    LOSARTAN-HYDROCHLOROTHIAZIDE 100-25 MG (HYZAAR) 100-25 MG PER TABLET    TAKE 1 TABLET EVERY DAY (DUE FOR ANNUAL WITH PCP, LABS)    METFORMIN (GLUCOPHAGE) 1000 MG TABLET    Take 1 tablet (1,000 mg total) by mouth 2 (two) times daily.    OMEPRAZOLE  "(PRILOSEC) 20 MG CAPSULE    TAKE 1 CAPSULE DAILY ONLY AS NEEDED FOR HEARTBURN    ROSUVASTATIN (CRESTOR) 5 MG TABLET    TAKE 1 TABLET EVERY EVENING    SEMAGLUTIDE (OZEMPIC) 0.25 MG OR 0.5 MG (2 MG/3 ML) PEN INJECTOR    Inject 0.5 mg into the skin every 7 days.    TRUEPLUS LANCETS 30 GAUGE MISC    USE ONCE DAILY AS DIRECTED       CARE TEAM:  Patient Care Team:  Rosa Garber MD as PCP - General (Internal Medicine)  nAa Warren LPN as Care Coordinator       REVIEW OF SYSTEMS:  Review of Systems   Constitutional:  Positive for fatigue. Negative for chills and unexpected weight change.   HENT:  Negative for congestion.    Eyes:  Positive for visual disturbance (chronic; has follow up with optho in the near future).   Cardiovascular:  Negative for chest pain.   Gastrointestinal:  Negative for abdominal pain.   Musculoskeletal:  Positive for arthralgias (chronic; stable).   Psychiatric/Behavioral:  Positive for dysphoric mood (due to chronic fatigue).              Objective    PHYSICAL EXAMINATION/VITALS:  Vitals:    12/08/23 1353   BP: 122/68   Pulse: 64   Temp: 98.2 °F (36.8 °C)   TempSrc: Oral   SpO2: 96%   Weight: 65 kg (143 lb 4.8 oz)   Height: 5' 1" (1.549 m)       Body mass index is 27.08 kg/m².      General appearance - alert, well appearing, and in no distress, overweight; exam limited as patient did not get onto the exam table.  Psychiatric - alert, oriented to person, place, and time, normal behavior, speech, dress, motor activity and thought process  Eyes - pupils equal and reactive, extraocular eye movements intact, sclera anicteric  Lymphatics - no palpable cervical lymphadenopathy  Chest - clear to auscultation, no wheezes, rales or rhonchi, symmetric air entry  Heart - normal rate and regular rhythm  Neurological - alert, normal speech, no focal findings; cranial nerves II through XII intact  Musculoskeletal - patient noted to have Mild-Moderate osteoarthritic changes to both knee joints. No " joint effusions noted.  Extremities - no pedal edema noted  Skin - normal coloration, no suspicious skin lesions      LABS:  Lab Results   Component Value Date    HGBA1C 5.3 10/25/2023    HGBA1C 5.3 05/17/2023    HGBA1C 5.5 01/17/2023      Lab Results   Component Value Date    CHOL 116 (L) 05/17/2023    CHOL 105 (L) 04/25/2022    CHOL 106 (L) 05/04/2021     Lab Results   Component Value Date    LDLCALC 61.6 (L) 05/17/2023    LDLCALC 44.8 (L) 04/25/2022    LDLCALC 50.8 (L) 05/04/2021               ASSESSMENT AND PLAN:  1. Controlled type 2 diabetes with neuropathy  Lab Results   Component Value Date    HGBA1C 5.3 10/25/2023     Diabetes is under good control at this time for age and comorbid conditions.   We discussed diabetic diet and regular exercise.   We discussed home blood sugar monitoring, if appropriate - the patient should test once daily and as needed.   Continue current medication regimen.  Patient is followed by endocrinology.  Diabetic complications addressed: Neuropathy pain controlled.   Patient was counseled on the need for yearly eye exam to screen for/monitor diabetic retinopathy and yearly diabetic foot exam.  -     Microalbumin/creatinine urine ratio; Future; Expected date: 12/08/2023    2. Benign hypertension  BP Readings from Last 1 Encounters:   12/08/23 122/68      The current medical regimen is effective;  continue present plan and medications. Recommended patient to check home readings to monitor and see me for followup as scheduled or sooner as needed.   Discussed sodium restriction, maintaining ideal body weight and regular exercise program as physiologic means to continue to achieve blood pressure control in addition to medication compliance.  Patient was educated that both decongestant and anti-inflammatory medication may raise blood pressure.  The patient is not active on the digital hypertension program.    3. Hyperlipidemia LDL goal <100  Lab Results   Component Value Date    CHOL 116  (L) 05/17/2023     Lab Results   Component Value Date    HDL 37 (L) 05/17/2023     Lab Results   Component Value Date    LDLCALC 61.6 (L) 05/17/2023     Lab Results   Component Value Date    TRIG 87 05/17/2023     Lab Results   Component Value Date    LDLCALC 61.6 (L) 05/17/2023     We discussed low fat diet and regular exercise.The current medical regimen is effective;  continue present plan and medications.     4. Flu vaccine need  Recently completed. Chart updated.    5. Chronic fatigue  I discussed with the patient that I suspect she has chronic fatigue syndrome.  She states she has been told this before.  She thinks there is a component of ADD.  She is frustrated that there is no medication for this.  I told her ADD medication at her age is high risk.  I will refer her to Psychiatry for further evaluation and treatment.  Phone numbers given.  She is aware that she needs to make her own appointment.    6. Bowel and bladder incontinence  I encouraged her to continue to do the exercises at home that she was taught in pelvic floor therapy.  I will refer her to Urology to rule out any other structural problems that would not be amenable to therapy.  -     Ambulatory referral/consult to Urology; Future; Expected date: 12/15/2023            Orders Placed This Encounter   Procedures    Microalbumin/creatinine urine ratio    Ambulatory referral/consult to Urology      FOLLOW UP: Follow up in about 6 months (around 6/8/2024), or if symptoms worsen or fail to improve, for annual exam. or sooner as needed.

## 2023-12-08 NOTE — PATIENT INSTRUCTIONS
Psychiatry:   Marco A [facility]: [phone]  [facility] [place]: [phone]

## 2023-12-24 DIAGNOSIS — I10 BENIGN HYPERTENSION: ICD-10-CM

## 2023-12-24 NOTE — TELEPHONE ENCOUNTER
No care due was identified.  [facility] Embedded Care Due Messages. Reference number: [id number].   12/24/2023 2:50:22 AM CST

## 2023-12-26 RX ORDER — LOSARTAN POTASSIUM AND HYDROCHLOROTHIAZIDE 25; 100 MG/1; MG/1
TABLET ORAL
Qty: 90 TABLET | Refills: 3 | Status: SHIPPED | OUTPATIENT
Start: 2023-12-26

## 2023-12-26 RX ORDER — AMLODIPINE BESYLATE 5 MG/1
TABLET ORAL
Qty: 90 TABLET | Refills: 3 | Status: SHIPPED | OUTPATIENT
Start: 2023-12-26

## 2023-12-26 RX ORDER — ATENOLOL 25 MG/1
TABLET ORAL
Qty: 90 TABLET | Refills: 3 | Status: SHIPPED | OUTPATIENT
Start: 2023-12-26

## 2023-12-26 NOTE — TELEPHONE ENCOUNTER
Refill Decision Note   Peace Farmer  is requesting a refill authorization.  Brief Assessment and Rationale for Refill:  Approve     Medication Therapy Plan:         Comments:     Note composed:10:35 AM 12/26/2023

## 2024-01-25 DIAGNOSIS — E03.8 SUBCLINICAL HYPOTHYROIDISM: ICD-10-CM

## 2024-01-25 RX ORDER — LEVOTHYROXINE SODIUM 50 UG/1
50 TABLET ORAL
Qty: 90 TABLET | Refills: 3 | Status: SHIPPED | OUTPATIENT
Start: 2024-01-25

## 2024-03-07 ENCOUNTER — OFFICE VISIT (OUTPATIENT)
Dept: OPTOMETRY | Facility: CLINIC | Age: 79
End: 2024-03-07
Payer: MEDICARE

## 2024-03-07 DIAGNOSIS — E11.9 TYPE 2 DIABETES MELLITUS WITHOUT COMPLICATION, WITHOUT LONG-TERM CURRENT USE OF INSULIN: Primary | ICD-10-CM

## 2024-03-07 DIAGNOSIS — H43.393 VITREOUS FLOATERS OF BOTH EYES: ICD-10-CM

## 2024-03-07 DIAGNOSIS — H53.15 VISUAL DISTORTIONS OF SHAPE AND SIZE: ICD-10-CM

## 2024-03-07 DIAGNOSIS — Z96.1 PSEUDOPHAKIA: ICD-10-CM

## 2024-03-07 DIAGNOSIS — H52.7 REFRACTIVE ERROR: ICD-10-CM

## 2024-03-07 PROCEDURE — 92015 DETERMINE REFRACTIVE STATE: CPT | Mod: HCNC,S$GLB,, | Performed by: OPTOMETRIST

## 2024-03-07 PROCEDURE — 99214 OFFICE O/P EST MOD 30 MIN: CPT | Mod: HCNC,S$GLB,, | Performed by: OPTOMETRIST

## 2024-03-07 PROCEDURE — 3288F FALL RISK ASSESSMENT DOCD: CPT | Mod: HCNC,CPTII,S$GLB, | Performed by: OPTOMETRIST

## 2024-03-07 PROCEDURE — 1126F AMNT PAIN NOTED NONE PRSNT: CPT | Mod: HCNC,CPTII,S$GLB, | Performed by: OPTOMETRIST

## 2024-03-07 PROCEDURE — 92134 CPTRZ OPH DX IMG PST SGM RTA: CPT | Mod: HCNC,S$GLB,, | Performed by: OPTOMETRIST

## 2024-03-07 PROCEDURE — 1159F MED LIST DOCD IN RCRD: CPT | Mod: HCNC,CPTII,S$GLB, | Performed by: OPTOMETRIST

## 2024-03-07 PROCEDURE — 2023F DILAT RTA XM W/O RTNOPTHY: CPT | Mod: HCNC,CPTII,S$GLB, | Performed by: OPTOMETRIST

## 2024-03-07 PROCEDURE — 99999 PR PBB SHADOW E&M-EST. PATIENT-LVL III: CPT | Mod: PBBFAC,HCNC,, | Performed by: OPTOMETRIST

## 2024-03-07 PROCEDURE — 1160F RVW MEDS BY RX/DR IN RCRD: CPT | Mod: HCNC,CPTII,S$GLB, | Performed by: OPTOMETRIST

## 2024-03-07 PROCEDURE — 1101F PT FALLS ASSESS-DOCD LE1/YR: CPT | Mod: HCNC,CPTII,S$GLB, | Performed by: OPTOMETRIST

## 2024-03-07 NOTE — PROGRESS NOTES
Subjective:       Patient ID: Peace Farmer is a 79 y.o. female      Chief Complaint   Patient presents with    Concerns About Ocular Health    Diabetic Eye Exam     History of Present Illness  Dls: 9/14/22 Dr. Freeman     80 y/o female presents today for diabetic eye exam.   Pt c/o blurry vision at distance and near ou.   Pt states blurry vision at distance os with bifocal glasses  Pt does lots of needle work   Pt wears otc readers and bifocal glasses.     LBS ?     No tearing  + ou itching  No burning  No pain  No ha's  + ou floaters  No flashes    Eye meds  Otc gtts ou prn     Pohx:   S/p CE Bilateral 2020    Fohx:  Cat - mother, father   MD- mother     Hemoglobin A1C       Date                     Value               Ref Range             Status                10/25/2023               5.3                 4.0 - 5.6 %           Final                   05/17/2023               5.3                 4.0 - 5.6 %           Final                   01/17/2023               5.5                 4.0 - 5.6 %           Final                 Assessment/Plan:     1. Type 2 diabetes mellitus without complication, without long-term current use of insulin  No diabetic retinopathy. Discussed with pt the effects of diabetes on vision, importance of good blood sugar control, compliance with meds, and follow up care with PCP. Return in 1 year for dilated eye exam, sooner PRN.      2. Visual distortions of shape and size  Pt c/o multiple episodes of something moving through vision causing blurry vision lasting several days. Pt states this was ongoing issue before she had CEIOL. DFE today unremarkable, findings stable compared to prior visit. VA stable, IOP normal. OCT macula F/I. Pt with family history of MS and concerned her blurry vision is MS. Pt wants consult with ophthalmology. Will refer to neuro-ophthalmology for evaluation.      - Posterior Segment OCT Retina-Both eyes  - Ambulatory referral/consult to Ophthalmology    3. Vitreous  floaters of both eyes  Discussed causes of flashes and floaters with the patient and described the warnings of a possible retinal detachment. Advised patient to call if there is an increase in flashes or floaters.    4. Pseudophakia  Monovision IOL, OD distance, OS near.   Clear, centered.    5. Refractive error  Pt states that VA OS blurry, stable compared to prior. Pt correctable to 20/25 OS (reading eye with monovision IOL). Pt does a lot of needlework. Wants BF for plano distance with +3.00 for near, pt aware no distance correction OS for this pair.    Eyeglass Final Rx       Eyeglass Final Rx         Sphere Cylinder Axis Add    Right Royal +0.50 010 +2.50    Left -1.75 +0.25 165 +2.50      Expiration Date: 3/7/2025              Eyeglass Final Rx #2         Sphere Cylinder Axis Add    Right Royal Sphere  +3.00    Left Royal Sphere  +3.00      Type: Knitting glasses    Expiration Date: 3/7/2025                      Time spent with patient: 30 minutes  Time spent documenting encounter: 15 minutes  Total time: 45 minutes       Follow up for neuro-ophthalmology.

## 2024-03-21 ENCOUNTER — TELEPHONE (OUTPATIENT)
Dept: OPHTHALMOLOGY | Facility: CLINIC | Age: 79
End: 2024-03-21
Payer: MEDICARE

## 2024-03-21 NOTE — TELEPHONE ENCOUNTER
----- Message from Lesly Nowak MA sent at 3/8/2024 11:00 AM CST -----  Pt referred to Dr Rodriguez

## 2024-03-26 ENCOUNTER — LAB VISIT (OUTPATIENT)
Dept: LAB | Facility: HOSPITAL | Age: 79
End: 2024-03-26
Attending: HOSPITALIST
Payer: MEDICARE

## 2024-03-26 DIAGNOSIS — E11.65 TYPE 2 DIABETES MELLITUS WITH HYPERGLYCEMIA, WITHOUT LONG-TERM CURRENT USE OF INSULIN: ICD-10-CM

## 2024-03-26 LAB
ALBUMIN SERPL BCP-MCNC: 4.2 G/DL (ref 3.5–5.2)
ANION GAP SERPL CALC-SCNC: 10 MMOL/L (ref 8–16)
BUN SERPL-MCNC: 18 MG/DL (ref 8–23)
CALCIUM SERPL-MCNC: 11.1 MG/DL (ref 8.7–10.5)
CHLORIDE SERPL-SCNC: 102 MMOL/L (ref 95–110)
CO2 SERPL-SCNC: 27 MMOL/L (ref 23–29)
CREAT SERPL-MCNC: 0.7 MG/DL (ref 0.5–1.4)
EST. GFR  (NO RACE VARIABLE): >60 ML/MIN/1.73 M^2
ESTIMATED AVG GLUCOSE: 105 MG/DL (ref 68–131)
GLUCOSE SERPL-MCNC: 105 MG/DL (ref 70–110)
HBA1C MFR BLD: 5.3 % (ref 4–5.6)
PHOSPHATE SERPL-MCNC: 5.1 MG/DL (ref 2.7–4.5)
POTASSIUM SERPL-SCNC: 5.2 MMOL/L (ref 3.5–5.1)
SODIUM SERPL-SCNC: 139 MMOL/L (ref 136–145)

## 2024-03-26 PROCEDURE — 36415 COLL VENOUS BLD VENIPUNCTURE: CPT | Mod: HCNC,PO | Performed by: HOSPITALIST

## 2024-03-26 PROCEDURE — 80069 RENAL FUNCTION PANEL: CPT | Mod: HCNC | Performed by: HOSPITALIST

## 2024-03-26 PROCEDURE — 83036 HEMOGLOBIN GLYCOSYLATED A1C: CPT | Mod: HCNC | Performed by: HOSPITALIST

## 2024-04-02 ENCOUNTER — OFFICE VISIT (OUTPATIENT)
Dept: ENDOCRINOLOGY | Facility: CLINIC | Age: 79
End: 2024-04-02
Payer: MEDICARE

## 2024-04-02 VITALS
WEIGHT: 148.63 LBS | HEART RATE: 73 BPM | DIASTOLIC BLOOD PRESSURE: 66 MMHG | BODY MASS INDEX: 28.08 KG/M2 | SYSTOLIC BLOOD PRESSURE: 130 MMHG

## 2024-04-02 DIAGNOSIS — E11.40 CONTROLLED TYPE 2 DIABETES WITH NEUROPATHY: ICD-10-CM

## 2024-04-02 DIAGNOSIS — I10 BENIGN HYPERTENSION: ICD-10-CM

## 2024-04-02 DIAGNOSIS — E03.8 SUBCLINICAL HYPOTHYROIDISM: ICD-10-CM

## 2024-04-02 DIAGNOSIS — M81.8 OTHER OSTEOPOROSIS WITHOUT CURRENT PATHOLOGICAL FRACTURE: ICD-10-CM

## 2024-04-02 DIAGNOSIS — E11.65 TYPE 2 DIABETES MELLITUS WITH HYPERGLYCEMIA, WITHOUT LONG-TERM CURRENT USE OF INSULIN: Primary | ICD-10-CM

## 2024-04-02 DIAGNOSIS — E55.9 MILD VITAMIN D DEFICIENCY: ICD-10-CM

## 2024-04-02 PROCEDURE — 3078F DIAST BP <80 MM HG: CPT | Mod: HCNC,CPTII,S$GLB, | Performed by: HOSPITALIST

## 2024-04-02 PROCEDURE — 1159F MED LIST DOCD IN RCRD: CPT | Mod: HCNC,CPTII,S$GLB, | Performed by: HOSPITALIST

## 2024-04-02 PROCEDURE — 99999 PR PBB SHADOW E&M-EST. PATIENT-LVL IV: CPT | Mod: PBBFAC,HCNC,, | Performed by: HOSPITALIST

## 2024-04-02 PROCEDURE — 99214 OFFICE O/P EST MOD 30 MIN: CPT | Mod: HCNC,S$GLB,, | Performed by: HOSPITALIST

## 2024-04-02 PROCEDURE — 3075F SYST BP GE 130 - 139MM HG: CPT | Mod: HCNC,CPTII,S$GLB, | Performed by: HOSPITALIST

## 2024-04-02 NOTE — ASSESSMENT & PLAN NOTE
- Diabetes is at goal, given most current A1C, Goal A1C for patient is 7%  - Diabetic supplies/medications reviewed this visit to ensure continue refills and compliance  - Diabetes education referral: patient refused   - Advised patient to get periodic eye and feet exam.   - Reviewed routine maintenance: lipid, U:P/C   - continue encouragement of dietary modification, portion size control, decreasing carbohydrates intake  - Trulicity too expensive, currently on Ozempic, getting it through PAP    Plan  - given better glycemic control:  Continue Ozempic 0.5 mg once a week injection, and applying for pharmacy assistant to help with cost.    - continue Metformin 1000 mg daily, Glimepiride 4mg daily (advised patient to monitor for hypoglycemia, patient prefer to continue)  - patient does not want to change her regimen at this time.  Confirm with patient no hypoglycemic event  - Advised pt to check glucoses regularly  - Follow up as scheduled with lab work prior

## 2024-04-02 NOTE — PATIENT INSTRUCTIONS
Call [facility] [phone] or  Call [facility] pharmacy assistance with Sheryl Theodore, [phone]  For Ozempic    Regiment:   __ Metformin 1000 mg daily  __ Glimepiride 4mg daily  __ Ozempic 0.5 mg once a week injection    Goal for your blood sugar   In the morning, before breakfast: 100-140  Before going to bed: 100-140  Do not go to bed with glucose less than 100, have a small snack if lower than 100    Please check glucose before breakfast and at bedtime.   You can keep track of the glucose with Glucose logs or any papers  Please filled out and bring back to next office visit for me to review  Document any (LOW BLOOD GLUCOSE) hypoglycemia  episode with date and time for me to review    Please make sure to get your dilated eyes examine once a year with your eye doctor.  Monitor your feet for any cuts or skin breakdown regularly, schedule a visit with your foot doctor/podiatrist yearly if you see one.      Contact information:    Earnest Zavaleta M.D  [facility] [address] [facility] [address], [address]  [place] [address]    Office:  [phone]  Fax:  [phone]     ----------------------------------------------------------

## 2024-04-02 NOTE — PROGRESS NOTES
"Subjective:      Patient ID: Peace Farmer is a 79 y.o. female presented to [facility] on 4/2/2024.  Chief Complaint:  Diabetes    History of Present Illness: Peace Farmer is a 79 y.o. female here for diabetes  Other significant past medical history:  Osteoporosis, abnormal thyroid function, hypertension      Interval history:  Patient is here for follow-up, to discuss multiple endocrine issues.  In regards to diabetes, has been doing much better, A1c stable 5.3%.  After dietary modification, small portion, low carbohydrates.  Tolerating Ozempic well without any issues.  Getting medication through PAP.  Needs submission for 2024  Patient reports constipation on Ozempic but tolerable  Denies any hypoglycemia.   Current weight:  148, previous office visit weight: 143, 146    In regards to abnormal thyroid function:  TPO antibodies negative, patient still with fatigue, family history of hypothyroidism, trial of TRT has help "clear up brain fog". Feeling a lot better. LT5 50 mcg daily  In regards to osteoporosis:  After discussion with her insurance, she does not want to start therapy at this time due to dental issue.  Ongoing stressor with her 's cancer  Patient denies any falls fractures.  Currently taking multivitamin, and calcium supplemement twice a day  Mild hypercalcemia.  On chronic hydrochlorothiazide.    1) Diabetes Mellitus Type 2  - Known diabetic complications: none  - Diagnosed w/ DM: 2008?, worsening now  - On restrictive diet, small portion, low carbohydrates.  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  - switch to Ozempic, getting medication from P[facility]  - prefers to continue metformin and glimepiride.    Glucose log review:  - denies any hypoglycemia.  Did not bring glucose log for review     Current meds:    Ozempic 0.5 " "mg once a week injection   Metformin 1000 mg daily   Glimepiride 4mg once a day  Previous meds:              Januvia   Trulicity 1.5 mg Q weekly injection  (expensive, inconsistently getting medication)  Home glucose checks: checks 2-3x a day, Logs reviewed/Unavailable: oral recall:   Hypoglycemia: denies  Diet/Exercise:               Eating multiple small meals  per day (7x)              Drink: diet coke  Weight trend: stable  Diabetes Education: yes, many years ago  Diabetes Related Hospitalization:  No  Hx of pancreatitis: No  Family history of diabetes: Yes. brother  Occupation: retired    Eye exam current (within one year): yes, DR: no  Reports cuts or ulcers on feet:   Denies  Statin: Taking, ACE/ARB: Taking    Diabetes lab work  Lab Results   Component Value Date    HGBA1C 5.3 03/26/2024    HGBA1C 5.3 10/25/2023    HGBA1C 5.3 05/17/2023    HGBA1C 5.5 01/17/2023     No results found for: "CPEPTIDE", "GLUTAMICACID", "ISLETCELLANT"   No results found for: "FRUCTOSAMINE"  Lab Results   Component Value Date    MICALBCREAT 12.0 03/28/2024     Lab Results   Component Value Date    RNMDFCCA23 1581 (H) 10/12/2020     Diabetes Management Status: Reviewed this office visit  Screening or Prevention Patient's value Goal Complete/Controlled?   Lipid profile : 05/17/2023 Annually Yes     Dilated retinal exam : 03/07/2024 Annually Yes     Foot exam   Most Recent Foot Exam Date: Not Found Annually Yes          2) Abnormal thyroid function  - TSH elevation, 1x  - Family history thyroid disorder: son with hypothyroidism   - Denies goiter  - patient's main concern is:  Fatigue and Heat intolerance  - started on low-dose levothyroxine 25 mcg daily 6/2022  - Now on Levothyroxine 50mcg daily, doing well at this time    Lab Results   Component Value Date    TSH 0.581 10/25/2023    TSH 1.576 05/17/2023    TSH 0.795 01/17/2023    FREET4 1.12 10/25/2023    FREET4 0.97 05/17/2023    FREET4 1.12 01/17/2023    THYROPEROXID <6.0 06/30/2022 "       3) Osteoporosis  - Previously with osteopenia, 2020, previously managed by Dr. Rashid  - Was on Boniva for treatment of osteopenia, leading to heartburn> she does not to be on this medication  - Dr. Rashid wanted to switch patient to Reclast, for which she never started    - Recent bone density 05/2022 show osteoporosis  - She is taking Calcium and Vit D supplements. 500mg/1000iu  - She had a hysterectomy at 33 y/o and menopausal symptoms at 52 y/o.   - Holding off on treatment including Prolia therapy due to  illnesses, she is having dif time balancing both  - No fall  - She does not want therapy  - She report dental issue, possible tooth issue  - Denies any fall  - noted with mild hypercalcemia 11.1 on bloodwork, on chronic hydrochlorothiazide.  Denies excessive calcium supplementation    DXA 5/2022  Lumbar spine (L1-L4): BMD is 0.850 g/cm2, T-score is -1.8, and Z-score is 0.6.  Total hip:  BMD is 0.751 g/cm2, T-score is -1.6, and Z-score is 0.2.  Femoral neck: BMD is 0.555 g/cm2, T-score is -2.6, and Z-score is -0.6.     FRAX:  16% risk of a major osteoporotic fracture in the next 10 years.  5.1% risk of hip fracture in the next 10 years.     Impression:  *Osteoporosis based on T-score below -2.5    Reviewed past surgical, medical, family, social history and updated as appropriate.  Review of Systems: see HPI above    Objective:   /66   Pulse 73   Wt 67.4 kg (148 lb 9.6 oz)   BMI 28.08 kg/m²     Body mass index is 28.08 kg/m².  Vital signs reviewed    Physical Exam  Vitals and nursing note reviewed.   Constitutional:       General: She is not in acute distress.     Appearance: Normal appearance. She is well-developed. She is obese. She is not toxic-appearing.   Neck:      Thyroid: No thyromegaly.      Comments: No goiter noted  Cardiovascular:      Heart sounds: Normal heart sounds.   Pulmonary:      Effort: Pulmonary effort is normal. No respiratory distress.   Abdominal:      Tenderness: There  is no abdominal tenderness.   Musculoskeletal:         General: No deformity. Normal range of motion.      Cervical back: Normal range of motion.   Skin:     Findings: No bruising.   Neurological:      Mental Status: She is alert and oriented to person, place, and time.   Psychiatric:         Mood and Affect: Mood normal.       Lab Reviewed:  See results in subjective  Lab Results   Component Value Date    HGBA1C 5.3 03/26/2024     Lab Results   Component Value Date    CHOL 116 (L) 05/17/2023    HDL 37 (L) 05/17/2023    LDLCALC 61.6 (L) 05/17/2023    TRIG 87 05/17/2023    CHOLHDL 31.9 05/17/2023     Lab Results   Component Value Date     03/26/2024    K 5.2 (H) 03/26/2024     03/26/2024    CO2 27 03/26/2024     03/26/2024    BUN 18 03/26/2024    CREATININE 0.7 03/26/2024    CALCIUM 11.1 (H) 03/26/2024    PHOS 5.1 (H) 03/26/2024    PROT 6.9 10/25/2023    ALBUMIN 4.2 03/26/2024    BILITOT 0.3 10/25/2023    ALKPHOS 57 10/25/2023    AST 16 10/25/2023    ALT 19 10/25/2023    ANIONGAP 10 03/26/2024    ESTGFRAFRICA >60.0 06/30/2022    EGFRNONAA >60.0 06/30/2022    TSH 0.581 10/25/2023    PTH 64.0 05/13/2020    TIMIAXGN12BK 34 10/25/2023      Assessment     1. Type 2 diabetes mellitus with hyperglycemia, without long-term current use of insulin  Ambulatory referral/consult to Pharmacy Assistance    HEMOGLOBIN A1C    RENAL FUNCTION PANEL      2. Other osteoporosis without current pathological fracture  RENAL FUNCTION PANEL    Vitamin D    DXA Bone Density Axial Skeleton 1 or more sites      3. Subclinical hypothyroidism  TSH    T4, Free      4. Benign hypertension        5. Mild vitamin D deficiency  Vitamin D      6. Controlled type 2 diabetes with neuropathy          Plan     Type 2 diabetes mellitus with hyperglycemia, without long-term current use of insulin  - Diabetes is at goal, given most current A1C, Goal A1C for patient is 7%  - Diabetic supplies/medications reviewed this visit to ensure continue  refills and compliance  - Diabetes education referral: patient refused   - Advised patient to get periodic eye and feet exam.   - Reviewed routine maintenance: lipid, U:P/C   - continue encouragement of dietary modification, portion size control, decreasing carbohydrates intake  - Trulicity too expensive, currently on Ozempic, getting it through PAP    Plan  - given better glycemic control:  Continue Ozempic 0.5 mg once a week injection, and applying for pharmacy assistant to help with cost.    - continue Metformin 1000 mg daily, Glimepiride 4mg daily (advised patient to monitor for hypoglycemia, patient prefer to continue)  - patient does not want to change her regimen at this time.  Confirm with patient no hypoglycemic event  - Advised pt to check glucoses regularly  - Follow up as scheduled with lab work prior    Other osteoporosis without current pathological fracture  - Longstanding history of Osteopenia with high FRAX at the hip  >> unable to tolerate oral bisphosphonate in the past 2018  - Patient has not been willing to start medication including IV Reclast or SC Prolia since 2020- 2024  - Recent bone density 2022 showed osteoporosis femoral neck   - Risk factors: race, postmenopausal status and family history   - continue vitamin-D and calcium supplement>> given within normal range vitamin-D  - mild hypercalcemia noted on blood work, we will continue to monitor  - I previously discussed IV Reclast versus Prolia, patient declined  - after patient discussed with patient, she does NOT want to start on medical therapy for her osteoporosis  - she is aware of the risk of fractures and broken bone.  - will repeat bone density in 2024 for re-evaluation.  - advised patient to follow up with her dentist given ongoing dental issues    Subclinical hypothyroidism  - no history of thyroid dysfunction, not on thyroid medication  - patient is quite concerned about her thyroid function given her son's history of  hypothyroidism, patient with TPO negative antibody  - TSH improved, while on low-dose levothyroxine, with improvement in symptoms  - continue levothyroxine 50 mcg daily  - repeat TFTs in the future    Benign hypertension  - BP reviewed today  - continue home medications  - manage by PCP  - on HCTZ can lead to mild hypercalcemia message sent to PCP today recommending alternative therapy.    Mild vitamin D deficiency  - lab work reviewed, on Vit D supplement  - lab work every 6 mo or yearly  - continue OTC VitD3 daily      Advised patient to follow up with PCP for routine health maintenance care.   RTC in 5-6 months    Earnest Zavaleta M.D.  Endocrinology  [facility]  4/2/2024      Disclaimer: This note has been generated in part with the use of voice-recognition software. There may be typographical errors that have been missed during proof-reading.

## 2024-04-02 NOTE — ASSESSMENT & PLAN NOTE
- Longstanding history of Osteopenia with high FRAX at the hip  >> unable to tolerate oral bisphosphonate in the past 2018  - Patient has not been willing to start medication including IV Reclast or SC Prolia since 2020- 2024  - Recent bone density 2022 showed osteoporosis femoral neck   - Risk factors: race, postmenopausal status and family history   - continue vitamin-D and calcium supplement>> given within normal range vitamin-D  - mild hypercalcemia noted on blood work, we will continue to monitor  - I previously discussed IV Reclast versus Prolia, patient declined  - after patient discussed with patient, she does NOT want to start on medical therapy for her osteoporosis  - she is aware of the risk of fractures and broken bone.  - will repeat bone density in 2024 for re-evaluation.  - advised patient to follow up with her dentist given ongoing dental issues

## 2024-04-02 NOTE — ASSESSMENT & PLAN NOTE
- BP reviewed today  - continue home medications  - manage by PCP  - on HCTZ can lead to mild hypercalcemia message sent to PCP today recommending alternative therapy.

## 2024-04-02 NOTE — Clinical Note
Can you stop her HCTZ (combo) pill as it can cause hypercalcemia. Ok to continue Losartan. She said she is not taking extra calcium supplemement

## 2024-04-03 ENCOUNTER — TELEPHONE (OUTPATIENT)
Dept: PHARMACY | Facility: CLINIC | Age: 79
End: 2024-04-03
Payer: MEDICARE

## 2024-04-03 ENCOUNTER — PATIENT MESSAGE (OUTPATIENT)
Dept: PHARMACY | Facility: CLINIC | Age: 79
End: 2024-04-03
Payer: MEDICARE

## 2024-04-03 NOTE — TELEPHONE ENCOUNTER
I have spoken with Peace Farmer and informed her of the [employer] application process for Ozempic 0.5 mg and what's required to apply.  Peace Farmer will provide the following documents: Proof of household Income( such as social security statement, 1099 form, pension statement or 3 consecutive pay stubs, Copy of all Insurance cards( front and back), and Signed and dated HIPAA /Patient Information Forms        I will follow up with the patient in 5 business days.

## 2024-04-16 NOTE — TELEPHONE ENCOUNTER
A 2nd attempt has been made to establish contact with Peace Farmer  via LETTER. The final contact attempt will be made in 5 business days

## 2024-04-19 ENCOUNTER — TELEPHONE (OUTPATIENT)
Dept: FAMILY MEDICINE | Facility: CLINIC | Age: 79
End: 2024-04-19
Payer: MEDICARE

## 2024-04-19 NOTE — TELEPHONE ENCOUNTER
Spoke with patient. Patient states call was supposed to be routed to endocrinology Dr. aZvaleta. Patient states it was already taken care of by endocrinology. Patient has no other questions at this time.

## 2024-04-19 NOTE — TELEPHONE ENCOUNTER
----- Message from Jesenia Rodriguez sent at 4/19/2024 10:58 AM CDT -----  Regarding: self [phone]  Type: Patient Call Back    Who called: self     What is the request in detail: wanted to let office know that her  will be bringing over paper work with in the hr for Ozempic to be faxed to Sheryl at the pharmacy, she stated the fax number is at the front of the page. She stated the office has done this before.     Can the clinic reply by [facility]? no    Would the patient rather a call back or a response via [facility]?  Call back     Best call back number: [phone]

## 2024-04-22 NOTE — TELEPHONE ENCOUNTER
Peace Farmer has provided the necessary documents to begin the enrollment process into the [employer] program. The prescription portion of the Ozempic 0.25 mg application has been sent to the providers office for approval and signature.

## 2024-04-22 NOTE — TELEPHONE ENCOUNTER
A Patient Assistance Application for  Peace Farmer - MRN [id number]  was faxed to your office @ [phone]. Please have Dr. Earnest Zavaleta review the application to ensure the prescription is correct. If correct, sign and fax the application back to the Pharmacy Patient Assistance Team @[phone].

## 2024-04-24 NOTE — TELEPHONE ENCOUNTER
The prescription portion of Ms. Farmer 's Ozempic 0.5mg and Trulicity 0.5 mg application was received from the providers office. The completed patient assistance application has been submitted to  [employer] for consideration of eligibility.

## 2024-05-07 ENCOUNTER — HOSPITAL ENCOUNTER (OUTPATIENT)
Dept: RADIOLOGY | Facility: CLINIC | Age: 79
Discharge: HOME | End: 2024-05-07
Attending: HOSPITALIST
Payer: MEDICARE

## 2024-05-07 DIAGNOSIS — M81.8 OTHER OSTEOPOROSIS WITHOUT CURRENT PATHOLOGICAL FRACTURE: ICD-10-CM

## 2024-05-07 PROCEDURE — 77080 DXA BONE DENSITY AXIAL: CPT | Mod: 26,HCNC,, | Performed by: INTERNAL MEDICINE

## 2024-05-07 PROCEDURE — 77080 DXA BONE DENSITY AXIAL: CPT | Mod: TC,HCNC,PO

## 2024-05-08 ENCOUNTER — PATIENT MESSAGE (OUTPATIENT)
Dept: PHARMACY | Facility: CLINIC | Age: 79
End: 2024-05-08
Payer: MEDICARE

## 2024-05-08 NOTE — TELEPHONE ENCOUNTER
We are pleased to inform you Peace Farmer has been approved in the [employer] Patient Assistance Program.  [employer] has shared this information with your patient.   Approval Details  Eligibility start Date: 04/30/2024  Eligibility end date: 12/31/2024 (Updated proof of eligibility required to re-enroll for 2025 calendar year).  Approved Medication: Ozempic 0.25 mg  Day supply: 120  Allotted Refills: 2 (Please be advised Program allows only 3 refills per eligibility period regardless of changes in strength).   Estimated Shipping: 10-14 business days or longer depending on availability and/or projected refill date  Shipping Location: [facility] (You and your patient will receive further communication from an OCCP Rep once Medication is received)                                            Important Note  It is imperative that any changes to strength or discontinuation of this medication be referred to the Pharmacy Patient Assistance Team Pool Via EPIC to prevent Gaps in therapy.

## 2024-05-14 ENCOUNTER — TELEPHONE (OUTPATIENT)
Dept: ENDOCRINOLOGY | Facility: CLINIC | Age: 79
End: 2024-05-14
Payer: MEDICARE

## 2024-05-14 NOTE — TELEPHONE ENCOUNTER
----- Message from Frank Dimas sent at 5/14/2024 12:53 PM CDT -----  Type: Patient Call Back    Who called:Self    What is the request in detail:Wrong dosage on semaglutide (OZEMPIC) 0.25 mg or 0.5 mg (2 mg/3 mL) pen injector      Can the clinic reply by [facility]?No    Would the patient rather a call back or a response via [facility]? Call    Best call back number:[phone] (home)       Additional Information:

## 2024-05-14 NOTE — TELEPHONE ENCOUNTER
Pt stated her ozempic Rx is for .5mg and was afraid she was going to run out too soon. Advised pt she would be fine as that's what her Rx is written for.

## 2024-05-22 DIAGNOSIS — E78.5 HYPERLIPIDEMIA LDL GOAL <100: ICD-10-CM

## 2024-05-22 RX ORDER — ROSUVASTATIN CALCIUM 5 MG/1
5 TABLET, COATED ORAL NIGHTLY
Qty: 90 TABLET | Refills: 0 | Status: SHIPPED | OUTPATIENT
Start: 2024-05-22

## 2024-05-22 NOTE — TELEPHONE ENCOUNTER
Care Due:                  Date            Visit Type   Department     Provider  --------------------------------------------------------------------------------                                EP -         [facility]                              PRIMARY      MED/ INTERNAL  [facility] [place]  Last Visit: 12-      CARE ([facility])   MED/ PEDS      Tayokeldelicia Garber                               -         [facility]                              PRIMARY      MED/ INTERNAL  [facility] [place]  Next Visit: 08-      CARE ([facility])   MED/ PEDS      Tayokeler  Jcarlos                                                            Last  Test          Frequency    Reason                     Performed    Due Date  --------------------------------------------------------------------------------    Lipid Panel.  12 months..  rosuvastatin.............  05- 05-    [employer] Embedded Care Due Messages. Reference number: [id number].   5/22/2024 2:48:11 AM CDT

## 2024-05-22 NOTE — TELEPHONE ENCOUNTER
Refill Routing Note   Medication(s) are not appropriate for processing by [facility] for the following reason(s):        Required labs outdated    [facility] action(s):  Defer     Requires labs : Yes      Medication Therapy Plan: LIPIDS NTBL TO FUTURE LAB ORDERS      Appointments  past 12m or future 3m with PCP    Date Provider   Last Visit   12/8/2023 Rosa Garber MD   Next Visit   8/7/2024 Rosa Garber MD   ED visits in past 90 days: 0        Note composed:11:45 AM 05/22/2024

## 2024-06-12 DIAGNOSIS — E11.65 TYPE 2 DIABETES MELLITUS WITH HYPERGLYCEMIA, WITHOUT LONG-TERM CURRENT USE OF INSULIN: ICD-10-CM

## 2024-06-13 RX ORDER — GLIMEPIRIDE 4 MG/1
TABLET ORAL
Qty: 180 TABLET | Refills: 3 | Status: SHIPPED | OUTPATIENT
Start: 2024-06-13

## 2024-07-15 DIAGNOSIS — E11.65 TYPE 2 DIABETES MELLITUS WITH HYPERGLYCEMIA, WITHOUT LONG-TERM CURRENT USE OF INSULIN: ICD-10-CM

## 2024-07-15 NOTE — TELEPHONE ENCOUNTER
Pt stated she didn't get a call from the pharmacy like she usually does when its being shipped. Pt stated she noticed her Rx ended on 6/24, she called them but no answer and no call back. Advised pt we can try sending a new Rx [facility].

## 2024-07-15 NOTE — TELEPHONE ENCOUNTER
----- Message from Zac Munson sent at 7/15/2024 11:39 AM CDT -----  Regarding: self  Type: Patient Call Back    Who called:self    What is the request in detail:pt is calling in regards of ozempic and would like tos peak to the office regarding it     Can the clinic reply by [facility]?self    Would the patient rather a call back or a response via [facility]? callback    Best call back number:[phone]    Additional Information:

## 2024-07-16 DIAGNOSIS — E11.65 TYPE 2 DIABETES MELLITUS WITH HYPERGLYCEMIA, WITHOUT LONG-TERM CURRENT USE OF INSULIN: ICD-10-CM

## 2024-07-16 RX ORDER — SEMAGLUTIDE 0.68 MG/ML
0.5 INJECTION, SOLUTION SUBCUTANEOUS
Qty: 15 ML | Refills: 3 | Status: SHIPPED | OUTPATIENT
Start: 2024-07-16

## 2024-07-17 RX ORDER — SEMAGLUTIDE 0.68 MG/ML
0.5 INJECTION, SOLUTION SUBCUTANEOUS
Qty: 15 ML | Refills: 3 | Status: SHIPPED | OUTPATIENT
Start: 2024-07-17

## 2024-07-18 ENCOUNTER — TELEPHONE (OUTPATIENT)
Dept: ENDOCRINOLOGY | Facility: CLINIC | Age: 79
End: 2024-07-18
Payer: MEDICARE

## 2024-07-18 NOTE — TELEPHONE ENCOUNTER
Pt was informed that the Rx was sent to her preferred pharmacy. Pt stated she spoke to them pharmacy and they will call her Monday since they're closed on Fridays. Pt stated she's fine and just will wait til then. Understanding verbalized.

## 2024-07-18 NOTE — TELEPHONE ENCOUNTER
----- Message from Sarah Laura sent at 7/18/2024  1:34 PM CDT -----  Regarding: self .[phone]   Type: RX Refill Request    Who Called: Self     Have you contacted your pharmacy: yes Pt states pharmacy is closed on friday and they aren't answering     Refill    RX Name and Strength:semaglutide (OZEMPIC) 0.25 mg or 0.5 mg (2 mg/3 mL) pen injector     Preferred Pharmacy with phone number:.    [facility]  [address], [address]  [facility] [address]  Phone: [phone] Fax: [phone]        Local or Mail Order: local     Would the patient rather a call back or a response via [facility]? Call back     Best Call Back Number:.[phone]      Additional Information:  Pt states she needs her medication and she will go pick it up if need be     Thank you.

## 2024-07-23 DIAGNOSIS — E78.5 HYPERLIPIDEMIA LDL GOAL <100: Primary | ICD-10-CM

## 2024-07-30 ENCOUNTER — LAB VISIT (OUTPATIENT)
Dept: LAB | Facility: HOSPITAL | Age: 79
End: 2024-07-30
Attending: HOSPITALIST
Payer: MEDICARE

## 2024-07-30 DIAGNOSIS — M81.8 OTHER OSTEOPOROSIS WITHOUT CURRENT PATHOLOGICAL FRACTURE: ICD-10-CM

## 2024-07-30 DIAGNOSIS — E11.65 TYPE 2 DIABETES MELLITUS WITH HYPERGLYCEMIA, WITHOUT LONG-TERM CURRENT USE OF INSULIN: ICD-10-CM

## 2024-07-30 DIAGNOSIS — E55.9 MILD VITAMIN D DEFICIENCY: ICD-10-CM

## 2024-07-30 DIAGNOSIS — E78.5 HYPERLIPIDEMIA LDL GOAL <100: ICD-10-CM

## 2024-07-30 DIAGNOSIS — E03.8 SUBCLINICAL HYPOTHYROIDISM: ICD-10-CM

## 2024-07-30 LAB
25(OH)D3+25(OH)D2 SERPL-MCNC: 30 NG/ML (ref 30–96)
ALBUMIN SERPL BCP-MCNC: 4.4 G/DL (ref 3.5–5.2)
ALBUMIN SERPL BCP-MCNC: 4.4 G/DL (ref 3.5–5.2)
ALP SERPL-CCNC: 65 U/L (ref 55–135)
ALT SERPL W/O P-5'-P-CCNC: 20 U/L (ref 10–44)
ANION GAP SERPL CALC-SCNC: 13 MMOL/L (ref 8–16)
AST SERPL-CCNC: 17 U/L (ref 10–40)
BASOPHILS # BLD AUTO: 0.11 K/UL (ref 0–0.2)
BASOPHILS NFR BLD: 1.2 % (ref 0–1.9)
BILIRUB DIRECT SERPL-MCNC: 0.1 MG/DL (ref 0.1–0.3)
BILIRUB SERPL-MCNC: 0.3 MG/DL (ref 0.1–1)
BUN SERPL-MCNC: 14 MG/DL (ref 8–23)
CALCIUM SERPL-MCNC: 10.3 MG/DL (ref 8.7–10.5)
CHLORIDE SERPL-SCNC: 103 MMOL/L (ref 95–110)
CHOLEST SERPL-MCNC: 111 MG/DL (ref 120–199)
CHOLEST/HDLC SERPL: 2.9 {RATIO} (ref 2–5)
CO2 SERPL-SCNC: 23 MMOL/L (ref 23–29)
CREAT SERPL-MCNC: 0.7 MG/DL (ref 0.5–1.4)
DIFFERENTIAL METHOD BLD: ABNORMAL
EOSINOPHIL # BLD AUTO: 0.4 K/UL (ref 0–0.5)
EOSINOPHIL NFR BLD: 4.7 % (ref 0–8)
ERYTHROCYTE [DISTWIDTH] IN BLOOD BY AUTOMATED COUNT: 13.2 % (ref 11.5–14.5)
EST. GFR  (NO RACE VARIABLE): >60 ML/MIN/1.73 M^2
ESTIMATED AVG GLUCOSE: 120 MG/DL (ref 68–131)
GLUCOSE SERPL-MCNC: 95 MG/DL (ref 70–110)
HBA1C MFR BLD: 5.8 % (ref 4–5.6)
HCT VFR BLD AUTO: 45.2 % (ref 37–48.5)
HDLC SERPL-MCNC: 38 MG/DL (ref 40–75)
HDLC SERPL: 34.2 % (ref 20–50)
HGB BLD-MCNC: 14.6 G/DL (ref 12–16)
IMM GRANULOCYTES # BLD AUTO: 0.06 K/UL (ref 0–0.04)
IMM GRANULOCYTES NFR BLD AUTO: 0.6 % (ref 0–0.5)
LDLC SERPL CALC-MCNC: 51.4 MG/DL (ref 63–159)
LYMPHOCYTES # BLD AUTO: 3.2 K/UL (ref 1–4.8)
LYMPHOCYTES NFR BLD: 33.8 % (ref 18–48)
MCH RBC QN AUTO: 28.2 PG (ref 27–31)
MCHC RBC AUTO-ENTMCNC: 32.3 G/DL (ref 32–36)
MCV RBC AUTO: 87 FL (ref 82–98)
MONOCYTES # BLD AUTO: 0.6 K/UL (ref 0.3–1)
MONOCYTES NFR BLD: 6.6 % (ref 4–15)
NEUTROPHILS # BLD AUTO: 5 K/UL (ref 1.8–7.7)
NEUTROPHILS NFR BLD: 53.1 % (ref 38–73)
NONHDLC SERPL-MCNC: 73 MG/DL
NRBC BLD-RTO: 0 /100 WBC
PHOSPHATE SERPL-MCNC: 2.9 MG/DL (ref 2.7–4.5)
PLATELET # BLD AUTO: 341 K/UL (ref 150–450)
PMV BLD AUTO: 12.7 FL (ref 9.2–12.9)
POTASSIUM SERPL-SCNC: 3.7 MMOL/L (ref 3.5–5.1)
PROT SERPL-MCNC: 7.5 G/DL (ref 6–8.4)
RBC # BLD AUTO: 5.17 M/UL (ref 4–5.4)
SODIUM SERPL-SCNC: 139 MMOL/L (ref 136–145)
T4 FREE SERPL-MCNC: 0.85 NG/DL (ref 0.71–1.51)
TRIGL SERPL-MCNC: 108 MG/DL (ref 30–150)
TSH SERPL DL<=0.005 MIU/L-ACNC: 1.73 UIU/ML (ref 0.4–4)
WBC # BLD AUTO: 9.34 K/UL (ref 3.9–12.7)

## 2024-07-30 PROCEDURE — 80061 LIPID PANEL: CPT | Mod: HCNC | Performed by: INTERNAL MEDICINE

## 2024-07-30 PROCEDURE — 80076 HEPATIC FUNCTION PANEL: CPT | Mod: HCNC | Performed by: INTERNAL MEDICINE

## 2024-07-30 PROCEDURE — 36415 COLL VENOUS BLD VENIPUNCTURE: CPT | Mod: HCNC,PO | Performed by: HOSPITALIST

## 2024-07-30 PROCEDURE — 84443 ASSAY THYROID STIM HORMONE: CPT | Mod: HCNC | Performed by: HOSPITALIST

## 2024-07-30 PROCEDURE — 83036 HEMOGLOBIN GLYCOSYLATED A1C: CPT | Mod: HCNC | Performed by: HOSPITALIST

## 2024-07-30 PROCEDURE — 82306 VITAMIN D 25 HYDROXY: CPT | Mod: HCNC | Performed by: HOSPITALIST

## 2024-07-30 PROCEDURE — 80069 RENAL FUNCTION PANEL: CPT | Mod: HCNC | Performed by: HOSPITALIST

## 2024-07-30 PROCEDURE — 84439 ASSAY OF FREE THYROXINE: CPT | Mod: HCNC | Performed by: HOSPITALIST

## 2024-07-30 PROCEDURE — 85025 COMPLETE CBC W/AUTO DIFF WBC: CPT | Mod: HCNC | Performed by: INTERNAL MEDICINE

## 2024-08-04 DIAGNOSIS — E78.5 HYPERLIPIDEMIA LDL GOAL <100: ICD-10-CM

## 2024-08-04 DIAGNOSIS — K21.9 GASTROESOPHAGEAL REFLUX DISEASE WITHOUT ESOPHAGITIS: ICD-10-CM

## 2024-08-04 RX ORDER — OMEPRAZOLE 20 MG/1
CAPSULE, DELAYED RELEASE ORAL
Qty: 90 CAPSULE | Refills: 1 | Status: SHIPPED | OUTPATIENT
Start: 2024-08-04

## 2024-08-04 RX ORDER — ROSUVASTATIN CALCIUM 5 MG/1
5 TABLET, COATED ORAL NIGHTLY
Qty: 90 TABLET | Refills: 1 | Status: SHIPPED | OUTPATIENT
Start: 2024-08-04

## 2024-08-06 ENCOUNTER — OFFICE VISIT (OUTPATIENT)
Dept: ENDOCRINOLOGY | Facility: CLINIC | Age: 79
End: 2024-08-06
Payer: MEDICARE

## 2024-08-06 VITALS
DIASTOLIC BLOOD PRESSURE: 66 MMHG | WEIGHT: 151.19 LBS | HEART RATE: 84 BPM | BODY MASS INDEX: 28.57 KG/M2 | SYSTOLIC BLOOD PRESSURE: 120 MMHG

## 2024-08-06 DIAGNOSIS — E03.8 SUBCLINICAL HYPOTHYROIDISM: ICD-10-CM

## 2024-08-06 DIAGNOSIS — E66.3 OVERWEIGHT (BMI 25.0-29.9): ICD-10-CM

## 2024-08-06 DIAGNOSIS — E78.5 HYPERLIPIDEMIA LDL GOAL <100: ICD-10-CM

## 2024-08-06 DIAGNOSIS — E55.9 MILD VITAMIN D DEFICIENCY: ICD-10-CM

## 2024-08-06 DIAGNOSIS — E11.65 TYPE 2 DIABETES MELLITUS WITH HYPERGLYCEMIA, WITHOUT LONG-TERM CURRENT USE OF INSULIN: Primary | ICD-10-CM

## 2024-08-06 DIAGNOSIS — M81.8 OTHER OSTEOPOROSIS WITHOUT CURRENT PATHOLOGICAL FRACTURE: ICD-10-CM

## 2024-08-06 PROCEDURE — 3074F SYST BP LT 130 MM HG: CPT | Mod: HCNC,CPTII,S$GLB, | Performed by: HOSPITALIST

## 2024-08-06 PROCEDURE — 3078F DIAST BP <80 MM HG: CPT | Mod: HCNC,CPTII,S$GLB, | Performed by: HOSPITALIST

## 2024-08-06 PROCEDURE — 1159F MED LIST DOCD IN RCRD: CPT | Mod: HCNC,CPTII,S$GLB, | Performed by: HOSPITALIST

## 2024-08-06 PROCEDURE — 1160F RVW MEDS BY RX/DR IN RCRD: CPT | Mod: HCNC,CPTII,S$GLB, | Performed by: HOSPITALIST

## 2024-08-06 PROCEDURE — 99215 OFFICE O/P EST HI 40 MIN: CPT | Mod: HCNC,S$GLB,, | Performed by: HOSPITALIST

## 2024-08-06 PROCEDURE — 1101F PT FALLS ASSESS-DOCD LE1/YR: CPT | Mod: HCNC,CPTII,S$GLB, | Performed by: HOSPITALIST

## 2024-08-06 PROCEDURE — 3288F FALL RISK ASSESSMENT DOCD: CPT | Mod: HCNC,CPTII,S$GLB, | Performed by: HOSPITALIST

## 2024-08-06 PROCEDURE — 99999 PR PBB SHADOW E&M-EST. PATIENT-LVL IV: CPT | Mod: PBBFAC,HCNC,, | Performed by: HOSPITALIST

## 2024-08-06 RX ORDER — LANCETS
EACH MISCELLANEOUS
Qty: 200 EACH | Refills: 8 | Status: SHIPPED | OUTPATIENT
Start: 2024-08-06

## 2024-08-06 RX ORDER — LEVOTHYROXINE SODIUM 50 UG/1
50 TABLET ORAL
Qty: 90 TABLET | Refills: 3 | Status: SHIPPED | OUTPATIENT
Start: 2024-08-06

## 2024-08-06 RX ORDER — SEMAGLUTIDE 1.34 MG/ML
1 INJECTION, SOLUTION SUBCUTANEOUS
Qty: 12 ML | Refills: 3 | Status: SHIPPED | OUTPATIENT
Start: 2024-08-06

## 2024-08-06 RX ORDER — METFORMIN HYDROCHLORIDE 1000 MG/1
1000 TABLET ORAL 2 TIMES DAILY
Qty: 180 TABLET | Refills: 3 | Status: SHIPPED | OUTPATIENT
Start: 2024-08-06

## 2024-08-06 RX ORDER — INSULIN PUMP SYRINGE, 3 ML
EACH MISCELLANEOUS
Qty: 1 EACH | Refills: 0 | Status: SHIPPED | OUTPATIENT
Start: 2024-08-06

## 2024-08-06 RX ORDER — GLIMEPIRIDE 4 MG/1
4 TABLET ORAL 2 TIMES DAILY
Qty: 180 TABLET | Refills: 3 | Status: SHIPPED | OUTPATIENT
Start: 2024-08-06

## 2024-08-06 RX ORDER — LANCING DEVICE
EACH MISCELLANEOUS
Qty: 1 EACH | Refills: 0 | Status: SHIPPED | OUTPATIENT
Start: 2024-08-06

## 2024-08-07 ENCOUNTER — PATIENT MESSAGE (OUTPATIENT)
Dept: PHARMACY | Facility: CLINIC | Age: 79
End: 2024-08-07
Payer: MEDICARE

## 2024-08-15 ENCOUNTER — PATIENT MESSAGE (OUTPATIENT)
Dept: PHARMACY | Facility: CLINIC | Age: 79
End: 2024-08-15
Payer: MEDICARE

## 2024-08-18 DIAGNOSIS — E03.8 SUBCLINICAL HYPOTHYROIDISM: ICD-10-CM

## 2024-08-19 RX ORDER — LEVOTHYROXINE SODIUM 50 UG/1
50 TABLET ORAL
Qty: 90 TABLET | Refills: 3 | Status: SHIPPED | OUTPATIENT
Start: 2024-08-19

## 2024-09-15 DIAGNOSIS — E03.8 SUBCLINICAL HYPOTHYROIDISM: ICD-10-CM

## 2024-09-19 RX ORDER — LEVOTHYROXINE SODIUM 50 UG/1
50 TABLET ORAL
Qty: 90 TABLET | Refills: 3 | Status: SHIPPED | OUTPATIENT
Start: 2024-09-19

## 2024-09-20 ENCOUNTER — INFUSION (OUTPATIENT)
Dept: INFUSION THERAPY | Facility: HOSPITAL | Age: 79
End: 2024-09-20
Attending: HOSPITALIST
Payer: MEDICARE

## 2024-09-20 VITALS
DIASTOLIC BLOOD PRESSURE: 65 MMHG | HEART RATE: 68 BPM | TEMPERATURE: 98 F | RESPIRATION RATE: 16 BRPM | OXYGEN SATURATION: 96 % | SYSTOLIC BLOOD PRESSURE: 140 MMHG

## 2024-09-20 DIAGNOSIS — M81.8 OTHER OSTEOPOROSIS WITHOUT CURRENT PATHOLOGICAL FRACTURE: Primary | ICD-10-CM

## 2024-09-20 PROCEDURE — 96372 THER/PROPH/DIAG INJ SC/IM: CPT | Mod: HCNC

## 2024-09-20 PROCEDURE — 63600175 PHARM REV CODE 636 W HCPCS: Mod: JZ,JG,HCNC | Performed by: HOSPITALIST

## 2024-09-20 RX ADMIN — DENOSUMAB 60 MG: 60 INJECTION SUBCUTANEOUS at 09:09

## 2024-11-21 ENCOUNTER — PATIENT MESSAGE (OUTPATIENT)
Dept: FAMILY MEDICINE | Facility: CLINIC | Age: 79
End: 2024-11-21
Payer: MEDICARE

## 2024-12-02 ENCOUNTER — LAB VISIT (OUTPATIENT)
Dept: LAB | Facility: HOSPITAL | Age: 79
End: 2024-12-02
Attending: HOSPITALIST
Payer: MEDICARE

## 2024-12-02 DIAGNOSIS — E11.65 TYPE 2 DIABETES MELLITUS WITH HYPERGLYCEMIA, WITHOUT LONG-TERM CURRENT USE OF INSULIN: ICD-10-CM

## 2024-12-02 LAB
ANION GAP SERPL CALC-SCNC: 9 MMOL/L (ref 8–16)
BUN SERPL-MCNC: 12 MG/DL (ref 8–23)
CALCIUM SERPL-MCNC: 10.4 MG/DL (ref 8.7–10.5)
CHLORIDE SERPL-SCNC: 101 MMOL/L (ref 95–110)
CO2 SERPL-SCNC: 29 MMOL/L (ref 23–29)
CREAT SERPL-MCNC: 0.7 MG/DL (ref 0.5–1.4)
EST. GFR  (NO RACE VARIABLE): >60 ML/MIN/1.73 M^2
ESTIMATED AVG GLUCOSE: 146 MG/DL (ref 68–131)
GLUCOSE SERPL-MCNC: 149 MG/DL (ref 70–110)
HBA1C MFR BLD: 6.7 % (ref 4–5.6)
POTASSIUM SERPL-SCNC: 4 MMOL/L (ref 3.5–5.1)
SODIUM SERPL-SCNC: 139 MMOL/L (ref 136–145)

## 2024-12-02 PROCEDURE — 80048 BASIC METABOLIC PNL TOTAL CA: CPT | Mod: HCNC | Performed by: HOSPITALIST

## 2024-12-02 PROCEDURE — 36415 COLL VENOUS BLD VENIPUNCTURE: CPT | Mod: HCNC | Performed by: HOSPITALIST

## 2024-12-02 PROCEDURE — 83036 HEMOGLOBIN GLYCOSYLATED A1C: CPT | Mod: HCNC | Performed by: HOSPITALIST

## 2024-12-10 ENCOUNTER — TELEPHONE (OUTPATIENT)
Dept: PHARMACY | Facility: CLINIC | Age: 79
End: 2024-12-10
Payer: MEDICARE

## 2024-12-10 ENCOUNTER — OFFICE VISIT (OUTPATIENT)
Dept: ENDOCRINOLOGY | Facility: CLINIC | Age: 79
End: 2024-12-10
Payer: MEDICARE

## 2024-12-10 VITALS
HEART RATE: 86 BPM | BODY MASS INDEX: 28.46 KG/M2 | SYSTOLIC BLOOD PRESSURE: 142 MMHG | WEIGHT: 150.63 LBS | DIASTOLIC BLOOD PRESSURE: 70 MMHG

## 2024-12-10 DIAGNOSIS — E03.8 SUBCLINICAL HYPOTHYROIDISM: ICD-10-CM

## 2024-12-10 DIAGNOSIS — E55.9 MILD VITAMIN D DEFICIENCY: ICD-10-CM

## 2024-12-10 DIAGNOSIS — M81.8 OTHER OSTEOPOROSIS WITHOUT CURRENT PATHOLOGICAL FRACTURE: ICD-10-CM

## 2024-12-10 DIAGNOSIS — E78.5 HYPERLIPIDEMIA LDL GOAL <100: ICD-10-CM

## 2024-12-10 DIAGNOSIS — E11.65 TYPE 2 DIABETES MELLITUS WITH HYPERGLYCEMIA, WITHOUT LONG-TERM CURRENT USE OF INSULIN: Primary | ICD-10-CM

## 2024-12-10 DIAGNOSIS — I10 BENIGN HYPERTENSION: ICD-10-CM

## 2024-12-10 PROCEDURE — 3078F DIAST BP <80 MM HG: CPT | Mod: HCNC,CPTII,S$GLB, | Performed by: HOSPITALIST

## 2024-12-10 PROCEDURE — 1159F MED LIST DOCD IN RCRD: CPT | Mod: HCNC,CPTII,S$GLB, | Performed by: HOSPITALIST

## 2024-12-10 PROCEDURE — 99215 OFFICE O/P EST HI 40 MIN: CPT | Mod: HCNC,S$GLB,, | Performed by: HOSPITALIST

## 2024-12-10 PROCEDURE — 99999 PR PBB SHADOW E&M-EST. PATIENT-LVL IV: CPT | Mod: PBBFAC,HCNC,, | Performed by: HOSPITALIST

## 2024-12-10 PROCEDURE — 1101F PT FALLS ASSESS-DOCD LE1/YR: CPT | Mod: HCNC,CPTII,S$GLB, | Performed by: HOSPITALIST

## 2024-12-10 PROCEDURE — 3288F FALL RISK ASSESSMENT DOCD: CPT | Mod: HCNC,CPTII,S$GLB, | Performed by: HOSPITALIST

## 2024-12-10 PROCEDURE — 1160F RVW MEDS BY RX/DR IN RCRD: CPT | Mod: HCNC,CPTII,S$GLB, | Performed by: HOSPITALIST

## 2024-12-10 PROCEDURE — 3077F SYST BP >= 140 MM HG: CPT | Mod: HCNC,CPTII,S$GLB, | Performed by: HOSPITALIST

## 2024-12-10 RX ORDER — LEVOTHYROXINE SODIUM 50 UG/1
50 TABLET ORAL
Qty: 90 TABLET | Refills: 3 | Status: SHIPPED | OUTPATIENT
Start: 2024-12-10

## 2024-12-10 RX ORDER — METFORMIN HYDROCHLORIDE 1000 MG/1
1000 TABLET ORAL 2 TIMES DAILY
Qty: 180 TABLET | Refills: 3 | Status: SHIPPED | OUTPATIENT
Start: 2024-12-10

## 2024-12-10 RX ORDER — GLIMEPIRIDE 4 MG/1
4 TABLET ORAL
Qty: 90 TABLET | Refills: 3 | Status: SHIPPED | OUTPATIENT
Start: 2024-12-10

## 2024-12-10 NOTE — ASSESSMENT & PLAN NOTE
- lab work reviewed, vitamin-D goal greater than 30  - on Vit D supplement  - lab work every 6 mo or yearly  - continue OTC VitD3 daily

## 2024-12-10 NOTE — ASSESSMENT & PLAN NOTE
- ASCVD Risk below: Statin: Taking  The ASCVD Risk score (Larry MORALES, et al., 2019) failed to calculate for the following reasons:    The valid total cholesterol range is 130 to 320 mg/dL

## 2024-12-10 NOTE — ASSESSMENT & PLAN NOTE
- BP reviewed today  - continue home medications  - manage by PCP  - on HCTZ can lead to mild hypercalcemia previously message sent to PCP recommending alternative therapy.  - still on hydrochlorothiazide will continue monitor

## 2024-12-10 NOTE — TELEPHONE ENCOUNTER
Patient is already enrolled in the program.  She should have enough medication to last her until the end of the year.  We will work on renewals this month for the upcoming year.

## 2024-12-10 NOTE — PROGRESS NOTES
Subjective:      Patient ID: Peace Farmer is a 79 y.o. female presented to [facility] on 12/10/2024.  Chief Complaint:  Diabetes    History of Present Illness: Peace Farmer is a 79 y.o. female here for diabetes  Other significant past medical history:  Osteoporosis, abnormal thyroid function, hypertension      Interval history:  Patient is here for follow-up, to discuss multiple endocrine issues.  In regards to diabetes, has been doing much better, A1c increased  6.7 due to dietary indiscretion  Getting Ozempic from pharmacy assistant, tolerating 1 mg  Currently on metformin once a day, glimepiride once a day  Patient is aware about dietary indiscretion leading to worsening glucose control.    Denies any hypoglycemia.   Obesity:  Current weight: 150  , previous office visit weight: 151,148, 143, 146    In regards to hypothyroidism:  Currently on levothyroxine 50mcg daily  In regards to osteoporosis:  After discussion with her patient, worsening bone density noted, started Prolia injection 9/20/2024 at [facility].  Patient denies any falls fractures.  Currently taking multivitamin, and calcium supplemement twice a day      1) Diabetes Mellitus Type 2  - Known diabetic complications: none  - Diagnosed w/ DM: 2008?, worsening now  - On restrictive diet, small portion, low carbohydrates.  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  - getting Ozempic from pharmacy assistant 2024  - prefers to continue metformin and glimepiride.    Current meds:     Ozempic 1.0 mg once a week injection  Metformin 1000 mg daily  Glimepiride 4mg daily  Previous meds:              Januvia   Trulicity 1.5 mg Q weekly injection  (expensive, inconsistently getting medication)  Home glucose checks: checks 2x a day, Logs reviewed/Unavailable: oral  "recall:   Hypoglycemia: denies  Diet/Exercise:               Eating multiple small meals  per day (7x)              Drink: diet coke  Weight trend: stable  Diabetes Education: yes, many years ago  Diabetes Related Hospitalization:  No  Hx of pancreatitis: No  Family history of diabetes: Yes. brother  Occupation: retired    Eye exam current (within one year): yes, DR: no  Reports cuts or ulcers on feet:   Denies  Statin: Taking, ACE/ARB: Taking    Diabetes lab work  Lab Results   Component Value Date    HGBA1C 6.7 (H) 12/02/2024    HGBA1C 5.8 (H) 07/30/2024    HGBA1C 5.3 03/26/2024    HGBA1C 5.3 10/25/2023     No results found for: "CPEPTIDE", "GLUTAMICACID", "ISLETCELLANT"   No results found for: "FRUCTOSAMINE"  Lab Results   Component Value Date    MICALBCREAT 12.0 03/28/2024     Lab Results   Component Value Date    IGWMBQIH35 1581 (H) 10/12/2020     Diabetes Management Status: Reviewed this office visit  Screening or Prevention Patient's value Goal Complete/Controlled?   Lipid profile : 07/30/2024 Annually Yes     Dilated retinal exam : 03/07/2024 Annually Yes     Foot exam   Most Recent Foot Exam Date: Not Found Annually Yes          2) Subclinical hypothyroidism  - TSH elevation, 1x. Denies goiter  - Family history thyroid disorder: son with hypothyroidism   - patient's main concern is:  Fatigue and Heat intolerance  - started on low-dose levothyroxine 25 mcg daily 6/2022, trial of thyroid replacement therapy has help "clear up brain fog".  - Now on Levothyroxine 50mcg daily, doing well at this time      Lab Results   Component Value Date    TSH 1.728 07/30/2024    TSH 0.581 10/25/2023    TSH 1.576 05/17/2023    FREET4 0.85 07/30/2024    FREET4 1.12 10/25/2023    FREET4 0.97 05/17/2023    THYROPEROXID <6.0 06/30/2022       3) Osteoporosis  - Previously with osteopenia, 2020, previously managed by Dr. Rashid  - Was on Boniva for treatment of osteopenia, leading to heartburn> she does not to be on this " medication  - Dr. Rashid wanted to switch patient to Reclast, for which she never started    - Recent bone density 05/2022 show osteoporosis  - She is taking Calcium and Vit D supplements. 500mg/1000iu  - She had a hysterectomy at 35 y/o and menopausal symptoms at 52 y/o.   - Holding off on treatment including Prolia therapy due to  illnesses, she is having dif time balancing both, over the last few office visit in 2023/2024, she has declined therapy  - Denies any fall  - 8/6/2024: Office visit, patient was comfortable in starting therapy.  Due to major decline of her bone density, -5.9% in total hip and -5.2% in lumbar spine  - Start patient on SC Prolia as discussed previously.  First injection 9/20/2024, so far has gotten 1 injection  - HCTZ for HTN chronically: Monitor for possibility of hypercalcemia    DXA  05/10/2024  Lumbar spine (L1-L4): BMD is 0.806 g/cm2, T-score is -2.2, and Z-score is 0.3.  Total hip: BMD is 0.707 g/cm2, T-score is -1.9, and Z-score is 0.0.  Femoral neck: BMD is 0.544 g/cm2, T-score is -2.7, and Z-score is -0.6.  Distal 1/3 radius: Not applicable     FRAX:  19% risk of a major osteoporotic fracture in the next 10 years.  6.5% risk of hip fracture in the next 10 years.     Impression:  *Osteoporosis based on T-score below -2.5.  *Fracture risk is very high due to calculated 10 year risk of hip fracture >4.5% (FRAX).  *Compared with previous DXA, BMD at the lumbar spine has declined by -5.2%, and BMD at the total hip has declined by -5.9%.    5/2022  Lumbar spine (L1-L4): BMD is 0.850 g/cm2, T-score is -1.8, and Z-score is 0.6.  Total hip:  BMD is 0.751 g/cm2, T-score is -1.6, and Z-score is 0.2.  Femoral neck: BMD is 0.555 g/cm2, T-score is -2.6, and Z-score is -0.6.     FRAX:  16% risk of a major osteoporotic fracture in the next 10 years.  5.1% risk of hip fracture in the next 10 years.     Impression:  *Osteoporosis based on T-score below -2.5    Reviewed past surgical, medical,  family, social history and updated as appropriate.  Review of Systems: see HPI above  Objective:   BP (!) 142/70   Pulse 86   Wt 68.3 kg (150 lb 9.6 oz)   BMI 28.46 kg/m²     Body mass index is 28.46 kg/m².  Vital signs reviewed    Physical Exam  Vitals and nursing note reviewed.   Constitutional:       General: She is not in acute distress.     Appearance: Normal appearance. She is well-developed. She is obese. She is not toxic-appearing.   Neck:      Thyroid: No thyromegaly.      Comments: No goiter noted  Cardiovascular:      Heart sounds: Normal heart sounds.   Pulmonary:      Effort: Pulmonary effort is normal. No respiratory distress.   Abdominal:      Tenderness: There is no abdominal tenderness.   Musculoskeletal:         General: No deformity. Normal range of motion.      Cervical back: Normal range of motion.   Skin:     Findings: No bruising.   Neurological:      Mental Status: She is alert and oriented to person, place, and time.   Psychiatric:         Mood and Affect: Mood normal.     Lab Reviewed:  See results in subjective  Lab Results   Component Value Date    HGBA1C 6.7 (H) 12/02/2024     Lab Results   Component Value Date    CHOL 111 (L) 07/30/2024    HDL 38 (L) 07/30/2024    LDLCALC 51.4 (L) 07/30/2024    TRIG 108 07/30/2024    CHOLHDL 34.2 07/30/2024     Lab Results   Component Value Date     12/02/2024    K 4.0 12/02/2024     12/02/2024    CO2 29 12/02/2024     (H) 12/02/2024    BUN 12 12/02/2024    CREATININE 0.7 12/02/2024    CALCIUM 10.4 12/02/2024    PHOS 2.9 07/30/2024    PROT 7.5 07/30/2024    ALBUMIN 4.4 07/30/2024    ALBUMIN 4.4 07/30/2024    BILITOT 0.3 07/30/2024    ALKPHOS 65 07/30/2024    AST 17 07/30/2024    ALT 20 07/30/2024    ANIONGAP 9 12/02/2024    EGFRNORACEVR >60.0 12/02/2024    TSH 1.728 07/30/2024    PTH 64.0 05/13/2020    [id number] 30 07/30/2024     Assessment     1. Type 2 diabetes mellitus with hyperglycemia, without long-term current use of  insulin  RENAL FUNCTION PANEL    HEMOGLOBIN A1C    Ambulatory referral/consult to Pharmacy Assistance    glimepiride (AMARYL) 4 MG tablet    metFORMIN (GLUCOPHAGE) 1000 MG tablet      2. Subclinical hypothyroidism  levothyroxine (SYNTHROID) 50 MCG tablet    TSH    T4, Free      3. Other osteoporosis without current pathological fracture        4. Benign hypertension        5. Hyperlipidemia LDL goal <100        6. Mild vitamin D deficiency          Plan     Type 2 diabetes mellitus with hyperglycemia, without long-term current use of insulin  - Diabetes is AT GOAL, as indicated by the most recent A1C reviewed on 12/10/2024.  - Therapy Goals: Discussed the aim to achieve optimal control without causing hypoglycemia, Targeting an A1C of <7%.  - Diabetic Supplies and Medications: Reviewed to ensure continued refills and compliance.  - Preventive Care: Advised the patient to schedule periodic eye exams and maintain regular foot care monitoring.  - Annual Monitoring: Reviewed the importance of yearly lipid profile (with statin use) and urine protein/creatinine tests.    Plan  - Changes:  Increase in A1c due to dietary indiscretion.  Continue Ozempic 1 mg once a week injection: With pharmacy assistant, referral for 2025  - Increase metformin 1000 mg twice a day, continue glimepiride 4 mg daily (monitor for hypoglycemia)  - Dietary Modifications: Encouraged portion size control and reduction of carbohydrate intake to better manage diabetes.   - Hypoglycemia Management: Discussed symptoms and treatment of hypoglycemia. The patient is informed  - Clear written instructions provided on AVS. Follow-up scheduled within the next 6 months with lab work prior.   - Appointment date and time were provided to the patient today.    Other osteoporosis without current pathological fracture  - Longstanding history of Osteopenia with high FRAX at the hip 2018 >> unable to tolerate oral bisphosphonate in the past 2018  - Was on Boniva for  treatment of osteopenia, leading to heartburn> she does not to be on this medication  - Recent bone density 05/2022 show osteoporosis  - Holding off on treatment including Prolia therapy due to  illnesses, she is having dif time balancing both, over the last few office visit in 2023/2024, she had declined therapy   - Most recent bone density  DXA 05/10/2024: Review, worsening osteoporosis femoral neck T-score -2.7, Compared with previous DXA, BMD at the lumbar spine has declined by -5.2%, and BMD at the total hip has declined by -5.9%.  - Risk factor:  Small stature and family history osteoporosis,  - Reassuring: No history of fragility fracture, no tobacco use, not at risk for falls/fracture    PLAN  - Start patient on SC Prolia every 6 months.  First injection 9/20/2024, so far has gotten 1 injection  - CONTINUE PROLIA INJECTION  - HCTZ for HTN chronically: Monitor for possibility of hypercalcemia  - Duration of therapy of at minimum 2 years and side effect profile discussed. given High risk of fracture, outweighs the risk of side effects  - Recommend the patient take sufficient calcium and vitamin D3 OTC  - Check routine lab work: check Renal Panel, vitamin-D regularly   - Fall precautions/Exercise regimen: advised  - Routine dental health screening advised, dental cleaning every 6 months  - Next DXA: 2 years from most current, 5/10/2026  - Routine follow-up with me for monitoring    Subclinical hypothyroidism  - no history of thyroid dysfunction, not on thyroid medication  - patient is quite concerned about her thyroid function given her son's history of hypothyroidism, patient with TPO negative antibody  - TSH improved, while on low-dose levothyroxine, with improvement in symptoms  - continue levothyroxine 50 mcg daily  - repeat TFTs in the future    Benign hypertension  - BP reviewed today  - continue home medications  - manage by PCP  - on HCTZ can lead to mild hypercalcemia previously message sent to  PCP recommending alternative therapy.  - still on hydrochlorothiazide will continue monitor    Hyperlipidemia LDL goal <100  - ASCVD Risk below: Statin: Taking  The ASCVD Risk score (Larry DK, et al., 2019) failed to calculate for the following reasons:    The valid total cholesterol range is 130 to 320 mg/dL      Mild vitamin D deficiency  - lab work reviewed, vitamin-D goal greater than 30  - on Vit D supplement  - lab work every 6 mo or yearly  - continue OTC VitD3 daily    Advised patient to follow up with PCP for routine health maintenance care.   RTC in 5 months    I spent a total of 41 minutes on the day of the visit.This includes face to face time and non-face to face time preparing to see the patient (eg, review of tests), obtaining and/or reviewing separately obtained history, documenting clinical information in the electronic or other health record, independently interpreting results and communicating results to the patient/family/caregiver, or care coordinator.     Earnest Zavaleta M.D.  Endocrinology  [facility]  12/10/2024      Disclaimer: This note has been generated in part with the use of voice-recognition software. There may be typographical errors that have been missed during proof-reading.

## 2024-12-10 NOTE — ASSESSMENT & PLAN NOTE
- Diabetes is AT GOAL, as indicated by the most recent A1C reviewed on 12/10/2024.  - Therapy Goals: Discussed the aim to achieve optimal control without causing hypoglycemia, Targeting an A1C of <7%.  - Diabetic Supplies and Medications: Reviewed to ensure continued refills and compliance.  - Preventive Care: Advised the patient to schedule periodic eye exams and maintain regular foot care monitoring.  - Annual Monitoring: Reviewed the importance of yearly lipid profile (with statin use) and urine protein/creatinine tests.    Plan  - Changes:  Increase in A1c due to dietary indiscretion.  Continue Ozempic 1 mg once a week injection: With pharmacy assistant, referral for 2025  - Increase metformin 1000 mg twice a day, continue glimepiride 4 mg daily (monitor for hypoglycemia)  - Dietary Modifications: Encouraged portion size control and reduction of carbohydrate intake to better manage diabetes.   - Hypoglycemia Management: Discussed symptoms and treatment of hypoglycemia. The patient is informed  - Clear written instructions provided on AVS. Follow-up scheduled within the next 6 months with lab work prior.   - Appointment date and time were provided to the patient today.

## 2024-12-10 NOTE — ASSESSMENT & PLAN NOTE
- Longstanding history of Osteopenia with high FRAX at the hip 2018 >> unable to tolerate oral bisphosphonate in the past 2018  - Was on Boniva for treatment of osteopenia, leading to heartburn> she does not to be on this medication  - Recent bone density 05/2022 show osteoporosis  - Holding off on treatment including Prolia therapy due to  illnesses, she is having dif time balancing both, over the last few office visit in 2023/2024, she had declined therapy   - Most recent bone density  DXA 05/10/2024: Review, worsening osteoporosis femoral neck T-score -2.7, Compared with previous DXA, BMD at the lumbar spine has declined by -5.2%, and BMD at the total hip has declined by -5.9%.  - Risk factor:  Small stature and family history osteoporosis,  - Reassuring: No history of fragility fracture, no tobacco use, not at risk for falls/fracture    PLAN  - Start patient on SC Prolia every 6 months.  First injection 9/20/2024, so far has gotten 1 injection  - CONTINUE PROLIA INJECTION  - HCTZ for HTN chronically: Monitor for possibility of hypercalcemia  - Duration of therapy of at minimum 2 years and side effect profile discussed. given High risk of fracture, outweighs the risk of side effects  - Recommend the patient take sufficient calcium and vitamin D3 OTC  - Check routine lab work: check Renal Panel, vitamin-D regularly   - Fall precautions/Exercise regimen: advised  - Routine dental health screening advised, dental cleaning every 6 months  - Next DXA: 2 years from most current, 5/10/2026  - Routine follow-up with me for monitoring

## 2024-12-11 NOTE — TELEPHONE ENCOUNTER
On 08/9/2024 we received a dosage increase for her Ozempic to 1mg.  On her medication profile in Epic it has Ozempic 0.25 & 0.5mg.  Please update the patients medication profile in Epic with a current prescription so that I can send you the 2025  re-enrollment application to sign for the correct dose.

## 2024-12-12 ENCOUNTER — PATIENT MESSAGE (OUTPATIENT)
Dept: PHARMACY | Facility: CLINIC | Age: 79
End: 2024-12-12
Payer: MEDICARE

## 2024-12-12 ENCOUNTER — TELEPHONE (OUTPATIENT)
Dept: ENDOCRINOLOGY | Facility: CLINIC | Age: 79
End: 2024-12-12
Payer: MEDICARE

## 2024-12-12 NOTE — TELEPHONE ENCOUNTER
A Patient Assistance Application for Peace Farmer  - MRN [id number]   was faxed to your office @ [phone]. Please have Dr. Earnest Zavaleta review the application to ensure the prescription is correct. If correct, sign and fax the application back to the Pharmacy Patient Assistance Team @[phone]. Do not fax to the Manufacture Program    Please do not write any corrections on this application.  If changes are needed, let me know ASAP and the corrected application will be re-faxed to your office for Provider signature.      Sheryl Theodore

## 2024-12-13 ENCOUNTER — PATIENT MESSAGE (OUTPATIENT)
Dept: PHARMACY | Facility: CLINIC | Age: 79
End: 2024-12-13
Payer: MEDICARE

## 2024-12-13 NOTE — TELEPHONE ENCOUNTER
The prescription portion of Ms. Farmer 's Ozempic 1mg application was received from the providers office. The completed patient assistance application has been submitted to  [employer] for consideration of eligibility.

## 2024-12-17 ENCOUNTER — PATIENT MESSAGE (OUTPATIENT)
Dept: PHARMACY | Facility: CLINIC | Age: 79
End: 2024-12-17
Payer: MEDICARE

## 2024-12-29 DIAGNOSIS — E78.5 HYPERLIPIDEMIA LDL GOAL <100: ICD-10-CM

## 2024-12-29 DIAGNOSIS — K21.9 GASTROESOPHAGEAL REFLUX DISEASE WITHOUT ESOPHAGITIS: ICD-10-CM

## 2024-12-29 NOTE — TELEPHONE ENCOUNTER
Care Due:                  Date            Visit Type   Department     Provider  --------------------------------------------------------------------------------                                EP [facility]                              PRIMARY      MED/ INTERNAL  Tanesha Mo  Last Visit: 12-      CARE ([facility])   MED/ PEDS      Lo Garber  Next Visit: None Scheduled  None         None Found                                                            Last  Test          Frequency    Reason                     Performed    Due Date  --------------------------------------------------------------------------------    Office Visit  15 months..  losartan-hydrochlorothiaz  12- 03-                             norah, omeprazole,                             rosuvastatin.............    [employer] Embedded Care Due Messages. Reference number: [id number].   12/29/2024 12:50:48 PM CST

## 2024-12-30 RX ORDER — ROSUVASTATIN CALCIUM 5 MG/1
5 TABLET, COATED ORAL NIGHTLY
Qty: 90 TABLET | Refills: 0 | Status: SHIPPED | OUTPATIENT
Start: 2024-12-30

## 2024-12-30 RX ORDER — OMEPRAZOLE 20 MG/1
CAPSULE, DELAYED RELEASE ORAL
Qty: 90 CAPSULE | Refills: 0 | Status: SHIPPED | OUTPATIENT
Start: 2024-12-30

## 2024-12-30 NOTE — TELEPHONE ENCOUNTER
Provider Staff:  Action required for this patient    Requires appointment      Please see care gap opportunities below in Care Due Message.    Thanks!  [facility]     Appointments      Date Provider   Last Visit   12/8/2023 Rosa Garber MD   Next Visit   Visit date not found Rosa Garber MD     Refill Decision Note   Peace Farmer  is requesting a refill authorization.  Brief Assessment and Rationale for Refill:  Approve     Medication Therapy Plan:         Comments:     Note composed:12:56 PM 12/30/2024

## 2025-01-02 ENCOUNTER — TELEPHONE (OUTPATIENT)
Dept: FAMILY MEDICINE | Facility: CLINIC | Age: 80
End: 2025-01-02
Payer: MEDICARE

## 2025-01-02 NOTE — TELEPHONE ENCOUNTER
Please call patient: we have not seen her in more than a year. Has she changed PCP or moved?  Please schedule with Joana Garcia NP, ASAP if patient is still under my care.

## 2025-01-03 NOTE — TELEPHONE ENCOUNTER
Subjective     Chief Complaint:  Physical Exam.    History of Present Illness    History obtained from the patient.    The patient reports a cough for the past 1 year.  It is generally dry but occasionally productive of clear sputum without hemoptysis.  She has congestion, clear rhinorrhea, and right ear popping / fullness.  She denies sore throat, earache, headache, chest pain, chest tightness, wheezing, and shortness of breath.  She has tried multiple allergy medicines, most recently Allegra, without much relief.  She also has itchy watery eyes, better with Pataday.    Of note, the patient a chest CT on 2/1/2018 which showed an on calcified 4 mm nodule in the right lung base, stable x 2 years, no further imaging recommended      Primary Care Cardiac [facility]: The patient is here today for a follow-up visit.    Her Hypertension has been stable.   Medication(s): Losartan / HCTZ.   Her Hyperlipidemia has been unstable.   Her LDL goal is < 130,  last LDL was 160, TG 162.   Medication(s): Atorvastatin  Side Effects: None.     Procedures: On 3/28/2022, Coronary Artery Calcium Score was 2.      Interval Events:  The patient states her blood pressure at home has been 130's / 70's-80's.  She gets nervous at the doctor's office.  She also states she is under increased stress, due to her 's recent stroke.     Symptoms:  Denies chest pain, dyspnea, PORTILLO, orthopnea, PND, palpitations, syncope, lower extremity edema, claudication, lightheadedness, and dizziness.  Associated Symptoms: No weight change in the past 7 to 8 months.  No knee, headache, polydipsia, polyuria, myalgias, arthralgias, memory loss, concentration problems, or focal neurologic deficits.      Lifestyle: She consumes a diverse and healthy diet, overall.  She walks 3 times per week.  Tobacco Use: Never a Smoker.      Hypothyroidism Follow-Up: The patient is being seen for follow-up of Hypothyroidism, which is stable.  Spoke with patient. Patient states that she has a lot going on right now and would like to push back on scheduling and appointment. Patient scheduled for 01/21/24 with Np Joana Garcia.      Interval Events: On 4/22/2022, TSH was normal.  Symptoms:  No weight change in the past 7 to 8 months.  Denies fatigue, heat / cold intolerance, constipation, diarrhea, memory loss, trouble concentrating, hair loss, and dry skin.   Associated Symptoms: no myalgias, arthralgias, or paresthesias.   Medications:  Levothyroxine (Synthroid).       GERD Follow-up: The patient is being seen for a routine clinic follow-up of Gastroesophageal Reflux Disease, which is stable.  Procedures:  EGD 12/21/15 (small hiatal hernia, mild duodenitis, and concentric espohageal rings suggestive of esophsgitis- path c/w mild chronic esophagitis w/ increased eosinophils suggestive of reflux ([place]onsalo).   Comorbid Illness: On 6/16/2020, liver enzymes were elevated ( and ALT 87). Elevated liver panel done on 11/19/2020 was significant for an elevated ARSH, GGT (249), Ferritin (778), ALT (80),  and AST (98 ).  Liver ultrasound was ordered on 1/25/2021, but not done.  She saw Dr. Masters on 1/28/2021.  He felt her ARSH was a false positive.  Additional labs were done on 8/25/2021 and were negative with the exception of an elevated AST (57) and ALT (68).  Last labs/22/22 were significant for elevated AST (74) and ALT (89)  Interval Events:  None.  Symptoms:  No abdominal pain, heartburn, acid regurgitation, nausea, vomiting, dysphagia, odynophagia, hematemesis, hematochezia, melena, early satiety, belching, and bloating.  Associated Symptoms: Reports a chronic cough as above.  No chronic sore throat, hoarseness, or wheezing.   Medication: Tums prn.      Vitamin D Deficiency Follow-Up: The patient is being seen for follow-up of Vitamin D Deficiency, which is stable.    Interval Events: Vitamin D level on 8/25/2021 was 31.1.   Symptoms:  No fatigue, myalgias, arthralgias, paresthesias, balance issues, or gait abnormality.  Medication:  Vitamin D3 (cholecalciferol) 2000 IU daily.      Anxiety Disorder Follow-Up: The patient is being seen  for follow-up of Anxiety, which is stable.  Comorbid Illnesses None.   Interval Events:  None.   Symptoms: Stable anxiety.  She has had sleep disruption due to taking care of her .  Denies depression, panic attacks, anhedonia, memory loss, and difficulty concentrating.  Associated Symptoms: no suicidal ideation or thoughts of self-harm.   Medication(s): Wellbutrin XL.   Side Effects: None.       Rzo Randolph is a 62 y.o. female who presents for an Annual Physical.      PMH, PSH, SocHx, FamHx, Allergies, and Medications: Reviewed and updated.    Outpatient Medications Prior to Visit   Medication Sig Dispense Refill   • acyclovir (Zovirax) 800 MG tablet Take 1 tablet by mouth 5 (Five) Times a Day. Take no more than 5 doses a day. 35 tablet 0   • atorvastatin (LIPITOR) 10 MG tablet TAKE ONE TABLET BY MOUTH DAILY 30 tablet 3   • buPROPion XL (WELLBUTRIN XL) 150 MG 24 hr tablet Take 1 tablet by mouth Every Morning. 90 tablet 1   • Cholecalciferol (VITAMIN D3) 25 MCG (1000 UT) capsule Take  by mouth.     • levothyroxine (SYNTHROID, LEVOTHROID) 88 MCG tablet Take 1 tablet by mouth Daily. 90 tablet 1   • losartan-hydrochlorothiazide (Hyzaar) 100-25 MG per tablet Take 1 tablet by mouth Daily. 90 tablet 1   • methylPREDNISolone (MEDROL) 4 MG dose pack Take as directed on package instructions. 1 each 0   • amoxicillin-clavulanate (AUGMENTIN) 875-125 MG per tablet Take 1 tablet by mouth 2 (Two) Times a Day. 14 tablet 0   • dicyclomine (BENTYL) 20 MG tablet Take 1 tablet by mouth Every 8 (Eight) Hours As Needed (pain). 20 tablet 0   • ondansetron (ZOFRAN) 4 MG tablet Take 1 tablet by mouth Every 8 (Eight) Hours As Needed for Nausea. 15 tablet 0     No facility-administered medications prior to visit.       Immunization History   Administered Date(s) Administered   • COVID-19 (PFIZER) PURPLE CAP 02/24/2021, 03/17/2021, 10/27/2021   • Covid-19 (Pfizer) Gray Cap 04/22/2022   • Flu Vaccine Quad PF >36MO 01/03/2018   •  FluLaval/Fluzone >6mos 10/18/2019, 11/19/2020   • Hepatitis A 04/15/2019, 10/18/2019   • PPD Test 02/08/2019   • Tdap 03/23/2011, 08/25/2021         Patient Active Problem List   Diagnosis   • Anxiety disorder   • Diverticulosis of large intestine   • Abnormal liver enzymes   • Gastroesophageal reflux disease   • Hyperlipidemia   • Hypertension   • Hypothyroidism   • Lung nodule   • Sigmoid diverticulitis   • Macrocytosis   • Vitamin D deficiency   • Rash       Health Habits:  Dental Exam. up to date  Eye Exam. up to date  Hearing Loss:  Yes  Exercise: 3 times/week.  Current exercise activities include: walking  Diet: Healthy  Multivitamin: No    Safe Driving:  Yes  Seat Belt:  Yes  Bike Helmet:  N/A  Skin Screening:  Yes  Sunscreen: Yes  SBE / MATIAS: Yes  Sexual Activity:  Yes  Birth Control:  Menopause  STD Prevention:  N/A    Last Pap: N/A (MO/BSO  Last Mammogram: 12/7/2022, category 1.  Last DEXA Scan: 11/27/2018, normal.  Last Colonoscopy: 4/12/2018, normal.  Last PSA: N/A    Social:    Social History     Socioeconomic History   • Marital status:    • Number of children: 2   Tobacco Use   • Smoking status: Never   • Smokeless tobacco: Never   Vaping Use   • Vaping Use: Never used   Substance and Sexual Activity   • Alcohol use: Yes     Comment: 2-3 glasses of wine weekends   • Drug use: No   • Sexual activity: Yes     Partners: Male     Birth control/protection: Surgical         Current Medical Providers:    Maia Melton MD (Internal Medicine / Pediatrics)    The [facility] providers who are involved in the care of this patient are listed above.         Review of Systems   Constitutional: Negative for chills, fatigue, fever and unexpected weight change.        Has intermittent night sweats.    HENT: Positive for congestion, ear pain, hearing loss (right), postnasal drip, rhinorrhea and sneezing. Negative for ear discharge, nosebleeds, sinus pressure, sinus pain, sore throat, tinnitus and voice  change.         Denies snoring.   Eyes: Positive for pain (occasional), discharge (watery), redness (occasional) and itching. Negative for photophobia and visual disturbance.   Respiratory: Positive for cough. Negative for chest tightness, shortness of breath and wheezing.         No chest congestion.  No hemoptysis.   Cardiovascular: Negative for chest pain, palpitations and leg swelling.        No orthopnea, PORTILLO, or PND.  No claudication or syncope.   Gastrointestinal: Negative for abdominal pain, blood in stool, constipation, diarrhea, nausea, rectal pain and vomiting.        No melena.  No hematemesis.  No heartburn, dysphagia or odynophagia.  No early satiety, belching, or bloating.    Endocrine: Negative for cold intolerance, heat intolerance, polydipsia, polyphagia and polyuria.        No hair loss or dry skin.  No hot flashes.     Genitourinary: Negative for difficulty urinating, dyspareunia, dysuria, flank pain, frequency, hematuria, pelvic pain, urgency, vaginal bleeding and vaginal discharge.        No nocturia, incomplete emptying, or incontinence.   Musculoskeletal: Negative for arthralgias, back pain, gait problem, joint swelling, myalgias, neck pain and neck stiffness.        No joint stiffness.   Skin: Negative for rash.        No new skin lesions or changes in skin lesions. No breast pain or masses.  No nipple discharge or nipple inversion.   Neurological: Negative for dizziness, tremors, syncope, speech difficulty, weakness, light-headedness, numbness and headaches.        No tingling.  No memory loss.  No decreased concentration.   Hematological: Negative for adenopathy. Does not bruise/bleed easily.   Psychiatric/Behavioral: Positive for sleep disturbance. Negative for confusion, self-injury and suicidal ideas. The patient is nervous/anxious.         No depression.           Objective     Vitals:    12/12/22 1009   BP: 158/90   BP Location: Right arm   Patient Position: Sitting   Cuff Size: Adult  "  Pulse: 94   Resp: 18   Temp: 97 °F (36.1 °C)   TempSrc: Infrared   SpO2: 98%   Weight: 76.6 kg (168 lb 12.8 oz)   Height: 161.3 cm (63.5\")   PainSc: 0-No pain       Body mass index is 29.43 kg/m².    Physical Exam  Vitals and nursing note reviewed.   Constitutional:       Appearance: Normal appearance. She is well-developed.      Comments: BMI greater than 25.   HENT:      Head: Normocephalic and atraumatic.      Right Ear: Tympanic membrane, ear canal and external ear normal.      Left Ear: Tympanic membrane, ear canal and external ear normal.      Mouth/Throat:      Mouth: Mucous membranes are moist. No oral lesions.      Pharynx: Oropharynx is clear.      Comments: Tonsils normal.  Eyes:      Extraocular Movements: Extraocular movements intact.      Conjunctiva/sclera: Conjunctivae normal.      Pupils: Pupils are equal, round, and reactive to light.      Comments: Fundi normal bilaterally.   Neck:      Thyroid: No thyromegaly.      Vascular: No carotid bruit.   Cardiovascular:      Rate and Rhythm: Normal rate and regular rhythm.      Pulses: Normal pulses.      Heart sounds: Normal heart sounds. No murmur heard.    No friction rub. No gallop.   Pulmonary:      Effort: Pulmonary effort is normal.      Breath sounds: Normal breath sounds.      Comments: The patient declined a breast exam due to recent mammogram.  Abdominal:      General: Bowel sounds are normal. There is no distension or abdominal bruit.      Palpations: Abdomen is soft. There is no hepatomegaly, splenomegaly or mass.      Tenderness: There is no abdominal tenderness.   Genitourinary:     Comments:  and rectal exam deferred.  Musculoskeletal:         General: Normal range of motion.      Cervical back: Normal range of motion and neck supple.      Right lower leg: No edema.      Left lower leg: No edema.   Lymphadenopathy:      Cervical: No cervical adenopathy.      Upper Body:      Right upper body: No supraclavicular adenopathy.      Left " upper body: No supraclavicular adenopathy.   Skin:     Findings: No rash.      Comments: No atypical skin lesions.   Neurological:      Mental Status: She is alert.      Cranial Nerves: Cranial nerves are intact.      Motor: Motor function is intact. No abnormal muscle tone.      Coordination: Coordination is intact.      Gait: Gait is intact.      Deep Tendon Reflexes: Reflexes are normal and symmetric.   Psychiatric:         Mood and Affect: Mood normal.         PHQ-2 Depression Screening  Little interest or pleasure in doing things? 0-->not at all   Feeling down, depressed, or hopeless?     PHQ-2 Total Score 0         Counseling was given to patient for the following topics: appropriate exercise, healthy eating habits, disease prevention, risk factors for cancer, importance of self breast exam and breast health, importance of immunizations, including risks and benefits, bone health, sun safety, seatbelt use and safe driving. Also discussed the importance of regular dental and vision care, as well recommendation for a yearly screening skin exam after age 40.  Written information provided to patient on these topics and other health maintenance issues.        Diagnoses and all orders for this visit:    1. Encounter for health maintenance examination in adult (Primary)  -     Lipid Panel; Future  -     Comprehensive Metabolic Panel; Future  -     TSH; Future  -     CBC & Differential; Future    2. Primary hypertension   Continue current medication(s) as noted in the history of present illness.    3. Other hyperlipidemia   Continue current medication(s) as noted in the history of present illness.    4. Acquired hypothyroidism  -     TSH; Future  -     T4, Free; Future   Continue current medication(s) as noted in the history of present illness.    5. Gastroesophageal reflux disease, unspecified whether esophagitis present   Continue current medication(s) as noted in the history of present illness.    6. Vitamin D  deficiency  -     Vitamin D,25-Hydroxy; Future   Continue Vitamin D supplementation.    7. Generalized anxiety disorder   Continue current medication(s) as noted in the history of present illness.    8. Chronic cough  -     XR Chest PA & Lateral; Future    9. Seasonal allergic rhinitis, unspecified trigger  -     fluticasone (Flonase) 50 MCG/ACT nasal spray; 2 sprays into the nostril(s) as directed by provider Daily As Needed for Allergies.  Dispense: 16 g; Refill: 5- NEW  -     levocetirizine (XYZAL) 5 MG tablet; Take 1 tablet by mouth Daily As Needed for Allergies.  Dispense: 30 tablet; Refill: 5- NEW  -     predniSONE (DELTASONE) 20 MG tablet; 2 pills daily x 3 days, then 1 pill daily x 3 days, then 1/2 pill daily x 4 days  Dispense: 11 tablet; Refill: 0- NEW   Discontinue Allegra.      10. Need for COVID-19 vaccine  -     COVID-19 Bivalent Booster (Pfizer) 12+yrs    11. Need for influenza vaccination  -     FluLaval/Fluzone >6 mos (4622-2672)        BMI is >= 25 and <30. (Overweight) The following options were offered after discussion;: exercise counseling/recommendations and nutrition counseling/recommendations            Return in about 6 months (around 6/12/2023) for Recheck HTN, fasting.

## 2025-01-14 ENCOUNTER — OFFICE VISIT (OUTPATIENT)
Dept: FAMILY MEDICINE | Facility: CLINIC | Age: 80
End: 2025-01-14
Payer: MEDICARE

## 2025-01-14 VITALS
OXYGEN SATURATION: 97 % | TEMPERATURE: 98 F | DIASTOLIC BLOOD PRESSURE: 65 MMHG | HEART RATE: 78 BPM | SYSTOLIC BLOOD PRESSURE: 136 MMHG | HEIGHT: 61 IN | WEIGHT: 152.88 LBS | BODY MASS INDEX: 28.87 KG/M2

## 2025-01-14 DIAGNOSIS — G47.00 INSOMNIA, UNSPECIFIED TYPE: ICD-10-CM
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
## 2025-01-14 DIAGNOSIS — E11.65 TYPE 2 DIABETES MELLITUS WITH HYPERGLYCEMIA, WITHOUT LONG-TERM CURRENT USE OF INSULIN: ICD-10-CM

## 2025-01-14 DIAGNOSIS — I10 BENIGN HYPERTENSION: ICD-10-CM

## 2025-01-14 DIAGNOSIS — E11.40 CONTROLLED TYPE 2 DIABETES WITH NEUROPATHY: ICD-10-CM

## 2025-01-14 DIAGNOSIS — Z00.00 ROUTINE GENERAL MEDICAL EXAMINATION AT A HEALTH CARE FACILITY: Primary | ICD-10-CM

## 2025-01-14 DIAGNOSIS — S99.912A LEFT ANKLE INJURY, INITIAL ENCOUNTER: ICD-10-CM

## 2025-01-14 DIAGNOSIS — E78.5 HYPERLIPIDEMIA LDL GOAL <100: ICD-10-CM

## 2025-01-14 PROCEDURE — 99999 PR PBB SHADOW E&M-EST. PATIENT-LVL V: CPT | Mod: PBBFAC,HCNC,, | Performed by: NURSE PRACTITIONER

## 2025-01-14 PROCEDURE — 3072F LOW RISK FOR RETINOPATHY: CPT | Mod: CPTII,S$GLB,, | Performed by: NURSE PRACTITIONER

## 2025-01-14 PROCEDURE — 3078F DIAST BP <80 MM HG: CPT | Mod: CPTII,S$GLB,, | Performed by: NURSE PRACTITIONER

## 2025-01-14 PROCEDURE — 3288F FALL RISK ASSESSMENT DOCD: CPT | Mod: CPTII,S$GLB,, | Performed by: NURSE PRACTITIONER

## 2025-01-14 PROCEDURE — G2211 COMPLEX E/M VISIT ADD ON: HCPCS | Mod: S$GLB,,, | Performed by: NURSE PRACTITIONER

## 2025-01-14 PROCEDURE — 1125F AMNT PAIN NOTED PAIN PRSNT: CPT | Mod: CPTII,S$GLB,, | Performed by: NURSE PRACTITIONER

## 2025-01-14 PROCEDURE — 3075F SYST BP GE 130 - 139MM HG: CPT | Mod: CPTII,S$GLB,, | Performed by: NURSE PRACTITIONER

## 2025-01-14 PROCEDURE — 1100F PTFALLS ASSESS-DOCD GE2>/YR: CPT | Mod: CPTII,S$GLB,, | Performed by: NURSE PRACTITIONER

## 2025-01-14 PROCEDURE — 1159F MED LIST DOCD IN RCRD: CPT | Mod: CPTII,S$GLB,, | Performed by: NURSE PRACTITIONER

## 2025-01-14 PROCEDURE — 99214 OFFICE O/P EST MOD 30 MIN: CPT | Mod: S$GLB,,, | Performed by: NURSE PRACTITIONER

## 2025-01-14 NOTE — PROGRESS NOTES
"  HPI     Peace Farmer is a 80 y.o. female with multiple medical diagnoses as listed in the medical history and problem list that presents for   Chief Complaint   Patient presents with    Annual Exam    Ankle Pain     left       Ankle Pain   Incident onset: 8 days ago. The incident occurred at home. The injury mechanism was a fall. The pain is present in the left ankle. The quality of the pain is described as aching. The pain is at a severity of 10/10. The pain has been Constant since onset. Associated symptoms include numbness and tingling. Pertinent negatives include no loss of sensation. She reports no foreign bodies present. The symptoms are aggravated by movement, palpation and weight bearing. She has tried acetaminophen, NSAIDs, non-weight bearing and rest for the symptoms. The treatment provided mild relief.   Reports tripping over a plant while brining them in the house. During the fall she struck her left ankle on brick flower bed. Denies head injury. She has been taking tylenol and Excedrin prn.         Social Factors  Tobacco use: no  Alcohol: 0 drinks per week  Intimate partner violence screening  "Do you feel safe in your current relationship?" Yes   "Have you ever been in a relationship in which your partner frightened you or hurt you?" No  Living Will/POA: No  Regular Exercise: No    Depression  Over the past two weeks, have you felt down, depressed, or hopeless? No  Over the past two weeks, have you felt little interest or pleasure in doing things? No    Reproductive Health  Last menstrual period began >10 yrs ago   Patient is not sexually active.   Contraception: no method  On Daily folic acid:  reviewed   STD screening in last year: declined  Last PAP: reviewed   HIV screening: reviewed     CHD, HTN, DM2  CHD Risk Factors: advanced age (older than 55 for men, 65 for women), diabetes mellitus, dyslipidemia, hypertension, and smoking/ tobacco exposure  Women 45 years and older should be " "screened for dyslipidemia if at increased risk of CHD  Women 20 to 45 years of age should be screened for dyslipidemia if at increased risk of CHD  Asymptomatic adults with sustained blood pressure greater than 135/80 mm Hg (treated or untreated) should be screened for type 2 diabetes mellitus    Estimated body mass index is 28.89 kg/m² as calculated from the following:    Height as of this encounter: 5' 1" (1.549 m).    Weight as of this encounter: 69.3 kg (152 lb 14.2 oz).    Screening  Mammogram needed: reviewed   Colonoscopy needed: reviewed   Osteoporosis screen needed: reviewed      Women 50 to 74 years of age should be screened for breast cancer with mammography biennially.  Women should be screened for cervical cancer with Pap tests beginning at 21 years of age. Low-risk women should receive Pap testing every three years. Co-testing for human papillomavirus is an option beginning at 30 years of age, and can extend the screening interval to five years. Cervical cancer screening should be discontinued at 65 years of age or after total hysterectomy if the woman has a benign gynecologic history  Adults 50 to 75 years of age should be screened for colorectal cancer with an FOBT annually, sigmoidoscopy every five years with an FOBT every three years, or colonoscopy every 10 years.  Women 65 years and older should be screened for osteoporosis. Women younger than 65 years should be screened if the risk of fracture is greater than or equal to that of a 65-year-old white woman without additional risk factors.    Immunizations  Reviewed        Assessment & Plan     1. Routine general medical examination at a health care facility  Counseled on age appropriate medical preventative services including age appropriate cancer screenings, age appropriate eye and dental exams, over all nutritional health, need for a consistent exercise regimen, and an over all push towards maintaining a vigorous and active lifestyle.  " Counseled on age appropriate vaccines and discussed upcoming health care needs based on age/gender. Discussed good sleep hygiene and stress management.      2. Controlled type 2 diabetes with neuropathy  -discussed with patient about routine diabetic care that includes but are not limited to regular eye exams, skin care, daily foot exam, proper nutrition, regular BG monitoring at home (fasting and mealtime) and medication compliance in a diabetic.  Target morning BS  and meal-time BS <180   - Diabetes Digital Medicine (DDMP) Enrollment Order    3. Benign hypertension  BP Readings from Last 3 Encounters:   01/14/25 136/65   12/10/24 (!) 142/70   09/20/24 (!) 140/65     -continue current medication regimen  -DASH diet, regular cardiovascular exercises, portion control  -weight loss  -f/u with BP logs in 2 weeks    - Hypertension Digital Medicine (HDMP) Enrollment Order    4. Type 2 diabetes mellitus with hyperglycemia, without long-term current use of insulin  The current medical regimen is effective;  continue present plan     5. Left ankle injury, initial encounter  Pt tripped over her cane and injured her left ankle. See media below.   Tylenol prn  Cane ordered   Obtain xray  [facility]  Discussed DDx, condition, and treatment.   Education sent to patient portal/included in after visit summary.  ED precautions given.   Notify provider if symptoms do not resolve or increase in severity.   Patient verbalizes understanding and agrees with plan of care.   - X-Ray Ankle Complete Left; Future  - CANE FOR HOME USE    6. Fall, initial encounter  -education provided on fall prevention and fall risk strategies. Discussed removing hazards, improving lighting, removing safety devices.   -Handouts provided.     What to do if you fall  Above all, try to stay calm:  If you start to fall, try to relax your body. This will reduce the impact of the fall.  After you fall, press your monitor button, or phone for help.  Don't rush to  get up. First, make sure you're not hurt.  Roll onto your side, then crawl to a chair. Pull yourself up onto the chair slowly.  You should call 911 if you struck your head, lost consciousness, were confused afterward, or have any other concerns for injury.  Be sure to tell your doctor that you fell.    Notify provider if you experience the following symptoms.   Feeling lightheaded or dizzy more than once a day  Losing your balance often or feeling unsteady on your feet  Feeling numbness in your legs or feet, or noticing a change in the way you walk  Having a steady decline in your memory or mental sharpness   - CANE FOR HOME USE    7. Hyperlipidemia LDL goal <100  discussed ways to lower triglycerides such as cutting simple sugars out of diet (white breads, candies, cookies, cakes, etc.) and reducing/eliminating intake of highly processed trans fatty acids.   Exercise 30 minutes a day for 4-5 days a week.   Eat more fiber.     8. Insomnia, unspecified type  Sleep Hygiene:     1. Avoid watching TV, eating, and discussing emotional issues in bed. The bed should be used for sleep and sex only. If not, we can associate the bed with other activities and it often becomes difficult to fall asleep.  2. Minimize noise, light, and temperature extremes during sleep with ear plugs, window blinds, or an electric blanket or air conditioner. Even the slightest nighttime noises or luminescent lights can disrupt the quality of your sleep. Try to keep your bedroom at a comfortable temperature -- not too hot (above 75 degrees) or too cold (below 54 degrees).  3. Try not to drink fluids after 8 p.m. This may reduce awakenings due to urination.  4. Avoid naps, but if you do nap, make it no more than about 25 minutes about eight hours after you awake. But if you have problems falling asleep, then no naps for you.  5. Do not expose your self to bright light if you need to get up at night. Use a small night-light instead.  6. Nicotine is a  stimulant and should be avoided particularly near bedtime and upon night awakenings. Having a smoke before bed, although it may feel relaxing, is actually putting a stimulant into your bloodstream.  7. Caffeine is also a stimulant and is present in coffee (100-200 mg), soda (50-75 mg), tea (50-75 mg), and various over-the-counter medications. Caffeine should be discontinued at least four to six hours before bedtime. If you consume large amounts of caffeine and you cut your self off too quickly, beware; you may get headaches that could keep you awake.  8. Although alcohol is a depressant and may help you fall asleep, the subsequent metabolism that clears it from your body when you are sleeping causes a withdrawal syndrome. This withdrawal causes awakenings and is often associated with nightmares and sweats.  9. A light snack may be sleep-inducing, but a heavy meal too close to bedtime interferes with sleep. Stay away from protein and stick to carbohydrates or dairy products. Milk contains the amino acid L-tryptophan, which has been shown in research to help people go to sleep. So milk and cookies or crackers (without chocolate) may be useful and taste good as well.            --------------------------------------------      Health Maintenance:  Health Maintenance         Date Due Completion Date    TETANUS VACCINE 05/20/2024 5/20/2014    Diabetic Eye Exam 03/07/2025 3/7/2024    Diabetes Urine Screening 03/28/2025 3/28/2024    Hemoglobin A1c 06/02/2025 12/2/2024    Mammogram 07/11/2025 7/11/2023    Lipid Panel 07/30/2025 7/30/2024    DEXA Scan 05/07/2026 5/7/2024            Advised patient on the importance of completing overdue health maintenance items    Follow Up:  Follow up in about 3 months (around 4/14/2025).    Exam     Review of Systems:  (as noted above)  Review of Systems   Constitutional:  Negative for fever.   HENT:  Negative for trouble swallowing.    Eyes:  Negative for visual disturbance.  "  Respiratory:  Negative for chest tightness and shortness of breath.    Cardiovascular:  Negative for chest pain.   Gastrointestinal:  Negative for blood in stool.   Neurological:  Positive for tingling and numbness.       Physical Exam:   Physical Exam  Constitutional:       General: She is not in acute distress.     Appearance: She is not ill-appearing or diaphoretic.   HENT:      Head: Normocephalic and atraumatic.   Cardiovascular:      Rate and Rhythm: Normal rate and regular rhythm.   Pulmonary:      Effort: Pulmonary effort is normal. No respiratory distress.   Chest:      Chest wall: No tenderness.   Neurological:      General: No focal deficit present.      Mental Status: She is alert and oriented to person, place, and time.       Vitals:    01/14/25 1637 01/14/25 1658   BP: (!) 142/72 136/65   BP Location: Left arm    Patient Position: Sitting    Pulse: 78    Temp: 98 °F (36.7 °C)    TempSrc: Oral    SpO2: 97%    Weight: 69.3 kg (152 lb 14.2 oz)    Height: 5' 1" (1.549 m)       Body mass index is 28.89 kg/m².        History     Past Medical History:  Past Medical History:   Diagnosis Date    Abnormal mammogram 10/13    s/p biopsy    Cortical cataract of both eyes 11/16/2018    Diabetes mellitus type II     Fibromyalgia     Hyperlipidemia     Hypertension     Obesity (BMI 30-39.9)     Osteopenia     Psoriasis     Skin cancer of face 2011    forehead       Past Surgical History:  Past Surgical History:   Procedure Laterality Date    BREAST BIOPSY Left     no scars noted    CATARACT EXTRACTION W/  INTRAOCULAR LENS IMPLANT Right 6/30/2020    Procedure: EXTRACTION, CATARACT, WITH IOL INSERTION;  Surgeon: Joshua Robin MD;  Location: [facility];  Service: Ophthalmology;  Laterality: Right;    CATARACT EXTRACTION W/  INTRAOCULAR LENS IMPLANT Left 7/14/2020    Procedure: EXTRACTION, CATARACT, WITH IOL INSERTION;  Surgeon: Joshua Robin MD;  Location: [facility];  Service: Ophthalmology;  Laterality: " Left;    PARTIAL HYSTERECTOMY      age 34    skin cancer face  2011       Social History:  Social History     Socioeconomic History    Marital status:     Number of children: 3    Years of education: some colle   Occupational History    Occupation: homemaker   Tobacco Use    Smoking status: Never    Smokeless tobacco: Never   Substance and Sexual Activity    Alcohol use: Yes     Comment: rare    Drug use: No    Sexual activity: Not Currently     Partners: Male     Birth control/protection: Post-menopausal   Social History Narrative    Adult Screenings Reviewed and updated  5/20/14    Mammogram( for females) October 2013 abnromal left     Pap ( for females) partial hysterectomy     Colonoscopy  2009  Reported normal.    Flu shot 2013     Td updated today  As Tdap  5/20/14    Pneumovax  done once    Zostavax done    Eye exam 2013 due for  2014    Bone density 7/2012 due again in  2016     Social Drivers of Health     Stress: Stress Concern Present (9/9/2019)    [employer] - Occupational Stress Questionnaire     Feeling of Stress : Very much       Family History:  Family History   Problem Relation Name Age of Onset    Hypertension Mother      Skin cancer Mother      Cataracts Mother      Macular degeneration Mother      Cancer Mother          skin     Multiple births Mother      Heart disease Father      Skin cancer Father      Hypertension Father      Lung cancer Father      Cataracts Father      No Known Problems Brother Jasiel     No Known Problems Maternal Aunt      No Known Problems Maternal Uncle      No Known Problems Paternal Aunt      No Known Problems Paternal Uncle      Stroke Maternal Grandmother      No Known Problems Maternal Grandfather      No Known Problems Paternal Grandmother      No Known Problems Paternal Grandfather      Multiple sclerosis Son Bill     No Known Problems Other      Diabetes Mellitus Neg Hx      Breast cancer Neg Hx      Colon cancer Neg Hx       Amblyopia Neg Hx      Blindness Neg Hx      Diabetes Neg Hx      Glaucoma Neg Hx      Retinal detachment Neg Hx      Strabismus Neg Hx      Thyroid disease Neg Hx         Allergies and Medications: (updated and reviewed)  Review of patient's allergies indicates:   Allergen Reactions    Pcn [penicillins] Hives and Shortness Of Breath     Current Outpatient Medications   Medication Sig Dispense Refill    amLODIPine (NORVASC) 5 MG tablet TAKE 1 TABLET ONCE DAILY. DUE FOR ANNUAL WITH PCP, LABS 90 tablet 3    atenoloL (TENORMIN) 25 MG tablet TAKE 1 TABLET ONCE DAILY. DUE FOR ANNUAL WITH PCP, LABS 90 tablet 3    blood sugar diagnostic Strp Dispense: Test strip to check glucose 3 times a day, ICD-10: E11.65, compatible with insurance/glucometer 200 each 8    blood-glucose meter kit Dispense: 1 glucometer, use to check glucose 3 times a day, ICD-10: E11.65(2)/E10.65 (1),  Dispense machine covered by insurance 1 each 0    calcium carb/vit D3/minerals (CALCIUM-VITAMIN D ORAL) Take by mouth.      calcium-vitamin D 500-125 mg-unit tablet Take 1 tablet by mouth 2 (two) times daily.      cetirizine (ZYRTEC) 10 MG tablet Take 1 tablet (10 mg total) by mouth daily as needed for Allergies. 90 tablet 2    gabapentin (NEURONTIN) 100 MG capsule TAKE 4 CAPSULES NIGHTLY AS NEEDED (ITCHING/SLEEP) 150 capsule 1    glimepiride (AMARYL) 4 MG tablet Take 1 tablet (4 mg total) by mouth daily with breakfast. 90 tablet 3    lancets Misc Dispense: Lancets use to check glucose 3 times a day, ICD-10: E11.65 200 each 8    lancing device Misc One device, used to check blood glucose, ICD-10: E11.65 1 each 0    levothyroxine (SYNTHROID) 50 MCG tablet Take 1 tablet (50 mcg total) by mouth before breakfast. 90 tablet 3    losartan-hydrochlorothiazide 100-25 mg (HYZAAR) 100-25 mg per tablet TAKE 1 TABLET BY MOUTH ONCE DAILY. DUE FOR ANNUAL WITH PCP, LABS 90 tablet 3    metFORMIN (GLUCOPHAGE) 1000 MG tablet Take 1 tablet (1,000 mg total) by mouth 2  (two) times daily. 180 tablet 3    omeprazole (PRILOSEC) 20 MG capsule TAKE 1 CAPSULE DAILY ONLY AS NEEDED FOR HEARTBURN 90 capsule 0    OZEMPIC 1 mg/dose (4 mg/3 mL) Inject 1 mg into the skin every 7 days. 12 mL 3    rosuvastatin (CRESTOR) 5 MG tablet TAKE 1 TABLET EVERY EVENING 90 tablet 0     No current facility-administered medications for this visit.       Patient Care Team:  Rosa Garber MD as PCP - General (Internal Medicine)  Ana Warren LPN as Care Coordinator         - The patient is given an After Visit Summary that lists all medications with directions, allergies, education, orders placed during this encounter and follow-up instructions.      - I have reviewed the patient's medical information including past medical, family, and social history sections including the medications and allergies.      - We discussed the patient's current medications.     This note was created by combination of typed  and MModal dictation.  Transcription errors may be present.  If there are any questions, please contact me.

## 2025-01-14 NOTE — PATIENT INSTRUCTIONS
[facility]--[phone]  Imaging, Xray, CT, MRI, Ultrasound---[phone]  Bariatrics---[phone]  Breast Surgery---[phone]  Case Management---[phone]  Colonoscopy---[phone]  DME---[phone]  Infectious Disease---[phone]  Interventional Radiology---[phone]  Medical Records---[phone]  [facility]---[phone]  Optometry/Ophthalmology---[phone]  [facility]---[phone]  Physical Therapy---[phone]  Psychiatry---[phone] or [phone]  Plastic Surgery---[phone]  Recovery--[phone] option 2, or [phone].  Sleep Study---[phone]  Smoking Cessation---[phone]  Wound Care---[phone]  Referral Desk---[phone]      Patient Education        Yearly Physical for Adults   About this topic   Most people do not want to be sick. Having a checkup each year with your doctor is one way to help you stay healthy. You may need to see your doctor more or less often. How often you need to go to the doctor depends on your age. Your family and medical history also play a role in how often you need to go to the doctor. Going to see your doctor on a routine basis can help you find problems early or even before they start. This may make it easier to treat or cure your problem.  General   Your doctor will talk about many things during your checkup. Your doctor may ask about:  Your medical and family history.  All the drugs you are taking. Be sure to include all prescription, over the counter, and herbal supplements. Tell the doctor if you have any drug allergy. Bring a list of drugs you take with you.  How you are feeling and if you are having any problems.  Risky behaviors like smoking, drinking alcohol, using illegal drugs, not wearing seatbelts, having unprotected sex, etc.  Your doctor will do a physical exam and may check your:  Height and weight  Blood pressure  Reflexes  Memory  Vision  Hearing  Your doctor may order:  Lab tests  ECG to check your  heart rhythm  X-rays  Tests or treatments based on your exam  What lifestyle changes are needed?   Your doctor may suggest you make changes to your lifestyle at this visit. The doctor may talk with you about being more active or lowering stress levels. Ask your doctor what you need to do.  What drugs may be needed?   Your doctor may order drugs or vaccines to protect you from illnesses.  What changes to diet are needed?   Talk to your doctor to see if any changes are needed to your diet.  When do I need to call the doctor?   Call your doctor if you need to learn about any test results. Together you can make a plan for more care.  Helpful tips   Make a list of questions for your doctor before you go. This will help you remember to ask about any concerns. Write down any answers from your doctor so you can look over them after your visit.   Tell your doctor about any changes in your body or health since your last visit.  Ask your doctor about any screening tests you need.  Where can I learn more?   American Academy [employer]icians  http://familydoctor.org/familydoctor/en/prevention-wellness/staying-healthy/healthy-living/preventive-services-for-healthy-living.printerview.html   Centers for Disease Control  http://www.cdc.gov/family/checkup/   Last Reviewed Date   2019-04-22  Consumer Information Use and Disclaimer   This information is not specific medical advice and does not replace information you receive from your health care provider. This is only a brief summary of general information. It does NOT include all information about conditions, illnesses, injuries, tests, procedures, treatments, therapies, discharge instructions or life-style choices that may apply to you. You must talk with your health care provider for complete information about your health and treatment options. This information should not be used to decide whether or not to accept your health care providers advice, instructions or  recommendations. Only your health care provider has the knowledge and training to provide advice that is right for you.  Copyright   Copyright © 2021 [employer] and its affiliates and/or licensors. All rights reserved.

## 2025-01-15 ENCOUNTER — TELEPHONE (OUTPATIENT)
Dept: FAMILY MEDICINE | Facility: CLINIC | Age: 80
End: 2025-01-15
Payer: MEDICARE

## 2025-01-15 ENCOUNTER — HOSPITAL ENCOUNTER (OUTPATIENT)
Dept: RADIOLOGY | Facility: HOSPITAL | Age: 80
Discharge: HOME | End: 2025-01-15
Attending: NURSE PRACTITIONER
Payer: MEDICARE

## 2025-01-15 DIAGNOSIS — S99.912A LEFT ANKLE INJURY, INITIAL ENCOUNTER: ICD-10-CM

## 2025-01-15 DIAGNOSIS — S82.892A CLOSED FRACTURE OF LEFT ANKLE, INITIAL ENCOUNTER: Primary | ICD-10-CM
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
## 2025-01-15 PROCEDURE — 73610 X-RAY EXAM OF ANKLE: CPT | Mod: 26,LT,, | Performed by: STUDENT IN AN ORGANIZED HEALTH CARE EDUCATION/TRAINING PROGRAM

## 2025-01-15 PROCEDURE — 73610 X-RAY EXAM OF ANKLE: CPT | Mod: TC,FY,PO,LT

## 2025-01-15 NOTE — TELEPHONE ENCOUNTER
Called pt and discussed diagnostic results, all questions answered.      1. Closed fracture of left ankle, initial encounter  Use cane  Avoid strain  Refer to ortho  Discussed DDx, condition, and treatment.   Education sent to patient portal/included in after visit summary.  ED precautions given.   Notify provider if symptoms do not resolve or increase in severity.   Patient verbalizes understanding and agrees with plan of care.   - Ambulatory referral/consult to Orthopedics; Future    2. Left ankle injury, initial encounter  - Ambulatory referral/consult to Orthopedics; Future    3. Fall, initial encounter  - Ambulatory referral/consult to Orthopedics; Future

## 2025-01-16 NOTE — PROGRESS NOTES
New Orthopedic Patient: Ankle   HISTORY OF PRESENT ILLNESS   Peace Farmer, a 80 y.o. female, presents today for evaluation of her left  ankle .    Patient reports onset of acute pain beginning Date: 1/5/25. Patient reports fall while trying to move potted plants inside. She did not seek treatment immediately, she was able to bear weight directly following injury. Was seen by her PCP on 1/14/25 and x-rays taken of the left ankle demonstrated non-displaced distal fibula fracture. Since then, she has been ambulating with mild pain and swelling had improved. However, this morning while she was getting up she reports she twisted her ankle again and is now having increased pain and swelling laterally. She has not been in a splint or boot. Ambulating in normal shoe with cane.   Pain is located along anterior and lateral aspect of  left ankle/foot . Pain is 6/10 at present and worse with  weight-bearing . Pain is described as ache, dull, and tender to palpation. Patient states pain does not radiate.     Associated symptoms include swelling. Pain is aggravated by activities above & occur constantly. Symptoms do not interfere with sleep. Patient reports pain & symptoms are staying the same . Patient reports no prior surgery to  ankle .     Prior treatment Peace Farmer has tried   OTC Acetaminophen - Yes  OTC NSAID - No   Rx NSAID - No   Rx Narcotic/Other - No   Brace - No   Activity Modification - Yes  Physical Therapy - No   Home Exercise Program - No  Assistive Device - Yes. Type of Assistive Device: cane  Other - ice, elevation    Review of systems (ROS):  PAIN ASSESSMENT:  See HPI.  MUSCULOSKELETAL: See HPI.  OTHER 10 point review of systems is negative except as stated in HPI above    Past Medical History:   Diagnosis Date    Abnormal mammogram 10/13    s/p biopsy    Cortical cataract of both eyes 11/16/2018    Diabetes mellitus type II     Fibromyalgia     Hyperlipidemia     Hypertension     Obesity (BMI  30-39.9)     Osteopenia     Psoriasis     Skin cancer of face 2011    forehead      Family History   Problem Relation Name Age of Onset    Hypertension Mother      Skin cancer Mother      Cataracts Mother      Macular degeneration Mother      Cancer Mother          skin     Multiple births Mother      Heart disease Father      Skin cancer Father      Hypertension Father      Lung cancer Father      Cataracts Father      No Known Problems Brother Jasiel     No Known Problems Maternal Aunt      No Known Problems Maternal Uncle      No Known Problems Paternal Aunt      No Known Problems Paternal Uncle      Stroke Maternal Grandmother      No Known Problems Maternal Grandfather      No Known Problems Paternal Grandmother      No Known Problems Paternal Grandfather      Multiple sclerosis Son Bill     No Known Problems Other      Diabetes Mellitus Neg Hx      Breast cancer Neg Hx      Colon cancer Neg Hx      Amblyopia Neg Hx      Blindness Neg Hx      Diabetes Neg Hx      Glaucoma Neg Hx      Retinal detachment Neg Hx      Strabismus Neg Hx      Thyroid disease Neg Hx        Past Surgical History:   Procedure Laterality Date    BREAST BIOPSY Left     no scars noted    CATARACT EXTRACTION W/  INTRAOCULAR LENS IMPLANT Right 6/30/2020    Procedure: EXTRACTION, CATARACT, WITH IOL INSERTION;  Surgeon: Joshua Robin MD;  Location: [facility];  Service: Ophthalmology;  Laterality: Right;    CATARACT EXTRACTION W/  INTRAOCULAR LENS IMPLANT Left 7/14/2020    Procedure: EXTRACTION, CATARACT, WITH IOL INSERTION;  Surgeon: Joshua Robin MD;  Location: [facility];  Service: Ophthalmology;  Laterality: Left;    PARTIAL HYSTERECTOMY      age 34    skin cancer face  2011      Review of patient's allergies indicates:   Allergen Reactions    Pcn [penicillins] Hives and Shortness Of Breath          PHYSICAL EXAMINATION    General:  The patient is alert and oriented x 3. Mood is pleasant. Observation of ears, eyes and nose  reveal no gross abnormalities. HEENT: NCAT, sclera anicteric. Lungs: Respirations are equal and unlabored.  Gait is antalgic.      LEFT FOOT/ANKLE EXAM     Inspection:              Alignment:  Gait:      Antalgic          Hindfoot  Normal   Scars:   None           Midfoot: Normal  Swelling:   Moderate over lateral ankle        Forefoot: Normal  Color:    Eccchymosis over lateral ankle and anterior foot     Atrophy:  None           Collective Ankle-Hindfoot Alignment          Tenderness:  Lateral:         Anterior:  Sinus tarsi:    None     Anteromedial joint line:   none  Syndesmosis:   None    Anterolateral joint line:    none  ATFL:     Pos     Talonavicular:      none   CFL:      Pos    Anterior tibialis:     none  Anterolateral gutter:  none     Extensor tendons:     none  Fibula:     Pos  Peroneal tendons:  none     Peroneal tubercle:  none      Medial:         Posterior:  Deltoid:   none      Medial/Lateral Achilles:   none  Malleolus:   none      Medial/Lateral Achilles insertion:  none  PTT:     none          CALCANEUS:  Navicular:   none      Retrocalcaneal:     none       Medial Achilles:     none  Lateral Achilles:     none  Calcaneal tuberosity:    none  Foot:  Calcaneal cuboid:    none   MT / MT heads:    none   Navicular:    none    Medial cord origin PF:   none  Cuneiforms:    none    Web space:      none  Lisfranc:     none    Tarsal tunnel:     none  Base of 5th metatarsal:  none       ROM:      Right / Left      Strength: (affected)  Ankle DF/PF:     15/45, 15/45     Anterior tibialis:   5/5  Eversion/Inversion:   15/25 15/25 pain   Posterior tibialis:   5/5  Midfoot ABD/ADD:   10/10 10/10   Gastrocnemius/Soleus:  5/5  First MTP DF/PF:   60/25 60/25    Peroneals:     5/5         EHL:       5/5         FHL:       5/5     Special Tests:  Anterior drawer:  Normal (C-W contralateral side)          Extremity Neuro-vascular Testing:  All dermatomes foot, ankle and leg have normal sensation light  touch  Ankle Reflexes 2+, symmetric   Negative Babinski and No Clonus  2+ pulses PT/DT with brisk capillary refill toes.     Other Findings:  Imagin25: Minimal nondisplaced distal fibula fracture. No ankle mortise widening. Soft tissue edema.   25: Similar appearance of a small nondisplaced fracture about the lateral malleolus with surrounding soft tissue swelling. Well corticated ossific fragment about the medial malleolus may represent sequela of remote fracture deformity. No new fracture or dislocation.     ASSESSMENT & PLAN   Assessment: Nondisplaced distal fibula fracture (DOI 25)  Plan:   Cam boot  WBAT   OTC medications for pain control   Encouraged RICE   Advised about fall precautions - continue to utilize ambulatory device even when at home  Return to clinic in 4 weeks with new x-rays. Return sooner if needed.    LANEY Vilchis PA-C  [facility]

## 2025-01-17 ENCOUNTER — OFFICE VISIT (OUTPATIENT)
Dept: ORTHOPEDICS | Facility: CLINIC | Age: 80
End: 2025-01-17
Payer: MEDICARE

## 2025-01-17 VITALS — BODY MASS INDEX: 28.84 KG/M2 | WEIGHT: 152.75 LBS | HEIGHT: 61 IN

## 2025-01-17 DIAGNOSIS — S99.912A LEFT ANKLE INJURY, INITIAL ENCOUNTER: ICD-10-CM

## 2025-01-17 DIAGNOSIS — S99.912A LEFT ANKLE INJURY, INITIAL ENCOUNTER: Primary | ICD-10-CM

## 2025-01-17 DIAGNOSIS — S82.839A NONDISPLACED FRACTURE OF DISTAL END OF FIBULA: Primary | ICD-10-CM
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
## 2025-01-17 PROCEDURE — 1160F RVW MEDS BY RX/DR IN RCRD: CPT | Mod: CPTII,S$GLB,,

## 2025-01-17 PROCEDURE — 1159F MED LIST DOCD IN RCRD: CPT | Mod: CPTII,S$GLB,,

## 2025-01-17 PROCEDURE — 99999 PR PBB SHADOW E&M-EST. PATIENT-LVL IV: CPT | Mod: PBBFAC,,,

## 2025-01-17 PROCEDURE — 3072F LOW RISK FOR RETINOPATHY: CPT | Mod: CPTII,S$GLB,,

## 2025-01-17 PROCEDURE — 3288F FALL RISK ASSESSMENT DOCD: CPT | Mod: CPTII,S$GLB,,

## 2025-01-17 PROCEDURE — 99203 OFFICE O/P NEW LOW 30 MIN: CPT | Mod: S$GLB,,,

## 2025-01-17 PROCEDURE — 1125F AMNT PAIN NOTED PAIN PRSNT: CPT | Mod: CPTII,S$GLB,,

## 2025-01-17 PROCEDURE — 1100F PTFALLS ASSESS-DOCD GE2>/YR: CPT | Mod: CPTII,S$GLB,,

## 2025-01-23 DIAGNOSIS — E78.5 HYPERLIPIDEMIA LDL GOAL <100: ICD-10-CM

## 2025-01-23 DIAGNOSIS — I10 BENIGN HYPERTENSION: ICD-10-CM

## 2025-01-23 RX ORDER — AMLODIPINE BESYLATE 5 MG/1
5 TABLET ORAL DAILY
Qty: 90 TABLET | Refills: 0 | Status: SHIPPED | OUTPATIENT
Start: 2025-01-23

## 2025-01-23 RX ORDER — LOSARTAN POTASSIUM AND HYDROCHLOROTHIAZIDE 25; 100 MG/1; MG/1
1 TABLET ORAL DAILY
Qty: 90 TABLET | Refills: 0 | Status: SHIPPED | OUTPATIENT
Start: 2025-01-23

## 2025-01-23 RX ORDER — ATENOLOL 25 MG/1
25 TABLET ORAL DAILY
Qty: 90 TABLET | Refills: 0 | Status: SHIPPED | OUTPATIENT
Start: 2025-01-23

## 2025-01-23 RX ORDER — ROSUVASTATIN CALCIUM 5 MG/1
5 TABLET, COATED ORAL NIGHTLY
Qty: 90 TABLET | Refills: 0 | Status: SHIPPED | OUTPATIENT
Start: 2025-01-23

## 2025-01-23 NOTE — TELEPHONE ENCOUNTER
No care due was identified.  [facility] Embedded Care Due Messages. Reference number: [id number].   1/23/2025 5:25:43 AM CST

## 2025-01-23 NOTE — TELEPHONE ENCOUNTER
Refill Decision Note   Peace Darian  is requesting a refill authorization.  Brief Assessment and Rationale for Refill:  Approve     Medication Therapy Plan:         Comments:     Note composed:12:08 PM 01/23/2025

## 2025-02-12 DIAGNOSIS — S99.912A LEFT ANKLE INJURY, INITIAL ENCOUNTER: Primary | ICD-10-CM

## 2025-02-13 DIAGNOSIS — E11.65 TYPE 2 DIABETES MELLITUS WITH HYPERGLYCEMIA, WITHOUT LONG-TERM CURRENT USE OF INSULIN: ICD-10-CM

## 2025-02-13 DIAGNOSIS — E03.8 SUBCLINICAL HYPOTHYROIDISM: ICD-10-CM

## 2025-02-14 ENCOUNTER — OFFICE VISIT (OUTPATIENT)
Dept: ORTHOPEDICS | Facility: CLINIC | Age: 80
End: 2025-02-14
Payer: MEDICARE

## 2025-02-14 VITALS — HEIGHT: 61 IN | BODY MASS INDEX: 28.84 KG/M2 | WEIGHT: 152.75 LBS

## 2025-02-14 DIAGNOSIS — S82.839A NONDISPLACED FRACTURE OF DISTAL END OF FIBULA: ICD-10-CM

## 2025-02-14 DIAGNOSIS — S99.912A LEFT ANKLE INJURY, INITIAL ENCOUNTER: Primary | ICD-10-CM

## 2025-02-14 PROCEDURE — 99999 PR PBB SHADOW E&M-EST. PATIENT-LVL III: CPT | Mod: PBBFAC,HCNC,,

## 2025-02-14 RX ORDER — LEVOTHYROXINE SODIUM 50 UG/1
50 TABLET ORAL
Qty: 90 TABLET | Refills: 3 | Status: SHIPPED | OUTPATIENT
Start: 2025-02-14

## 2025-02-14 RX ORDER — METFORMIN HYDROCHLORIDE 1000 MG/1
1000 TABLET ORAL 2 TIMES DAILY
Qty: 180 TABLET | Refills: 3 | Status: SHIPPED | OUTPATIENT
Start: 2025-02-14

## 2025-02-14 RX ORDER — GLIMEPIRIDE 4 MG/1
4 TABLET ORAL
Qty: 90 TABLET | Refills: 3 | Status: SHIPPED | OUTPATIENT
Start: 2025-02-14

## 2025-02-14 NOTE — PROGRESS NOTES
Est Orthopedic Patient: Ankle   Initial Visit (1/17/25) HISTORY OF PRESENT ILLNESS:  Peace Farmer, a 80 y.o. female, presents today for evaluation of her left  ankle .    Patient reports onset of acute pain beginning Date: 1/5/25. Patient reports fall while trying to move potted plants inside. She did not seek treatment immediately, she was able to bear weight directly following injury. Was seen by her PCP on 1/14/25 and x-rays taken of the left ankle demonstrated non-displaced distal fibula fracture. Since then, she has been ambulating with mild pain and swelling had improved. However, this morning while she was getting up she reports she twisted her ankle again and is now having increased pain and swelling laterally. She has not been in a splint or boot. Ambulating in normal shoe with cane.   Pain is located along anterior and lateral aspect of  left ankle/foot . Pain is 6/10 at present and worse with  weight-bearing . Pain is described as ache, dull, and tender to palpation. Patient states pain does not radiate.     Associated symptoms include swelling. Pain is aggravated by activities above & occur constantly. Symptoms do not interfere with sleep. Patient reports pain & symptoms are staying the same . Patient reports no prior surgery to  ankle .     Prior treatment Peace Farmer has tried   OTC Acetaminophen - Yes  OTC NSAID - No   Rx NSAID - No   Rx Narcotic/Other - No   Brace - No   Activity Modification - Yes  Physical Therapy - No   Home Exercise Program - No  Assistive Device - Yes. Type of Assistive Device: cane  Other - ice, elevation    Interval History (2/14/25): Patient returns for 4 week follow up of left nondisplaced distal fibula fracture (DOI 1/6/25). She reports the ankle is feeling much better. She rates her pain at about 3/10 with weight-bearing. She has been having issues with the boot, she feels it is too large and is irritating her knee. Due to this she has not been wearing the boot at  home. She wears it when she leaves her house. I recommended repeating radiographs toady to assess that the fracture has not displaced any, but patient declines due to price of x-ray. Not taking any medications.     Review of systems (ROS):  PAIN ASSESSMENT:  See HPI.  MUSCULOSKELETAL: See HPI.  OTHER 10 point review of systems is negative except as stated in HPI above    Past Medical History:   Diagnosis Date    Abnormal mammogram 10/13    s/p biopsy    Cortical cataract of both eyes 11/16/2018    Diabetes mellitus type II     Fibromyalgia     Hyperlipidemia     Hypertension     Obesity (BMI 30-39.9)     Osteopenia     Psoriasis     Skin cancer of face 2011    forehead      Family History   Problem Relation Name Age of Onset    Hypertension Mother      Skin cancer Mother      Cataracts Mother      Macular degeneration Mother      Cancer Mother          skin     Multiple births Mother      Heart disease Father      Skin cancer Father      Hypertension Father      Lung cancer Father      Cataracts Father      No Known Problems Brother Jasiel     No Known Problems Maternal Aunt      No Known Problems Maternal Uncle      No Known Problems Paternal Aunt      No Known Problems Paternal Uncle      Stroke Maternal Grandmother      No Known Problems Maternal Grandfather      No Known Problems Paternal Grandmother      No Known Problems Paternal Grandfather      Multiple sclerosis Son Bill     No Known Problems Other      Diabetes Mellitus Neg Hx      Breast cancer Neg Hx      Colon cancer Neg Hx      Amblyopia Neg Hx      Blindness Neg Hx      Diabetes Neg Hx      Glaucoma Neg Hx      Retinal detachment Neg Hx      Strabismus Neg Hx      Thyroid disease Neg Hx        Past Surgical History:   Procedure Laterality Date    BREAST BIOPSY Left     no scars noted    CATARACT EXTRACTION W/  INTRAOCULAR LENS IMPLANT Right 6/30/2020    Procedure: EXTRACTION, CATARACT, WITH IOL INSERTION;  Surgeon: Joshua Robin MD;   Location: [facility];  Service: Ophthalmology;  Laterality: Right;    CATARACT EXTRACTION W/  INTRAOCULAR LENS IMPLANT Left 7/14/2020    Procedure: EXTRACTION, CATARACT, WITH IOL INSERTION;  Surgeon: Joshua Robin MD;  Location: [facility];  Service: Ophthalmology;  Laterality: Left;    PARTIAL HYSTERECTOMY      age 34    skin cancer face  2011      Review of patient's allergies indicates:   Allergen Reactions    Pcn [penicillins] Hives and Shortness Of Breath          PHYSICAL EXAMINATION    General:  The patient is alert and oriented x 3. Mood is pleasant. Observation of ears, eyes and nose reveal no gross abnormalities. HEENT: NCAT, sclera anicteric. Lungs: Respirations are equal and unlabored.  Gait is antalgic.      LEFT FOOT/ANKLE EXAM      Inspection:              Alignment:  Gait:      Antalgic          Hindfoot  Normal   Scars:   None           Midfoot: Normal  Swelling:   Improving over lateral ankle        Forefoot: Normal  Color:    Eccchymosis over lateral ankle and anterior foot     Atrophy:  None           Collective Ankle-Hindfoot Alignment          Tenderness:  Lateral:         Anterior:  Sinus tarsi:    None     Anteromedial joint line:   none  Syndesmosis:   None    Anterolateral joint line:    none  ATFL:     Pos     Talonavicular:      none   CFL:      Pos    Anterior tibialis:     none  Anterolateral gutter:  none     Extensor tendons:     none  Fibula:     Pos  Peroneal tendons:  none     Peroneal tubercle:  none      Medial:         Posterior:  Deltoid:   none      Medial/Lateral Achilles:   none  Malleolus:   none      Medial/Lateral Achilles insertion:  none  PTT:     none          CALCANEUS:  Navicular:   none      Retrocalcaneal:     none       Medial Achilles:     none  Lateral Achilles:     none  Calcaneal tuberosity:    none  Foot:  Calcaneal cuboid:    none   MT / MT heads:    none   Navicular:    none    Medial cord origin PF:   none  Cuneiforms:    none    Web space:       none  Lisfranc:     none    Tarsal tunnel:     none  Base of 5th metatarsal:  none       ROM:      Right / Left      Strength: (affected)  Ankle DF/PF:     15/45, 15/45     Anterior tibialis:     Eversion/Inversion:   15/25 15/25 pain   Posterior tibialis:     Midfoot ABD/ADD:   10/10 10/10   Gastrocnemius/Soleus:    First MTP DF/PF:       Peroneals:              EHL:                FHL:            Special Tests:  Anterior drawer:  Normal (C-W contralateral side)          Extremity Neuro-vascular Testing:  All dermatomes foot, ankle and leg have normal sensation light touch  Ankle Reflexes 2+, symmetric   Negative Babinski and No Clonus  2+ pulses PT/DT with brisk capillary refill toes.     Other Findings:  Imagin25: Minimal nondisplaced distal fibula fracture. No ankle mortise widening. Soft tissue edema.   25: Similar appearance of a small nondisplaced fracture about the lateral malleolus with surrounding soft tissue swelling. Well corticated ossific fragment about the medial malleolus may represent sequela of remote fracture deformity. No new fracture or dislocation.     ASSESSMENT & PLAN   Assessment: Nondisplaced distal fibula fracture  now 5 1/2 weeks out from injury (DOI 25)  Plan:   Continue walking boot - provided smaller boot and instructed on how to properly wear. Provided written instructions, as well.   WBAT LLE.   OTC medications for pain control as needed  Encouraged RICE   Advised about fall precautions - continue to utilize ambulatory device even when at home. She is not utilizing ambulatory device today, she uses cane at home.   Return to clinic in 3 weeks with new x-rays. Will likely transition her out of the boot at that point if fracture demonstrates adequate healing. Return sooner if needed.    LANEY Vilchis PA-C  [facility]

## 2025-02-22 DIAGNOSIS — Z00.00 ENCOUNTER FOR MEDICARE ANNUAL WELLNESS EXAM: ICD-10-CM

## 2025-02-25 ENCOUNTER — TELEPHONE (OUTPATIENT)
Dept: ENDOCRINOLOGY | Facility: CLINIC | Age: 80
End: 2025-02-25
Payer: MEDICARE

## 2025-02-25 DIAGNOSIS — S99.912A LEFT ANKLE INJURY, INITIAL ENCOUNTER: Primary | ICD-10-CM

## 2025-02-25 NOTE — TELEPHONE ENCOUNTER
----- Message from Med Assistant Farfan sent at 2/25/2025  9:17 AM CST -----  Who called:Pt What is the request in detail:Needs to discuss with staff contact information for insurance regarding her medication Can the clinic reply by [facility]? NoWould the patient rather a call back or a response via [facility]? Call backCallback Best call back number:Telephone Information:Mobile          [phone] Additional Information: will be dropping off the paperwork today , please contact her for further contextThank you.

## 2025-03-13 DIAGNOSIS — K21.9 GASTROESOPHAGEAL REFLUX DISEASE WITHOUT ESOPHAGITIS: ICD-10-CM

## 2025-03-13 RX ORDER — OMEPRAZOLE 20 MG/1
CAPSULE, DELAYED RELEASE ORAL
Qty: 90 CAPSULE | Refills: 0 | Status: SHIPPED | OUTPATIENT
Start: 2025-03-13

## 2025-03-13 NOTE — TELEPHONE ENCOUNTER
Care Due:                  Date            Visit Type   Department     Provider  --------------------------------------------------------------------------------                                [facility]                              PRIMARY      MED/ INTERNAL  [facility]  Last Visit: 12-      CARE ([facility])   MED/ PEDS      Tayokeldelicia Garber                              [facility]                              PRIMARY      MED/ INTERNAL  Laurentia Chely  Next Visit: 07-      CARE ([facility])   MED/ PEDS      Tayokeler  Jcarlos                                                            Last  Test          Frequency    Reason                     Performed    Due Date  --------------------------------------------------------------------------------    Office Visit  15 months..  losartan-hydrochlorothiaz  12- 03-                             norah, omeprazole,                             rosuvastatin.............    [employer] Embedded Care Due Messages. Reference number: [id number].   3/13/2025 2:18:38 AM CDT

## 2025-03-13 NOTE — TELEPHONE ENCOUNTER
Refill Routing Note   Medication(s) are not appropriate for processing by [facility] for the following reason(s):        Outside of protocol    [facility] action(s):  Route   Requires appointment : Yes      Medication Therapy Plan: PRN USE OF PPI'S ARE OOP      Appointments  past 12m or future 3m with PCP    Date Provider   Last Visit   12/8/2023 Rosa Garber MD   Next Visit   7/16/2025 Rosa Garber MD   ED visits in past 90 days: 0        Note composed:10:16 AM 03/13/2025

## 2025-03-19 ENCOUNTER — LAB VISIT (OUTPATIENT)
Dept: LAB | Facility: HOSPITAL | Age: 80
End: 2025-03-19
Attending: HOSPITALIST
Payer: MEDICARE

## 2025-03-19 DIAGNOSIS — E03.8 SUBCLINICAL HYPOTHYROIDISM: ICD-10-CM

## 2025-03-19 DIAGNOSIS — E11.65 TYPE 2 DIABETES MELLITUS WITH HYPERGLYCEMIA, WITHOUT LONG-TERM CURRENT USE OF INSULIN: ICD-10-CM

## 2025-03-19 DIAGNOSIS — M81.8 OTHER OSTEOPOROSIS WITHOUT CURRENT PATHOLOGICAL FRACTURE: Primary | ICD-10-CM

## 2025-03-19 LAB
ALBUMIN SERPL BCP-MCNC: 4 G/DL (ref 3.5–5.2)
ANION GAP SERPL CALC-SCNC: 13 MMOL/L (ref 8–16)
BUN SERPL-MCNC: 10 MG/DL (ref 8–23)
CALCIUM SERPL-MCNC: 9.7 MG/DL (ref 8.7–10.5)
CHLORIDE SERPL-SCNC: 101 MMOL/L (ref 95–110)
CO2 SERPL-SCNC: 23 MMOL/L (ref 23–29)
CREAT SERPL-MCNC: 0.7 MG/DL (ref 0.5–1.4)
EST. GFR  (NO RACE VARIABLE): >60 ML/MIN/1.73 M^2
ESTIMATED AVG GLUCOSE: 123 MG/DL (ref 68–131)
GLUCOSE SERPL-MCNC: 90 MG/DL (ref 70–110)
HBA1C MFR BLD: 5.9 % (ref 4–5.6)
PHOSPHATE SERPL-MCNC: 3.4 MG/DL (ref 2.7–4.5)
POTASSIUM SERPL-SCNC: 4.1 MMOL/L (ref 3.5–5.1)
SODIUM SERPL-SCNC: 137 MMOL/L (ref 136–145)
T4 FREE SERPL-MCNC: 1.19 NG/DL (ref 0.71–1.51)
TSH SERPL DL<=0.005 MIU/L-ACNC: 0.6 UIU/ML (ref 0.4–4)

## 2025-03-19 PROCEDURE — 84443 ASSAY THYROID STIM HORMONE: CPT | Performed by: HOSPITALIST

## 2025-03-19 PROCEDURE — 83036 HEMOGLOBIN GLYCOSYLATED A1C: CPT | Performed by: HOSPITALIST

## 2025-03-19 PROCEDURE — 80069 RENAL FUNCTION PANEL: CPT | Performed by: HOSPITALIST

## 2025-03-19 PROCEDURE — 84439 ASSAY OF FREE THYROXINE: CPT | Performed by: HOSPITALIST

## 2025-04-09 ENCOUNTER — OFFICE VISIT (OUTPATIENT)
Dept: ORTHOPEDICS | Facility: CLINIC | Age: 80
End: 2025-04-09
Payer: MEDICARE

## 2025-04-09 ENCOUNTER — APPOINTMENT (OUTPATIENT)
Dept: RADIOLOGY | Facility: HOSPITAL | Age: 80
End: 2025-04-09
Payer: MEDICARE

## 2025-04-09 VITALS — WEIGHT: 152.75 LBS | HEIGHT: 61 IN | BODY MASS INDEX: 28.84 KG/M2

## 2025-04-09 DIAGNOSIS — S99.912A LEFT ANKLE INJURY, INITIAL ENCOUNTER: ICD-10-CM

## 2025-04-09 DIAGNOSIS — S82.839A NONDISPLACED FRACTURE OF DISTAL END OF FIBULA: ICD-10-CM

## 2025-04-09 DIAGNOSIS — S99.912A LEFT ANKLE INJURY, INITIAL ENCOUNTER: Primary | ICD-10-CM

## 2025-04-09 PROCEDURE — 73610 X-RAY EXAM OF ANKLE: CPT | Mod: TC,HCNC,FY,PN,LT

## 2025-04-09 PROCEDURE — 3072F LOW RISK FOR RETINOPATHY: CPT | Mod: HCNC,CPTII,S$GLB,

## 2025-04-09 PROCEDURE — 73610 X-RAY EXAM OF ANKLE: CPT | Mod: 26,HCNC,LT, | Performed by: RADIOLOGY

## 2025-04-09 PROCEDURE — 3288F FALL RISK ASSESSMENT DOCD: CPT | Mod: HCNC,CPTII,S$GLB,

## 2025-04-09 PROCEDURE — 99999 PR PBB SHADOW E&M-EST. PATIENT-LVL III: CPT | Mod: PBBFAC,HCNC,,

## 2025-04-09 PROCEDURE — 99213 OFFICE O/P EST LOW 20 MIN: CPT | Mod: HCNC,S$GLB,,

## 2025-04-09 PROCEDURE — 1159F MED LIST DOCD IN RCRD: CPT | Mod: HCNC,CPTII,S$GLB,

## 2025-04-09 PROCEDURE — 1126F AMNT PAIN NOTED NONE PRSNT: CPT | Mod: HCNC,CPTII,S$GLB,

## 2025-04-09 PROCEDURE — 1101F PT FALLS ASSESS-DOCD LE1/YR: CPT | Mod: HCNC,CPTII,S$GLB,

## 2025-04-09 PROCEDURE — 1160F RVW MEDS BY RX/DR IN RCRD: CPT | Mod: HCNC,CPTII,S$GLB,

## 2025-04-09 NOTE — PROGRESS NOTES
[facility] Patient: Ankle   Initial Visit (1/17/25) HISTORY OF PRESENT ILLNESS:  Peace Farmer, a 80 y.o. female, presents today for evaluation of her left  ankle .    Patient reports onset of acute pain beginning Date: 1/5/25. Patient reports fall while trying to move potted plants inside. She did not seek treatment immediately, she was able to bear weight directly following injury. Was seen by her PCP on 1/14/25 and x-rays taken of the left ankle demonstrated non-displaced distal fibula fracture. Since then, she has been ambulating with mild pain and swelling had improved. However, this morning while she was getting up she reports she twisted her ankle again and is now having increased pain and swelling laterally. She has not been in a splint or boot. Ambulating in normal shoe with cane.   Pain is located along anterior and lateral aspect of  left ankle/foot . Pain is 6/10 at present and worse with  weight-bearing . Pain is described as ache, dull, and tender to palpation. Patient states pain does not radiate.     Associated symptoms include swelling. Pain is aggravated by activities above & occur constantly. Symptoms do not interfere with sleep. Patient reports pain & symptoms are staying the same . Patient reports no prior surgery to  ankle .     Prior treatment Peace Farmer has tried   OTC Acetaminophen - Yes  OTC NSAID - No   Rx NSAID - No   Rx Narcotic/Other - No   Brace - No   Activity Modification - Yes  Physical Therapy - No   Home Exercise Program - No  Assistive Device - Yes. Type of Assistive Device: cane  Other - ice, elevation    Interval History (2/14/25): Patient returns for 4 week follow up of left nondisplaced distal fibula fracture (DOI 1/6/25). She reports the ankle is feeling much better. She rates her pain at about 3/10 with weight-bearing. She has been having issues with the boot, she feels it is too large and is irritating her knee. Due to this she has not been wearing the boot at  home. She wears it when she leaves her house. I recommended repeating radiographs toady to assess that the fracture has not displaced any, but patient declines due to price of x-ray. Not taking any medications.     Interval history 04/09/2025:  Patient returns for 3 week follow-up of left nondisplaced distal fibula fracture (DOI 1/6/25).  Now roughly 13 weeks out from injury.  Original follow-up was supposed to be at around 8 weeks from injury, however she unfortunately developed a head cold and had to reschedule.  Overall, her and laterally along fracture site has completely resolved.  She continues to wear the boot when out of her home, but reports she has not been compliant at home with boot over the last few weeks.  She does report pain which is mostly medially based when ambulating without the boot.  She has been trying to work on ankle range of motion at home.  Continues to not require any over-the-counter oral medications for this.  Denies any numbness or tingling.    Review of systems (ROS):  PAIN ASSESSMENT:  See HPI.  MUSCULOSKELETAL: See HPI.  OTHER 10 point review of systems is negative except as stated in HPI above    Past Medical History:   Diagnosis Date    Abnormal mammogram 10/13    s/p biopsy    Cortical cataract of both eyes 11/16/2018    Diabetes mellitus type II     Fibromyalgia     Hyperlipidemia     Hypertension     Obesity (BMI 30-39.9)     Osteopenia     Psoriasis     Skin cancer of face 2011    forehead      Family History   Problem Relation Name Age of Onset    Hypertension Mother      Skin cancer Mother      Cataracts Mother      Macular degeneration Mother      Cancer Mother          skin     Multiple births Mother      Heart disease Father      Skin cancer Father      Hypertension Father      Lung cancer Father      Cataracts Father      No Known Problems Brother Jasiel     No Known Problems Maternal Aunt      No Known Problems Maternal Uncle      No Known Problems Paternal Aunt      No  Known Problems Paternal Uncle      Stroke Maternal Grandmother      No Known Problems Maternal Grandfather      No Known Problems Paternal Grandmother      No Known Problems Paternal Grandfather      Multiple sclerosis Son Bill     No Known Problems Other      Diabetes Mellitus Neg Hx      Breast cancer Neg Hx      Colon cancer Neg Hx      Amblyopia Neg Hx      Blindness Neg Hx      Diabetes Neg Hx      Glaucoma Neg Hx      Retinal detachment Neg Hx      Strabismus Neg Hx      Thyroid disease Neg Hx        Past Surgical History:   Procedure Laterality Date    BREAST BIOPSY Left     no scars noted    CATARACT EXTRACTION W/  INTRAOCULAR LENS IMPLANT Right 6/30/2020    Procedure: EXTRACTION, CATARACT, WITH IOL INSERTION;  Surgeon: Joshua Robin MD;  Location: [facility];  Service: Ophthalmology;  Laterality: Right;    CATARACT EXTRACTION W/  INTRAOCULAR LENS IMPLANT Left 7/14/2020    Procedure: EXTRACTION, CATARACT, WITH IOL INSERTION;  Surgeon: Joshua Robin MD;  Location: [facility];  Service: Ophthalmology;  Laterality: Left;    PARTIAL HYSTERECTOMY      age 34    skin cancer face  2011      Review of patient's allergies indicates:   Allergen Reactions    Pcn [penicillins] Hives and Shortness Of Breath          PHYSICAL EXAMINATION    General:  The patient is alert and oriented x 3. Mood is pleasant. Observation of ears, eyes and nose reveal no gross abnormalities. HEENT: NCAT, sclera anicteric. Lungs: Respirations are equal and unlabored.  Gait is antalgic.      LEFT FOOT/ANKLE EXAM      Inspection:                Gait:      Antalgic             Scars:   None             Swelling:   Improving over lateral ankle          Color:    Improving ecchymosis over lateral ankle and anterior foot     Atrophy:  None                   Tenderness:  Lateral:         Anterior:  Sinus tarsi:    None     Anteromedial joint line:   none  Syndesmosis:   None    Anterolateral joint line:    none  ATFL:     None      Talonavicular:      none   CFL:      None   Anterior tibialis:     none  Anterolateral gutter:  none     Extensor tendons:     none  Fibula:     None  Peroneal tendons:  none     Peroneal tubercle:  none      Medial:         Posterior:  Deltoid:   none      Medial/Lateral Achilles:   none  Malleolus:   none      Medial/Lateral Achilles insertion:  none  PTT:     Mildly tender         CALCANEUS:  Navicular:   none      Retrocalcaneal:     none       Medial Achilles:     none  Lateral Achilles:     none  Calcaneal tuberosity:    none  Foot:  Calcaneal cuboid:    none   MT / MT heads:    none   Navicular:    none    Medial cord origin PF:   none  Cuneiforms:    none    Web space:      none  Lisfranc:     none    Tarsal tunnel:     none  Base of 5th metatarsal:  none       ROM:      Right / Left      Strength: (affected)  Ankle DF/PF:     15/45, 15/45     Anterior tibialis:   /  Eversion/Inversion:   15/25 15/25 pain   Posterior tibialis:     Midfoot ABD/ADD:   10/10 10/10   Gastrocnemius/Soleus:    First MTP DF/PF:       Peroneals:              EHL:                FHL:       /     Special Tests:  Anterior drawer:  Normal (C-W contralateral side)          Extremity Neuro-vascular Testing:  All dermatomes foot, ankle and leg have normal sensation light touch  Ankle Reflexes 2+, symmetric   Negative Babinski and No Clonus  2+ pulses PT/DT with brisk capillary refill toes.     Other Findings:  Imagin25: Minimal nondisplaced distal fibula fracture. No ankle mortise widening. Soft tissue edema.   25: Similar appearance of a small nondisplaced fracture about the lateral malleolus with surrounding soft tissue swelling. Well corticated ossific fragment about the medial malleolus may represent sequela of remote fracture deformity. No new fracture or dislocation.   25: Reconfirmed now subacute fracture of the fibula tip. Interval development of 1 mm diastasis of the distal fracture  fragment with respect to the remaining fibular shaft. Fracture line remains evident. No new fractures. Preserved tibiotalar articulation and talar dome. Two stable radiodensities seen subjacent to the medial malleolus that could be on the basis of remote nonunited fracture fragments or areas of dystrophic soft tissue calcification-ossification. No new fractures. Preserved tibiotalar articulation and talar dome. Reconfirmed bimalleolar soft tissue swelling, greater laterally.     ASSESSMENT & PLAN   Assessment: Nondisplaced distal fibula fracture  now 13 weeks out from injury (DOI 1/6/25).  Overall her lateral based pain over fracture site has resolved.  Still has some minor swelling laterally.  Main complaint is medial based ankle and foot pain, based on examination seems consistent with posterior tibial tendinitis.  Given that she is having trouble transitioning out of walking boot, I am referring her for formal physical therapy.    Plan:   Okay to begin weaning out of walking boot as tolerated given that we are now 13 weeks out from injury and there is no evidence of displacement of distal fibula fracture.  Reports she has already been doing this at home, but she has been having issues with medial based pain with ambulation.  She is hesitant to come out of the boot while out of the house, which I think is reasonable as she has become reliant on this.  Recommended ambulatory device to prevent future falls. She is not utilizing ambulatory device today.  Reports she has a cane at home.  WBAT LLE.   OTC medications for pain control as needed  Encouraged RICE   Referral to formal physical therapy  Return to clinic in 6 weeks or sooner if needed.  If she continues to have lingering pain regarding this foot/ankle despite conservative treatment with physical therapy, likely consider referral to foot and ankle specialist at that time.  Call in the interim with any questions    N. Neubig, PA-C [facility]

## 2025-04-10 ENCOUNTER — OFFICE VISIT (OUTPATIENT)
Dept: ENDOCRINOLOGY | Facility: CLINIC | Age: 80
End: 2025-04-10
Payer: MEDICARE

## 2025-04-10 VITALS
HEART RATE: 69 BPM | DIASTOLIC BLOOD PRESSURE: 71 MMHG | SYSTOLIC BLOOD PRESSURE: 136 MMHG | BODY MASS INDEX: 29.29 KG/M2 | WEIGHT: 155 LBS

## 2025-04-10 DIAGNOSIS — M81.8 OTHER OSTEOPOROSIS WITHOUT CURRENT PATHOLOGICAL FRACTURE: ICD-10-CM

## 2025-04-10 DIAGNOSIS — E11.65 TYPE 2 DIABETES MELLITUS WITH HYPERGLYCEMIA, WITHOUT LONG-TERM CURRENT USE OF INSULIN: Primary | ICD-10-CM

## 2025-04-10 DIAGNOSIS — I10 BENIGN HYPERTENSION: ICD-10-CM

## 2025-04-10 DIAGNOSIS — E03.8 SUBCLINICAL HYPOTHYROIDISM: ICD-10-CM

## 2025-04-10 DIAGNOSIS — E78.5 HYPERLIPIDEMIA LDL GOAL <100: ICD-10-CM

## 2025-04-10 DIAGNOSIS — E55.9 MILD VITAMIN D DEFICIENCY: ICD-10-CM

## 2025-04-10 PROCEDURE — 1100F PTFALLS ASSESS-DOCD GE2>/YR: CPT | Mod: HCNC,CPTII,S$GLB, | Performed by: HOSPITALIST

## 2025-04-10 PROCEDURE — 1159F MED LIST DOCD IN RCRD: CPT | Mod: HCNC,CPTII,S$GLB, | Performed by: HOSPITALIST

## 2025-04-10 PROCEDURE — 3288F FALL RISK ASSESSMENT DOCD: CPT | Mod: HCNC,CPTII,S$GLB, | Performed by: HOSPITALIST

## 2025-04-10 PROCEDURE — 3078F DIAST BP <80 MM HG: CPT | Mod: HCNC,CPTII,S$GLB, | Performed by: HOSPITALIST

## 2025-04-10 PROCEDURE — 99999 PR PBB SHADOW E&M-EST. PATIENT-LVL III: CPT | Mod: PBBFAC,HCNC,, | Performed by: HOSPITALIST

## 2025-04-10 PROCEDURE — G2211 COMPLEX E/M VISIT ADD ON: HCPCS | Mod: HCNC,S$GLB,, | Performed by: HOSPITALIST

## 2025-04-10 PROCEDURE — 3072F LOW RISK FOR RETINOPATHY: CPT | Mod: HCNC,CPTII,S$GLB, | Performed by: HOSPITALIST

## 2025-04-10 PROCEDURE — 1160F RVW MEDS BY RX/DR IN RCRD: CPT | Mod: HCNC,CPTII,S$GLB, | Performed by: HOSPITALIST

## 2025-04-10 PROCEDURE — 3075F SYST BP GE 130 - 139MM HG: CPT | Mod: HCNC,CPTII,S$GLB, | Performed by: HOSPITALIST

## 2025-04-10 PROCEDURE — 99214 OFFICE O/P EST MOD 30 MIN: CPT | Mod: HCNC,S$GLB,, | Performed by: HOSPITALIST

## 2025-04-10 RX ORDER — LEVOTHYROXINE SODIUM 50 UG/1
50 TABLET ORAL
Qty: 90 TABLET | Refills: 3 | Status: SHIPPED | OUTPATIENT
Start: 2025-04-10

## 2025-04-10 RX ORDER — METFORMIN HYDROCHLORIDE 1000 MG/1
1000 TABLET ORAL 2 TIMES DAILY
Qty: 180 TABLET | Refills: 3 | Status: SHIPPED | OUTPATIENT
Start: 2025-04-10

## 2025-04-10 RX ORDER — SEMAGLUTIDE 1.34 MG/ML
1 INJECTION, SOLUTION SUBCUTANEOUS
Qty: 12 ML | Refills: 3 | Status: SHIPPED | OUTPATIENT
Start: 2025-04-10

## 2025-04-10 NOTE — PATIENT INSTRUCTIONS
Call [facility] [phone] or  Call [facility] pharmacy assistance with Sheryl Arben, [phone]>>For Ozempic     Regiment:   __ Metformin 1000 mg twice a day  __ Glimepiride 4mg daily>> can consider decreasing   __ Ozempic 1.0 mg once a week injection

## 2025-04-10 NOTE — ASSESSMENT & PLAN NOTE
- Diabetes is AT GOAL, as indicated by the most recent A1C reviewed on 4/10/2025.  - Therapy Goals: Discussed the aim to achieve optimal control without causing hypoglycemia, Targeting an A1C of <7%.  - Diabetic Supplies and Medications: Reviewed to ensure continued refills and compliance.  - Preventive Care: Advised the patient to schedule periodic eye exams and maintain regular foot care monitoring.  - Annual Monitoring: Reviewed the importance of yearly lipid profile (with statin use) and urine protein/creatinine tests.    Plan  - Changes: Continue Ozempic 1 mg once a week injection: With pharmacy assistant, 2025  - Continue metformin 1000 mg twice a day, continue glimepiride 4 mg daily (monitor for hypoglycemia)  - Dietary Modifications: Encouraged portion size control and reduction of carbohydrate intake to better manage diabetes.   - Hypoglycemia Management: Discussed symptoms and treatment of hypoglycemia. The patient is informed  - Clear written instructions provided on AVS. Follow-up scheduled within the next 6 months with lab work prior.   - Appointment date and time were provided to the patient today.

## 2025-04-10 NOTE — ASSESSMENT & PLAN NOTE
- Longstanding history of Osteopenia with high FRAX at the hip 2018 >> unable to tolerate oral bisphosphonate in the past 2018  - Was on Boniva for treatment of osteopenia, leading to heartburn> she does not to be on this medication  - Recent bone density 05/2022 show osteoporosis  - Holding off on treatment including Prolia therapy due to  illnesses, she is having dif time balancing both, over the last few office visit in 2023/2024, she had declined therapy   - Most recent bone density  DXA 05/10/2024: Review, worsening osteoporosis femoral neck T-score -2.7, Compared with previous DXA, BMD at the lumbar spine has declined by -5.2%, and BMD at the total hip has declined by -5.9%.  - Risk factor:  Small stature and family history osteoporosis,  - Reassuring: No history of fragility fracture, no tobacco use, not at risk for falls/fracture    PLAN  - Continue SC Prolia every 6 months. First injection 9/20/2024, so far has gotten 1 injection  - CONTINUE PROLIA INJECTION  - Duration of therapy of at minimum 2 years and side effect profile discussed. given High risk of fracture, outweighs the risk of side effects  - Recommend the patient take sufficient calcium and vitamin D3 OTC  - Check routine lab work: check Renal Panel, vitamin-D regularly   - Fall precautions/Exercise regimen: advised  - Routine dental health screening advised, dental cleaning every 6 months  - Next DXA: 2 years from most current, 5/10/2026  - Routine follow-up with me for monitoring

## 2025-04-10 NOTE — PROGRESS NOTES
Subjective:      Patient ID: Peace Farmer is a 80 y.o. female presented to [facility] on 4/10/2025.  Chief Complaint:  Diabetes    History of Present Illness: Peace Farmer is a 80 y.o. female here for diabetes  Other significant past medical history:  Osteoporosis, abnormal thyroid function, hypertension      Interval history:  Patient is here for follow-up, to discuss multiple endocrine issues.  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     Obesity:  Current weight: 155 , previous office visit weight: 150  151,148, 143, 146  No exercise due to recent left ankle fracture    In regards to hypothyroidism:  Currently on levothyroxine 50mcg daily  In regards to osteoporosis:  Recent  fall around 1/6/2025, tripped and fell breaking L ankle. Wear boot, still seeing Ortho  Currently taking multivitamin, and calcium supplemement twice a day      1) Diabetes Mellitus Type 2  - Known diabetic complications: none  - Diagnosed w/ DM: 2008?, worsening now  - On restrictive diet, small portion, low carbohydrates.  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  - getting Ozempic from pharmacy assistant 2024, 2025  - prefers to continue metformin and glimepiride.    Current meds:     Ozempic 1.0 mg once a week injection>> no issue  Metformin 1000 mg daily  Glimepiride 4mg daily  Previous meds:              Januvia   Trulicity 1.5 mg Q weekly injection  (expensive, inconsistently getting medication)  Home glucose checks: checks 2x a day, Logs reviewed/Unavailable: oral recall:   Hypoglycemia: denies  Diet/Exercise:               Eating multiple small meals  per day (7x)           "    Drink: diet coke  Weight trend: stable  Diabetes Education: yes, many years ago  Diabetes Related Hospitalization:  No  Hx of pancreatitis: No  Family history of diabetes: Yes. brother  Occupation: retired    Eye exam current (within one year): yes, DR: no  Reports cuts or ulcers on feet:   Denies  Statin: Taking, ACE/ARB: Taking    Diabetes lab work  Lab Results   Component Value Date    HGBA1C 5.9 (H) 03/19/2025    HGBA1C 6.7 (H) 12/02/2024    HGBA1C 5.8 (H) 07/30/2024     No results found for: "CPEPTIDE", "GLUTAMICACID", "ISLETCELLANT"     Random serum glucose:   Lab Results   Component Value Date    GLU 90 03/19/2025     (H) 12/02/2024     No results found for: "FRUCTOSAMINE", "GLYCOMARKTM"  Lab Results   Component Value Date    MICALBCREAT 12.0 03/28/2024    MICALBCREAT 24.1 04/25/2022    UTPCR 0.21 07/07/2006     Diabetes Management Status: Reviewed this office visit  Screening or Prevention Patient's value Goal Complete/Controlled?   Lipid profile : 07/30/2024 Annually Yes   Dilated retinal exam : 03/07/2024 Annually Yes   Foot exam   Most Recent Foot Exam Date: Not Found Annually Yes        2) Subclinical hypothyroidism  - TSH elevation, 1x. Denies goiter  - Family history thyroid disorder: son with hypothyroidism   - patient's main concern is:  Fatigue and Heat intolerance  - started on low-dose levothyroxine 25 mcg daily 6/2022, trial of thyroid replacement therapy has help "clear up brain fog".  - Now on Levothyroxine 50mcg daily, doing well at this time    Thyroid lab work  Lab Results   Component Value Date    TSH 0.603 03/19/2025    TSH 1.728 07/30/2024    TSH 0.581 10/25/2023    FREET4 1.19 03/19/2025    FREET4 0.85 07/30/2024    FREET4 1.12 10/25/2023    T3FREE 2.5 09/20/2012      Antibodies  Lab Results   Component Value Date    THYROPEROXID <6.0 06/30/2022       3) Osteoporosis  - Previously with osteopenia, 2020, previously managed by Dr. Rashid  - Was on Boniva for treatment of " osteopenia, leading to heartburn> she does not to be on this medication  - Dr. Rashid wanted to switch patient to Reclast, for which she never started    - Holding off on treatment including Prolia therapy due to  illnesses, she is having dif time balancing both, over the last few office visit in 2023/2024, she has declined therapy  - 8/6/2024: Office visit, patient was comfortable in starting therapy.  Due to major decline of her bone density, -5.9% in total hip and -5.2% in lumbar spine  - SC Prolia as discussed previously.  First injection 9/20/2024, so far has gotten 1 injection  - HCTZ for HTN chronically: Monitor for possibility of hypercalcemia  - Recent  fall around 1/6/2025, tripped and fell breaking L ankle. Wear boot, still seeing Ortho. Possible remote R ankle fracture?      - She is taking Calcium and Vit D supplements. 500mg/1000iu  - She had a hysterectomy at 33 y/o and menopausal symptoms at 52 y/o.   - Mom, 97 yo, fell broke her femur, now requiring 24 hr care    DXA  05/10/2024  Lumbar spine (L1-L4): BMD is 0.806 g/cm2, T-score is -2.2, and Z-score is 0.3.  Total hip: BMD is 0.707 g/cm2, T-score is -1.9, and Z-score is 0.0.  Femoral neck: BMD is 0.544 g/cm2, T-score is -2.7, and Z-score is -0.6.  Distal 1/3 radius: Not applicable     FRAX:  19% risk of a major osteoporotic fracture in the next 10 years.  6.5% risk of hip fracture in the next 10 years.     Impression:  *Osteoporosis based on T-score below -2.5.  *Fracture risk is very high due to calculated 10 year risk of hip fracture >4.5% (FRAX).  *Compared with previous DXA, BMD at the lumbar spine has declined by -5.2%, and BMD at the total hip has declined by -5.9%.    5/2022  Lumbar spine (L1-L4): BMD is 0.850 g/cm2, T-score is -1.8, and Z-score is 0.6.  Total hip:  BMD is 0.751 g/cm2, T-score is -1.6, and Z-score is 0.2.  Femoral neck: BMD is 0.555 g/cm2, T-score is -2.6, and Z-score is -0.6.     FRAX:  16% risk of a major osteoporotic  "fracture in the next 10 years.  5.1% risk of hip fracture in the next 10 years.     Impression:  *Osteoporosis based on T-score below -2.5    Lab work reviewed  Lab Results   Component Value Date    PTH 64.0 05/13/2020    CALCIUM 9.7 03/19/2025    CALCIUM 10.4 12/02/2024    CALCIUM 10.3 07/30/2024    [id number] 30 07/30/2024    [id number] 34 10/25/2023     Lab Results   Component Value Date    PHOS 3.4 03/19/2025    PHOS 2.9 07/30/2024    PHOS 5.1 (H) 03/26/2024    ALKPHOS 65 07/30/2024    ALKPHOS 57 10/25/2023    ALKPHOS 59 05/17/2023    EGFRNORACEVR >60.0 03/19/2025    ALBUMIN 4.0 03/19/2025    TSH 0.603 03/19/2025     No results found for: "PTHRELATEDPR", "[id number]", "CTELOPEPTIDE", "PROCOLLAGEN"    Reviewed past surgical, medical, family, social history and updated as appropriate.  Review of Systems: see HPI above  Objective:   /71   Pulse 69   Wt 70.3 kg (155 lb)   BMI 29.29 kg/m²   Body mass index is 29.29 kg/m².  Vital signs reviewed    Physical Exam  Vitals and nursing note reviewed.   Constitutional:       General: She is not in acute distress.     Appearance: Normal appearance. She is well-developed. She is obese. She is not toxic-appearing.   Neck:      Thyroid: No thyromegaly.      Comments: No goiter noted  Cardiovascular:      Heart sounds: Normal heart sounds.   Pulmonary:      Effort: Pulmonary effort is normal. No respiratory distress.   Abdominal:      Tenderness: There is no abdominal tenderness.   Musculoskeletal:         General: No deformity. Normal range of motion.      Cervical back: Normal range of motion.   Skin:     Findings: No bruising.   Neurological:      Mental Status: She is alert and oriented to person, place, and time.   Psychiatric:         Mood and Affect: Mood normal.     Lab Reviewed:  See results in subjective  Lab Results   Component Value Date    HGBA1C 5.9 (H) 03/19/2025     Lab Results   Component Value Date    CHOL 111 (L) 07/30/2024    HDL 38 (L) " 07/30/2024    LDLCALC 51.4 (L) 07/30/2024    TRIG 108 07/30/2024    CHOLHDL 34.2 07/30/2024     Lab Results   Component Value Date     03/19/2025    K 4.1 03/19/2025     03/19/2025    CO2 23 03/19/2025    BUN 10 03/19/2025    CREATININE 0.7 03/19/2025    CALCIUM 9.7 03/19/2025    PHOS 3.4 03/19/2025    PROT 7.5 07/30/2024    ALBUMIN 4.0 03/19/2025    BILITOT 0.3 07/30/2024    ALKPHOS 65 07/30/2024    AST 17 07/30/2024    ALT 20 07/30/2024    ANIONGAP 13 03/19/2025    EGFRNORACEVR >60.0 03/19/2025    TSH 0.603 03/19/2025    PTH 64.0 05/13/2020    [id number] 30 07/30/2024     Assessment     1. Type 2 diabetes mellitus with hyperglycemia, without long-term current use of insulin  OZEMPIC 1 mg/dose (4 mg/3 mL)    metFORMIN (GLUCOPHAGE) 1000 MG tablet    RENAL FUNCTION PANEL    Hemoglobin A1C    Microalbumin/Creatinine Ratio, Urine      2. Subclinical hypothyroidism  levothyroxine (SYNTHROID) 50 MCG tablet    TSH    T4, Free      3. Mild vitamin D deficiency  Vitamin D      4. Other osteoporosis without current pathological fracture        5. Hyperlipidemia LDL goal <100        6. Benign hypertension          Plan     Type 2 diabetes mellitus with hyperglycemia, without long-term current use of insulin  - Diabetes is AT GOAL, as indicated by the most recent A1C reviewed on 4/10/2025.  - Therapy Goals: Discussed the aim to achieve optimal control without causing hypoglycemia, Targeting an A1C of <7%.  - Diabetic Supplies and Medications: Reviewed to ensure continued refills and compliance.  - Preventive Care: Advised the patient to schedule periodic eye exams and maintain regular foot care monitoring.  - Annual Monitoring: Reviewed the importance of yearly lipid profile (with statin use) and urine protein/creatinine tests.    Plan  - Changes: Continue Ozempic 1 mg once a week injection: With pharmacy assistant, 2025  - Continue metformin 1000 mg twice a day, continue glimepiride 4 mg daily (monitor for  hypoglycemia)  - Dietary Modifications: Encouraged portion size control and reduction of carbohydrate intake to better manage diabetes.   - Hypoglycemia Management: Discussed symptoms and treatment of hypoglycemia. The patient is informed  - Clear written instructions provided on AVS. Follow-up scheduled within the next 6 months with lab work prior.   - Appointment date and time were provided to the patient today.    Other osteoporosis without current pathological fracture  - Longstanding history of Osteopenia with high FRAX at the hip 2018 >> unable to tolerate oral bisphosphonate in the past 2018  - Was on Boniva for treatment of osteopenia, leading to heartburn> she does not to be on this medication  - Recent bone density 05/2022 show osteoporosis  - Holding off on treatment including Prolia therapy due to  illnesses, she is having dif time balancing both, over the last few office visit in 2023/2024, she had declined therapy   - Most recent bone density  DXA 05/10/2024: Review, worsening osteoporosis femoral neck T-score -2.7, Compared with previous DXA, BMD at the lumbar spine has declined by -5.2%, and BMD at the total hip has declined by -5.9%.  - Risk factor:  Small stature and family history osteoporosis,  - Reassuring: No history of fragility fracture, no tobacco use, not at risk for falls/fracture    PLAN  - Continue SC Prolia every 6 months. First injection 9/20/2024, so far has gotten 1 injection  - CONTINUE PROLIA INJECTION  - Duration of therapy of at minimum 2 years and side effect profile discussed. given High risk of fracture, outweighs the risk of side effects  - Recommend the patient take sufficient calcium and vitamin D3 OTC  - Check routine lab work: check Renal Panel, vitamin-D regularly   - Fall precautions/Exercise regimen: advised  - Routine dental health screening advised, dental cleaning every 6 months  - Next DXA: 2 years from most current, 5/10/2026  - Routine follow-up with me  for monitoring    Subclinical hypothyroidism  - no history of thyroid dysfunction, not on thyroid medication  - patient is quite concerned about her thyroid function given her son's history of hypothyroidism, patient with TPO negative antibody  - TSH improved, while on low-dose levothyroxine, with improvement in symptoms  - continue levothyroxine 50 mcg daily  - repeat TFTs in the future    Mild vitamin D deficiency  - lab work reviewed, vitamin-D goal greater than 30  - on Vit D supplement  - lab work every 6 mo or yearly  - continue OTC VitD3 daily    Hyperlipidemia LDL goal <100  - ASCVD Risk below: Statin: Taking  The ASCVD Risk score (Larry MORALES, et al., 2019) failed to calculate for the following reasons:    The valid total cholesterol range is 130 to 320 mg/dL    Benign hypertension  - BP reviewed today  - continue home medications  - manage by PCP  - on HCTZ can lead to mild hypercalcemia previously message sent to PCP recommending alternative therapy.  - still on hydrochlorothiazide will continue monitor    Advised patient to follow up with PCP for routine health maintenance care.   RTC in 5-6 months    Visit today included increased complexity associated with the care of the episodic problem addressed and managing the longitudinal care of the patient due to the serious and/or complex managed problem(s).   Including:  Osteoporosis Type 2 diabetes, hypothyroidism, vitamin-D deficiency, hypertension, hyperlipidemia.       Earnest Zavaleta M.D.  Endocrinology  [facility]  4/10/2025      Disclaimer: This note has been generated in part with the use of voice-recognition software. There may be typographical errors that have been missed during proof-reading.

## 2025-04-17 ENCOUNTER — INFUSION (OUTPATIENT)
Dept: INFUSION THERAPY | Facility: HOSPITAL | Age: 80
End: 2025-04-17
Attending: HOSPITALIST
Payer: MEDICARE

## 2025-04-17 VITALS
OXYGEN SATURATION: 98 % | RESPIRATION RATE: 16 BRPM | DIASTOLIC BLOOD PRESSURE: 66 MMHG | HEART RATE: 67 BPM | TEMPERATURE: 98 F | SYSTOLIC BLOOD PRESSURE: 146 MMHG

## 2025-04-17 DIAGNOSIS — M81.8 OTHER OSTEOPOROSIS WITHOUT CURRENT PATHOLOGICAL FRACTURE: Primary | ICD-10-CM

## 2025-04-17 PROCEDURE — 63600175 PHARM REV CODE 636 W HCPCS: Mod: JZ,TB,HCNC | Performed by: HOSPITALIST

## 2025-04-17 PROCEDURE — 96372 THER/PROPH/DIAG INJ SC/IM: CPT | Mod: HCNC

## 2025-04-17 RX ADMIN — DENOSUMAB 60 MG: 60 INJECTION SUBCUTANEOUS at 02:04

## 2025-04-23 DIAGNOSIS — I10 BENIGN HYPERTENSION: ICD-10-CM

## 2025-04-23 RX ORDER — LOSARTAN POTASSIUM AND HYDROCHLOROTHIAZIDE 25; 100 MG/1; MG/1
1 TABLET ORAL
Qty: 90 TABLET | Refills: 0 | Status: SHIPPED | OUTPATIENT
Start: 2025-04-23

## 2025-04-23 RX ORDER — AMLODIPINE BESYLATE 5 MG/1
5 TABLET ORAL
Qty: 90 TABLET | Refills: 0 | Status: SHIPPED | OUTPATIENT
Start: 2025-04-23

## 2025-04-23 RX ORDER — ATENOLOL 25 MG/1
25 TABLET ORAL
Qty: 90 TABLET | Refills: 0 | Status: SHIPPED | OUTPATIENT
Start: 2025-04-23

## 2025-04-23 NOTE — TELEPHONE ENCOUNTER
Refill Routing Note   Medication(s) are not appropriate for processing by [facility] for the following reason(s):        Required vitals abnormal  Patient not seen by provider within 15 months    [facility] action(s):  Defer      Medication Therapy Plan: PCP ADDED COMMENT IN MEDICATION SIG IN LAST PRESCRIPTION = DUE FOR ANNUAL WITH PCP, LABS      Appointments  past 12m or future 3m with PCP    Date Provider   Last Visit   12/8/2023 Rosa Garber MD   Next Visit   7/16/2025 Rosa Garber MD   ED visits in past 90 days: 0        Note composed:11:07 AM 04/23/2025

## 2025-04-23 NOTE — TELEPHONE ENCOUNTER
No care due was identified.  [facility] Embedded Care Due Messages. Reference number: [id number].   4/23/2025 6:34:32 AM CDT

## 2025-05-21 ENCOUNTER — OFFICE VISIT (OUTPATIENT)
Dept: ORTHOPEDICS | Facility: CLINIC | Age: 80
End: 2025-05-21
Payer: MEDICARE

## 2025-05-21 VITALS — HEIGHT: 61 IN | WEIGHT: 155 LBS | BODY MASS INDEX: 29.27 KG/M2

## 2025-05-21 DIAGNOSIS — S99.912A LEFT ANKLE INJURY, INITIAL ENCOUNTER: Primary | ICD-10-CM

## 2025-05-21 DIAGNOSIS — K21.9 GASTROESOPHAGEAL REFLUX DISEASE WITHOUT ESOPHAGITIS: ICD-10-CM

## 2025-05-21 DIAGNOSIS — S82.839A NONDISPLACED FRACTURE OF DISTAL END OF FIBULA: ICD-10-CM

## 2025-05-21 DIAGNOSIS — E78.5 HYPERLIPIDEMIA LDL GOAL <100: ICD-10-CM

## 2025-05-21 PROCEDURE — 99213 OFFICE O/P EST LOW 20 MIN: CPT | Mod: HCNC,S$GLB,,

## 2025-05-21 PROCEDURE — 1101F PT FALLS ASSESS-DOCD LE1/YR: CPT | Mod: CPTII,HCNC,S$GLB,

## 2025-05-21 PROCEDURE — 1125F AMNT PAIN NOTED PAIN PRSNT: CPT | Mod: CPTII,HCNC,S$GLB,

## 2025-05-21 PROCEDURE — 3288F FALL RISK ASSESSMENT DOCD: CPT | Mod: CPTII,HCNC,S$GLB,

## 2025-05-21 PROCEDURE — 1160F RVW MEDS BY RX/DR IN RCRD: CPT | Mod: CPTII,HCNC,S$GLB,

## 2025-05-21 PROCEDURE — 3072F LOW RISK FOR RETINOPATHY: CPT | Mod: CPTII,HCNC,S$GLB,

## 2025-05-21 PROCEDURE — 99999 PR PBB SHADOW E&M-EST. PATIENT-LVL III: CPT | Mod: PBBFAC,HCNC,,

## 2025-05-21 PROCEDURE — 1159F MED LIST DOCD IN RCRD: CPT | Mod: CPTII,HCNC,S$GLB,

## 2025-05-21 RX ORDER — ROSUVASTATIN CALCIUM 5 MG/1
5 TABLET, COATED ORAL NIGHTLY
Qty: 90 TABLET | Refills: 0 | Status: SHIPPED | OUTPATIENT
Start: 2025-05-21

## 2025-05-21 RX ORDER — OMEPRAZOLE 20 MG/1
CAPSULE, DELAYED RELEASE ORAL
Qty: 90 CAPSULE | Refills: 0 | Status: SHIPPED | OUTPATIENT
Start: 2025-05-21

## 2025-05-21 NOTE — PROGRESS NOTES
[facility] Patient: Ankle   Initial Visit (1/17/25) HISTORY OF PRESENT ILLNESS:  Peace Farmer, a 80 y.o. female, presents today for evaluation of her left ankle.    Patient reports onset of acute pain beginning Date: 1/5/25. Patient reports fall while trying to move potted plants inside. She did not seek treatment immediately, she was able to bear weight directly following injury. Was seen by her PCP on 1/14/25 and x-rays taken of the left ankle demonstrated non-displaced distal fibula fracture. Since then, she has been ambulating with mild pain and swelling had improved. However, this morning while she was getting up she reports she twisted her ankle again and is now having increased pain and swelling laterally. She has not been in a splint or boot. Ambulating in normal shoe with cane.   Pain is located along anterior and lateral aspect of left ankle/foot. Pain is 6/10 at present and worse with weight-bearing. Pain is described as ache, dull, and tender to palpation. Patient states pain does not radiate.     Associated symptoms include swelling. Pain is aggravated by activities above & occur constantly. Symptoms do not interfere with sleep. Patient reports pain & symptoms are staying the same . Patient reports no prior surgery to ankle.     Prior treatment Peace Farmer has tried   OTC Acetaminophen - Yes  OTC NSAID - No   Rx NSAID - No   Rx Narcotic/Other - No   Brace - No   Activity Modification - Yes  Physical Therapy - No   Home Exercise Program - No  Assistive Device - Yes. Type of Assistive Device: cane  Other - ice, elevation    Interval History (2/14/25): Patient returns for 4 week follow up of left nondisplaced distal fibula fracture (DOI 1/6/25). She reports the ankle is feeling much better. She rates her pain at about 3/10 with weight-bearing. She has been having issues with the boot, she feels it is too large and is irritating her knee. Due to this she has not been wearing the boot at home.  She wears it when she leaves her house. I recommended repeating radiographs toady to assess that the fracture has not displaced any, but patient declines due to price of x-ray. Not taking any medications.     Interval history 04/09/2025:  Patient returns for 3 week follow-up of left nondisplaced distal fibula fracture (DOI 1/6/25).  Now roughly 13 weeks out from injury.  Original follow-up was supposed to be at around 8 weeks from injury, however she unfortunately developed a head cold and had to reschedule.  Overall, her and laterally along fracture site has completely resolved.  She continues to wear the boot when out of her home, but reports she has not been compliant at home with boot over the last few weeks.  She does report pain which is mostly medially based when ambulating without the boot.  She has been trying to work on ankle range of motion at home.  Continues to not require any over-the-counter oral medications for this.  Denies any numbness or tingling.    Interval history 05/21/2025: Patient returns for follow up of left nondisplaced distal fibula fracture (DOI 1/6/25). She is doing really well at this stage. Overall her pain has significantly improved. Has weaned out of boot. Working on ROM at home. Not taking any medications for pain.     Review of systems (ROS):  PAIN ASSESSMENT:  See HPI.  MUSCULOSKELETAL: See HPI.  OTHER 10 point review of systems is negative except as stated in HPI above    Past Medical History:   Diagnosis Date    Abnormal mammogram 10/13    s/p biopsy    Cortical cataract of both eyes 11/16/2018    Diabetes mellitus type II     Fibromyalgia     Hyperlipidemia     Hypertension     Obesity (BMI 30-39.9)     Osteopenia     Psoriasis     Skin cancer of face 2011    forehead      Family History   Problem Relation Name Age of Onset    Hypertension Mother      Skin cancer Mother      Cataracts Mother      Macular degeneration Mother      Cancer Mother          skin     Multiple births  Mother      Heart disease Father      Skin cancer Father      Hypertension Father      Lung cancer Father      Cataracts Father      No Known Problems Brother Jasiel     No Known Problems Maternal Aunt      No Known Problems Maternal Uncle      No Known Problems Paternal Aunt      No Known Problems Paternal Uncle      Stroke Maternal Grandmother      No Known Problems Maternal Grandfather      No Known Problems Paternal Grandmother      No Known Problems Paternal Grandfather      Multiple sclerosis Son Bill     No Known Problems Other      Diabetes Mellitus Neg Hx      Breast cancer Neg Hx      Colon cancer Neg Hx      Amblyopia Neg Hx      Blindness Neg Hx      Diabetes Neg Hx      Glaucoma Neg Hx      Retinal detachment Neg Hx      Strabismus Neg Hx      Thyroid disease Neg Hx        Past Surgical History:   Procedure Laterality Date    BREAST BIOPSY Left     no scars noted    CATARACT EXTRACTION W/  INTRAOCULAR LENS IMPLANT Right 6/30/2020    Procedure: EXTRACTION, CATARACT, WITH IOL INSERTION;  Surgeon: Joshua Robin MD;  Location: [facility];  Service: Ophthalmology;  Laterality: Right;    CATARACT EXTRACTION W/  INTRAOCULAR LENS IMPLANT Left 7/14/2020    Procedure: EXTRACTION, CATARACT, WITH IOL INSERTION;  Surgeon: Joshua Robin MD;  Location: [facility];  Service: Ophthalmology;  Laterality: Left;    PARTIAL HYSTERECTOMY      age 34    skin cancer face  2011      Review of patient's allergies indicates:   Allergen Reactions    Pcn [penicillins] Hives and Shortness Of Breath          PHYSICAL EXAMINATION    General:  The patient is alert and oriented x 3. Mood is pleasant. Observation of ears, eyes and nose reveal no gross abnormalities. HEENT: NCAT, sclera anicteric. Lungs: Respirations are equal and unlabored.       LEFT FOOT/ANKLE EXAM      Inspection:                Gait:      Non-Antalgic             Scars:   None             Swelling:   Very mild over lateral ankle          Color:     Ecchymosis resolved   Atrophy:  None                   Tenderness:  Lateral:         Anterior:  Sinus tarsi:    None     Anteromedial joint line:   none  Syndesmosis:   None    Anterolateral joint line:    none  ATFL:     None     Talonavicular:      none   CFL:      None   Anterior tibialis:     none  Anterolateral gutter:  none     Extensor tendons:     none  Fibula:     None  Peroneal tendons:  none     Peroneal tubercle:  none      Medial:         Posterior:  Deltoid:   none      Medial/Lateral Achilles:   none  Malleolus:   none      Medial/Lateral Achilles insertion:  none  PTT:     none        CALCANEUS:  Navicular:   none      Retrocalcaneal:     none       Medial Achilles:     none  Lateral Achilles:     none  Calcaneal tuberosity:    none  Foot:  Calcaneal cuboid:    none   MT / MT heads:    none   Navicular:    none    Medial cord origin PF:   none  Cuneiforms:    none    Web space:      none  Lisfranc:     none    Tarsal tunnel:     none  Base of 5th metatarsal:  none       ROM:      Right / Left      Strength: (affected)  Ankle DF/PF:     15/45, 15/45     Anterior tibialis:   /  Eversion/Inversion:   15/25 15/25     Posterior tibialis:   /  Midfoot ABD/ADD:   10/10 10/10   Gastrocnemius/Soleus:  /  First MTP DF/PF:       Peroneals:     5/5         EHL:       5/5         FHL:       5/5     Special Tests:  Anterior drawer:  Normal (C-W contralateral side)          Extremity Neuro-vascular Testing:  All dermatomes foot, ankle and leg have normal sensation light touch  Ankle Reflexes 2+, symmetric   Negative Babinski and No Clonus  2+ pulses PT/DT with brisk capillary refill toes.     Other Findings:  Imagin25: Minimal nondisplaced distal fibula fracture. No ankle mortise widening. Soft tissue edema.   25: Similar appearance of a small nondisplaced fracture about the lateral malleolus with surrounding soft tissue swelling. Well corticated ossific fragment about the medial  malleolus may represent sequela of remote fracture deformity. No new fracture or dislocation.   4/9/25: Reconfirmed now subacute fracture of the fibula tip. Interval development of 1 mm diastasis of the distal fracture fragment with respect to the remaining fibular shaft. Fracture line remains evident. No new fractures. Preserved tibiotalar articulation and talar dome. Two stable radiodensities seen subjacent to the medial malleolus that could be on the basis of remote nonunited fracture fragments or areas of dystrophic soft tissue calcification-ossification. No new fractures. Preserved tibiotalar articulation and talar dome. Reconfirmed bimalleolar soft tissue swelling, greater laterally.     ASSESSMENT & PLAN   Assessment: Nondisplaced distal fibula fracture (DOI 1/6/25).  Overall her lateral based pain over fracture site has resolved.  Still has some minor swelling laterally. She is doing much better from a pain standpoint. Transitioned to normal shoe. Did not attend PT, does not feel that she needs it.     Plan:   OTC medications for pain control as needed  Continue to work on ROM/strengthening at home  Follow up as needed   Call in the interim with any questions    N. Neubig, PA-C [facility]

## 2025-05-21 NOTE — TELEPHONE ENCOUNTER
Refill Routing Note   Medication(s) are not appropriate for processing by [facility] for the following reason(s):        Patient not seen by provider within 15 months    [facility] action(s):  Defer   Requires labs : Yes      Medication Therapy Plan: LIPIDS      Appointments  past 12m or future 3m with PCP    Date Provider   Last Visit   12/8/2023 Rosa Garber MD   Next Visit   7/16/2025 Rosa Garber MD   ED visits in past 90 days: 0        Note composed:8:17 AM 05/21/2025

## 2025-05-21 NOTE — TELEPHONE ENCOUNTER
No care due was identified.  [facility] Embedded Care Due Messages. Reference number: [id number].   5/21/2025 3:58:13 AM CDT

## 2025-05-21 NOTE — TELEPHONE ENCOUNTER
Care Due:                  Date            Visit Type   Department     Provider  --------------------------------------------------------------------------------                                [facility]                              PRIMARY      / INTERNAL MED [facility]  Last Visit: 12-      CARE ([facility])   / PEDS         Lo Gabrer                              [facility]                              PRIMARY      / INTERNAL MED [facility]  Next Visit: 07-      CARE ([facility])   / PEDS         Lo Garber                                                            Last  Test          Frequency    Reason                     Performed    Due Date  --------------------------------------------------------------------------------    CMP.........  12 months..  rosuvastatin.............  10-   07-    Lipid Panel.  12 months..  rosuvastatin.............  07- 07-    [employer] Embedded Care Due Messages. Reference number: [id number].   5/21/2025 3:57:41 AM CDT

## 2025-06-26 DIAGNOSIS — E78.5 HYPERLIPIDEMIA LDL GOAL <100: Primary | ICD-10-CM

## 2025-06-30 DIAGNOSIS — I10 BENIGN HYPERTENSION: ICD-10-CM

## 2025-06-30 RX ORDER — AMLODIPINE BESYLATE 5 MG/1
5 TABLET ORAL DAILY
Qty: 90 TABLET | Refills: 0 | Status: SHIPPED | OUTPATIENT
Start: 2025-06-30

## 2025-06-30 RX ORDER — ATENOLOL 25 MG/1
25 TABLET ORAL DAILY
Qty: 90 TABLET | Refills: 0 | Status: SHIPPED | OUTPATIENT
Start: 2025-06-30

## 2025-06-30 RX ORDER — LOSARTAN POTASSIUM AND HYDROCHLOROTHIAZIDE 25; 100 MG/1; MG/1
1 TABLET ORAL DAILY
Qty: 90 TABLET | Refills: 0 | Status: SHIPPED | OUTPATIENT
Start: 2025-06-30

## 2025-06-30 NOTE — TELEPHONE ENCOUNTER
Refill Routing Note   Medication(s) are not appropriate for processing by [facility] for the following reason(s):        Required vitals abnormal  Patient not seen by provider within 15 months    [facility] action(s):  Defer             Appointments  past 12m or future 3m with PCP    Date Provider   Last Visit   12/8/2023 Rosa Garber MD   Next Visit   7/16/2025 Rosa Garber MD   ED visits in past 90 days: 0        Note composed:4:01 PM 06/30/2025

## 2025-06-30 NOTE — TELEPHONE ENCOUNTER
No care due was identified.  [facility] Embedded Care Due Messages. Reference number: [id number].   6/30/2025 1:38:49 AM CDT

## 2025-07-17 ENCOUNTER — TELEPHONE (OUTPATIENT)
Dept: FAMILY MEDICINE | Facility: CLINIC | Age: 80
End: 2025-07-17
Payer: MEDICARE

## 2025-07-17 NOTE — TELEPHONE ENCOUNTER
Spoke with patient. Patient scheduled for Copied from CRM #[id number]. Topic: Appointments - Appointment Access  >> Jul 15, 2025  9:43 AM Katelyn wrote:  Type:  Sooner Appointment Request    Patient is requesting a sooner appointment.  Patient declined first available appointment listed as well as another facility and provider .  Patient will not accept being placed on the waitlist and is requesting a message be sent to doctor.    Name of Caller: Self     When is the first available appointment? 03/04/2026    Symptoms: general f/u     Would the patient rather a call back or a response via [facility]? Call back     Best Call Back Number: [phone]      Additional Information:  due to conflicting schedules with her  pt was forced to reschedule their appt pushing her out to march of next year.

## 2025-07-23 ENCOUNTER — OFFICE VISIT (OUTPATIENT)
Dept: FAMILY MEDICINE | Facility: CLINIC | Age: 80
End: 2025-07-23
Payer: MEDICARE

## 2025-07-23 VITALS
TEMPERATURE: 99 F | OXYGEN SATURATION: 96 % | HEART RATE: 70 BPM | WEIGHT: 150.81 LBS | HEIGHT: 61 IN | SYSTOLIC BLOOD PRESSURE: 126 MMHG | DIASTOLIC BLOOD PRESSURE: 62 MMHG | BODY MASS INDEX: 28.47 KG/M2

## 2025-07-23 DIAGNOSIS — E78.5 HYPERLIPIDEMIA LDL GOAL <100: ICD-10-CM

## 2025-07-23 DIAGNOSIS — Z12.31 BREAST CANCER SCREENING BY MAMMOGRAM: ICD-10-CM

## 2025-07-23 DIAGNOSIS — I10 BENIGN HYPERTENSION: Primary | ICD-10-CM

## 2025-07-23 DIAGNOSIS — E11.65 TYPE 2 DIABETES MELLITUS WITH HYPERGLYCEMIA, WITHOUT LONG-TERM CURRENT USE OF INSULIN: ICD-10-CM

## 2025-07-23 PROCEDURE — 99999 PR PBB SHADOW E&M-EST. PATIENT-LVL IV: CPT | Mod: PBBFAC,HCNC,,

## 2025-07-23 PROCEDURE — G2211 COMPLEX E/M VISIT ADD ON: HCPCS | Mod: HCNC,S$GLB,,

## 2025-07-23 PROCEDURE — 3074F SYST BP LT 130 MM HG: CPT | Mod: CPTII,HCNC,S$GLB,

## 2025-07-23 PROCEDURE — 3288F FALL RISK ASSESSMENT DOCD: CPT | Mod: CPTII,HCNC,S$GLB,

## 2025-07-23 PROCEDURE — 1160F RVW MEDS BY RX/DR IN RCRD: CPT | Mod: CPTII,HCNC,S$GLB,

## 2025-07-23 PROCEDURE — 1159F MED LIST DOCD IN RCRD: CPT | Mod: CPTII,HCNC,S$GLB,

## 2025-07-23 PROCEDURE — 1126F AMNT PAIN NOTED NONE PRSNT: CPT | Mod: CPTII,HCNC,S$GLB,

## 2025-07-23 PROCEDURE — 99214 OFFICE O/P EST MOD 30 MIN: CPT | Mod: HCNC,S$GLB,,

## 2025-07-23 PROCEDURE — 3072F LOW RISK FOR RETINOPATHY: CPT | Mod: CPTII,HCNC,S$GLB,

## 2025-07-23 PROCEDURE — 1100F PTFALLS ASSESS-DOCD GE2>/YR: CPT | Mod: CPTII,HCNC,S$GLB,

## 2025-07-23 PROCEDURE — 3078F DIAST BP <80 MM HG: CPT | Mod: CPTII,HCNC,S$GLB,

## 2025-07-23 RX ORDER — AMLODIPINE BESYLATE 5 MG/1
5 TABLET ORAL DAILY
Qty: 90 TABLET | Refills: 0 | Status: SHIPPED | OUTPATIENT
Start: 2025-07-23

## 2025-07-23 RX ORDER — LOSARTAN POTASSIUM AND HYDROCHLOROTHIAZIDE 25; 100 MG/1; MG/1
1 TABLET ORAL DAILY
Qty: 90 TABLET | Refills: 0 | Status: SHIPPED | OUTPATIENT
Start: 2025-07-23

## 2025-07-23 RX ORDER — ROSUVASTATIN CALCIUM 5 MG/1
5 TABLET, COATED ORAL NIGHTLY
Qty: 90 TABLET | Refills: 0 | Status: SHIPPED | OUTPATIENT
Start: 2025-07-23

## 2025-07-23 RX ORDER — ATENOLOL 25 MG/1
25 TABLET ORAL DAILY
Qty: 90 TABLET | Refills: 0 | Status: SHIPPED | OUTPATIENT
Start: 2025-07-23

## 2025-07-23 NOTE — PROGRESS NOTES
HPI     Chief Complaint:  Chief Complaint   Patient presents with    Follow-up       Peace Farmer is a 80 y.o. female with multiple medical diagnoses as listed in the medical history and problem list that presents for   Chief Complaint   Patient presents with    Follow-up    .     Patient is not known to me with her last appointment in this department on Visit date not found.     HPI  Pt presents for follow up.  Complains of continued fatigue. Does take multivitamin with B12.   Continued OAB despite completing pelvic floor therapy.  Followed by endocrinology for DM. Blood sugar has been stable.  Caring for  with cancer.    Assessment & Plan     Problem List Items Addressed This Visit       Benign hypertension - Primary  BP today in clinic 126/62.  Will refill Hyzaar, Tenormin and Norvasc.      Relevant Medications    losartan-hydrochlorothiazide 100-25 mg (HYZAAR) 100-25 mg per tablet    atenoloL (TENORMIN) 25 MG tablet    amLODIPine (NORVASC) 5 MG tablet    Hyperlipidemia LDL goal <100  Stable. Will refill Rosuvastatin. The current medical regimen is effective;  continue present plan and medications.      Relevant Medications    rosuvastatin (CRESTOR) 5 MG tablet    Type 2 diabetes mellitus with hyperglycemia, without long-term current use of insulin  Stable. Followed by endocrinology. Ozempic controlling blood sugar and assisting with weight loss.  Hemoglobin A1C   Date Value Ref Range Status   03/19/2025 5.9 (H) 4.0 - 5.6 % Final     Comment:     ADA Screening Guidelines:  5.7-6.4%  Consistent with prediabetes  >or=6.5%  Consistent with diabetes    High levels of fetal hemoglobin interfere with the HbA1C  assay. Heterozygous hemoglobin variants (HbS, HgC, etc)do  not significantly interfere with this assay.   However, presence of multiple variants may affect accuracy.     12/02/2024 6.7 (H) 4.0 - 5.6 % Final     Comment:     ADA Screening Guidelines:  5.7-6.4%  Consistent with prediabetes  >or=6.5%   Consistent with diabetes    High levels of fetal hemoglobin interfere with the HbA1C  assay. Heterozygous hemoglobin variants (HbS, HgC, etc)do  not significantly interfere with this assay.   However, presence of multiple variants may affect accuracy.     07/30/2024 5.8 (H) 4.0 - 5.6 % Final     Comment:     ADA Screening Guidelines:  5.7-6.4%  Consistent with prediabetes  >or=6.5%  Consistent with diabetes    High levels of fetal hemoglobin interfere with the HbA1C  assay. Heterozygous hemoglobin variants (HbS, HgC, etc)do  not significantly interfere with this assay.   However, presence of multiple variants may affect accuracy.            Other Visit Diagnoses         Breast cancer screening by mammogram      Due for breast cancer screening. Will order mammogram.    Relevant Orders    Mammo Digital Screening Bilat w/ Barrie (XPD)              --------------------------------------------      Health Maintenance:  Health Maintenance         Date Due Completion Date    TETANUS VACCINE 05/20/2024 5/20/2014    Diabetic Eye Exam 03/07/2025 3/7/2024    Diabetes Urine Screening 03/28/2025 3/28/2024    Mammogram 07/11/2025 7/11/2023    Lipid Panel 07/30/2025 7/30/2024    Influenza Vaccine (1) 09/01/2025 12/10/2024    Hemoglobin A1c 09/19/2025 3/19/2025    DEXA Scan 05/07/2026 5/7/2024            Health maintenance reviewed and Mammogram ordered  DM eye exam scheduled.    Follow Up:  Follow up in about 8 months (around 3/23/2026).    Exam     Review of Systems:  (as noted above)  Review of Systems    Physical Exam:   Physical Exam  Constitutional:       General: She is not in acute distress.     Appearance: Normal appearance. She is not ill-appearing, toxic-appearing or diaphoretic.   Cardiovascular:      Rate and Rhythm: Normal rate and regular rhythm.   Pulmonary:      Effort: Pulmonary effort is normal.      Breath sounds: Normal breath sounds.   Neurological:      Mental Status: She is alert.       Vitals:    07/23/25  "1403   BP: 126/62   BP Location: Right arm   Patient Position: Sitting   Pulse: 70   Temp: 98.7 °F (37.1 °C)   TempSrc: Oral   SpO2: 96%   Weight: 68.4 kg (150 lb 12.7 oz)   Height: 5' 1" (1.549 m)      Body mass index is 28.49 kg/m².        History     Past Medical History:  Past Medical History:   Diagnosis Date    Abnormal mammogram 10/13    s/p biopsy    Cortical cataract of both eyes 11/16/2018    Diabetes mellitus type II     Fibromyalgia     Hyperlipidemia     Hypertension     Obesity (BMI 30-39.9)     Osteopenia     Psoriasis     Skin cancer of face 2011    forehead       Past Surgical History:  Past Surgical History:   Procedure Laterality Date    BREAST BIOPSY Left     no scars noted    CATARACT EXTRACTION W/  INTRAOCULAR LENS IMPLANT Right 6/30/2020    Procedure: EXTRACTION, CATARACT, WITH IOL INSERTION;  Surgeon: Joshua Robin MD;  Location: [facility];  Service: Ophthalmology;  Laterality: Right;    CATARACT EXTRACTION W/  INTRAOCULAR LENS IMPLANT Left 7/14/2020    Procedure: EXTRACTION, CATARACT, WITH IOL INSERTION;  Surgeon: Joshua Robin MD;  Location: [facility];  Service: Ophthalmology;  Laterality: Left;    PARTIAL HYSTERECTOMY      age 34    skin cancer face  2011       Social History:  Social History[1]    Family History:  Family History   Problem Relation Name Age of Onset    Hypertension Mother      Skin cancer Mother      Cataracts Mother      Macular degeneration Mother      Cancer Mother          skin     Multiple births Mother      Heart disease Father      Skin cancer Father      Hypertension Father      Lung cancer Father      Cataracts Father      No Known Problems Brother Jasiel     No Known Problems Maternal Aunt      No Known Problems Maternal Uncle      No Known Problems Paternal Aunt      No Known Problems Paternal Uncle      Stroke Maternal Grandmother      No Known Problems Maternal Grandfather      No Known Problems Paternal Grandmother      No Known Problems " Paternal Grandfather      Multiple sclerosis Son Bill     No Known Problems Other      Diabetes Mellitus Neg Hx      Breast cancer Neg Hx      Colon cancer Neg Hx      Amblyopia Neg Hx      Blindness Neg Hx      Diabetes Neg Hx      Glaucoma Neg Hx      Retinal detachment Neg Hx      Strabismus Neg Hx      Thyroid disease Neg Hx         Allergies and Medications: (updated and reviewed)  Review of patient's allergies indicates:   Allergen Reactions    Pcn [penicillins] Hives and Shortness Of Breath     Current Medications[2]    Patient Care Team:  Rosa Garber MD as PCP - General (Internal Medicine)  Ana Warren LPN as Care Coordinator         - The patient is given an After Visit Summary that lists all medications with directions, allergies, education, orders placed during this encounter and follow-up instructions.      - I have reviewed the patient's medical information including past medical, family, and social history sections including the medications and allergies.      - We discussed the patient's current medications.     This note was created by combination of typed  and MModal dictation.  Transcription errors may be present.  If there are any questions, please contact me.       Joana Garcia NP                      [1]   Social History  Socioeconomic History    Marital status:     Number of children: 3    Years of education: some colle   Occupational History    Occupation: homemaker   Tobacco Use    Smoking status: Never    Smokeless tobacco: Never   Substance and Sexual Activity    Alcohol use: Yes     Comment: rare    Drug use: No    Sexual activity: Not Currently     Partners: Male     Birth control/protection: Post-menopausal   Social History Narrative    Adult Screenings Reviewed and updated  5/20/14    Mammogram( for females) October 2013 abnromal left     Pap ( for females) partial hysterectomy     Colonoscopy  2009  Reported normal.    Flu shot 2013     Td  updated today  As Tdap  5/20/14    Pneumovax  done once    Zostavax done    Eye exam 2013 due for  2014    Bone density 7/2012 due again in  2016     Social Drivers of Health     Financial Resource Strain: Medium Risk (4/10/2025)    Overall Financial Resource Strain (CARDIA)     Difficulty of Paying Living Expenses: Somewhat hard   Food Insecurity: No Food Insecurity (4/10/2025)    Hunger Vital Sign     Worried About Running Out of Food in the Last Year: Never true     Ran Out of Food in the Last Year: Never true   Transportation Needs: Unmet Transportation Needs (4/10/2025)    PRAPARE - Transportation     Lack of Transportation (Medical): No     Lack of Transportation (Non-Medical): Yes   Physical Activity: Inactive (4/10/2025)    Exercise Vital Sign     Days of Exercise per Week: 0 days     Minutes of Exercise per Session: 0 min   Stress: Stress Concern Present (4/10/2025)    [employer] [place] [employer] - Occupational Stress Questionnaire     Feeling of Stress : Very much   Housing Stability: Low Risk  (4/10/2025)    Housing Stability Vital Sign     Unable to Pay for Housing in the Last Year: No     Number of Times Moved in the Last Year: 0     Homeless in the Last Year: No   [2]   Current Outpatient Medications   Medication Sig Dispense Refill    blood-glucose meter kit Dispense: 1 glucometer, use to check glucose 3 times a day, ICD-10: E11.65(2)/E10.65 (1),  Dispense machine covered by insurance 1 each 0    calcium carb/vit D3/minerals (CALCIUM-VITAMIN D ORAL) Take by mouth.      calcium-vitamin D 500-125 mg-unit tablet Take 1 tablet by mouth 2 (two) times daily.      cetirizine (ZYRTEC) 10 MG tablet Take 1 tablet (10 mg total) by mouth daily as needed for Allergies. 90 tablet 2    gabapentin (NEURONTIN) 100 MG capsule TAKE 4 CAPSULES NIGHTLY AS NEEDED (ITCHING/SLEEP) 150 capsule 1    glimepiride (AMARYL) 4 MG tablet Take 1 tablet (4 mg total) by mouth daily with breakfast. 90 tablet 3    lancets  Misc Dispense: Lancets use to check glucose 3 times a day, ICD-10: E11.65 200 each 8    levothyroxine (SYNTHROID) 50 MCG tablet Take 1 tablet (50 mcg total) by mouth before breakfast. 90 tablet 3    metFORMIN (GLUCOPHAGE) 1000 MG tablet Take 1 tablet (1,000 mg total) by mouth 2 (two) times daily. 180 tablet 3    omeprazole (PRILOSEC) 20 MG capsule TAKE 1 CAPSULE DAILY ONLY AS NEEDED FOR HEARTBURN 90 capsule 0    OZEMPIC 1 mg/dose (4 mg/3 mL) Inject 1 mg into the skin every 7 days. 12 mL 3    amLODIPine (NORVASC) 5 MG tablet Take 1 tablet (5 mg total) by mouth once daily. DUE FOR ANNUAL WITH PCP, LABS 90 tablet 0    atenoloL (TENORMIN) 25 MG tablet Take 1 tablet (25 mg total) by mouth once daily. DUE FOR ANNUAL WITH PCP, LABS 90 tablet 0    losartan-hydrochlorothiazide 100-25 mg (HYZAAR) 100-25 mg per tablet Take 1 tablet by mouth once daily. DUE FOR ANNUAL WITH PCP, LABS 90 tablet 0    rosuvastatin (CRESTOR) 5 MG tablet Take 1 tablet (5 mg total) by mouth every evening. 90 tablet 0     No current facility-administered medications for this visit.

## 2025-08-26 ENCOUNTER — PATIENT OUTREACH (OUTPATIENT)
Dept: ADMINISTRATIVE | Facility: HOSPITAL | Age: 80
End: 2025-08-26
Payer: MEDICARE

## (undated) DEVICE — SHIELD EYE METAL FOX 50/BX

## (undated) DEVICE — BLADE SURG BVL ANG COAX 2.4MM

## (undated) DEVICE — GLOVE BIOGEL SKINSENSE PI 7.5

## (undated) DEVICE — SHEILD & GARTERS FOX METAL EYE

## (undated) DEVICE — Device

## (undated) DEVICE — SOL BETADINE 5%

## (undated) DEVICE — CASSETTE INFINITI

## (undated) DEVICE — SOL IRR STRL WATER 500ML

## (undated) DEVICE — SYR SLIP TIP 1CC